# Patient Record
Sex: MALE | Race: WHITE | Employment: OTHER | URBAN - METROPOLITAN AREA
[De-identification: names, ages, dates, MRNs, and addresses within clinical notes are randomized per-mention and may not be internally consistent; named-entity substitution may affect disease eponyms.]

---

## 2017-03-28 ENCOUNTER — OFFICE VISIT (OUTPATIENT)
Dept: CARDIOLOGY CLINIC | Age: 77
End: 2017-03-28

## 2017-03-28 VITALS
WEIGHT: 173 LBS | SYSTOLIC BLOOD PRESSURE: 132 MMHG | DIASTOLIC BLOOD PRESSURE: 80 MMHG | HEIGHT: 70 IN | BODY MASS INDEX: 24.77 KG/M2 | HEART RATE: 84 BPM

## 2017-03-28 DIAGNOSIS — I49.3 FREQUENT PVCS: Primary | ICD-10-CM

## 2017-03-28 PROCEDURE — 1123F ACP DISCUSS/DSCN MKR DOCD: CPT | Performed by: INTERNAL MEDICINE

## 2017-03-28 PROCEDURE — G8427 DOCREV CUR MEDS BY ELIG CLIN: HCPCS | Performed by: INTERNAL MEDICINE

## 2017-03-28 PROCEDURE — G8484 FLU IMMUNIZE NO ADMIN: HCPCS | Performed by: INTERNAL MEDICINE

## 2017-03-28 PROCEDURE — G8420 CALC BMI NORM PARAMETERS: HCPCS | Performed by: INTERNAL MEDICINE

## 2017-03-28 PROCEDURE — 4040F PNEUMOC VAC/ADMIN/RCVD: CPT | Performed by: INTERNAL MEDICINE

## 2017-03-28 PROCEDURE — 1036F TOBACCO NON-USER: CPT | Performed by: INTERNAL MEDICINE

## 2017-03-28 PROCEDURE — 99213 OFFICE O/P EST LOW 20 MIN: CPT | Performed by: INTERNAL MEDICINE

## 2017-03-28 RX ORDER — DILTIAZEM HYDROCHLORIDE 120 MG/1
120 CAPSULE, COATED, EXTENDED RELEASE ORAL DAILY
Qty: 90 CAPSULE | Refills: 3 | Status: SHIPPED | OUTPATIENT
Start: 2017-03-28 | End: 2018-04-06 | Stop reason: SDUPTHER

## 2017-05-15 ENCOUNTER — HOSPITAL ENCOUNTER (OUTPATIENT)
Dept: LAB | Age: 77
Discharge: HOME | End: 2017-05-15
Attending: PSYCHIATRY & NEUROLOGY | Admitting: PSYCHIATRY & NEUROLOGY

## 2017-05-15 LAB
ALBUMIN SERPL-MCNC: 4.1 GM/DL (ref 3.4–5)
ALP BLD-CCNC: 33 IU/L (ref 40–129)
ALT SERPL-CCNC: 19 U/L (ref 10–40)
ANION GAP SERPL CALCULATED.3IONS-SCNC: 10 MMOL/L (ref 4–16)
AST SERPL-CCNC: 25 IU/L (ref 15–37)
BASOPHILS ABSOLUTE: 0 K/CU MM
BASOPHILS RELATIVE PERCENT: 0.3 % (ref 0–1)
BILIRUB SERPL-MCNC: 0.6 MG/DL (ref 0–1)
BUN BLDV-MCNC: 12 MG/DL (ref 6–23)
CALCIUM SERPL-MCNC: 9.5 MG/DL (ref 8.3–10.6)
CHLORIDE BLD-SCNC: 100 MMOL/L (ref 99–110)
CO2: 28 MMOL/L (ref 21–32)
CREAT SERPL-MCNC: 1 MG/DL (ref 0.9–1.3)
DIFFERENTIAL TYPE: ABNORMAL
EOSINOPHILS ABSOLUTE: 0.1 K/CU MM
EOSINOPHILS RELATIVE PERCENT: 1.6 % (ref 0–3)
FOLATE: 10.9 NG/ML (ref 3.1–17.5)
GFR AFRICAN AMERICAN: >60 ML/MIN/1.73M2
GFR NON-AFRICAN AMERICAN: >60 ML/MIN/1.73M2
GLUCOSE FASTING: 93 MG/DL (ref 70–99)
HCT VFR BLD CALC: 47.6 % (ref 42–52)
HEMOGLOBIN: 15.6 GM/DL (ref 13.5–18)
IMMATURE NEUTROPHIL %: 0.3 % (ref 0–0.43)
LYMPHOCYTES ABSOLUTE: 0.9 K/CU MM
LYMPHOCYTES RELATIVE PERCENT: 27.4 % (ref 24–44)
MCH RBC QN AUTO: 31 PG (ref 27–31)
MCHC RBC AUTO-ENTMCNC: 32.8 % (ref 32–36)
MCV RBC AUTO: 94.6 FL (ref 78–100)
MONOCYTES ABSOLUTE: 0.3 K/CU MM
MONOCYTES RELATIVE PERCENT: 10.2 % (ref 0–4)
NUCLEATED RBC %: 0 %
PDW BLD-RTO: 13.2 % (ref 11.7–14.9)
PLATELET # BLD: 190 K/CU MM (ref 140–440)
PMV BLD AUTO: 9.2 FL (ref 7.5–11.1)
POTASSIUM SERPL-SCNC: 4.4 MMOL/L (ref 3.5–5.1)
RBC # BLD: 5.03 M/CU MM (ref 4.6–6.2)
SEGMENTED NEUTROPHILS ABSOLUTE COUNT: 1.9 K/CU MM
SEGMENTED NEUTROPHILS RELATIVE PERCENT: 60.2 % (ref 36–66)
SODIUM BLD-SCNC: 138 MMOL/L (ref 135–145)
TOTAL IMMATURE NEUTOROPHIL: 0.01 K/CU MM
TOTAL NUCLEATED RBC: 0 K/CU MM
TOTAL PROTEIN: 6.2 GM/DL (ref 6.4–8.2)
VITAMIN B-12: 463.5 PG/ML (ref 211–911)
WBC # BLD: 3.1 K/CU MM (ref 4–10.5)

## 2017-05-17 LAB
ALBUMIN ELP: 3.3 GM/DL (ref 3.2–5.6)
ALPHA-1-GLOBULIN: 0.2 GM/DL (ref 0.1–0.4)
ALPHA-2-GLOBULIN: 0.7 GM/DL (ref 0.4–1.2)
BETA GLOBULIN: 1 GM/DL (ref 0.5–1.3)
GAMMA GLOBULIN: 1 GM/DL (ref 0.5–1.6)
TOTAL PROTEIN: 6.2 GM/DL (ref 6.4–8.2)

## 2017-07-09 RX ORDER — SODIUM CHLORIDE, SODIUM LACTATE, POTASSIUM CHLORIDE, CALCIUM CHLORIDE 600; 310; 30; 20 MG/100ML; MG/100ML; MG/100ML; MG/100ML
INJECTION, SOLUTION INTRAVENOUS CONTINUOUS
Status: CANCELLED | OUTPATIENT
Start: 2017-07-09

## 2017-07-10 ENCOUNTER — HOSPITAL ENCOUNTER (OUTPATIENT)
Dept: SURGERY | Age: 77
Discharge: HOME | End: 2017-07-10
Attending: SPECIALIST | Admitting: SPECIALIST

## 2017-07-10 VITALS
WEIGHT: 166 LBS | OXYGEN SATURATION: 93 % | SYSTOLIC BLOOD PRESSURE: 148 MMHG | RESPIRATION RATE: 16 BRPM | DIASTOLIC BLOOD PRESSURE: 79 MMHG | HEIGHT: 70 IN | TEMPERATURE: 97.6 F | BODY MASS INDEX: 23.77 KG/M2 | HEART RATE: 85 BPM

## 2017-07-10 ASSESSMENT — PAIN SCALES - GENERAL
PAINLEVEL_OUTOF10: 0
PAINLEVEL_OUTOF10: 0

## 2017-07-10 ASSESSMENT — PAIN - FUNCTIONAL ASSESSMENT: PAIN_FUNCTIONAL_ASSESSMENT: 0-10

## 2017-07-31 ENCOUNTER — TELEPHONE (OUTPATIENT)
Dept: CARDIOLOGY CLINIC | Age: 77
End: 2017-07-31

## 2017-07-31 DIAGNOSIS — R07.9 CHEST PAIN, UNSPECIFIED TYPE: ICD-10-CM

## 2017-07-31 DIAGNOSIS — R06.00 DYSPNEA, UNSPECIFIED TYPE: Primary | ICD-10-CM

## 2017-08-01 ENCOUNTER — HOSPITAL ENCOUNTER (OUTPATIENT)
Dept: MRI IMAGING | Age: 77
Discharge: HOME | End: 2017-08-01
Attending: PSYCHIATRY & NEUROLOGY | Admitting: PSYCHIATRY & NEUROLOGY

## 2017-08-01 DIAGNOSIS — M54.17 LUMBOSACRAL RADICULOPATHY: ICD-10-CM

## 2017-08-01 DIAGNOSIS — G62.9 ACQUIRED POLYNEUROPATHY: ICD-10-CM

## 2017-08-01 DIAGNOSIS — M54.17 RADICULOPATHY OF LUMBOSACRAL REGION: ICD-10-CM

## 2017-08-03 ENCOUNTER — PROCEDURE VISIT (OUTPATIENT)
Dept: CARDIOLOGY CLINIC | Age: 77
End: 2017-08-03

## 2017-08-03 DIAGNOSIS — R06.00 DYSPNEA, UNSPECIFIED TYPE: ICD-10-CM

## 2017-08-03 DIAGNOSIS — R07.9 CHEST PAIN, UNSPECIFIED TYPE: Primary | ICD-10-CM

## 2017-08-03 DIAGNOSIS — R07.9 CHEST PAIN, UNSPECIFIED TYPE: ICD-10-CM

## 2017-08-03 LAB
LV EF: 58 %
LV EF: 60 %
LVEF MODALITY: NORMAL
LVEF MODALITY: NORMAL

## 2017-08-03 PROCEDURE — 93015 CV STRESS TEST SUPVJ I&R: CPT | Performed by: INTERNAL MEDICINE

## 2017-08-03 PROCEDURE — 78452 HT MUSCLE IMAGE SPECT MULT: CPT | Performed by: INTERNAL MEDICINE

## 2017-08-03 PROCEDURE — A9500 TC99M SESTAMIBI: HCPCS | Performed by: INTERNAL MEDICINE

## 2017-08-03 PROCEDURE — 93306 TTE W/DOPPLER COMPLETE: CPT | Performed by: INTERNAL MEDICINE

## 2017-08-04 ENCOUNTER — TELEPHONE (OUTPATIENT)
Dept: CARDIOLOGY CLINIC | Age: 77
End: 2017-08-04

## 2017-08-30 PROBLEM — I65.21 STENOSIS OF RIGHT CAROTID ARTERY: Status: ACTIVE | Noted: 2017-08-30

## 2017-08-30 PROBLEM — R51.9 HEADACHE: Status: ACTIVE | Noted: 2017-08-30

## 2017-08-30 PROBLEM — G45.9 TIA (TRANSIENT ISCHEMIC ATTACK): Status: ACTIVE | Noted: 2017-08-30

## 2017-08-30 PROBLEM — R20.0 LEFT FACIAL NUMBNESS: Status: ACTIVE | Noted: 2017-08-30

## 2017-10-09 ENCOUNTER — HOSPITAL ENCOUNTER (OUTPATIENT)
Dept: MRI IMAGING | Age: 77
Discharge: HOME | End: 2017-10-09
Attending: PSYCHIATRY & NEUROLOGY | Admitting: PSYCHIATRY & NEUROLOGY

## 2017-10-09 DIAGNOSIS — C61 PROSTATE CA (HCC): ICD-10-CM

## 2017-10-09 DIAGNOSIS — R51.9 HEADACHE: ICD-10-CM

## 2017-11-27 ENCOUNTER — HOSPITAL ENCOUNTER (OUTPATIENT)
Dept: PULMONOLOGY | Age: 77
Discharge: HOME | End: 2017-11-27
Attending: FAMILY MEDICINE | Admitting: FAMILY MEDICINE

## 2018-04-06 ENCOUNTER — OFFICE VISIT (OUTPATIENT)
Dept: CARDIOLOGY CLINIC | Age: 78
End: 2018-04-06

## 2018-04-06 VITALS
SYSTOLIC BLOOD PRESSURE: 110 MMHG | BODY MASS INDEX: 24.88 KG/M2 | HEIGHT: 70 IN | WEIGHT: 173.8 LBS | DIASTOLIC BLOOD PRESSURE: 70 MMHG | HEART RATE: 64 BPM

## 2018-04-06 DIAGNOSIS — R07.9 CHEST PAIN, UNSPECIFIED TYPE: Primary | ICD-10-CM

## 2018-04-06 PROCEDURE — G8428 CUR MEDS NOT DOCUMENT: HCPCS | Performed by: INTERNAL MEDICINE

## 2018-04-06 PROCEDURE — 99213 OFFICE O/P EST LOW 20 MIN: CPT | Performed by: INTERNAL MEDICINE

## 2018-04-06 PROCEDURE — G8420 CALC BMI NORM PARAMETERS: HCPCS | Performed by: INTERNAL MEDICINE

## 2018-04-06 PROCEDURE — 4040F PNEUMOC VAC/ADMIN/RCVD: CPT | Performed by: INTERNAL MEDICINE

## 2018-04-06 PROCEDURE — 1123F ACP DISCUSS/DSCN MKR DOCD: CPT | Performed by: INTERNAL MEDICINE

## 2018-04-06 PROCEDURE — 1036F TOBACCO NON-USER: CPT | Performed by: INTERNAL MEDICINE

## 2018-04-06 PROCEDURE — G8598 ASA/ANTIPLAT THER USED: HCPCS | Performed by: INTERNAL MEDICINE

## 2018-04-06 RX ORDER — DILTIAZEM HYDROCHLORIDE 120 MG/1
120 CAPSULE, COATED, EXTENDED RELEASE ORAL DAILY
Qty: 90 CAPSULE | Refills: 3 | Status: SHIPPED | OUTPATIENT
Start: 2018-04-06 | End: 2019-04-02 | Stop reason: SDUPTHER

## 2018-04-10 ENCOUNTER — HOSPITAL ENCOUNTER (OUTPATIENT)
Dept: LAB | Age: 78
Discharge: HOME | End: 2018-04-10
Attending: PSYCHIATRY & NEUROLOGY | Admitting: PSYCHIATRY & NEUROLOGY

## 2018-04-10 LAB
BACTERIA: NEGATIVE /HPF
BILIRUBIN URINE: ABNORMAL MG/DL
BLOOD, URINE: NEGATIVE
CLARITY: CLEAR
COLOR: ABNORMAL
CREATININE URINE: 333.6 MG/DL (ref 39–259)
GLUCOSE, URINE: NEGATIVE MG/DL
HYALINE CASTS: 5 /LPF
ICTOTEST: NEGATIVE
KETONES, URINE: NEGATIVE MG/DL
LEUKOCYTE ESTERASE, URINE: NEGATIVE
MUCUS: ABNORMAL HPF
NITRITE URINE, QUANTITATIVE: NEGATIVE
PH, URINE: 5 (ref 5–8)
PROT/CREAT RATIO, UR: 0.1
PROTEIN UA: NEGATIVE MG/DL
RBC URINE: 2 /HPF (ref 0–3)
SPECIFIC GRAVITY UA: 1.03 (ref 1–1.03)
SQUAMOUS EPITHELIAL: <1 /HPF
TRANSITIONAL EPITHELIAL: <1 /HPF
TRICHOMONAS: ABNORMAL /HPF
URINE TOTAL PROTEIN: 31.7 MG/DL
UROBILINOGEN, URINE: 1 MG/DL (ref 0.2–1)
WBC UA: <1 /HPF (ref 0–2)

## 2018-07-01 PROBLEM — R07.9 CHEST PAIN: Status: ACTIVE | Noted: 2018-07-01

## 2018-07-01 PROBLEM — R07.9 EXERTIONAL CHEST PAIN: Status: ACTIVE | Noted: 2018-07-01

## 2018-08-03 ENCOUNTER — OFFICE VISIT (OUTPATIENT)
Dept: CARDIOLOGY CLINIC | Age: 78
End: 2018-08-03

## 2018-08-03 VITALS
WEIGHT: 168.6 LBS | DIASTOLIC BLOOD PRESSURE: 78 MMHG | BODY MASS INDEX: 24.14 KG/M2 | HEIGHT: 70 IN | SYSTOLIC BLOOD PRESSURE: 120 MMHG | HEART RATE: 72 BPM

## 2018-08-03 DIAGNOSIS — R07.2 PRECORDIAL PAIN: ICD-10-CM

## 2018-08-03 DIAGNOSIS — E78.2 MIXED HYPERLIPIDEMIA: Primary | ICD-10-CM

## 2018-08-03 PROCEDURE — G8420 CALC BMI NORM PARAMETERS: HCPCS | Performed by: INTERNAL MEDICINE

## 2018-08-03 PROCEDURE — G8427 DOCREV CUR MEDS BY ELIG CLIN: HCPCS | Performed by: INTERNAL MEDICINE

## 2018-08-03 PROCEDURE — G8598 ASA/ANTIPLAT THER USED: HCPCS | Performed by: INTERNAL MEDICINE

## 2018-08-03 PROCEDURE — 99214 OFFICE O/P EST MOD 30 MIN: CPT | Performed by: INTERNAL MEDICINE

## 2018-08-03 PROCEDURE — 1101F PT FALLS ASSESS-DOCD LE1/YR: CPT | Performed by: INTERNAL MEDICINE

## 2018-08-03 PROCEDURE — 1123F ACP DISCUSS/DSCN MKR DOCD: CPT | Performed by: INTERNAL MEDICINE

## 2018-08-03 PROCEDURE — 4040F PNEUMOC VAC/ADMIN/RCVD: CPT | Performed by: INTERNAL MEDICINE

## 2018-08-03 PROCEDURE — 1036F TOBACCO NON-USER: CPT | Performed by: INTERNAL MEDICINE

## 2018-08-03 NOTE — PROGRESS NOTES
Pt here in office & educated on [facility] for Dx: CAD, chest pain, scheduled for 8/6/2018 @ 1400, w/arrival @ 1200, @ [facility]; risks explained; & consents signed. No labs, cxr per dr Nikhil Olson. Instructions given to pt to:  NPO 8 hrs before procedure; Call hospital @ [phone] to pre-register; May take rest of morning meds. am of procedure; & pt voiced understanding. Copies of consent,  & info. sheet given to Geni.

## 2018-08-03 NOTE — PROGRESS NOTES
CARDIOLOGY NOTE      8/3/2018    RE: Michelle Cabrera  (1940)                               TO:  Dr. Heriberto Castro MD      Thank you for involving me in taking care of your  patient Michelle Cabrera, who is a  77y.o. year old      male with past medical  history of  hyperlipidimea,  afib , TIA, Car artery stenosis is  seen today Patient  during this  visit  getstremely tired while working outside gets hypotensive , still has pressure. Normal stress and echo 7/2/18    Vitals:    08/03/18 0920   BP: 120/78   Pulse: 72       Current Outpatient Prescriptions   Medication Sig Dispense Refill    diltiazem (CARDIZEM CD) 120 MG extended release capsule Take 1 capsule by mouth daily 90 capsule 3    acetaminophen (TYLENOL) 325 MG tablet Take 2 tablets by mouth every 4 hours as needed for Pain 120 tablet 3    clopidogrel (PLAVIX) 75 MG tablet Take 1 tablet by mouth daily 30 tablet 3    gabapentin (NEURONTIN) 300 MG capsule Take 300 mg by mouth 2 times daily. 0    vitamin B-12 (CYANOCOBALAMIN) 500 MCG tablet Take 500 mcg by mouth daily.  traMADol (ULTRAM) 50 MG tablet Take 50 mg by mouth every 6 hours as needed for Pain.  omeprazole (PRILOSEC) 20 MG capsule Take 20 mg by mouth daily. No current facility-administered medications for this visit. Allergies: Fenofibrate; Lipitor; and Pravachol [pravastatin sodium]  Past Medical History:   Diagnosis Date    Anesthesia complication     Patient reports in 1973 following a Back Surgery he was kept in the hospital due to high temperatures in the evenings. He stated they kept him for observation and doesn't remember receiving any treatment for the high temperatures. Patient reports the anesthesia name started with flur? ?.    CAD (coronary artery disease)     4-29-14 Patient unsure of reason for Diagnosis. Pt reports having a MVP but had clear Cardiac Cath. report.     Cancer [facility]) 6-2011    Prostate - Surgical Intervention    GERD (gastroesophageal reflux disease)     H/O 24 hour EKG monitoring 3/12  12/11    3/12/12  essentially a normal event monitor without significant arrhythmias noted    H/O cardiac catheterization 2/2/12 4/02 10/98    2/22/12  LM, LAD and RCA all without significant disease    H/O cardiac catheterization 2/2/12 4/02 10/98    Done R/T false pos on stress test.    H/O echocardiogram 12/10      3/14 EF55-60% normal LV function 12/10/10  complete 2 dim transthoracic echj. LVSF normal  EF >55%, transmitral spectral Doppler flow pattern is suggestive of  impaired LV relaxation    History of cardiovascular stress test 2/12 11/09 7/08 9/06 2/02 4/00 8/99 2/8/2012  evidence of mild ischemia  in the RCA. abnormal study , restingEF is 70%, global LVSF is  normal    History of Holter monitoring 9/17/14    14 day EVENT - no RVR    History of nuclear stress test 08/03/2017    cardiolite-normal,EF60%    Hx of chest x-ray 2/20/12    chest is considereed non acute. COPD    Hx of echocardiogram 08/03/2017    TR97-71%,VPVVJ 2 diastolic dysfunction    Hyperlipidemia     MVP (mitral valve prolapse)      Past Surgical History:   Procedure Laterality Date    BACK SURGERY  1973, 2003    CARDIAC CATHETERIZATION  2/2/12    EF 55% No sugnificant disease.     COLONOSCOPY  5/5/14    diverticulosis, 1 polyp, hemorrhoids    COLONOSCOPY  07/10/2017    single 5 mm polyp found in sigmoid colon, moderate diverticulosis found in sigmoid colon    PROSTATE SURGERY  6/2011    ROTATOR CUFF REPAIR      ROTATOR CUFF REPAIR Right      Family History   Problem Relation Age of Onset    Heart Disease Mother     Heart Disease Father     Heart Disease Brother      Social History   Substance Use Topics    Smoking status: Never Smoker    Smokeless tobacco: Never Used    Alcohol use Yes      Comment: Occassional          Review of systems:  HEENT: Neg  Card: CP  GI;Neg  : Neg  Neuro: Neg  Psych: Neg  Derm: Neg  MS; Neg  All:

## 2018-08-15 ENCOUNTER — OFFICE VISIT (OUTPATIENT)
Dept: CARDIOLOGY CLINIC | Age: 78
End: 2018-08-15

## 2018-08-15 ENCOUNTER — TELEPHONE (OUTPATIENT)
Dept: CARDIOLOGY CLINIC | Age: 78
End: 2018-08-15

## 2018-08-15 VITALS
HEIGHT: 70 IN | SYSTOLIC BLOOD PRESSURE: 126 MMHG | HEART RATE: 64 BPM | DIASTOLIC BLOOD PRESSURE: 78 MMHG | BODY MASS INDEX: 24.2 KG/M2 | WEIGHT: 169 LBS

## 2018-08-15 DIAGNOSIS — R07.2 PRECORDIAL PAIN: Primary | ICD-10-CM

## 2018-08-15 PROCEDURE — G8420 CALC BMI NORM PARAMETERS: HCPCS | Performed by: INTERNAL MEDICINE

## 2018-08-15 PROCEDURE — 1101F PT FALLS ASSESS-DOCD LE1/YR: CPT | Performed by: INTERNAL MEDICINE

## 2018-08-15 PROCEDURE — 1036F TOBACCO NON-USER: CPT | Performed by: INTERNAL MEDICINE

## 2018-08-15 PROCEDURE — 1123F ACP DISCUSS/DSCN MKR DOCD: CPT | Performed by: INTERNAL MEDICINE

## 2018-08-15 PROCEDURE — G8598 ASA/ANTIPLAT THER USED: HCPCS | Performed by: INTERNAL MEDICINE

## 2018-08-15 PROCEDURE — 99213 OFFICE O/P EST LOW 20 MIN: CPT | Performed by: INTERNAL MEDICINE

## 2018-08-15 PROCEDURE — 4040F PNEUMOC VAC/ADMIN/RCVD: CPT | Performed by: INTERNAL MEDICINE

## 2018-08-15 PROCEDURE — G8427 DOCREV CUR MEDS BY ELIG CLIN: HCPCS | Performed by: INTERNAL MEDICINE

## 2018-08-15 NOTE — PROGRESS NOTES
Cardiac Cath. report.  Cancer [facility]) 6-2011    Prostate - Surgical Intervention    GERD (gastroesophageal reflux disease)     H/O 24 hour EKG monitoring 3/12  12/11    3/12/12  essentially a normal event monitor without significant arrhythmias noted    H/O cardiac catheterization 2/2/12 4/02 10/98    2/22/12  LM, LAD and RCA all without significant disease    H/O cardiac catheterization 2/2/12 4/02 10/98    Done R/T false pos on stress test.    H/O cardiac catheterization 08/06/2018    normal study    H/O cardiovascular stress test 07/02/2018    normal study    H/O echocardiogram 12/10      3/14 EF55-60% normal LV function 12/10/10  complete 2 dim transthoracic echj. LVSF normal  EF >55%, transmitral spectral Doppler flow pattern is suggestive of  impaired LV relaxation    History of cardiovascular stress test 2/12 11/09 7/08 9/06 2/02 4/00 8/99 2/8/2012  evidence of mild ischemia  in the RCA. abnormal study , restingEF is 70%, global LVSF is  normal    History of Holter monitoring 9/17/14    14 day EVENT - no RVR    History of nuclear stress test 08/03/2017    cardiolite-normal,EF60%    Hx of chest x-ray 2/20/12    chest is considereed non acute. COPD    Hx of echocardiogram 08/03/2017    CH97-71%,FICFN 2 diastolic dysfunction    Hyperlipidemia     MVP (mitral valve prolapse)      Past Surgical History:   Procedure Laterality Date    BACK SURGERY  1973, 2003    CARDIAC CATHETERIZATION  2/2/12    EF 55% No sugnificant disease.     COLONOSCOPY  5/5/14    diverticulosis, 1 polyp, hemorrhoids    COLONOSCOPY  07/10/2017    single 5 mm polyp found in sigmoid colon, moderate diverticulosis found in sigmoid colon    PROSTATE SURGERY  6/2011    ROTATOR CUFF REPAIR      ROTATOR CUFF REPAIR Right      Family History   Problem Relation Age of Onset    Heart Disease Mother     Heart Disease Father     Heart Disease Brother      Social History   Substance Use Topics    Smoking status: Never Smoker  Smokeless tobacco: Never Used    Alcohol use Yes      Comment: Occassional          Review of systems:  HEENT: Neg  Card: CP  GI;Neg  : Neg  Neuro: Neg  Psych: Neg  Derm: Neg  MS; Neg  All: Documented  Constitutional: Neg    Objective:      Physical Exam:  /78   Pulse 64   Ht 5' 10\" (1.778 m)   Wt 169 lb (76.7 kg)   BMI 24.25 kg/m²   Wt Readings from Last 3 Encounters:   08/15/18 169 lb (76.7 kg)   08/08/18 168 lb (76.2 kg)   08/06/18 168 lb (76.2 kg)     Body mass index is 24.25 kg/m². GENERAL - Alert, oriented, pleasant, in no apparent distress. Head unremarkable  Eyes  Not injected conjunctiva  ENT  normal mucosa  Neck - Supple. No jugular venous distention noted. No carotid bruits. Cardiovascular  Normal S1 and S2 without obvious murmur or gallop. Extremities - No cyanosis, clubbing, or significant edema. Pulmonary  No respiratory distress. No wheezes or rales. Pulses: Bilateral radial and pedal pulses normal  Abdomen  no tenderness  Musculoskeletal  normal strength  Neurologic    There are  no gross focal neurologic abnormalities. Skin-  No rash  Affect; normal mood    DATA:  Lab Results   Component Value Date    CKTOTAL 197 08/08/2018    TROPONINI 0.010 03/28/2014     BNP:  No results found for: BNP  PT/INR:  No results found for: PTINR  No results found for: LABA1C  Lab Results   Component Value Date    CHOL 175 07/02/2018    TRIG 168 (H) 07/02/2018    HDL 33 (L) 07/02/2018    LDLDIRECT 128 (H) 07/02/2018     Lab Results   Component Value Date    ALT 16 08/08/2018    AST 21 08/08/2018     TSH:  No results found for: TSH          Assessment/ Plan:     Patient seen , interviewed and examined     PLAN:        - has normal LHC, has CP, CT chest    -  LIPID MANAGEMENT:  Available lipid  lab data reviewed  and patient was given dietary advice.     -   Changes  in medicines made: No     -  Test ordered today : No                      - arrythmia stable no a fib  CPM    - TIA

## 2018-08-16 ENCOUNTER — HOSPITAL ENCOUNTER (OUTPATIENT)
Dept: CT IMAGING | Age: 78
Discharge: HOME | End: 2018-08-16
Attending: INTERNAL MEDICINE | Admitting: INTERNAL MEDICINE

## 2018-08-16 DIAGNOSIS — R07.2 PRECORDIAL PAIN: ICD-10-CM

## 2018-08-17 ENCOUNTER — TELEPHONE (OUTPATIENT)
Dept: CARDIOLOGY CLINIC | Age: 78
End: 2018-08-17

## 2018-10-30 ENCOUNTER — HOSPITAL ENCOUNTER (OUTPATIENT)
Dept: CT IMAGING | Age: 78
Discharge: HOME | End: 2018-10-30
Payer: MEDICARE

## 2018-10-30 ENCOUNTER — HOSPITAL ENCOUNTER (OUTPATIENT)
Dept: NON INVASIVE DIAGNOSTICS | Age: 78
Discharge: HOME | End: 2018-10-30
Payer: MEDICARE

## 2018-10-30 DIAGNOSIS — R00.2 AWARENESS OF HEART BEAT: ICD-10-CM

## 2018-10-30 DIAGNOSIS — I95.1 AUTONOMIC ORTHOSTATIC HYPOTENSION: ICD-10-CM

## 2018-10-30 LAB
GFR AFRICAN AMERICAN: >60 ML/MIN/1.73M2
GFR NON-AFRICAN AMERICAN: >60 ML/MIN/1.73M2
POC CREATININE: 1 MG/DL (ref 0.9–1.3)

## 2018-10-30 PROCEDURE — 70498 CT ANGIOGRAPHY NECK: CPT

## 2018-10-30 PROCEDURE — 93225 XTRNL ECG REC<48 HRS REC: CPT

## 2018-10-30 PROCEDURE — 6360000004 HC RX CONTRAST MEDICATION

## 2018-10-30 PROCEDURE — 2580000003 HC RX 258

## 2018-10-30 PROCEDURE — 93226 XTRNL ECG REC<48 HR SCAN A/R: CPT

## 2018-10-30 RX ORDER — 0.9 % SODIUM CHLORIDE 0.9 %
10 VIAL (ML) INJECTION PRN
Status: DISCONTINUED | OUTPATIENT
Start: 2018-10-30 | End: 2018-10-31 | Stop reason: HOSPADM

## 2018-10-30 RX ADMIN — SODIUM CHLORIDE, PRESERVATIVE FREE 10 ML: 5 INJECTION INTRAVENOUS at 11:51

## 2018-10-30 RX ADMIN — IOPAMIDOL 80 ML: 755 INJECTION, SOLUTION INTRAVENOUS at 11:50

## 2018-11-07 ENCOUNTER — APPOINTMENT (OUTPATIENT)
Dept: CT IMAGING | Age: 78
End: 2018-11-07
Payer: MEDICARE

## 2018-11-07 ENCOUNTER — HOSPITAL ENCOUNTER (EMERGENCY)
Age: 78
Discharge: HOME | End: 2018-11-07
Attending: EMERGENCY MEDICINE
Payer: MEDICARE

## 2018-11-07 VITALS
RESPIRATION RATE: 16 BRPM | BODY MASS INDEX: 24.34 KG/M2 | OXYGEN SATURATION: 98 % | WEIGHT: 170 LBS | TEMPERATURE: 97.6 F | DIASTOLIC BLOOD PRESSURE: 39 MMHG | HEART RATE: 86 BPM | SYSTOLIC BLOOD PRESSURE: 100 MMHG | HEIGHT: 70 IN

## 2018-11-07 DIAGNOSIS — R10.84 GENERALIZED ABDOMINAL PAIN: Primary | ICD-10-CM

## 2018-11-07 DIAGNOSIS — R19.7 DIARRHEA, UNSPECIFIED TYPE: ICD-10-CM

## 2018-11-07 DIAGNOSIS — R11.2 NON-INTRACTABLE VOMITING WITH NAUSEA, UNSPECIFIED VOMITING TYPE: ICD-10-CM

## 2018-11-07 LAB
ALBUMIN SERPL-MCNC: 4.1 GM/DL (ref 3.4–5)
ALP BLD-CCNC: 31 IU/L (ref 40–128)
ALT SERPL-CCNC: 24 U/L (ref 10–40)
ANION GAP SERPL CALCULATED.3IONS-SCNC: 12 MMOL/L (ref 4–16)
AST SERPL-CCNC: 29 IU/L (ref 15–37)
BACTERIA: NEGATIVE /HPF
BASOPHILS ABSOLUTE: 0 K/CU MM
BASOPHILS RELATIVE PERCENT: 0.1 % (ref 0–1)
BILIRUB SERPL-MCNC: 0.7 MG/DL (ref 0–1)
BILIRUBIN URINE: ABNORMAL MG/DL
BLOOD, URINE: NEGATIVE
BUN BLDV-MCNC: 23 MG/DL (ref 6–23)
CALCIUM SERPL-MCNC: 9.3 MG/DL (ref 8.3–10.6)
CHLORIDE BLD-SCNC: 99 MMOL/L (ref 99–110)
CLARITY: CLEAR
CO2: 26 MMOL/L (ref 21–32)
COLOR: ABNORMAL
CREAT SERPL-MCNC: 1.2 MG/DL (ref 0.9–1.3)
DIFFERENTIAL TYPE: ABNORMAL
EOSINOPHILS ABSOLUTE: 0 K/CU MM
EOSINOPHILS RELATIVE PERCENT: 0.4 % (ref 0–3)
GFR AFRICAN AMERICAN: >60 ML/MIN/1.73M2
GFR NON-AFRICAN AMERICAN: 58 ML/MIN/1.73M2
GLUCOSE BLD-MCNC: 130 MG/DL (ref 70–99)
GLUCOSE, URINE: NEGATIVE MG/DL
HCT VFR BLD CALC: 49.8 % (ref 42–52)
HEMOGLOBIN: 16.2 GM/DL (ref 13.5–18)
ICTOTEST: POSITIVE
IMMATURE NEUTROPHIL %: 0.3 % (ref 0–0.43)
KETONES, URINE: NEGATIVE MG/DL
LACTATE: 1.5 MMOL/L (ref 0.4–2)
LEUKOCYTE ESTERASE, URINE: NEGATIVE
LIPASE: 22 IU/L (ref 13–60)
LYMPHOCYTES ABSOLUTE: 0.3 K/CU MM
LYMPHOCYTES RELATIVE PERCENT: 3.5 % (ref 24–44)
MCH RBC QN AUTO: 32.5 PG (ref 27–31)
MCHC RBC AUTO-ENTMCNC: 32.5 % (ref 32–36)
MCV RBC AUTO: 99.8 FL (ref 78–100)
MONOCYTES ABSOLUTE: 0.4 K/CU MM
MONOCYTES RELATIVE PERCENT: 5 % (ref 0–4)
MUCUS: ABNORMAL HPF
NITRITE URINE, QUANTITATIVE: NEGATIVE
PDW BLD-RTO: 12.5 % (ref 11.7–14.9)
PH, URINE: 5 (ref 5–8)
PLATELET # BLD: 236 K/CU MM (ref 140–440)
PMV BLD AUTO: 9.2 FL (ref 7.5–11.1)
POTASSIUM SERPL-SCNC: 4.5 MMOL/L (ref 3.5–5.1)
PROTEIN UA: 100 MG/DL
RBC # BLD: 4.99 M/CU MM (ref 4.6–6.2)
RBC URINE: 1 /HPF (ref 0–3)
SEGMENTED NEUTROPHILS ABSOLUTE COUNT: 6.7 K/CU MM
SEGMENTED NEUTROPHILS RELATIVE PERCENT: 90.7 % (ref 36–66)
SODIUM BLD-SCNC: 137 MMOL/L (ref 135–145)
SPECIFIC GRAVITY UA: 1.03 (ref 1–1.03)
SQUAMOUS EPITHELIAL: <1 /HPF
TOTAL IMMATURE NEUTOROPHIL: 0.02 K/CU MM
TOTAL PROTEIN: 6.8 GM/DL (ref 6.4–8.2)
TRICHOMONAS: ABNORMAL /HPF
UROBILINOGEN, URINE: 1 MG/DL (ref 0.2–1)
WBC # BLD: 7.4 K/CU MM (ref 4–10.5)
WBC UA: 1 /HPF (ref 0–2)

## 2018-11-07 PROCEDURE — 96374 THER/PROPH/DIAG INJ IV PUSH: CPT

## 2018-11-07 PROCEDURE — 93010 ELECTROCARDIOGRAM REPORT: CPT | Performed by: INTERNAL MEDICINE

## 2018-11-07 PROCEDURE — 83690 ASSAY OF LIPASE: CPT

## 2018-11-07 PROCEDURE — 99284 EMERGENCY DEPT VISIT MOD MDM: CPT

## 2018-11-07 PROCEDURE — 74177 CT ABD & PELVIS W/CONTRAST: CPT

## 2018-11-07 PROCEDURE — 83605 ASSAY OF LACTIC ACID: CPT

## 2018-11-07 PROCEDURE — 93005 ELECTROCARDIOGRAM TRACING: CPT | Performed by: EMERGENCY MEDICINE

## 2018-11-07 PROCEDURE — 96361 HYDRATE IV INFUSION ADD-ON: CPT

## 2018-11-07 PROCEDURE — 6360000004 HC RX CONTRAST MEDICATION: Performed by: EMERGENCY MEDICINE

## 2018-11-07 PROCEDURE — 2580000003 HC RX 258: Performed by: EMERGENCY MEDICINE

## 2018-11-07 PROCEDURE — 81001 URINALYSIS AUTO W/SCOPE: CPT

## 2018-11-07 PROCEDURE — 6360000002 HC RX W HCPCS: Performed by: EMERGENCY MEDICINE

## 2018-11-07 PROCEDURE — 85025 COMPLETE CBC W/AUTO DIFF WBC: CPT

## 2018-11-07 PROCEDURE — 80053 COMPREHEN METABOLIC PANEL: CPT

## 2018-11-07 RX ORDER — 0.9 % SODIUM CHLORIDE 0.9 %
10 VIAL (ML) INJECTION
Status: COMPLETED | OUTPATIENT
Start: 2018-11-07 | End: 2018-11-07

## 2018-11-07 RX ORDER — ONDANSETRON 2 MG/ML
4 INJECTION INTRAMUSCULAR; INTRAVENOUS
Status: COMPLETED | OUTPATIENT
Start: 2018-11-07 | End: 2018-11-07

## 2018-11-07 RX ORDER — 0.9 % SODIUM CHLORIDE 0.9 %
1000 INTRAVENOUS SOLUTION INTRAVENOUS ONCE
Status: COMPLETED | OUTPATIENT
Start: 2018-11-07 | End: 2018-11-07

## 2018-11-07 RX ADMIN — IOPAMIDOL 80 ML: 755 INJECTION, SOLUTION INTRAVENOUS at 08:32

## 2018-11-07 RX ADMIN — SODIUM CHLORIDE 1000 ML: 9 INJECTION, SOLUTION INTRAVENOUS at 07:18

## 2018-11-07 RX ADMIN — SODIUM CHLORIDE, PRESERVATIVE FREE 10 ML: 5 INJECTION INTRAVENOUS at 08:32

## 2018-11-07 RX ADMIN — ONDANSETRON HYDROCHLORIDE 4 MG: 2 INJECTION, SOLUTION INTRAMUSCULAR; INTRAVENOUS at 08:05

## 2018-11-07 ASSESSMENT — PAIN DESCRIPTION - DESCRIPTORS: DESCRIPTORS: SHARP

## 2018-11-07 ASSESSMENT — PAIN DESCRIPTION - ORIENTATION: ORIENTATION: LOWER

## 2018-11-07 ASSESSMENT — PAIN DESCRIPTION - PAIN TYPE: TYPE: ACUTE PAIN

## 2018-11-07 ASSESSMENT — PAIN SCALES - GENERAL: PAINLEVEL_OUTOF10: 8

## 2018-11-07 ASSESSMENT — PAIN DESCRIPTION - LOCATION: LOCATION: ABDOMEN

## 2018-11-07 NOTE — ED TRIAGE NOTES
Pt presents to ED for c.o generalized abd pain throughout the night. Reports nausea and emesis x1. Reports 3 episodes of diarrhea. Denies urinary complaints. Pt a&ox4 speaking clear complete sentences.

## 2018-11-08 LAB
EKG ATRIAL RATE: 86 BPM
EKG DIAGNOSIS: NORMAL
EKG P AXIS: 62 DEGREES
EKG P-R INTERVAL: 168 MS
EKG Q-T INTERVAL: 360 MS
EKG QRS DURATION: 84 MS
EKG QTC CALCULATION (BAZETT): 430 MS
EKG R AXIS: -19 DEGREES
EKG T AXIS: 62 DEGREES
EKG VENTRICULAR RATE: 86 BPM

## 2019-03-11 ENCOUNTER — HOSPITAL ENCOUNTER (EMERGENCY)
Age: 79
Discharge: HOME | End: 2019-03-11
Payer: MEDICARE

## 2019-03-11 ENCOUNTER — APPOINTMENT (OUTPATIENT)
Dept: GENERAL RADIOLOGY | Age: 79
End: 2019-03-11
Payer: MEDICARE

## 2019-03-11 VITALS
HEIGHT: 70 IN | HEART RATE: 71 BPM | WEIGHT: 175 LBS | OXYGEN SATURATION: 95 % | DIASTOLIC BLOOD PRESSURE: 83 MMHG | RESPIRATION RATE: 13 BRPM | SYSTOLIC BLOOD PRESSURE: 125 MMHG | BODY MASS INDEX: 25.05 KG/M2 | TEMPERATURE: 97.9 F

## 2019-03-11 DIAGNOSIS — M79.605 LEFT LEG PAIN: Primary | ICD-10-CM

## 2019-03-11 PROCEDURE — 73552 X-RAY EXAM OF FEMUR 2/>: CPT

## 2019-03-11 PROCEDURE — 99283 EMERGENCY DEPT VISIT LOW MDM: CPT

## 2019-03-11 PROCEDURE — 73501 X-RAY EXAM HIP UNI 1 VIEW: CPT

## 2019-03-11 PROCEDURE — 6370000000 HC RX 637 (ALT 250 FOR IP): Performed by: PHYSICIAN ASSISTANT

## 2019-03-11 RX ORDER — HYDROCODONE BITARTRATE AND ACETAMINOPHEN 5; 325 MG/1; MG/1
1 TABLET ORAL ONCE
Status: COMPLETED | OUTPATIENT
Start: 2019-03-11 | End: 2019-03-11

## 2019-03-11 RX ORDER — HYDROCODONE BITARTRATE AND ACETAMINOPHEN 5; 325 MG/1; MG/1
1 TABLET ORAL EVERY 6 HOURS PRN
Qty: 10 TABLET | Refills: 0 | Status: SHIPPED | OUTPATIENT
Start: 2019-03-11 | End: 2019-03-14

## 2019-03-11 RX ADMIN — HYDROCODONE BITARTRATE AND ACETAMINOPHEN 1 TABLET: 5; 325 TABLET ORAL at 16:01

## 2019-03-11 ASSESSMENT — PAIN DESCRIPTION - ORIENTATION: ORIENTATION: LEFT;POSTERIOR

## 2019-03-11 ASSESSMENT — PAIN SCALES - GENERAL
PAINLEVEL_OUTOF10: 10
PAINLEVEL_OUTOF10: 8

## 2019-03-11 ASSESSMENT — PAIN DESCRIPTION - LOCATION: LOCATION: LEG

## 2019-03-11 ASSESSMENT — PAIN DESCRIPTION - PAIN TYPE: TYPE: ACUTE PAIN

## 2019-04-02 RX ORDER — DILTIAZEM HYDROCHLORIDE 120 MG/1
120 CAPSULE, COATED, EXTENDED RELEASE ORAL DAILY
Qty: 30 CAPSULE | Refills: 5 | Status: SHIPPED | OUTPATIENT
Start: 2019-04-02 | End: 2019-09-24 | Stop reason: SDUPTHER

## 2019-04-18 LAB
T4 FREE: 1.3 NG/DL (ref 0.9–1.8)
TSH SERPL DL<=0.05 MIU/L-ACNC: 0.56 UIU/ML (ref 0.27–4.2)

## 2019-07-07 DIAGNOSIS — C43.30 MALIGNANT MELANOMA OF SKIN OF FACE (HCC): ICD-10-CM

## 2019-07-07 DIAGNOSIS — N28.1 PERIPELVIC (LYMPHATIC) CYST: ICD-10-CM

## 2019-07-07 DIAGNOSIS — N20.0 KIDNEY STONE: ICD-10-CM

## 2019-07-07 DIAGNOSIS — M62.89 MYOTONIA: ICD-10-CM

## 2019-07-07 DIAGNOSIS — N13.8 BPH WITH OBSTRUCTION/LOWER URINARY TRACT SYMPTOMS: ICD-10-CM

## 2019-07-07 DIAGNOSIS — E53.8 VITAMIN B12 DEFICIENCY: ICD-10-CM

## 2019-07-07 DIAGNOSIS — G60.9 IDIOPATHIC PERIPHERAL NEUROPATHY: ICD-10-CM

## 2019-07-07 DIAGNOSIS — N40.1 BPH WITH OBSTRUCTION/LOWER URINARY TRACT SYMPTOMS: ICD-10-CM

## 2019-07-07 DIAGNOSIS — M54.16 LUMBAR RADICULOPATHY: ICD-10-CM

## 2019-07-07 RX ORDER — MULTIVIT-MIN/IRON/FOLIC ACID/K 18-600-40
2 CAPSULE ORAL DAILY
COMMUNITY
End: 2019-07-22

## 2019-07-07 RX ORDER — VITAMIN B COMPLEX
2 CAPSULE ORAL DAILY
COMMUNITY
End: 2019-07-22

## 2019-07-07 RX ORDER — ALBUTEROL SULFATE 90 UG/1
2 AEROSOL, METERED RESPIRATORY (INHALATION) 4 TIMES DAILY PRN
COMMUNITY
End: 2019-07-22

## 2019-07-07 RX ORDER — PANTOPRAZOLE SODIUM 40 MG/1
40 TABLET, DELAYED RELEASE ORAL EVERY MORNING
COMMUNITY
End: 2019-11-08 | Stop reason: SDUPTHER

## 2019-07-07 RX ORDER — FLUOROURACIL 50 MG/G
CREAM TOPICAL
COMMUNITY
End: 2019-07-22

## 2019-07-07 RX ORDER — NITROGLYCERIN 0.3 MG/1
0.3 TABLET SUBLINGUAL EVERY 5 MIN PRN
COMMUNITY
End: 2021-11-18

## 2019-07-07 RX ORDER — LEVOTHYROXINE SODIUM 0.05 MG/1
50 TABLET ORAL EVERY MORNING
COMMUNITY
End: 2019-11-08 | Stop reason: SDUPTHER

## 2019-07-22 ENCOUNTER — OFFICE VISIT (OUTPATIENT)
Dept: FAMILY MEDICINE CLINIC | Age: 79
End: 2019-07-22
Payer: MEDICARE

## 2019-07-22 VITALS
HEART RATE: 76 BPM | BODY MASS INDEX: 23.56 KG/M2 | SYSTOLIC BLOOD PRESSURE: 110 MMHG | HEIGHT: 70 IN | WEIGHT: 164.6 LBS | DIASTOLIC BLOOD PRESSURE: 80 MMHG

## 2019-07-22 DIAGNOSIS — Z23 NEED FOR PROPHYLACTIC VACCINATION AND INOCULATION AGAINST VARICELLA: ICD-10-CM

## 2019-07-22 DIAGNOSIS — G62.9 PERIPHERAL POLYNEUROPATHY: ICD-10-CM

## 2019-07-22 DIAGNOSIS — G89.29 CHRONIC BILATERAL LOW BACK PAIN WITH BILATERAL SCIATICA: Primary | Chronic | ICD-10-CM

## 2019-07-22 DIAGNOSIS — M54.42 CHRONIC BILATERAL LOW BACK PAIN WITH BILATERAL SCIATICA: Primary | Chronic | ICD-10-CM

## 2019-07-22 DIAGNOSIS — M54.41 CHRONIC BILATERAL LOW BACK PAIN WITH BILATERAL SCIATICA: Primary | Chronic | ICD-10-CM

## 2019-07-22 PROBLEM — R07.2 PRECORDIAL PAIN: Status: RESOLVED | Noted: 2018-08-03 | Resolved: 2019-07-22

## 2019-07-22 PROBLEM — R51.9 HEADACHE: Chronic | Status: ACTIVE | Noted: 2017-08-30

## 2019-07-22 PROBLEM — K57.90 DIVERTICULOSIS: Chronic | Status: ACTIVE | Noted: 2019-07-22

## 2019-07-22 PROBLEM — Z85.46 H/O PROSTATE CANCER: Status: ACTIVE | Noted: 2019-07-22

## 2019-07-22 PROBLEM — Z86.19 HISTORY OF HELICOBACTER PYLORI INFECTION: Chronic | Status: ACTIVE | Noted: 2019-07-22

## 2019-07-22 PROBLEM — E03.9 ACQUIRED HYPOTHYROIDISM: Chronic | Status: ACTIVE | Noted: 2019-07-22

## 2019-07-22 PROBLEM — J44.9 COPD (CHRONIC OBSTRUCTIVE PULMONARY DISEASE) (HCC): Status: ACTIVE | Noted: 2019-07-22

## 2019-07-22 PROBLEM — Z85.46 H/O PROSTATE CANCER: Chronic | Status: ACTIVE | Noted: 2019-07-22

## 2019-07-22 PROBLEM — R07.9 CHEST PAIN: Status: RESOLVED | Noted: 2018-07-01 | Resolved: 2019-07-22

## 2019-07-22 PROBLEM — J44.9 COPD (CHRONIC OBSTRUCTIVE PULMONARY DISEASE) (HCC): Chronic | Status: ACTIVE | Noted: 2019-07-22

## 2019-07-22 PROBLEM — E03.9 ACQUIRED HYPOTHYROIDISM: Status: ACTIVE | Noted: 2019-07-22

## 2019-07-22 PROBLEM — R20.0 LEFT FACIAL NUMBNESS: Status: RESOLVED | Noted: 2017-08-30 | Resolved: 2019-07-22

## 2019-07-22 PROBLEM — K57.90 DIVERTICULOSIS: Status: ACTIVE | Noted: 2019-07-22

## 2019-07-22 PROBLEM — Z86.19 HISTORY OF HELICOBACTER PYLORI INFECTION: Status: ACTIVE | Noted: 2019-07-22

## 2019-07-22 PROCEDURE — 99213 OFFICE O/P EST LOW 20 MIN: CPT | Performed by: PHYSICIAN ASSISTANT

## 2019-07-22 RX ORDER — TRAMADOL HYDROCHLORIDE 50 MG/1
100 TABLET ORAL 2 TIMES DAILY PRN
Qty: 120 TABLET | Refills: 0 | Status: SHIPPED | OUTPATIENT
Start: 2019-07-22 | End: 2019-08-21

## 2019-07-22 RX ORDER — LEVOTHYROXINE SODIUM 0.05 MG/1
50 TABLET ORAL EVERY MORNING
Qty: 90 TABLET | Refills: 1 | Status: CANCELLED | OUTPATIENT
Start: 2019-07-22

## 2019-07-22 RX ORDER — GABAPENTIN 300 MG/1
300 CAPSULE ORAL 3 TIMES DAILY
Qty: 270 CAPSULE | Refills: 0 | Status: SHIPPED | OUTPATIENT
Start: 2019-07-22 | End: 2019-11-08 | Stop reason: SDUPTHER

## 2019-07-22 ASSESSMENT — PATIENT HEALTH QUESTIONNAIRE - PHQ9
1. LITTLE INTEREST OR PLEASURE IN DOING THINGS: 0
2. FEELING DOWN, DEPRESSED OR HOPELESS: 0
SUM OF ALL RESPONSES TO PHQ QUESTIONS 1-9: 0
SUM OF ALL RESPONSES TO PHQ QUESTIONS 1-9: 0
SUM OF ALL RESPONSES TO PHQ9 QUESTIONS 1 & 2: 0

## 2019-07-22 ASSESSMENT — ENCOUNTER SYMPTOMS: BACK PAIN: 1

## 2019-09-24 ENCOUNTER — OFFICE VISIT (OUTPATIENT)
Dept: CARDIOLOGY CLINIC | Age: 79
End: 2019-09-24
Payer: MEDICARE

## 2019-09-24 VITALS
HEIGHT: 70 IN | SYSTOLIC BLOOD PRESSURE: 122 MMHG | BODY MASS INDEX: 23.48 KG/M2 | DIASTOLIC BLOOD PRESSURE: 74 MMHG | WEIGHT: 164 LBS | HEART RATE: 66 BPM

## 2019-09-24 DIAGNOSIS — I10 ESSENTIAL HYPERTENSION: Primary | ICD-10-CM

## 2019-09-24 PROCEDURE — G8598 ASA/ANTIPLAT THER USED: HCPCS | Performed by: INTERNAL MEDICINE

## 2019-09-24 PROCEDURE — 99213 OFFICE O/P EST LOW 20 MIN: CPT | Performed by: INTERNAL MEDICINE

## 2019-09-24 PROCEDURE — 1036F TOBACCO NON-USER: CPT | Performed by: INTERNAL MEDICINE

## 2019-09-24 PROCEDURE — 4040F PNEUMOC VAC/ADMIN/RCVD: CPT | Performed by: INTERNAL MEDICINE

## 2019-09-24 PROCEDURE — G8420 CALC BMI NORM PARAMETERS: HCPCS | Performed by: INTERNAL MEDICINE

## 2019-09-24 PROCEDURE — 1123F ACP DISCUSS/DSCN MKR DOCD: CPT | Performed by: INTERNAL MEDICINE

## 2019-09-24 PROCEDURE — G8427 DOCREV CUR MEDS BY ELIG CLIN: HCPCS | Performed by: INTERNAL MEDICINE

## 2019-09-24 RX ORDER — CLOPIDOGREL BISULFATE 75 MG/1
75 TABLET ORAL DAILY
Qty: 90 TABLET | Refills: 3 | Status: SHIPPED | OUTPATIENT
Start: 2019-09-24 | End: 2019-11-22 | Stop reason: SDUPTHER

## 2019-09-24 RX ORDER — CLOPIDOGREL BISULFATE 75 MG/1
75 TABLET ORAL DAILY
Qty: 90 TABLET | Refills: 3 | Status: SHIPPED | OUTPATIENT
Start: 2019-09-24 | End: 2019-09-24 | Stop reason: SDUPTHER

## 2019-09-24 RX ORDER — DILTIAZEM HYDROCHLORIDE 120 MG/1
120 CAPSULE, COATED, EXTENDED RELEASE ORAL DAILY
Qty: 90 CAPSULE | Refills: 3 | Status: SHIPPED | OUTPATIENT
Start: 2019-09-24 | End: 2020-09-09

## 2019-09-24 NOTE — PROGRESS NOTES
(PROTONIX) 40 MG tablet Take 40 mg by mouth every morning      diltiazem (CARDIZEM CD) 120 MG extended release capsule Take 1 capsule by mouth daily 30 capsule 5    clopidogrel (PLAVIX) 75 MG tablet Take 1 tablet by mouth daily 30 tablet 3     No current facility-administered medications for this visit. Allergies: Fenofibrate; Lipitor; and Pravachol [pravastatin sodium]  Past Medical History:   Diagnosis Date    Anesthesia complication     Patient reports in 1973 following a Back Surgery he was kept in the hospital due to high temperatures in the evenings. He stated they kept him for observation and doesn't remember receiving any treatment for the high temperatures. Patient reports the anesthesia name started with flur? ?.    BPH with obstruction/lower urinary tract symptoms     CAD (coronary artery disease)     4-29-14 Patient unsure of reason for Diagnosis. Pt reports having a MVP but had clear Cardiac Cath. report.  Chest pain 7/1/2018    Chronic low back pain     GERD (gastroesophageal reflux disease)     H/O 24 hour EKG monitoring 3/12  12/11    3/12/12  essentially a normal event monitor without significant arrhythmias noted    H/O cardiac catheterization 2/2/12 4/02 10/98    2/22/12  LM, LAD and RCA all without significant disease    H/O cardiac catheterization 2/2/12 4/02 10/98    Done R/T false pos on stress test.    H/O cardiac catheterization 08/06/2018    normal study    H/O cardiovascular stress test 07/02/2018    normal study    H/O echocardiogram 12/10      3/14 EF55-60% normal LV function 12/10/10  complete 2 dim transthoracic echj. LVSF normal  EF >55%, transmitral spectral Doppler flow pattern is suggestive of  impaired LV relaxation    History of cardiovascular stress test 2/12 11/09 7/08 9/06 2/02 4/00 8/99 2/8/2012  evidence of mild ischemia  in the RCA.   abnormal study , restingEF is 70%, global LVSF is  normal    History of Holter monitoring 9/17/14    14 day EVENT -

## 2019-11-08 ENCOUNTER — OFFICE VISIT (OUTPATIENT)
Dept: FAMILY MEDICINE CLINIC | Age: 79
End: 2019-11-08
Payer: MEDICARE

## 2019-11-08 VITALS
HEIGHT: 71 IN | SYSTOLIC BLOOD PRESSURE: 120 MMHG | HEART RATE: 80 BPM | BODY MASS INDEX: 23.38 KG/M2 | DIASTOLIC BLOOD PRESSURE: 82 MMHG | WEIGHT: 167 LBS

## 2019-11-08 DIAGNOSIS — E78.2 MIXED HYPERLIPIDEMIA: Chronic | ICD-10-CM

## 2019-11-08 DIAGNOSIS — E53.8 VITAMIN B12 DEFICIENCY: Chronic | ICD-10-CM

## 2019-11-08 DIAGNOSIS — Z23 NEEDS FLU SHOT: ICD-10-CM

## 2019-11-08 DIAGNOSIS — K21.9 GASTROESOPHAGEAL REFLUX DISEASE WITHOUT ESOPHAGITIS: Chronic | ICD-10-CM

## 2019-11-08 DIAGNOSIS — Z91.81 AT HIGH RISK FOR FALLS: ICD-10-CM

## 2019-11-08 DIAGNOSIS — E03.9 ACQUIRED HYPOTHYROIDISM: Primary | Chronic | ICD-10-CM

## 2019-11-08 DIAGNOSIS — E03.9 ACQUIRED HYPOTHYROIDISM: Chronic | ICD-10-CM

## 2019-11-08 DIAGNOSIS — G62.9 PERIPHERAL POLYNEUROPATHY: ICD-10-CM

## 2019-11-08 DIAGNOSIS — M54.41 CHRONIC BILATERAL LOW BACK PAIN WITH BILATERAL SCIATICA: Chronic | ICD-10-CM

## 2019-11-08 DIAGNOSIS — G89.29 CHRONIC BILATERAL LOW BACK PAIN WITH BILATERAL SCIATICA: Chronic | ICD-10-CM

## 2019-11-08 DIAGNOSIS — M54.42 CHRONIC BILATERAL LOW BACK PAIN WITH BILATERAL SCIATICA: Chronic | ICD-10-CM

## 2019-11-08 LAB
A/G RATIO: 2.4 (ref 1.1–2.2)
ALBUMIN SERPL-MCNC: 4.7 G/DL (ref 3.4–5)
ALP BLD-CCNC: 40 U/L (ref 40–129)
ALT SERPL-CCNC: 17 U/L (ref 10–40)
ANION GAP SERPL CALCULATED.3IONS-SCNC: 13 MMOL/L (ref 3–16)
AST SERPL-CCNC: 26 U/L (ref 15–37)
BASOPHILS ABSOLUTE: 0 K/UL (ref 0–0.2)
BASOPHILS RELATIVE PERCENT: 0.5 %
BILIRUB SERPL-MCNC: 0.5 MG/DL (ref 0–1)
BUN BLDV-MCNC: 14 MG/DL (ref 7–20)
CALCIUM SERPL-MCNC: 9.6 MG/DL (ref 8.3–10.6)
CHLORIDE BLD-SCNC: 102 MMOL/L (ref 99–110)
CO2: 26 MMOL/L (ref 21–32)
CREAT SERPL-MCNC: 1.1 MG/DL (ref 0.8–1.3)
EOSINOPHILS ABSOLUTE: 0 K/UL (ref 0–0.6)
EOSINOPHILS RELATIVE PERCENT: 1.5 %
GFR AFRICAN AMERICAN: >60
GFR NON-AFRICAN AMERICAN: >60
GLOBULIN: 2 G/DL
GLUCOSE BLD-MCNC: 92 MG/DL (ref 70–99)
HCT VFR BLD CALC: 45.5 % (ref 40.5–52.5)
HEMOGLOBIN: 15.4 G/DL (ref 13.5–17.5)
LYMPHOCYTES ABSOLUTE: 1 K/UL (ref 1–5.1)
LYMPHOCYTES RELATIVE PERCENT: 33 %
MCH RBC QN AUTO: 32.4 PG (ref 26–34)
MCHC RBC AUTO-ENTMCNC: 33.9 G/DL (ref 31–36)
MCV RBC AUTO: 95.6 FL (ref 80–100)
MONOCYTES ABSOLUTE: 0.3 K/UL (ref 0–1.3)
MONOCYTES RELATIVE PERCENT: 10.2 %
NEUTROPHILS ABSOLUTE: 1.7 K/UL (ref 1.7–7.7)
NEUTROPHILS RELATIVE PERCENT: 54.8 %
PDW BLD-RTO: 13.5 % (ref 12.4–15.4)
PLATELET # BLD: 207 K/UL (ref 135–450)
PMV BLD AUTO: 7.7 FL (ref 5–10.5)
POTASSIUM SERPL-SCNC: 4.1 MMOL/L (ref 3.5–5.1)
RBC # BLD: 4.76 M/UL (ref 4.2–5.9)
SODIUM BLD-SCNC: 141 MMOL/L (ref 136–145)
TOTAL PROTEIN: 6.7 G/DL (ref 6.4–8.2)
TSH REFLEX FT4: 2.01 UIU/ML (ref 0.27–4.2)
VITAMIN B-12: 249 PG/ML (ref 211–911)
WBC # BLD: 3 K/UL (ref 4–11)

## 2019-11-08 PROCEDURE — G0008 ADMIN INFLUENZA VIRUS VAC: HCPCS | Performed by: FAMILY MEDICINE

## 2019-11-08 PROCEDURE — G8482 FLU IMMUNIZE ORDER/ADMIN: HCPCS | Performed by: FAMILY MEDICINE

## 2019-11-08 PROCEDURE — G8427 DOCREV CUR MEDS BY ELIG CLIN: HCPCS | Performed by: FAMILY MEDICINE

## 2019-11-08 PROCEDURE — 4040F PNEUMOC VAC/ADMIN/RCVD: CPT | Performed by: FAMILY MEDICINE

## 2019-11-08 PROCEDURE — 1123F ACP DISCUSS/DSCN MKR DOCD: CPT | Performed by: FAMILY MEDICINE

## 2019-11-08 PROCEDURE — G8598 ASA/ANTIPLAT THER USED: HCPCS | Performed by: FAMILY MEDICINE

## 2019-11-08 PROCEDURE — 90653 IIV ADJUVANT VACCINE IM: CPT | Performed by: FAMILY MEDICINE

## 2019-11-08 PROCEDURE — G8420 CALC BMI NORM PARAMETERS: HCPCS | Performed by: FAMILY MEDICINE

## 2019-11-08 PROCEDURE — 99214 OFFICE O/P EST MOD 30 MIN: CPT | Performed by: FAMILY MEDICINE

## 2019-11-08 PROCEDURE — 1036F TOBACCO NON-USER: CPT | Performed by: FAMILY MEDICINE

## 2019-11-08 RX ORDER — GABAPENTIN 300 MG/1
300 CAPSULE ORAL 3 TIMES DAILY
Qty: 270 CAPSULE | Refills: 1 | Status: SHIPPED | OUTPATIENT
Start: 2019-11-08 | End: 2020-01-10

## 2019-11-08 RX ORDER — TRAMADOL HYDROCHLORIDE 50 MG/1
100 TABLET ORAL 2 TIMES DAILY PRN
Qty: 360 TABLET | Refills: 0 | Status: SHIPPED | OUTPATIENT
Start: 2019-11-08 | End: 2020-05-08 | Stop reason: SDUPTHER

## 2019-11-08 RX ORDER — TRAMADOL HYDROCHLORIDE 50 MG/1
100 TABLET ORAL 2 TIMES DAILY PRN
COMMUNITY
End: 2019-11-08 | Stop reason: SDUPTHER

## 2019-11-08 RX ORDER — PANTOPRAZOLE SODIUM 40 MG/1
40 TABLET, DELAYED RELEASE ORAL EVERY MORNING
Qty: 90 TABLET | Refills: 1 | Status: SHIPPED | OUTPATIENT
Start: 2019-11-08 | End: 2019-11-22 | Stop reason: SDUPTHER

## 2019-11-08 RX ORDER — LEVOTHYROXINE SODIUM 0.05 MG/1
50 TABLET ORAL EVERY MORNING
Qty: 90 TABLET | Refills: 1 | Status: SHIPPED | OUTPATIENT
Start: 2019-11-08 | End: 2020-01-10

## 2019-11-09 ASSESSMENT — ENCOUNTER SYMPTOMS
SORE THROAT: 0
CONSTIPATION: 0
ABDOMINAL PAIN: 0
SHORTNESS OF BREATH: 0
RHINORRHEA: 0
SINUS PRESSURE: 0
COUGH: 0
DIARRHEA: 0
CHEST TIGHTNESS: 0

## 2019-11-21 DIAGNOSIS — K21.9 GASTROESOPHAGEAL REFLUX DISEASE WITHOUT ESOPHAGITIS: Chronic | ICD-10-CM

## 2019-11-22 ENCOUNTER — TELEPHONE (OUTPATIENT)
Dept: FAMILY MEDICINE CLINIC | Age: 79
End: 2019-11-22

## 2019-11-22 RX ORDER — PANTOPRAZOLE SODIUM 40 MG/1
40 TABLET, DELAYED RELEASE ORAL EVERY MORNING
Qty: 90 TABLET | Refills: 1 | Status: SHIPPED | OUTPATIENT
Start: 2019-11-22 | End: 2019-11-25

## 2019-11-22 RX ORDER — CLOPIDOGREL BISULFATE 75 MG/1
75 TABLET ORAL DAILY
Qty: 90 TABLET | Refills: 1 | Status: SHIPPED | OUTPATIENT
Start: 2019-11-22 | End: 2020-11-02

## 2019-11-25 RX ORDER — FAMOTIDINE 20 MG/1
20 TABLET, FILM COATED ORAL 2 TIMES DAILY
Qty: 180 TABLET | Refills: 1 | Status: SHIPPED | OUTPATIENT
Start: 2019-11-25 | End: 2019-12-16

## 2019-12-12 ENCOUNTER — TELEPHONE (OUTPATIENT)
Dept: FAMILY MEDICINE CLINIC | Age: 79
End: 2019-12-12

## 2019-12-16 ENCOUNTER — TELEPHONE (OUTPATIENT)
Dept: FAMILY MEDICINE CLINIC | Age: 79
End: 2019-12-16

## 2019-12-16 RX ORDER — PANTOPRAZOLE SODIUM 40 MG/1
40 TABLET, DELAYED RELEASE ORAL DAILY
Qty: 30 TABLET | Refills: 0 | Status: SHIPPED | OUTPATIENT
Start: 2019-12-16 | End: 2019-12-16 | Stop reason: SDUPTHER

## 2019-12-16 RX ORDER — PANTOPRAZOLE SODIUM 40 MG/1
40 TABLET, DELAYED RELEASE ORAL DAILY
Qty: 90 TABLET | Refills: 1 | Status: SHIPPED | OUTPATIENT
Start: 2019-12-16 | End: 2019-12-17 | Stop reason: SDUPTHER

## 2019-12-17 ENCOUNTER — TELEPHONE (OUTPATIENT)
Dept: FAMILY MEDICINE CLINIC | Age: 79
End: 2019-12-17

## 2019-12-17 RX ORDER — PANTOPRAZOLE SODIUM 40 MG/1
40 TABLET, DELAYED RELEASE ORAL DAILY
Qty: 30 TABLET | Refills: 0 | Status: SHIPPED | OUTPATIENT
Start: 2019-12-17 | End: 2020-05-08 | Stop reason: SDUPTHER

## 2020-01-09 ENCOUNTER — TELEPHONE (OUTPATIENT)
Dept: FAMILY MEDICINE CLINIC | Age: 80
End: 2020-01-09

## 2020-01-10 RX ORDER — GABAPENTIN 300 MG/1
CAPSULE ORAL
Qty: 270 CAPSULE | Refills: 1 | Status: SHIPPED | OUTPATIENT
Start: 2020-01-10 | End: 2020-11-09 | Stop reason: SDUPTHER

## 2020-01-10 RX ORDER — LEVOTHYROXINE SODIUM 0.05 MG/1
50 TABLET ORAL EVERY MORNING
Qty: 90 TABLET | Refills: 1 | Status: SHIPPED | OUTPATIENT
Start: 2020-01-10 | End: 2020-08-10 | Stop reason: SDUPTHER

## 2020-03-29 ENCOUNTER — HOSPITAL ENCOUNTER (EMERGENCY)
Age: 80
Discharge: HOME | End: 2020-03-30
Payer: MEDICARE

## 2020-03-29 PROCEDURE — 99284 EMERGENCY DEPT VISIT MOD MDM: CPT

## 2020-03-30 ENCOUNTER — APPOINTMENT (OUTPATIENT)
Dept: GENERAL RADIOLOGY | Age: 80
End: 2020-03-30
Payer: MEDICARE

## 2020-03-30 ENCOUNTER — APPOINTMENT (OUTPATIENT)
Dept: CT IMAGING | Age: 80
End: 2020-03-30
Payer: MEDICARE

## 2020-03-30 VITALS
WEIGHT: 170 LBS | HEART RATE: 79 BPM | TEMPERATURE: 99.2 F | RESPIRATION RATE: 18 BRPM | BODY MASS INDEX: 24.34 KG/M2 | OXYGEN SATURATION: 95 % | SYSTOLIC BLOOD PRESSURE: 125 MMHG | DIASTOLIC BLOOD PRESSURE: 83 MMHG | HEIGHT: 70 IN

## 2020-03-30 LAB
ALBUMIN SERPL-MCNC: 4 GM/DL (ref 3.4–5)
ALP BLD-CCNC: 40 IU/L (ref 40–128)
ALT SERPL-CCNC: 17 U/L (ref 10–40)
ANION GAP SERPL CALCULATED.3IONS-SCNC: 11 MMOL/L (ref 4–16)
AST SERPL-CCNC: 27 IU/L (ref 15–37)
BACTERIA: NEGATIVE /HPF
BASOPHILS ABSOLUTE: 0 K/CU MM
BASOPHILS RELATIVE PERCENT: 0.2 % (ref 0–1)
BILIRUB SERPL-MCNC: 0.3 MG/DL (ref 0–1)
BILIRUBIN URINE: NEGATIVE MG/DL
BLOOD, URINE: NEGATIVE
BUN BLDV-MCNC: 15 MG/DL (ref 6–23)
CALCIUM SERPL-MCNC: 9.8 MG/DL (ref 8.3–10.6)
CHLORIDE BLD-SCNC: 98 MMOL/L (ref 99–110)
CLARITY: CLEAR
CO2: 25 MMOL/L (ref 21–32)
COLOR: YELLOW
CREAT SERPL-MCNC: 1.2 MG/DL (ref 0.9–1.3)
DIFFERENTIAL TYPE: ABNORMAL
EOSINOPHILS ABSOLUTE: 0 K/CU MM
EOSINOPHILS RELATIVE PERCENT: 0.7 % (ref 0–3)
GFR AFRICAN AMERICAN: >60 ML/MIN/1.73M2
GFR NON-AFRICAN AMERICAN: 58 ML/MIN/1.73M2
GLUCOSE BLD-MCNC: 114 MG/DL (ref 70–99)
GLUCOSE, URINE: NEGATIVE MG/DL
HCT VFR BLD CALC: 45.7 % (ref 42–52)
HEMOGLOBIN: 15.1 GM/DL (ref 13.5–18)
IMMATURE NEUTROPHIL %: 0.2 % (ref 0–0.43)
KETONES, URINE: NEGATIVE MG/DL
LACTATE: 0.8 MMOL/L (ref 0.4–2)
LEUKOCYTE ESTERASE, URINE: NEGATIVE
LIPASE: 13 IU/L (ref 13–60)
LYMPHOCYTES ABSOLUTE: 0.8 K/CU MM
LYMPHOCYTES RELATIVE PERCENT: 18.2 % (ref 24–44)
MCH RBC QN AUTO: 31.9 PG (ref 27–31)
MCHC RBC AUTO-ENTMCNC: 33 % (ref 32–36)
MCV RBC AUTO: 96.6 FL (ref 78–100)
MONOCYTES ABSOLUTE: 0.5 K/CU MM
MONOCYTES RELATIVE PERCENT: 10.5 % (ref 0–4)
MUCUS: ABNORMAL HPF
NITRITE URINE, QUANTITATIVE: NEGATIVE
NUCLEATED RBC %: 0 %
PDW BLD-RTO: 12.7 % (ref 11.7–14.9)
PH, URINE: 6 (ref 5–8)
PLATELET # BLD: 196 K/CU MM (ref 140–440)
PMV BLD AUTO: 9.2 FL (ref 7.5–11.1)
POTASSIUM SERPL-SCNC: 4.7 MMOL/L (ref 3.5–5.1)
PROTEIN UA: NEGATIVE MG/DL
RBC # BLD: 4.73 M/CU MM (ref 4.6–6.2)
RBC URINE: ABNORMAL /HPF (ref 0–3)
SEGMENTED NEUTROPHILS ABSOLUTE COUNT: 3.2 K/CU MM
SEGMENTED NEUTROPHILS RELATIVE PERCENT: 70.2 % (ref 36–66)
SODIUM BLD-SCNC: 134 MMOL/L (ref 135–145)
SPECIFIC GRAVITY UA: 1.01 (ref 1–1.03)
SQUAMOUS EPITHELIAL: <1 /HPF
TOTAL IMMATURE NEUTOROPHIL: 0.01 K/CU MM
TOTAL NUCLEATED RBC: 0 K/CU MM
TOTAL PROTEIN: 6.6 GM/DL (ref 6.4–8.2)
TRICHOMONAS: ABNORMAL /HPF
TROPONIN T: <0.01 NG/ML
UROBILINOGEN, URINE: 2 MG/DL (ref 0.2–1)
WBC # BLD: 4.6 K/CU MM (ref 4–10.5)
WBC UA: <1 /HPF (ref 0–2)

## 2020-03-30 PROCEDURE — 80053 COMPREHEN METABOLIC PANEL: CPT

## 2020-03-30 PROCEDURE — 84484 ASSAY OF TROPONIN QUANT: CPT

## 2020-03-30 PROCEDURE — 71045 X-RAY EXAM CHEST 1 VIEW: CPT

## 2020-03-30 PROCEDURE — 81001 URINALYSIS AUTO W/SCOPE: CPT

## 2020-03-30 PROCEDURE — 83690 ASSAY OF LIPASE: CPT

## 2020-03-30 PROCEDURE — 70450 CT HEAD/BRAIN W/O DYE: CPT

## 2020-03-30 PROCEDURE — 83605 ASSAY OF LACTIC ACID: CPT

## 2020-03-30 PROCEDURE — 6360000002 HC RX W HCPCS: Performed by: PHYSICIAN ASSISTANT

## 2020-03-30 PROCEDURE — 93010 ELECTROCARDIOGRAM REPORT: CPT | Performed by: INTERNAL MEDICINE

## 2020-03-30 PROCEDURE — 85025 COMPLETE CBC W/AUTO DIFF WBC: CPT

## 2020-03-30 PROCEDURE — 2580000003 HC RX 258: Performed by: PHYSICIAN ASSISTANT

## 2020-03-30 PROCEDURE — 93005 ELECTROCARDIOGRAM TRACING: CPT | Performed by: PHYSICIAN ASSISTANT

## 2020-03-30 PROCEDURE — 96374 THER/PROPH/DIAG INJ IV PUSH: CPT

## 2020-03-30 PROCEDURE — 6370000000 HC RX 637 (ALT 250 FOR IP): Performed by: PHYSICIAN ASSISTANT

## 2020-03-30 RX ORDER — 0.9 % SODIUM CHLORIDE 0.9 %
1000 INTRAVENOUS SOLUTION INTRAVENOUS ONCE
Status: COMPLETED | OUTPATIENT
Start: 2020-03-30 | End: 2020-03-30

## 2020-03-30 RX ORDER — ACETAMINOPHEN 500 MG
1000 TABLET ORAL ONCE
Status: COMPLETED | OUTPATIENT
Start: 2020-03-30 | End: 2020-03-30

## 2020-03-30 RX ORDER — ONDANSETRON 2 MG/ML
4 INJECTION INTRAMUSCULAR; INTRAVENOUS ONCE
Status: COMPLETED | OUTPATIENT
Start: 2020-03-30 | End: 2020-03-30

## 2020-03-30 RX ADMIN — SODIUM CHLORIDE 1000 ML: 9 INJECTION, SOLUTION INTRAVENOUS at 01:38

## 2020-03-30 RX ADMIN — ONDANSETRON 4 MG: 2 INJECTION INTRAMUSCULAR; INTRAVENOUS at 01:38

## 2020-03-30 RX ADMIN — ACETAMINOPHEN 1000 MG: 500 TABLET, FILM COATED ORAL at 02:47

## 2020-03-30 ASSESSMENT — PAIN SCALES - GENERAL: PAINLEVEL_OUTOF10: 5

## 2020-03-30 NOTE — ED PROVIDER NOTES
5/5/14    diverticulosis, 1 polyp, hemorrhoids    COLONOSCOPY  07/10/2017    single 5 mm polyp found in sigmoid colon, moderate diverticulosis found in sigmoid colon    LUMBAR DISC SURGERY  11/2002    L4-5 hemilaminectomy and disk    LUMBAR DISCECTOMY  1973    PROSTATE SURGERY  09/26/2011    s/p robotic radical prostatectomy    ROTATOR CUFF REPAIR Right 10/2016    TIBIA FRACTURE SURGERY Right     childhood    URETEROLITHOTOMY  1992     Social History     Socioeconomic History    Marital status:       Spouse name: None    Number of children: None    Years of education: None    Highest education level: None   Occupational History    None   Social Needs    Financial resource strain: None    Food insecurity     Worry: None     Inability: None    Transportation needs     Medical: None     Non-medical: None   Tobacco Use    Smoking status: Never Smoker    Smokeless tobacco: Never Used   Substance and Sexual Activity    Alcohol use: Yes     Comment: Occassional    Drug use: No    Sexual activity: Not Currently     Partners: Female     Comment:    Lifestyle    Physical activity     Days per week: None     Minutes per session: None    Stress: None   Relationships    Social connections     Talks on phone: None     Gets together: None     Attends Sikh service: None     Active member of club or organization: None     Attends meetings of clubs or organizations: None     Relationship status: None    Intimate partner violence     Fear of current or ex partner: None     Emotionally abused: None     Physically abused: None     Forced sexual activity: None   Other Topics Concern    None   Social History Narrative    None       Medications/Allergies     Previous Medications    ASPIRIN 81 MG TABLET    Take 81 mg by mouth daily    CLOPIDOGREL (PLAVIX) 75 MG TABLET    Take 1 tablet by mouth daily    DILTIAZEM (CARDIZEM CD) 120 MG EXTENDED RELEASE CAPSULE    Take 1 capsule by mouth daily GABAPENTIN (NEURONTIN) 300 MG CAPSULE    TAKE 1 CAPSULE BY MOUTH 3  TIMES DAILY    LEVOTHYROXINE (SYNTHROID) 50 MCG TABLET    TAKE 1 TABLET BY MOUTH  EVERY MORNING    NITROGLYCERIN (NITROSTAT) 0.3 MG SL TABLET    Place 0.3 mg under the tongue every 5 minutes as needed for Chest pain up to max of 3 total doses. If no relief after 1 dose, call 911. PANTOPRAZOLE (PROTONIX) 40 MG TABLET    Take 1 tablet by mouth daily    TRAMADOL (ULTRAM) 50 MG TABLET    Take 2 tablets by mouth 2 times daily as needed for Pain for up to 90 days. Allergies   Allergen Reactions    Fenofibrate Other (See Comments)     Liq stools    Lipitor Other (See Comments)     Liq stools    Pravachol [Pravastatin Sodium] Other (See Comments)     Liq stools        Physical Exam       ED Triage Vitals   BP Temp Temp Source Pulse Resp SpO2 Height Weight   03/29/20 2346 03/29/20 2346 03/29/20 2346 03/29/20 2346 03/29/20 2346 03/29/20 2346 03/29/20 2339 03/29/20 2339   (!) 198/104 99.2 °F (37.3 °C) Oral 85 17 95 % 5' 10\" (1.778 m) 170 lb (77.1 kg)     GENERAL APPEARANCE:  Well-developed, well-nourished, no acute distress. HEAD:  NC/AT. EYES:  Sclera anicteric. ENT:  Ears, nose, mouth normal.     NECK:  Supple. CARDIO:  RRR. LUNGS:   CTAB. Respirations unlabored. ABDOMEN:  Soft, non-distended, non-tender. BS active. EXTREMITIES:  No acute deformities. SKIN:  Warm and dry. NEUROLOGICAL:  Alert and oriented. PSYCHIATRIC:  Normal mood.      Diagnostics     Labs:  Labs Reviewed   CBC WITH AUTO DIFFERENTIAL - Abnormal; Notable for the following components:       Result Value    MCH 31.9 (*)     Segs Relative 70.2 (*)     Lymphocytes % 18.2 (*)     Monocytes % 10.5 (*)     All other components within normal limits   COMPREHENSIVE METABOLIC PANEL W/ REFLEX TO MG FOR LOW K - Abnormal; Notable for the following components:    Sodium 134 (*)     Chloride 98 (*)     Glucose 114 (*)     GFR Non- 58 (*)     All other components exam:->nausea Reason for Exam: nausea Acuity: Acute Type of Exam: Initial FINDINGS: The cardiomediastinal silhouette is normal in size and contour. The lungs are clear. No pleural effusion or pneumothorax is present. No acute cardiopulmonary process     Procedures/EKG:   Please see Dr. Rito Desai note for interpretation. ED Course and MDM   -  Patient seen and evaluated in the emergency department. -  Triage and nursing notes reviewed and incorporated. -  Old chart records reviewed and incorporated. -  Supervising physician was Dr. Paty Pacheco. Patient was seen independently. -  Work-up included:  See above  -  ED medications:  NS, Zofran, Tylenol  -  Results discussed with patient. CT head without acute findings. CXR no acute findings. Labs unremarkable. Afebrile here in the ED. He feels better on re-examination. HA similar to his chronic migraines. He was advised on close FU with PCP or return here for new/worsening symptoms. He is agreeable with plan of care and disposition.  -  Disposition:  Home    I did don/doff appropriate PPE (including surgical face mask, protective eye ware/safety glasses, gloves, hair covering), as recommended by the health facility/national standard best practice, during my bedside interactions with the patient. Final Impression      1. Nonintractable headache, unspecified chronicity pattern, unspecified headache type    2.  Nausea            DISPOSITION        Maximino Key, [place], [place]  03/30/20 [id number]

## 2020-03-31 ENCOUNTER — CARE COORDINATION (OUTPATIENT)
Dept: CARE COORDINATION | Age: 80
End: 2020-03-31

## 2020-04-02 ENCOUNTER — CARE COORDINATION (OUTPATIENT)
Dept: CARE COORDINATION | Age: 80
End: 2020-04-02

## 2020-04-03 LAB
EKG ATRIAL RATE: 78 BPM
EKG DIAGNOSIS: NORMAL
EKG P AXIS: 54 DEGREES
EKG P-R INTERVAL: 192 MS
EKG Q-T INTERVAL: 360 MS
EKG QRS DURATION: 90 MS
EKG QTC CALCULATION (BAZETT): 410 MS
EKG R AXIS: 4 DEGREES
EKG T AXIS: 46 DEGREES
EKG VENTRICULAR RATE: 78 BPM

## 2020-04-06 ENCOUNTER — TELEPHONE (OUTPATIENT)
Dept: FAMILY MEDICINE CLINIC | Age: 80
End: 2020-04-06

## 2020-04-06 ENCOUNTER — APPOINTMENT (OUTPATIENT)
Dept: GENERAL RADIOLOGY | Age: 80
End: 2020-04-06
Payer: MEDICARE

## 2020-04-06 ENCOUNTER — HOSPITAL ENCOUNTER (EMERGENCY)
Age: 80
Discharge: HOME | End: 2020-04-06
Attending: EMERGENCY MEDICINE
Payer: MEDICARE

## 2020-04-06 ENCOUNTER — APPOINTMENT (OUTPATIENT)
Dept: CT IMAGING | Age: 80
End: 2020-04-06
Payer: MEDICARE

## 2020-04-06 ENCOUNTER — APPOINTMENT (OUTPATIENT)
Dept: MRI IMAGING | Age: 80
End: 2020-04-06
Payer: MEDICARE

## 2020-04-06 LAB
ANION GAP SERPL CALCULATED.3IONS-SCNC: 10 MMOL/L (ref 4–16)
APTT: 26.4 SECONDS (ref 25.1–37.1)
BASOPHILS ABSOLUTE: 0 K/CU MM
BASOPHILS RELATIVE PERCENT: 0.2 % (ref 0–1)
BUN BLDV-MCNC: 14 MG/DL (ref 6–23)
CALCIUM SERPL-MCNC: 9.5 MG/DL (ref 8.3–10.6)
CHLORIDE BLD-SCNC: 99 MMOL/L (ref 99–110)
CO2: 25 MMOL/L (ref 21–32)
CREAT SERPL-MCNC: 1 MG/DL (ref 0.9–1.3)
DIFFERENTIAL TYPE: ABNORMAL
EOSINOPHILS ABSOLUTE: 0 K/CU MM
EOSINOPHILS RELATIVE PERCENT: 0.4 % (ref 0–3)
GFR AFRICAN AMERICAN: >60 ML/MIN/1.73M2
GFR NON-AFRICAN AMERICAN: >60 ML/MIN/1.73M2
GLUCOSE BLD-MCNC: 101 MG/DL (ref 70–99)
HCT VFR BLD CALC: 49.7 % (ref 42–52)
HEMOGLOBIN: 16.2 GM/DL (ref 13.5–18)
IMMATURE NEUTROPHIL %: 0.2 % (ref 0–0.43)
INR BLD: 1.12 INDEX
LYMPHOCYTES ABSOLUTE: 1.1 K/CU MM
LYMPHOCYTES RELATIVE PERCENT: 24.9 % (ref 24–44)
MCH RBC QN AUTO: 31.7 PG (ref 27–31)
MCHC RBC AUTO-ENTMCNC: 32.6 % (ref 32–36)
MCV RBC AUTO: 97.3 FL (ref 78–100)
MONOCYTES ABSOLUTE: 0.3 K/CU MM
MONOCYTES RELATIVE PERCENT: 7.1 % (ref 0–4)
NUCLEATED RBC %: 0 %
PDW BLD-RTO: 12.5 % (ref 11.7–14.9)
PLATELET # BLD: 215 K/CU MM (ref 140–440)
PMV BLD AUTO: 9 FL (ref 7.5–11.1)
POTASSIUM SERPL-SCNC: 4.4 MMOL/L (ref 3.5–5.1)
PRO-BNP: 79.26 PG/ML
PROTHROMBIN TIME: 13.6 SECONDS (ref 11.7–14.5)
RBC # BLD: 5.11 M/CU MM (ref 4.6–6.2)
SEGMENTED NEUTROPHILS ABSOLUTE COUNT: 3 K/CU MM
SEGMENTED NEUTROPHILS RELATIVE PERCENT: 67.2 % (ref 36–66)
SODIUM BLD-SCNC: 134 MMOL/L (ref 135–145)
TOTAL IMMATURE NEUTOROPHIL: 0.01 K/CU MM
TOTAL NUCLEATED RBC: 0 K/CU MM
TROPONIN T: <0.01 NG/ML
WBC # BLD: 4.5 K/CU MM (ref 4–10.5)

## 2020-04-06 PROCEDURE — 70450 CT HEAD/BRAIN W/O DYE: CPT

## 2020-04-06 PROCEDURE — 99285 EMERGENCY DEPT VISIT HI MDM: CPT

## 2020-04-06 PROCEDURE — 71045 X-RAY EXAM CHEST 1 VIEW: CPT

## 2020-04-06 PROCEDURE — 96374 THER/PROPH/DIAG INJ IV PUSH: CPT

## 2020-04-06 PROCEDURE — 85730 THROMBOPLASTIN TIME PARTIAL: CPT

## 2020-04-06 PROCEDURE — 96375 TX/PRO/DX INJ NEW DRUG ADDON: CPT

## 2020-04-06 PROCEDURE — 70551 MRI BRAIN STEM W/O DYE: CPT

## 2020-04-06 PROCEDURE — 70496 CT ANGIOGRAPHY HEAD: CPT

## 2020-04-06 PROCEDURE — 84484 ASSAY OF TROPONIN QUANT: CPT

## 2020-04-06 PROCEDURE — 85610 PROTHROMBIN TIME: CPT

## 2020-04-06 PROCEDURE — 6360000002 HC RX W HCPCS: Performed by: EMERGENCY MEDICINE

## 2020-04-06 PROCEDURE — 85025 COMPLETE CBC W/AUTO DIFF WBC: CPT

## 2020-04-06 PROCEDURE — 83880 ASSAY OF NATRIURETIC PEPTIDE: CPT

## 2020-04-06 PROCEDURE — 93005 ELECTROCARDIOGRAM TRACING: CPT | Performed by: EMERGENCY MEDICINE

## 2020-04-06 PROCEDURE — 80048 BASIC METABOLIC PNL TOTAL CA: CPT

## 2020-04-06 PROCEDURE — 6360000004 HC RX CONTRAST MEDICATION: Performed by: EMERGENCY MEDICINE

## 2020-04-06 RX ORDER — PROCHLORPERAZINE EDISYLATE 5 MG/ML
10 INJECTION INTRAMUSCULAR; INTRAVENOUS ONCE
Status: COMPLETED | OUTPATIENT
Start: 2020-04-06 | End: 2020-04-06

## 2020-04-06 RX ORDER — DIPHENHYDRAMINE HYDROCHLORIDE 50 MG/ML
12.5 INJECTION INTRAMUSCULAR; INTRAVENOUS ONCE
Status: COMPLETED | OUTPATIENT
Start: 2020-04-06 | End: 2020-04-06

## 2020-04-06 RX ADMIN — IOPAMIDOL 80 ML: 755 INJECTION, SOLUTION INTRAVENOUS at 13:20

## 2020-04-06 RX ADMIN — DIPHENHYDRAMINE HYDROCHLORIDE 12.5 MG: 50 INJECTION, SOLUTION INTRAMUSCULAR; INTRAVENOUS at 14:12

## 2020-04-06 RX ADMIN — PROCHLORPERAZINE EDISYLATE 10 MG: 5 INJECTION INTRAMUSCULAR; INTRAVENOUS at 14:12

## 2020-04-06 ASSESSMENT — PAIN DESCRIPTION - ORIENTATION: ORIENTATION: LEFT

## 2020-04-06 ASSESSMENT — ENCOUNTER SYMPTOMS
BACK PAIN: 0
ABDOMINAL PAIN: 0
NAUSEA: 0
WHEEZING: 0
VOICE CHANGE: 0
SHORTNESS OF BREATH: 0
VOMITING: 0
EYE PAIN: 0
TROUBLE SWALLOWING: 0
STRIDOR: 0

## 2020-04-06 ASSESSMENT — PAIN DESCRIPTION - LOCATION: LOCATION: HEAD

## 2020-04-06 ASSESSMENT — PAIN DESCRIPTION - DESCRIPTORS: DESCRIPTORS: DULL;NUMBNESS

## 2020-04-06 ASSESSMENT — PAIN DESCRIPTION - PAIN TYPE: TYPE: ACUTE PAIN

## 2020-04-06 ASSESSMENT — PAIN DESCRIPTION - FREQUENCY: FREQUENCY: CONTINUOUS

## 2020-04-06 ASSESSMENT — PAIN SCALES - GENERAL
PAINLEVEL_OUTOF10: 4
PAINLEVEL_OUTOF10: 2

## 2020-04-06 NOTE — TELEPHONE ENCOUNTER
bp 04/06/20 10:30am 172/95 p 73. Headache. Denies chest pain, sob, nausea, numbness, tingling in limbs. Pt describes a \"numbness & tingling top of head\". 4/5/20 bp 195/113 p unknown 7:30pm    Reviewed med list pt taking all meds daily as directed.

## 2020-04-06 NOTE — TELEPHONE ENCOUNTER
Ask patient if they changed his blood pressure meds. Either way, have him call us in 2 days with a total of 4 blood pressures. 2 blood pressures from each day.

## 2020-04-06 NOTE — TELEPHONE ENCOUNTER
Call patient. Asked patient to do 2 things. Please increase his diltiazem from 120 to 240 mg daily. Therefore please use 2 of your 120s till they are gone. (Jessica call in diltiazem  mg 1 daily #30 refill 5) and asked patient to add lisinopril 5 mg 1 daily #30 refill 5. Please follow-up with us in 2 weeks with results of your blood pressure and pulses.

## 2020-04-06 NOTE — ED PROVIDER NOTES
[address] ENCOUNTER      Pt Name: Kassidy Carlisle  MRN: [id number]  [place] 1940  Date of evaluation: 4/6/2020  Provider: Tung Robert MD    [address] [place] [address]       Chief Complaint   Patient presents with    Numbness     to left side of face         HISTORY OF PRESENT ILLNESS   (Location/Symptom, Timing/Onset, Context/Setting, Quality, Duration, Modifying Factors, Severity)  Note limiting factors. Kassidy Carlisle is a 79 y.o. male who presents to the emergency department      18-year-old man history of migraines says that he has headaches most mornings. He said that he woke up this morning was having a headache as was normal for him. Says he is having headache which she says is on the left side of his head. He says that today however he had some associated numbness which was initially on the top of his head and migrated onto his left side of his face and now back to the top of his head. He denies any other focal weakness or numbness and denies any vision changes and no nausea or vomiting no photophobia no neck stiffness or pain no rashes and no fevers. He has attempted specific treatment nothing seems to make his symptoms better or worse. He describes his symptoms as moderate intensity gradual in onset and not severe. He says that aside from the numbness, this is similar to the migraines he has had in the past.    The history is provided by the patient. Nursing Notes were reviewed. REVIEW OF SYSTEMS    (2-9 systems for level 4, 10 or more for level 5)     Review of Systems   Constitutional: Negative for fatigue, fever and unexpected weight change. HENT: Negative for trouble swallowing and voice change. Eyes: Negative for pain and visual disturbance. Respiratory: Negative for shortness of breath, wheezing and stridor. Cardiovascular: Negative for chest pain, palpitations and leg swelling. Gastrointestinal: Negative for abdominal pain, nausea and vomiting. Endocrine: Negative for cold intolerance and heat intolerance. Genitourinary: Negative for decreased urine volume, dysuria and flank pain. Musculoskeletal: Negative for arthralgias, back pain, neck pain and neck stiffness. Skin: Negative for rash and wound. Allergic/Immunologic: Negative for immunocompromised state. Neurological: Positive for numbness and headaches. Negative for dizziness, tremors, seizures, syncope, facial asymmetry, speech difficulty, weakness and light-headedness. Hematological: Negative for adenopathy. Does not bruise/bleed easily. Psychiatric/Behavioral: Negative for self-injury and suicidal ideas. Except as noted above the remainder of the review of systems was reviewed and negative. PAST MEDICAL HISTORY     Past Medical History:   Diagnosis Date    Anesthesia complication     Patient reports in 1973 following a Back Surgery he was kept in the hospital due to high temperatures in the evenings. He stated they kept him for observation and doesn't remember receiving any treatment for the high temperatures. Patient reports the anesthesia name started with flur? ?.    BPH with obstruction/lower urinary tract symptoms     CAD (coronary artery disease)     4-29-14 Patient unsure of reason for Diagnosis. Pt reports having a MVP but had clear Cardiac Cath. report.     Chest pain 7/1/2018    Chronic low back pain     GERD (gastroesophageal reflux disease)     H/O 24 hour EKG monitoring 3/12  12/11    3/12/12  essentially a normal event monitor without significant arrhythmias noted    H/O cardiac catheterization 2/2/12 4/02 10/98    2/22/12  LM, LAD and RCA all without significant disease    H/O cardiac catheterization 2/2/12 4/02 10/98    Done R/T false pos on stress test.    H/O cardiac catheterization 08/06/2018    normal study    H/O cardiovascular stress test 07/02/2018    normal study    H/O echocardiogram 12/10      3/14 EF55-60% normal LV function 12/10/10  complete 2 dim transthoracic echj. LVSF normal  EF >55%, transmitral spectral Doppler flow pattern is suggestive of  impaired LV relaxation    History of cardiovascular stress test 2/12 11/09 7/08 9/06 2/02 4/00 8/99 2/8/2012  evidence of mild ischemia  in the RCA. abnormal study , restingEF is 70%, global LVSF is  normal    History of Holter monitoring 9/17/14    14 day EVENT - no RVR    History of nuclear stress test 08/03/2017    cardiolite-normal,EF60%    Hx of chest x-ray 2/20/12    chest is considereed non acute. COPD    Hx of echocardiogram 08/03/2017    II50-61%,YLJBD 2 diastolic dysfunction    Hyperlipidemia     Idiopathic peripheral neuropathy     Kidney stone     Left facial numbness 8/30/2017    Lumbar radiculopathy     Malignant melanoma of skin of face (HCC)     MVP (mitral valve prolapse)     Myotonia     chronic increased CPK's    Peripelvic (lymphatic) cyst     per CT    Precordial pain 8/3/2018    Prostate cancer ([address] 06/2011    Prostate - Surgical Intervention    Vitamin B12 deficiency          SURGICAL HISTORY       Past Surgical History:   Procedure Laterality Date    CARDIAC CATHETERIZATION  2/2/12    EF 55% No sugnificant disease.     COLONOSCOPY  5/5/14    diverticulosis, 1 polyp, hemorrhoids    COLONOSCOPY  07/10/2017    single 5 mm polyp found in sigmoid colon, moderate diverticulosis found in sigmoid colon    LUMBAR DISC SURGERY  11/2002    L4-5 hemilaminectomy and disk    LUMBAR DISCECTOMY  1973    PROSTATE SURGERY  09/26/2011    s/p robotic radical prostatectomy    ROTATOR CUFF REPAIR Right 10/2016    TIBIA FRACTURE SURGERY Right     childhood    URETEROLITHOTOMY  1992         CURRENT MEDICATIONS       Previous Medications    ASPIRIN 81 MG TABLET    Take 81 mg by mouth daily    CLOPIDOGREL (PLAVIX) 75 MG TABLET    Take 1 tablet by mouth daily    DILTIAZEM (CARDIZEM CD) 120 MG EXTENDED RELEASE CAPSULE    Take 1 capsule by mouth daily    GABAPENTIN (NEURONTIN) 300 MG CAPSULE    TAKE 1 CAPSULE BY MOUTH 3  TIMES DAILY    LEVOTHYROXINE (SYNTHROID) 50 MCG TABLET    TAKE 1 TABLET BY MOUTH  EVERY MORNING    NITROGLYCERIN (NITROSTAT) 0.3 MG SL TABLET    Place 0.3 mg under the tongue every 5 minutes as needed for Chest pain up to max of 3 total doses. If no relief after 1 dose, call 911. PANTOPRAZOLE (PROTONIX) 40 MG TABLET    Take 1 tablet by mouth daily    TRAMADOL (ULTRAM) 50 MG TABLET    Take 2 tablets by mouth 2 times daily as needed for Pain for up to 90 days. ALLERGIES     Fenofibrate; Lipitor; and Pravachol [pravastatin sodium]    FAMILY HISTORY       Family History   Problem Relation Age of Onset    Heart Disease Mother         ASCAD    Heart Disease Father     Heart Disease Brother     Diabetes Other     Cancer Other         unknown type    Prostate Cancer Other     Coronary Art Dis Other     Coronary Art Dis Other           SOCIAL HISTORY       Social History     Socioeconomic History    Marital status:       Spouse name: None    Number of children: None    Years of education: None    Highest education level: None   Occupational History    None   Social Needs    Financial resource strain: None    Food insecurity     Worry: None     Inability: None    Transportation needs     Medical: None     Non-medical: None   Tobacco Use    Smoking status: Never Smoker    Smokeless tobacco: Never Used   Substance and Sexual Activity    Alcohol use: Yes     Comment: Occassional    Drug use: No    Sexual activity: Not Currently     Partners: Female     Comment:    Lifestyle    Physical activity     Days per week: None     Minutes per session: None    Stress: None   Relationships    Social connections     Talks on phone: None     Gets together: None     Attends Druze service: None     Active member of club or organization: None     Attends meetings of clubs or Left eye: No discharge. Extraocular Movements: Extraocular movements intact. Conjunctiva/sclera: Conjunctivae normal.      Pupils: Pupils are equal, round, and reactive to light. Neck:      Musculoskeletal: Normal range of motion and neck supple. No neck rigidity or muscular tenderness. Trachea: No tracheal deviation. Cardiovascular:      Rate and Rhythm: Normal rate and regular rhythm. Pulses: Normal pulses. Heart sounds: Normal heart sounds. No murmur. No friction rub. No gallop. Pulmonary:      Effort: Pulmonary effort is normal. No respiratory distress. Breath sounds: Normal breath sounds. No stridor. No wheezing, rhonchi or rales. Comments: Completely clear and equal.  Speaking full sentences. Excellent aeration. No retractions. Chest:      Chest wall: No tenderness. Abdominal:      General: Bowel sounds are normal. There is no distension. Palpations: Abdomen is soft. There is no mass. Tenderness: There is no abdominal tenderness. There is no right CVA tenderness, left CVA tenderness, guarding or rebound. Hernia: No hernia is present. Comments: Soft nontender throughout. No rebound or guarding. No masses pulsatile or otherwise. Musculoskeletal: Normal range of motion. General: No swelling, tenderness, deformity or signs of injury. Right lower leg: No edema. Left lower leg: No edema. Skin:     General: Skin is warm. Capillary Refill: Capillary refill takes less than 2 seconds. Coloration: Skin is not jaundiced or pale. Findings: No rash. Neurological:      General: No focal deficit present. Mental Status: He is alert and oriented to person, place, and time. Mental status is at baseline. Cranial Nerves: No cranial nerve deficit. Sensory: No sensory deficit. Motor: No weakness or abnormal muscle tone.       Coordination: Coordination normal.      Gait: Gait normal.      Deep Tendon [place]  [facility] [address] [facility] [address]  [phone]    Schedule an appointment as soon as possible for a visit in 1 day        DISCHARGE MEDICATIONS:  New Prescriptions    No medications on file     Controlled Substances Monitoring:     RX Monitoring 11/8/2019   Periodic Controlled Substance Monitoring Obtaining appropriate analgesic effect of treatment.        (Please note that portions of this note were completed with a voice recognition program.  Efforts were made to edit the dictations but occasionally words are mis-transcribed.)    Bekah Rosales MD (electronically signed)  Attending Emergency Physician             Bekah Rosales MD  04/06/20 [id number]

## 2020-04-07 ENCOUNTER — CARE COORDINATION (OUTPATIENT)
Dept: CARE COORDINATION | Age: 80
End: 2020-04-07

## 2020-04-07 VITALS
HEIGHT: 70 IN | WEIGHT: 169 LBS | TEMPERATURE: 98.3 F | BODY MASS INDEX: 24.2 KG/M2 | RESPIRATION RATE: 14 BRPM | HEART RATE: 79 BPM | SYSTOLIC BLOOD PRESSURE: 143 MMHG | DIASTOLIC BLOOD PRESSURE: 83 MMHG | OXYGEN SATURATION: 97 %

## 2020-04-09 ENCOUNTER — OFFICE VISIT (OUTPATIENT)
Dept: FAMILY MEDICINE CLINIC | Age: 80
End: 2020-04-09
Payer: MEDICARE

## 2020-04-09 VITALS
HEART RATE: 64 BPM | SYSTOLIC BLOOD PRESSURE: 112 MMHG | WEIGHT: 163 LBS | TEMPERATURE: 97 F | DIASTOLIC BLOOD PRESSURE: 74 MMHG | BODY MASS INDEX: 23.39 KG/M2

## 2020-04-09 PROBLEM — G43.709 CHRONIC MIGRAINE WITHOUT AURA WITHOUT STATUS MIGRAINOSUS, NOT INTRACTABLE: Status: ACTIVE | Noted: 2020-04-09

## 2020-04-09 PROCEDURE — 99214 OFFICE O/P EST MOD 30 MIN: CPT | Performed by: FAMILY MEDICINE

## 2020-04-09 RX ORDER — SUMATRIPTAN 50 MG/1
50 TABLET, FILM COATED ORAL
Qty: 9 TABLET | Refills: 3 | Status: ON HOLD | OUTPATIENT
Start: 2020-04-09 | End: 2021-04-11 | Stop reason: HOSPADM

## 2020-04-09 ASSESSMENT — PATIENT HEALTH QUESTIONNAIRE - PHQ9
SUM OF ALL RESPONSES TO PHQ9 QUESTIONS 1 & 2: 0
1. LITTLE INTEREST OR PLEASURE IN DOING THINGS: 0
SUM OF ALL RESPONSES TO PHQ QUESTIONS 1-9: 0
2. FEELING DOWN, DEPRESSED OR HOPELESS: 0
SUM OF ALL RESPONSES TO PHQ QUESTIONS 1-9: 0

## 2020-04-09 NOTE — PROGRESS NOTES
weakness  Eyes:  no photophobia or discharge  ENT:  no sore throat or ear pain  Cardiovascular:  Denies chest pain, palpitations or swelling  Respiratory:  Denies cough or shortness of breath  GI:  no abdominal pain, nausea, vomiting, or diarrhea  Musculoskeletal:  no back pain  Skin:  No rashes  Neurologic:  no headache, focal weakness, or sensory changes  Endocrine:  no polyuria or polydipsia      PAST MEDICAL HISTORY  Past Medical History:   Diagnosis Date    Anesthesia complication     Patient reports in 1973 following a Back Surgery he was kept in the hospital due to high temperatures in the evenings. He stated they kept him for observation and doesn't remember receiving any treatment for the high temperatures. Patient reports the anesthesia name started with flur? ?.    BPH with obstruction/lower urinary tract symptoms     CAD (coronary artery disease)     4-29-14 Patient unsure of reason for Diagnosis. Pt reports having a MVP but had clear Cardiac Cath. report.  Chest pain 7/1/2018    Chronic low back pain     GERD (gastroesophageal reflux disease)     H/O 24 hour EKG monitoring 3/12  12/11    3/12/12  essentially a normal event monitor without significant arrhythmias noted    H/O cardiac catheterization 2/2/12 4/02 10/98    2/22/12  LM, LAD and RCA all without significant disease    H/O cardiac catheterization 2/2/12 4/02 10/98    Done R/T false pos on stress test.    H/O cardiac catheterization 08/06/2018    normal study    H/O cardiovascular stress test 07/02/2018    normal study    H/O echocardiogram 12/10      3/14 EF55-60% normal LV function 12/10/10  complete 2 dim transthoracic echj. LVSF normal  EF >55%, transmitral spectral Doppler flow pattern is suggestive of  impaired LV relaxation    History of cardiovascular stress test 2/12 11/09 7/08 9/06 2/02 4/00 8/99 2/8/2012  evidence of mild ischemia  in the RCA.   abnormal study , restingEF is 70%, global LVSF is  normal    History of Not on file     Attends Mosque service: Not on file     Active member of club or organization: Not on file     Attends meetings of clubs or organizations: Not on file     Relationship status: Not on file    Intimate partner violence     Fear of current or ex partner: Not on file     Emotionally abused: Not on file     Physically abused: Not on file     Forced sexual activity: Not on file   Other Topics Concern    Not on file   Social History Narrative    Not on file        SURGICAL HISTORY  Past Surgical History:   Procedure Laterality Date    CARDIAC CATHETERIZATION  2/2/12    EF 55% No sugnificant disease.  COLONOSCOPY  5/5/14    diverticulosis, 1 polyp, hemorrhoids    COLONOSCOPY  07/10/2017    single 5 mm polyp found in sigmoid colon, moderate diverticulosis found in sigmoid colon    LUMBAR DISC SURGERY  11/2002    L4-5 hemilaminectomy and disk    LUMBAR DISCECTOMY  1973    PROSTATE SURGERY  09/26/2011    s/p robotic radical prostatectomy    ROTATOR CUFF REPAIR Right 10/2016    TIBIA FRACTURE SURGERY Right     childhood    URETEROLITHOTOMY  1992       CURRENT MEDICATIONS  Current Outpatient Medications   Medication Sig Dispense Refill    SUMAtriptan (IMITREX) 50 MG tablet Take 1 tablet by mouth once as needed for Migraine 9 tablet 3    gabapentin (NEURONTIN) 300 MG capsule TAKE 1 CAPSULE BY MOUTH 3  TIMES DAILY 270 capsule 1    levothyroxine (SYNTHROID) 50 MCG tablet TAKE 1 TABLET BY MOUTH  EVERY MORNING 90 tablet 1    pantoprazole (PROTONIX) 40 MG tablet Take 1 tablet by mouth daily 30 tablet 0    clopidogrel (PLAVIX) 75 MG tablet Take 1 tablet by mouth daily 90 tablet 1    traMADol (ULTRAM) 50 MG tablet Take 2 tablets by mouth 2 times daily as needed for Pain for up to 90 days.  360 tablet 0    diltiazem (CARDIZEM CD) 120 MG extended release capsule Take 1 capsule by mouth daily 90 capsule 3    aspirin 81 MG tablet Take 81 mg by mouth daily      nitroGLYCERIN (NITROSTAT) 0.3 MG SL

## 2020-04-13 LAB
EKG ATRIAL RATE: 68 BPM
EKG DIAGNOSIS: NORMAL
EKG P AXIS: 55 DEGREES
EKG P-R INTERVAL: 186 MS
EKG Q-T INTERVAL: 404 MS
EKG QRS DURATION: 88 MS
EKG QTC CALCULATION (BAZETT): 429 MS
EKG R AXIS: 6 DEGREES
EKG T AXIS: 41 DEGREES
EKG VENTRICULAR RATE: 68 BPM

## 2020-04-21 ENCOUNTER — CARE COORDINATION (OUTPATIENT)
Dept: CARE COORDINATION | Age: 80
End: 2020-04-21

## 2020-05-08 ENCOUNTER — OFFICE VISIT (OUTPATIENT)
Dept: FAMILY MEDICINE CLINIC | Age: 80
End: 2020-05-08
Payer: MEDICARE

## 2020-05-08 VITALS
BODY MASS INDEX: 23.62 KG/M2 | WEIGHT: 164.6 LBS | HEART RATE: 64 BPM | DIASTOLIC BLOOD PRESSURE: 82 MMHG | SYSTOLIC BLOOD PRESSURE: 120 MMHG

## 2020-05-08 PROCEDURE — 99214 OFFICE O/P EST MOD 30 MIN: CPT | Performed by: FAMILY MEDICINE

## 2020-05-08 RX ORDER — TRAMADOL HYDROCHLORIDE 50 MG/1
100 TABLET ORAL 2 TIMES DAILY PRN
Qty: 360 TABLET | Refills: 0 | Status: SHIPPED | OUTPATIENT
Start: 2020-05-08 | End: 2020-11-09 | Stop reason: SDUPTHER

## 2020-05-08 RX ORDER — PANTOPRAZOLE SODIUM 40 MG/1
40 TABLET, DELAYED RELEASE ORAL DAILY
Qty: 90 TABLET | Refills: 1 | Status: SHIPPED | OUTPATIENT
Start: 2020-05-08 | End: 2020-11-09 | Stop reason: SDUPTHER

## 2020-05-08 NOTE — PROGRESS NOTES
5/8/2020    Branden Dias    Chief Complaint   Patient presents with    6 Month Follow-Up    Other     no c/o's       HPI    [facility] is a 79 y.o. male who presents today with follow-up. Patient complains of flatulence. He denies increased reflux. He denies eating cabbage and beans more. He denies significant abdominal pain or constipation. Patient notes his chronic low back pain is fairly steady. He uses the pain medicine now down to 2 or 3 pills/day. He is only using tramadol. He hopes to be using less as he like not to have to follow-up every 3 months. As light as tramadol is patient continues to wean himself down I would reward him with less often visits as that is the goal of the whole plan. Patient notes compliance with his thyroid medicine. He denies side effects of it. He is willing for thyroid lab rechecked. REVIEW OF SYSTEMS    Constitutional:  Denies fever, chills, weight loss or weakness  Eyes:  no photophobia or discharge  ENT:  no sore throat or ear pain  Cardiovascular:  Denies chest pain, palpitations or swelling  Respiratory:  Denies cough or shortness of breath  GI:  no abdominal pain, nausea, vomiting, or diarrhea  Musculoskeletal:  no back pain  Skin:  No rashes  Neurologic:  no headache, focal weakness, or sensory changes  Endocrine:  no polyuria or polydipsia      PAST MEDICAL HISTORY  Past Medical History:   Diagnosis Date    Anesthesia complication     Patient reports in 1973 following a Back Surgery he was kept in the hospital due to high temperatures in the evenings. He stated they kept him for observation and doesn't remember receiving any treatment for the high temperatures. Patient reports the anesthesia name started with flur? ?.    BPH with obstruction/lower urinary tract symptoms     CAD (coronary artery disease)     4-29-14 Patient unsure of reason for Diagnosis. Pt reports having a MVP but had clear Cardiac Cath. report.     Chest pain 7/1/2018    Chronic

## 2020-06-05 DIAGNOSIS — E53.8 VITAMIN B12 DEFICIENCY: Chronic | ICD-10-CM

## 2020-06-05 DIAGNOSIS — E03.9 ACQUIRED HYPOTHYROIDISM: Chronic | ICD-10-CM

## 2020-06-05 LAB
TSH REFLEX FT4: 2.03 UIU/ML (ref 0.27–4.2)
VITAMIN B-12: 579 PG/ML (ref 211–911)

## 2020-08-10 ENCOUNTER — OFFICE VISIT (OUTPATIENT)
Dept: FAMILY MEDICINE CLINIC | Age: 80
End: 2020-08-10
Payer: MEDICARE

## 2020-08-10 VITALS
HEIGHT: 70 IN | DIASTOLIC BLOOD PRESSURE: 66 MMHG | HEART RATE: 64 BPM | SYSTOLIC BLOOD PRESSURE: 102 MMHG | WEIGHT: 162.2 LBS | BODY MASS INDEX: 23.22 KG/M2 | TEMPERATURE: 97.6 F

## 2020-08-10 PROCEDURE — 99214 OFFICE O/P EST MOD 30 MIN: CPT | Performed by: FAMILY MEDICINE

## 2020-08-10 RX ORDER — LEVOTHYROXINE SODIUM 0.05 MG/1
50 TABLET ORAL EVERY MORNING
Qty: 90 TABLET | Refills: 1 | Status: SHIPPED | OUTPATIENT
Start: 2020-08-10 | End: 2021-01-26

## 2020-08-10 NOTE — PROGRESS NOTES
8/10/2020    Michelle Pathak    Chief Complaint   Patient presents with    3 Month Follow-Up    Other     no c/o       HPI    Jose Rojas is a 79 y.o. male who presents today with follow-up. Patient notes that he is trying to wean his tramadol so far unsuccessful. He is averaging about 4 tramadol a day due to his peripheral neuropathy. The neuropathy is about the same. He is currently not using Tylenol. Patient denies side effect of the tramadol. Patient notes since been over a decade since he is had melanoma on his face. Is not had recurrences elsewhere. He continues to get every 6 to 12 months checks from [facility] [address]. Patient notes compliance with his thyroid medicine. He denies side effects. We reviewed his last thyoid lab which is right on track. REVIEW OF SYSTEMS    Constitutional:  Denies fever, chills, weight loss or weakness  Eyes:  no photophobia or discharge  ENT:  no sore throat or ear pain  Cardiovascular:  Denies chest pain, palpitations or swelling  Respiratory:  Denies cough or shortness of breath  GI:  no abdominal pain, nausea, vomiting, or diarrhea  Musculoskeletal:  no back pain  Skin:  No rashes  Neurologic:  no headache, focal weakness, or sensory changes  Endocrine:  no polyuria or polydipsia      PAST MEDICAL HISTORY  Past Medical History:   Diagnosis Date    Anesthesia complication     Patient reports in 1973 following a Back Surgery he was kept in the hospital due to high temperatures in the evenings. He stated they kept him for observation and doesn't remember receiving any treatment for the high temperatures. Patient reports the anesthesia name started with flur? ?.    BPH with obstruction/lower urinary tract symptoms     Chest pain 7/1/2018    Chronic low back pain     GERD (gastroesophageal reflux disease)     H/O 24 hour EKG monitoring 3/12  12/11    3/12/12  essentially a normal event monitor without significant arrhythmias noted    H/O cardiac catheterization 2/2/12 4/02 10/98    2/22/12  LM, LAD and RCA all without significant disease    H/O cardiac catheterization 2/2/12 4/02 10/98    Done R/T false pos on stress test.    H/O cardiac catheterization 08/06/2018    normal study    H/O cardiovascular stress test 07/02/2018    normal study    H/O echocardiogram 12/10      3/14 EF55-60% normal LV function 12/10/10  complete 2 dim transthoracic echj. LVSF normal  EF >55%, transmitral spectral Doppler flow pattern is suggestive of  impaired LV relaxation    History of cardiovascular stress test 2/12 11/09 7/08 9/06 2/02 4/00 8/99 2/8/2012  evidence of mild ischemia  in the RCA. abnormal study , restingEF is 70%, global LVSF is  normal    History of Holter monitoring 9/17/14    14 day EVENT - no RVR    History of nuclear stress test 08/03/2017    cardiolite-normal,EF60%    Hx of chest x-ray 2/20/12    chest is considereed non acute. COPD    Hx of echocardiogram 08/03/2017    KX30-46%,TNZSH 2 diastolic dysfunction    Hyperlipidemia     Idiopathic peripheral neuropathy     Kidney stone     Left facial numbness 8/30/2017    Lumbar radiculopathy     Malignant melanoma of skin of face (HCC)     MVP (mitral valve prolapse)     Myotonia     chronic increased CPK's    Peripelvic (lymphatic) cyst     per CT    Precordial pain 8/3/2018    Prostate cancer ([facility] [address] 06/2011    Prostate - Surgical Intervention    Vitamin B12 deficiency        FAMILY HISTORY  Family History   Problem Relation Age of Onset    Heart Disease Mother         ASCAD    Heart Disease Father     Heart Disease Brother     Diabetes Other     Cancer Other         unknown type    Prostate Cancer Other     Coronary Art Dis Other     Coronary Art Dis Other        SOCIAL HISTORY  Social History     Socioeconomic History    Marital status:       Spouse name: Not on file    Number of children: Not on file    Years of education: Not on file    Highest education level: Not on file Occupational History    Not on file   Social Needs    Financial resource strain: Not on file    Food insecurity     Worry: Not on file     Inability: Not on file    Transportation needs     Medical: Not on file     Non-medical: Not on file   Tobacco Use    Smoking status: Never Smoker    Smokeless tobacco: Never Used   Substance and Sexual Activity    Alcohol use: Yes     Comment: Occassional    Drug use: No    Sexual activity: Not Currently     Partners: Female     Comment:    Lifestyle    Physical activity     Days per week: Not on file     Minutes per session: Not on file    Stress: Not on file   Relationships    Social connections     Talks on phone: Not on file     Gets together: Not on file     Attends [employer] service: Not on file     Active member of club or organization: Not on file     Attends meetings of clubs or organizations: Not on file     Relationship status: Not on file    Intimate partner violence     Fear of current or ex partner: Not on file     Emotionally abused: Not on file     Physically abused: Not on file     Forced sexual activity: Not on file   Other Topics Concern    Not on file   Social History Narrative    Not on file        SURGICAL HISTORY  Past Surgical History:   Procedure Laterality Date    CARDIAC CATHETERIZATION  2/2/12    EF 55% No sugnificant disease.     COLONOSCOPY  5/5/14    diverticulosis, 1 polyp, hemorrhoids    COLONOSCOPY  07/10/2017    single 5 mm polyp found in sigmoid colon, moderate diverticulosis found in sigmoid colon    LUMBAR DISC SURGERY  11/2002    L4-5 hemilaminectomy and disk    LUMBAR DISCECTOMY  1973    PROSTATE SURGERY  09/26/2011    s/p robotic radical prostatectomy    ROTATOR CUFF REPAIR Right 10/2016    TIBIA FRACTURE SURGERY Right     childhood    URETEROLITHOTOMY  1992       CURRENT MEDICATIONS  Current Outpatient Medications   Medication Sig Dispense Refill    levothyroxine (SYNTHROID) 50 MCG tablet Take 1 tablet by mouth every morning 90 tablet 1    pantoprazole (PROTONIX) 40 MG tablet Take 1 tablet by mouth daily 90 tablet 1    traMADol (ULTRAM) 50 MG tablet Take 2 tablets by mouth 2 times daily as needed for Pain for up to 90 days. 360 tablet 0    SUMAtriptan (IMITREX) 50 MG tablet Take 1 tablet by mouth once as needed for Migraine 9 tablet 3    gabapentin (NEURONTIN) 300 MG capsule TAKE 1 CAPSULE BY MOUTH 3  TIMES DAILY 270 capsule 1    clopidogrel (PLAVIX) 75 MG tablet Take 1 tablet by mouth daily 90 tablet 1    diltiazem (CARDIZEM CD) 120 MG extended release capsule Take 1 capsule by mouth daily 90 capsule 3    aspirin 81 MG tablet Take 81 mg by mouth daily      nitroGLYCERIN (NITROSTAT) 0.3 MG SL tablet Place 0.3 mg under the tongue every 5 minutes as needed for Chest pain up to max of 3 total doses. If no relief after 1 dose, call 911. No current facility-administered medications for this visit. ALLERGIES  Allergies   Allergen Reactions    Fenofibrate Other (See Comments)     Liq stools    Lipitor Other (See Comments)     Liq stools    Pravachol [Pravastatin Sodium] Other (See Comments)     Liq stools       PHYSICAL EXAM    /66   Pulse 64   Temp 97.6 °F (36.4 °C)   Ht 5' 10\" (1.778 m)   Wt 162 lb 3.2 oz (73.6 kg)   BMI 23.27 kg/m²     Constitutional:  Well developed, well nourished  HEENT:  Normocephalic, atraumatic, bilateral external ears normal, oropharynx moist, nose normal  Neck:  Normal range of motion, no tenderness, supple  Lymphatic:  No lymphadenopathy noted  Cardiovascular:  Normal heart rate, S1S2 nl  Thorax & Lungs:  Normal breath sounds, no respiratory distress, no wheezing  Skin:  Warm, dry, no erythema, no rash  Back:  straight  Extremities:  No edema, no tenderness, no cyanosis  Musculoskeletal:  Good range of motion   Neurologic:  Alert & oriented X 3      ASSESSMENT & PLAN    1. Acquired hypothyroidism  Reviewed labs.   Insert controlled  - levothyroxine (SYNTHROID) 50 MCG tablet; Take 1 tablet by mouth every morning  Dispense: 90 tablet; Refill: 1    2. Chronic obstructive pulmonary disease, unspecified COPD type ([facility] [address]  Patient doing so well is not even using albuterol. 3. Prostate CA ([facility] [address]  No sign of recurrence. 4. Malignant melanoma of skin of face (HCC)  Stable no sign of recurrence    6. 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  - VL DUP CAROTID BILATERAL;  Future    Follow-up 6 months       Electronically signed by Saleem Draper MD on 8/10/2020

## 2020-08-10 NOTE — LETTER
MEDICATION AGREEMENT     Abelinotara Swapnil Florez  [birth year]      For certain conditions, multiple classes of medications may be used to help better manage your symptoms, and to improve your ability to function at home, work and in social settings. However, these medications do have risks, which will be discussed with you, including addiction and dependency. The following prescribed medications need frequent monitoring and will require you to partner and assist in your healthcare. Medication  Dose, instructions and quantity as indicated on current prescription bottle Diagnosis/Reason(s) for Taking Category   Tramadol 50mg 2 pills bid prn lbp 4                           Benefits and goals of Controlled Substance Medications: There are two potential goals for your treatment: (1) decreased pain and suffering (2) improved daily life functions. There are many possible treatments for your chronic condition(s), and, in addition to controlled substance medications, we will try alternatives such as physical therapy, yoga, massage, home daily exercise, meditation, relaxation techniques, injections, chiropractic manipulations, surgery, cognitive therapy, hypnosis and many medications that are not habit-forming. Use of controlled substance medications may be helpful, but they are unlikely to resolve all of your symptoms or restore all function. Risks of Controlled Substance Medications:    Opioid pain medications: These medications can lead to problems such as addiction/dependence, sedation, lightheadedness/dizziness, memory issues, falls, constipation, nausea, or vomiting. They may also impair the ability to drive or operate machinery. Additionally, these medications may lower testosterone levels, leading to loss of bone strength, stamina and sex drive.   They may cause problems with breathing, sleep apnea and reduced coughing, which are especially dangerous for patients with lung disease. Overdose or dangerous interactions with alcohol and other medications may occur, leading to death. Hyperalgesia may develop, in which patients receiving opioids for the treatment of pain may actually become more sensitive to certain painful stimuli, and in some cases, experience pain from ordinarily non-painful stimuli. Women between the ages of 14-53 who could become pregnant should carefully weigh the risks and benefits of opioids with their physicians, as these medications increase the risk of pregnancy complications, including miscarriage,  delivery and stillbirth. It is also possible for babies to be born addicted to opioids. Opioid dependence withdrawal symptoms may include; feelings of uneasiness, increased pain, irritability, belly pain, diarrhea, sweats and goose-flesh. Benzodiazepines and non-benzodiazepine sleep medications: These medications can lead to problems such as addiction/dependence, sedation, fatigue, lightheadedness, dizziness, incoordination, falls, depression, hallucinations, and impaired judgment, memory and concentration. The ability to drive and operate machinery may also be affected. Abnormal sleep-related behaviors have been reported, including sleep walking, driving, making telephone calls, eating, or having sex while not fully awake. These medications can suppress breathing and worsen sleep apnea, particularly when combined with alcohol or other sedating medications, potentially leading to death. Dependence withdrawal symptoms may include tremors, anxiety, hallucinations and seizures. Stimulants:  Common adverse effects include addiction/dependence, increased blood pressure and heart rate, decreased appetite, nausea, involuntary weight loss, insomnia, irritability, and headaches.   These risks may increase when these medications are combined with other stimulants, such as caffeine pills or energy drinks, certain weight loss supplements and oral decongestants. Dependence withdrawal symptoms may include depressed mood, loss of interest, suicidal thoughts, anxiety, fatigue, appetite changes and agitation. Testosterone replacement therapy:  Potential side effects include increased risk of stroke and heart attack, blood clots, increased blood pressure, increased cholesterol, enlarged prostate, sleep apnea, irritability/aggression and other mood disorders, and decreased fertility. Other:     1. I understand that I have the following responsibilities:  · I will take medications at the dose and frequency prescribed. · I will not increase or change how I take my medications without the approval of the health care provider who signs this Medication Agreement. · I will arrange for refills at the prescribed interval ONLY during regular office hours. I will not ask for refills earlier than agreed, after-hours, on holidays or on weekends. · I will obtain all refills for these medications at  ·  ______[facility]______________________________  pharmacy (phone number  ·  ________________________), with full consent for my provider and pharmacist to exchange information in writing or verbally. · I will not request any pain medications or controlled substances from other providers and will inform this provider of all other medications I am taking. · I will inform my other health care providers that I am taking these medications and of the existence of this [address]. In the event of an emergency, I will provide the same information to the emergency department providers. · I will protect my prescriptions and medications. I understand that lost or misplaced prescriptions will not be replaced. · I will keep medications only for my own use and will not share them with others. I will keep all medications away from children.   · I agree to participate in any medical, psychological or psychiatric assessments recommended by my provider. · I will actively participate in any program designed to improve function, including social, physical, psychological and daily or work activities. 2. I will not use illegal or street drugs or another person's prescription. If I have an addiction problem with drugs or alcohol and my provider asks me to enter a program to address this issue, I agree to follow through. Such programs may include:  · 12-Step program and securing a sponsor  · Individual counseling   · Inpatient or outpatient treatment  · Other:_____________________________________________________________________________________________________________________________________________    If in treatment, I will request that a copy of the programs initial evaluation and treatment recommendations be sent to this provider and will not expect refills until that is received. I will also request written monthly updates be sent to this provider to verify my continuing treatment. 3. I will consent to drug screening upon my providers request to assure I am only taking the prescribed drugs, described in this MEDICATION AGREEMENT. I understand that a drug screen is a laboratory test in which a sample of my urine, blood or saliva is checked to see what drugs I have been taking. 4. I agree that I will treat the providers and staff at this office with respect at all times. I will keep all of my scheduled appointments, but if I need to cancel my appointment, I will do so a minimum of 24 hours before it is scheduled. 5. I understand that this provider may stop prescribing the medications listed if:  · I do not show any improvement in pain, or my activity has not improved. · I develop rapid tolerance or loss of improvement, as described in my treatment plan. · I develop significant side effects from the medication.   · My behavior is inconsistent with the responsibilities outlined above, which may also result in my being prevented from receiving further care from this office. · Other:____________________________________________________________________    AGREEMENT:    I have read the above and have had all of my questions answered. For chronic disease management, I know that my symptoms can be managed with many types of treatments. A chronic medication trial may be part of my treatment, but I must be an active participant in my care. Medication therapy is only one part of my symptom management plan. In some cases, there may be limited scientific evidence to support the chronic use of certain medications to improve symptoms and daily function. Furthermore, in certain circumstances, there may be scientific information that suggests that use of chronic controlled substances may actually worsen my symptoms and increase my risk of unintentional death directly related to this medication therapy. I know that if my provider feels my risk from controlled medications is greater than my benefit, I will have my controlled substance medication(s) compassionately lowered or removed altogether. I agree to a controlled substance medication trial.      I further agree to allow this office to contact my HIPAA contact on file if there are concerns about my safety and use of controlled medications. I have agreed to use the following medications above as instructed by my physician and as stated in this [address].      Patient Signature:  ______________________  Date:8/10/2020 or _____________    Provider Signature:______________________  Date:8/10/2020 or _____________

## 2020-08-18 ENCOUNTER — HOSPITAL ENCOUNTER (OUTPATIENT)
Dept: ULTRASOUND IMAGING | Age: 80
Discharge: HOME | End: 2020-08-18
Payer: MEDICARE

## 2020-08-18 PROCEDURE — 93880 EXTRACRANIAL BILAT STUDY: CPT

## 2020-09-09 RX ORDER — DILTIAZEM HYDROCHLORIDE 120 MG/1
120 CAPSULE, EXTENDED RELEASE ORAL DAILY
Qty: 90 CAPSULE | Refills: 1 | Status: SHIPPED | OUTPATIENT
Start: 2020-09-09 | End: 2021-03-03

## 2020-09-30 ENCOUNTER — OFFICE VISIT (OUTPATIENT)
Dept: CARDIOLOGY CLINIC | Age: 80
End: 2020-09-30
Payer: MEDICARE

## 2020-09-30 VITALS
DIASTOLIC BLOOD PRESSURE: 86 MMHG | WEIGHT: 164.2 LBS | BODY MASS INDEX: 23.51 KG/M2 | OXYGEN SATURATION: 96 % | SYSTOLIC BLOOD PRESSURE: 134 MMHG | TEMPERATURE: 96.9 F | HEART RATE: 72 BPM | HEIGHT: 70 IN

## 2020-09-30 PROCEDURE — 1036F TOBACCO NON-USER: CPT | Performed by: NURSE PRACTITIONER

## 2020-09-30 PROCEDURE — G8427 DOCREV CUR MEDS BY ELIG CLIN: HCPCS | Performed by: NURSE PRACTITIONER

## 2020-09-30 PROCEDURE — G8420 CALC BMI NORM PARAMETERS: HCPCS | Performed by: NURSE PRACTITIONER

## 2020-09-30 PROCEDURE — 4040F PNEUMOC VAC/ADMIN/RCVD: CPT | Performed by: NURSE PRACTITIONER

## 2020-09-30 PROCEDURE — 99212 OFFICE O/P EST SF 10 MIN: CPT | Performed by: NURSE PRACTITIONER

## 2020-09-30 PROCEDURE — 1123F ACP DISCUSS/DSCN MKR DOCD: CPT | Performed by: NURSE PRACTITIONER

## 2020-09-30 ASSESSMENT — ENCOUNTER SYMPTOMS
POOR WOUND HEALING: 0
EYE PAIN: 0
COLOR CHANGE: 0
SHORTNESS OF BREATH: 0
HEARTBURN: 1
ABDOMINAL PAIN: 0
HOARSE VOICE: 0

## 2020-09-30 NOTE — PROGRESS NOTES
[facility] PHYSICAL [facility]ABILITATION [facility]  Phone: [phone]    Fax [phone]                  Oziel Arvizu MD, Evangelina Rodriguez MD, [address] [facility], MD, MD Ngozi Nance, MD Niels Tellez, MD Cristal Aranda, APRN               Rubén Espinal, APRN    Khadar Case, APRN              Dakota Boswell, APRN            9/30/2020    RE: Hannah Maza  (1940)                               TO:  Dr. Etienne Feliciano MD  The primary cardiologist is Dr. Leena Person    CC:   1. Mixed hyperlipidemia         HPI:    Thank you for involving me in taking care of your patient Hannah Maza. Hannah Maza is a 79y.o. year old male with a history as listed above and is being seen in the office today. Hannah Maza reports feeling well. He denies any chest pain shortness breath or palpitations. He denies any edema. States that he is active and compliant with his medications. He does follow-up with his primary care physician on a regular basis. Current Outpatient Medications   Medication Sig Dispense Refill    dilTIAZem (DILT-XR) 120 MG extended release capsule Take 1 capsule by mouth daily 90 capsule 1    levothyroxine (SYNTHROID) 50 MCG tablet Take 1 tablet by mouth every morning 90 tablet 1    pantoprazole (PROTONIX) 40 MG tablet Take 1 tablet by mouth daily 90 tablet 1    traMADol (ULTRAM) 50 MG tablet Take 2 tablets by mouth 2 times daily as needed for Pain for up to 90 days.  360 tablet 0    SUMAtriptan (IMITREX) 50 MG tablet Take 1 tablet by mouth once as needed for Migraine 9 tablet 3    gabapentin (NEURONTIN) 300 MG capsule TAKE 1 CAPSULE BY MOUTH 3  TIMES DAILY 270 capsule 1    clopidogrel (PLAVIX) 75 MG tablet Take 1 tablet by mouth daily 90 tablet 1    aspirin 81 MG tablet Take 81 mg by mouth daily      nitroGLYCERIN (NITROSTAT) 0.3 MG SL tablet Place 0.3 mg under the tongue every 5 minutes as needed for Chest pain up to max of 3 total doses. If no relief after 1 dose, call 911. No current facility-administered medications for this visit. Allergies: Fenofibrate; Lipitor; and Pravachol [pravastatin sodium]  Past Medical History:   Diagnosis Date    Anesthesia complication     Patient reports in 1973 following a Back Surgery he was kept in the hospital due to high temperatures in the evenings. He stated they kept him for observation and doesn't remember receiving any treatment for the high temperatures. Patient reports the anesthesia name started with flur? ?.    BPH with obstruction/lower urinary tract symptoms     Chest pain 7/1/2018    Chronic low back pain     GERD (gastroesophageal reflux disease)     H/O 24 hour EKG monitoring 3/12  12/11    3/12/12  essentially a normal event monitor without significant arrhythmias noted    H/O cardiac catheterization 2/2/12 4/02 10/98    2/22/12  LM, LAD and RCA all without significant disease    H/O cardiac catheterization 2/2/12 4/02 10/98    Done R/T false pos on stress test.    H/O cardiac catheterization 08/06/2018    normal study    H/O cardiovascular stress test 07/02/2018    normal study    H/O echocardiogram 12/10      3/14 EF55-60% normal LV function 12/10/10  complete 2 dim transthoracic echj. LVSF normal  EF >55%, transmitral spectral Doppler flow pattern is suggestive of  impaired LV relaxation    History of cardiovascular stress test 2/12 11/09 7/08 9/06 2/02 4/00 8/99 2/8/2012  evidence of mild ischemia  in the RCA. abnormal study , restingEF is 70%, global LVSF is  normal    History of Holter monitoring 9/17/14    14 day EVENT - no RVR    History of nuclear stress test 08/03/2017    cardiolite-normal,EF60%    Hx of chest x-ray 2/20/12    chest is considereed non acute.   COPD    Hx of echocardiogram 08/03/2017    BV81-81%,XLZSH 2 diastolic dysfunction    Hyperlipidemia     Idiopathic peripheral neuropathy     Kidney stone     Left facial numbness 8/30/2017    Lumbar radiculopathy     Malignant melanoma of skin of face (HCC)     MVP (mitral valve prolapse)     Myotonia     chronic increased CPK's    Peripelvic (lymphatic) cyst     per CT    Precordial pain 8/3/2018    Prostate cancer ([facility] [address] 06/2011    Prostate - Surgical Intervention    Vitamin B12 deficiency      Past Surgical History:   Procedure Laterality Date    CARDIAC CATHETERIZATION  2/2/12    EF 55% No sugnificant disease.  COLONOSCOPY  5/5/14    diverticulosis, 1 polyp, hemorrhoids    COLONOSCOPY  07/10/2017    single 5 mm polyp found in sigmoid colon, moderate diverticulosis found in sigmoid colon   Rob 71 SURGERY  11/2002    L4-5 hemilaminectomy and disk    LUMBAR DISCECTOMY  1973    PROSTATE SURGERY  09/26/2011    s/p robotic radical prostatectomy    ROTATOR CUFF REPAIR Right 10/2016    TIBIA FRACTURE SURGERY Right     childhood    URETEROLITHOTOMY  1992     Family History   Problem Relation Age of Onset    Heart Disease Mother         ASCAD    Heart Disease Father     Heart Disease Brother     Diabetes Other     Cancer Other         unknown type    Prostate Cancer Other     Coronary Art Dis Other     Coronary Art Dis Other      Social History     Tobacco Use    Smoking status: Never Smoker    Smokeless tobacco: Never Used   Substance Use Topics    Alcohol use: Yes     Comment: Occassional        Review of Systems   Constitution: Negative for diaphoresis and malaise/fatigue. HENT: Negative for congestion and hoarse voice. Eyes: Negative for pain and visual disturbance. Cardiovascular: Negative for chest pain, dyspnea on exertion, irregular heartbeat, near-syncope, palpitations and paroxysmal nocturnal dyspnea. Respiratory: Negative for shortness of breath. Endocrine: Negative for cold intolerance and heat intolerance. Hematologic/Lymphatic: Negative for adenopathy and bleeding problem.    Skin: Negative for color change, poor wound healing and rash. Musculoskeletal: Positive for arthritis. Negative for muscle weakness. Gastrointestinal: Positive for heartburn. Negative for abdominal pain and melena. Genitourinary: Negative for flank pain and hematuria. Neurological: Negative for dizziness and light-headedness. Psychiatric/Behavioral: Negative for depression. The patient is not nervous/anxious. Objective:      Physical Exam:  /86   Pulse 72   Temp 96.9 °F (36.1 °C)   Ht 5' 10\" (1.778 m)   Wt 164 lb 3.2 oz (74.5 kg)   SpO2 96%   BMI 23.56 kg/m²   Wt Readings from Last 3 Encounters:   09/30/20 164 lb 3.2 oz (74.5 kg)   08/10/20 162 lb 3.2 oz (73.6 kg)   05/08/20 164 lb 9.6 oz (74.7 kg)     Body mass index is 23.56 kg/m². Physical Exam  Constitutional:       Appearance: Normal appearance. HENT:      Head: Normocephalic and atraumatic. Right Ear: External ear normal.      Left Ear: External ear normal.      Nose: Nose normal.      Mouth/Throat:      Mouth: Mucous membranes are moist.      Pharynx: Oropharynx is clear. Eyes:      Extraocular Movements: Extraocular movements intact. Pupils: Pupils are equal, round, and reactive to light. Neck:      Musculoskeletal: Normal range of motion and neck supple. Cardiovascular:      Rate and Rhythm: Normal rate and regular rhythm. Pulses: Normal pulses. Pulmonary:      Effort: Pulmonary effort is normal.      Breath sounds: Normal breath sounds. Abdominal:      General: There is no distension. Palpations: Abdomen is soft. Tenderness: There is no abdominal tenderness. Musculoskeletal: Normal range of motion. Right lower leg: No edema. Left lower leg: No edema. Skin:     General: Skin is warm. Capillary Refill: Capillary refill takes less than 2 seconds. Neurological:      General: No focal deficit present. Mental Status: He is alert and oriented to person, place, and time.    Psychiatric: Mood and Affect: Mood normal.         Behavior: Behavior normal.           DATA  BNP:   Lab Results   Component Value Date    PROBNP 79.26 04/06/2020     CBC:   Lab Results   Component Value Date    WBC 4.5 04/06/2020    RBC 5.11 04/06/2020    HGB 16.2 04/06/2020    HCT 49.7 04/06/2020     04/06/2020     CMP:    Lab Results   Component Value Date     04/06/2020    K 4.4 04/06/2020    CL 99 04/06/2020    CO2 25 04/06/2020    BUN 14 04/06/2020    CREATININE 1.0 04/06/2020    GFRAA >60 04/06/2020    AGRATIO 2.4 11/08/2019    LABGLOM >60 04/06/2020    GLUCOSE 101 04/06/2020    CALCIUM 9.5 04/06/2020     Hepatic Function Panel:    Lab Results   Component Value Date    ALKPHOS 40 03/30/2020    ALT 17 03/30/2020    AST 27 03/30/2020    PROT 6.6 03/30/2020    BILITOT 0.3 03/30/2020    BILIDIR 0.1 06/14/2011    IBILI 0.4 06/14/2011    LABALBU 4.0 03/30/2020     Magnesium:    Lab Results   Component Value Date    MG 1.8 07/01/2018     PT/INR:    Lab Results   Component Value Date    PROTIME 13.6 04/06/2020    PROTIME 12.4 02/20/2012    INR 1.12 04/06/2020     Lipids:    Lab Results   Component Value Date    TRIG 168 07/02/2018    HDL 33 07/02/2018    LDLDIRECT 128 07/02/2018     No results found for: LABA1C  TSH:    Lab Results   Component Value Date    TSH 0.56 04/18/2019         Assessment/ Plan:    Patient seen, interviewed and examined. Testing was reviewed. 1. Mixed hyperlipidemia  No recent lipid- will give lab slip to check  Encouraged to continue his walking program.   He has an intolerance to statins         Lifestyle and risk factor modificatons discussed. Various goals are discussed and questions answered. Continue current medications. Appropriate prescriptions are addressed. Questions answered and patient verbalizes understanding.    Office visit in 1 year

## 2020-10-06 ENCOUNTER — HOSPITAL ENCOUNTER (OUTPATIENT)
Age: 80
Discharge: HOME | End: 2020-10-06
Payer: MEDICARE

## 2020-10-06 LAB
CHOLESTEROL: 183 MG/DL
HDLC SERPL-MCNC: 37 MG/DL
LDL CHOLESTEROL DIRECT: 131 MG/DL
TRIGL SERPL-MCNC: 122 MG/DL

## 2020-10-06 PROCEDURE — 36415 COLL VENOUS BLD VENIPUNCTURE: CPT

## 2020-10-06 PROCEDURE — 80061 LIPID PANEL: CPT

## 2020-10-06 PROCEDURE — 83721 ASSAY OF BLOOD LIPOPROTEIN: CPT

## 2020-10-07 ENCOUNTER — TELEPHONE (OUTPATIENT)
Dept: CARDIOLOGY CLINIC | Age: 80
End: 2020-10-07

## 2020-10-07 NOTE — TELEPHONE ENCOUNTER
Please inform patient that his lipids are good- encourage exercise and low cholesterol diet    Per Ilia Peña, left message for patient.

## 2020-11-02 ENCOUNTER — TELEPHONE (OUTPATIENT)
Dept: INTERNAL MEDICINE CLINIC | Age: 80
End: 2020-11-02

## 2020-11-02 RX ORDER — CLOPIDOGREL BISULFATE 75 MG/1
75 TABLET ORAL DAILY
Qty: 90 TABLET | Refills: 3 | Status: SHIPPED | OUTPATIENT
Start: 2020-11-02 | End: 2021-04-21 | Stop reason: SDUPTHER

## 2020-11-02 NOTE — TELEPHONE ENCOUNTER
Pt returned phone call and stated he has enough medications to get him through until the 9 th. Thank you.

## 2020-11-09 ENCOUNTER — OFFICE VISIT (OUTPATIENT)
Dept: FAMILY MEDICINE CLINIC | Age: 80
End: 2020-11-09
Payer: MEDICARE

## 2020-11-09 VITALS
WEIGHT: 163.8 LBS | DIASTOLIC BLOOD PRESSURE: 68 MMHG | BODY MASS INDEX: 22.93 KG/M2 | TEMPERATURE: 98.1 F | SYSTOLIC BLOOD PRESSURE: 118 MMHG | HEART RATE: 68 BPM | HEIGHT: 71 IN

## 2020-11-09 PROCEDURE — 99214 OFFICE O/P EST MOD 30 MIN: CPT | Performed by: FAMILY MEDICINE

## 2020-11-09 RX ORDER — PANTOPRAZOLE SODIUM 40 MG/1
40 TABLET, DELAYED RELEASE ORAL DAILY
Qty: 90 TABLET | Refills: 1 | Status: SHIPPED | OUTPATIENT
Start: 2020-11-09 | End: 2021-04-12

## 2020-11-09 RX ORDER — GABAPENTIN 300 MG/1
CAPSULE ORAL
Qty: 270 CAPSULE | Refills: 1 | Status: SHIPPED | OUTPATIENT
Start: 2020-11-09 | End: 2021-04-12

## 2020-11-09 RX ORDER — TRAMADOL HYDROCHLORIDE 50 MG/1
100 TABLET ORAL 2 TIMES DAILY PRN
Qty: 360 TABLET | Refills: 0 | Status: SHIPPED | OUTPATIENT
Start: 2020-11-09 | End: 2021-08-12 | Stop reason: SDUPTHER

## 2020-11-09 NOTE — PROGRESS NOTES
11/9/2020    [facility]    Chief Complaint   Patient presents with    3 Month Follow-Up    Gas     Pt reports concern due to increase in flatus     Other     Pt requesting refill for plavix, system shows dr. Mateo Schultz filled prescription on 11/2. please advise       HPI    Phill Lesches is a 79 y.o. male who presents today with follow-up. Patient is pleased with how he is doing well overall. Patient does note chronic low back pain with bilateral sciatica. It is tolerable with his 4 tramadol a day plus Tylenol. Patient wishes remain on the same. No sign of abuse or diversion. Patient complains of increased gas. It did not change with probiotic. He is willing to increase the MiraLAX and try Beano. Patient denies significant reflux that could be causing that. Patient notes compliance with his close thyroid medication. He denies side effect of it. He is willing to get it checked on follow-up. We reviewed his thyroid from 5 months ago that was fine. Patient verifies a prior history of prostate cancer and melanoma. Both these diagnoses are annotated. Patient has chronic hereditary neuropathy. He also has run low on B12 before so we checked that keep following that level. It was fine 5 months ago      REVIEW OF SYSTEMS    Constitutional:  Denies fever, chills, weight loss or weakness  Eyes:  no photophobia or discharge  ENT:  no sore throat or ear pain  Cardiovascular:  Denies chest pain, palpitations or swelling  Respiratory:  Denies cough or shortness of breath  GI:  no abdominal pain, nausea, vomiting, or diarrhea  Musculoskeletal:  no back pain  Skin:  No rashes  Neurologic:  no headache, focal weakness, or sensory changes  Endocrine:  no polyuria or polydipsia      PAST MEDICAL HISTORY  Past Medical History:   Diagnosis Date    Anesthesia complication     Patient reports in 1973 following a Back Surgery he was kept in the hospital due to high temperatures in the evenings.  He stated they kept him for observation and doesn't remember receiving any treatment for the high temperatures. Patient reports the anesthesia name started with flur? ?.    BPH with obstruction/lower urinary tract symptoms     Chest pain 7/1/2018    Chronic low back pain     GERD (gastroesophageal reflux disease)     H/O 24 hour EKG monitoring 3/12  12/11    3/12/12  essentially a normal event monitor without significant arrhythmias noted    H/O cardiac catheterization 2/2/12 4/02 10/98    2/22/12  LM, LAD and RCA all without significant disease    H/O cardiac catheterization 2/2/12 4/02 10/98    Done R/T false pos on stress test.    H/O cardiac catheterization 08/06/2018    normal study    H/O cardiovascular stress test 07/02/2018    normal study    H/O echocardiogram 12/10      3/14 EF55-60% normal LV function 12/10/10  complete 2 dim transthoracic echj. LVSF normal  EF >55%, transmitral spectral Doppler flow pattern is suggestive of  impaired LV relaxation    History of cardiovascular stress test 2/12 11/09 7/08 9/06 2/02 4/00 8/99 2/8/2012  evidence of mild ischemia  in the RCA. abnormal study , restingEF is 70%, global LVSF is  normal    History of Holter monitoring 9/17/14    14 day EVENT - no RVR    History of nuclear stress test 08/03/2017    cardiolite-normal,EF60%    Hx of chest x-ray 2/20/12    chest is considereed non acute.   COPD    Hx of echocardiogram 08/03/2017    MS58-13%,ZMYKL 2 diastolic dysfunction    Hyperlipidemia     Idiopathic peripheral neuropathy     Kidney stone     Left facial numbness 8/30/2017    Lumbar radiculopathy     Malignant melanoma of skin of face (HCC)     MVP (mitral valve prolapse)     Myotonia     chronic increased CPK's    Peripelvic (lymphatic) cyst     per CT    Precordial pain 8/3/2018    Vitamin B12 deficiency        FAMILY HISTORY  Family History   Problem Relation Age of Onset    Heart Disease Mother         ASCAD    Heart Disease Father     Heart Disease Brother     Diabetes Other     Cancer Other         unknown type    Prostate Cancer Other     Coronary Art Dis Other     Coronary Art Dis Other        SOCIAL HISTORY  Social History     Socioeconomic History    Marital status:      Spouse name: None    Number of children: None    Years of education: None    Highest education level: None   Occupational History    None   Social Needs    Financial resource strain: None    Food insecurity     Worry: None     Inability: None    Transportation needs     Medical: None     Non-medical: None   Tobacco Use    Smoking status: Never Smoker    Smokeless tobacco: Never Used   Substance and Sexual Activity    Alcohol use: Yes     Comment: Occassional    Drug use: No    Sexual activity: Not Currently     Partners: Female     Comment:    Lifestyle    Physical activity     Days per week: None     Minutes per session: None    Stress: None   Relationships    Social connections     Talks on phone: None     Gets together: None     Attends Oriental orthodox service: None     Active member of club or organization: None     Attends meetings of clubs or organizations: None     Relationship status: None    Intimate partner violence     Fear of current or ex partner: None     Emotionally abused: None     Physically abused: None     Forced sexual activity: None   Other Topics Concern    None   Social History Narrative    None        SURGICAL HISTORY  Past Surgical History:   Procedure Laterality Date    CARDIAC CATHETERIZATION  2/2/12    EF 55% No sugnificant disease.     COLONOSCOPY  5/5/14    diverticulosis, 1 polyp, hemorrhoids    COLONOSCOPY  07/10/2017    single 5 mm polyp found in sigmoid colon, moderate diverticulosis found in sigmoid colon   Rob 71 SURGERY  11/2002    L4-5 hemilaminectomy and disk    LUMBAR DISCECTOMY  1973    PROSTATE SURGERY  09/26/2011    s/p robotic radical prostatectomy    ROTATOR CUFF REPAIR Right 10/2016    TIBIA FRACTURE SURGERY Right     childhood    URETEROLITHOTOMY  1992       CURRENT MEDICATIONS  Current Outpatient Medications   Medication Sig Dispense Refill    pantoprazole (PROTONIX) 40 MG tablet Take 1 tablet by mouth daily 90 tablet 1    gabapentin (NEURONTIN) 300 MG capsule TAKE 1 CAPSULE BY MOUTH 3  TIMES DAILY 270 capsule 1    traMADol (ULTRAM) 50 MG tablet Take 2 tablets by mouth 2 times daily as needed for Pain for up to 90 days. 360 tablet 0    clopidogrel (PLAVIX) 75 MG tablet Take 1 tablet by mouth daily 90 tablet 3    dilTIAZem (DILT-XR) 120 MG extended release capsule Take 1 capsule by mouth daily 90 capsule 1    levothyroxine (SYNTHROID) 50 MCG tablet Take 1 tablet by mouth every morning 90 tablet 1    SUMAtriptan (IMITREX) 50 MG tablet Take 1 tablet by mouth once as needed for Migraine 9 tablet 3    aspirin 81 MG tablet Take 81 mg by mouth daily      nitroGLYCERIN (NITROSTAT) 0.3 MG SL tablet Place 0.3 mg under the tongue every 5 minutes as needed for Chest pain up to max of 3 total doses. If no relief after 1 dose, call 911. No current facility-administered medications for this visit.         ALLERGIES  Allergies   Allergen Reactions    Fenofibrate Other (See Comments)     Liq stools    Lipitor Other (See Comments)     Liq stools    Pravachol [Pravastatin Sodium] Other (See Comments)     Liq stools       PHYSICAL EXAM    /68   Pulse 68   Temp 98.1 °F (36.7 °C)   Ht 5' 10.5\" (1.791 m)   Wt 163 lb 12.8 oz (74.3 kg)   BMI 23.17 kg/m²     Constitutional:  Well developed, well nourished  HEENT:  Normocephalic, atraumatic, bilateral external ears normal, oropharynx moist, nose normal  Neck:  Normal range of motion, no tenderness, supple  Lymphatic:  No lymphadenopathy noted  Cardiovascular:  Normal heart rate, S1S2 nl  Thorax & Lungs:  Normal breath sounds, no respiratory distress, no wheezing  Skin:  Warm, dry, no erythema, no rash  Back:  straight  Extremities:  No edema, no tenderness, no cyanosis  Musculoskeletal:  Good range of motion   Neurologic:  Alert & oriented X 3      ASSESSMENT & PLAN    1. Vitamin B12 deficiency  Issue controlled. Continue meds. Refilled meds. 2. Prostate CA ([address]  Issue controlled. Continue meds. Refilled meds. 3. Malignant melanoma of skin of face ([address]  No evidence of recurrence. 4. Acquired hypothyroidism  Currently controlled. Continue same dose. Recheck on follow-up. 5. Chronic bilateral low back pain with bilateral sciatica  No sign of abuse or diversion. Issue controlled. Continue meds. Refilled meds. - gabapentin (NEURONTIN) 300 MG capsule; TAKE 1 CAPSULE BY MOUTH 3  TIMES DAILY  Dispense: 270 capsule; Refill: 1  - traMADol (ULTRAM) 50 MG tablet; Take 2 tablets by mouth 2 times daily as needed for Pain for up to 90 days. Dispense: 360 tablet; Refill: 0    6. Peripheral polyneuropathy  Issue controlled. Continue meds. Refilled meds. - gabapentin (NEURONTIN) 300 MG capsule; TAKE 1 CAPSULE BY MOUTH 3  TIMES DAILY  Dispense: 270 capsule; Refill: 1      Periodic Controlled Substance Monitoring: Obtaining appropriate analgesic effect of treatment.  Saddie Apgar, MD)    Follow-up 3 months    Electronically signed by Saddie Apgar, MD on 2020

## 2020-11-11 ENCOUNTER — VIRTUAL VISIT (OUTPATIENT)
Dept: FAMILY MEDICINE CLINIC | Age: 80
End: 2020-11-11
Payer: MEDICARE

## 2020-11-11 VITALS — HEIGHT: 71 IN | WEIGHT: 163 LBS | BODY MASS INDEX: 22.82 KG/M2

## 2020-11-11 PROCEDURE — G0438 PPPS, INITIAL VISIT: HCPCS | Performed by: FAMILY MEDICINE

## 2020-11-11 ASSESSMENT — PATIENT HEALTH QUESTIONNAIRE - PHQ9
SUM OF ALL RESPONSES TO PHQ QUESTIONS 1-9: 0
2. FEELING DOWN, DEPRESSED OR HOPELESS: 0
1. LITTLE INTEREST OR PLEASURE IN DOING THINGS: 0
SUM OF ALL RESPONSES TO PHQ9 QUESTIONS 1 & 2: 0

## 2020-11-11 ASSESSMENT — LIFESTYLE VARIABLES
HOW OFTEN DURING THE LAST YEAR HAVE YOU HAD A FEELING OF GUILT OR REMORSE AFTER DRINKING: 0
HOW OFTEN DURING THE LAST YEAR HAVE YOU FAILED TO DO WHAT WAS NORMALLY EXPECTED FROM YOU BECAUSE OF DRINKING: 0
HOW OFTEN DO YOU HAVE A DRINK CONTAINING ALCOHOL: 1
HOW OFTEN DURING THE LAST YEAR HAVE YOU FOUND THAT YOU WERE NOT ABLE TO STOP DRINKING ONCE YOU HAD STARTED: 0
HOW OFTEN DO YOU HAVE SIX OR MORE DRINKS ON ONE OCCASION: 0
HOW OFTEN DURING THE LAST YEAR HAVE YOU NEEDED AN ALCOHOLIC DRINK FIRST THING IN THE MORNING TO GET YOURSELF GOING AFTER A NIGHT OF HEAVY DRINKING: 0
HOW OFTEN DURING THE LAST YEAR HAVE YOU BEEN UNABLE TO REMEMBER WHAT HAPPENED THE NIGHT BEFORE BECAUSE YOU HAD BEEN DRINKING: 0
AUDIT TOTAL SCORE: 1
HOW MANY STANDARD DRINKS CONTAINING ALCOHOL DO YOU HAVE ON A TYPICAL DAY: 0
HAS A RELATIVE, FRIEND, DOCTOR, OR ANOTHER HEALTH PROFESSIONAL EXPRESSED CONCERN ABOUT YOUR DRINKING OR SUGGESTED YOU CUT DOWN: 0
HAVE YOU OR SOMEONE ELSE BEEN INJURED AS A RESULT OF YOUR DRINKING: 0
AUDIT-C TOTAL SCORE: 1

## 2020-11-11 NOTE — PROGRESS NOTES
Medicare Annual Wellness Visit  Name: Sarmad Renteria Date: 2020   MRN: [id number] Sex: Male   Age: 79 y.o. Ethnicity: Non-/Non    : 1940 Race: Estela Newsome is here for Medicare AWV    Screenings for behavioral, psychosocial and functional/safety risks, and cognitive dysfunction are all negative except as indicated below. These results, as well as other patient data from the [address] form, are documented in Flowsheets linked to this Encounter. Allergies   Allergen Reactions    Fenofibrate Other (See Comments)     Liq stools    Lipitor Other (See Comments)     Liq stools    Pravachol [Pravastatin Sodium] Other (See Comments)     Liq stools       Prior to Visit Medications    Medication Sig Taking? Authorizing Provider   pantoprazole (PROTONIX) 40 MG tablet Take 1 tablet by mouth daily Yes Court Santos MD   gabapentin (NEURONTIN) 300 MG capsule TAKE 1 CAPSULE BY MOUTH 3  TIMES DAILY Yes Court Santos MD   traMADol (ULTRAM) 50 MG tablet Take 2 tablets by mouth 2 times daily as needed for Pain for up to 90 days. Yes Court Santos MD   clopidogrel (PLAVIX) 75 MG tablet Take 1 tablet by mouth daily Yes Doug Latham MD   dilTIAZem (DILT-XR) 120 MG extended release capsule Take 1 capsule by mouth daily Yes Doug Latham MD   aspirin 81 MG tablet Take 81 mg by mouth daily Yes Historical Provider, MD   nitroGLYCERIN (NITROSTAT) 0.3 MG SL tablet Place 0.3 mg under the tongue every 5 minutes as needed for Chest pain up to max of 3 total doses. If no relief after 1 dose, call 911.  Yes Historical Provider, MD   levothyroxine (SYNTHROID) 50 MCG tablet Take 1 tablet by mouth every morning  Court Santos MD   SUMAtriptan (IMITREX) 50 MG tablet Take 1 tablet by mouth once as needed for Migraine  Court Santos MD       Past Medical History:   Diagnosis Date    Anesthesia complication     Patient reports in  following Laterality Date    CARDIAC CATHETERIZATION  12    EF 55% No sugnificant disease.  COLONOSCOPY  14    diverticulosis, 1 polyp, hemorrhoids    COLONOSCOPY  07/10/2017    single 5 mm polyp found in sigmoid colon, moderate diverticulosis found in sigmoid colon    LUMBAR DISC SURGERY  2002    L4-5 hemilaminectomy and disk    LUMBAR DISCECTOMY  1973    PROSTATE SURGERY  2011    s/p robotic radical prostatectomy    ROTATOR CUFF REPAIR Right 10/2016    TIBIA FRACTURE SURGERY Right     childhood    URETEROLITHOTOMY         Family History   Problem Relation Age of Onset    Heart Disease Mother         ASCAD    Heart Disease Father     Heart Disease Brother     Diabetes Other     Cancer Other         unknown type    Prostate Cancer Other     Coronary Art Dis Other     Coronary Art Dis Other        CareTeam (Including outside providers/suppliers regularly involved in providing care):   Patient Care Team:  Saddie Apgar, MD as PCP - General  Saddie Apgar, MD as PCP - [facility] Empaneled Provider  Osvaldo Jurado MD as Consulting Physician (Cardiology)  Malachi Flanagan RN as Care Transitions Nurse    Wt Readings from Last 3 Encounters:   20 163 lb (73.9 kg)   20 163 lb 12.8 oz (74.3 kg)   20 164 lb 3.2 oz (74.5 kg)     Vitals:    20 1353   Weight: 163 lb (73.9 kg)   Height: 5' 10.5\" (1.791 m)     Body mass index is 23.06 kg/m². Based upon direct observation of the patient, evaluation of cognition reveals recent and remote memory intact. Patient's complete Health Risk Assessment and screening values have been reviewed and are found in Flowsheets. The following problems were reviewed today and where indicated follow up appointments were made and/or referrals ordered. Positive Risk Factor Screenings with Interventions:     Cognitive:   Words recalled: 2 Words Recalled  Total Score Interpretation: Positive Mini-Cog  Did the patient refuse to take the cognition test?: No  Cognitive Impairment Interventions:  · Patient declines any further evaluation/treatment for cognitive impairment   · Patient did not sound as if he was cognitively impaired    General Health and ACP:  General  In general, how would you say your health is?: Very Good  In the past 7 days, have you experienced any of the following?  New or Increased Pain, New or Increased Fatigue, Loneliness, Social Isolation, Stress or Anger?: None of These  Do you get the social and emotional support that you need?: Yes  Do you have a Living Will?: Yes  Advance Directives     Power of  Living Will ACP-Advance Directive ACP-Power of     Not on File Not on File [address] Interventions:  · No Living Will: ACP documents already completed- patient asked to provide copy to the office    Hearing/Vision:  No exam data present  Hearing/Vision  Do you or your family notice any trouble with your hearing?: (!) Yes  Do you have difficulty driving, watching TV, or doing any of your daily activities because of your eyesight?: No  Have you had an eye exam within the past year?: Yes  Hearing/Vision Interventions:  · Hearing concerns:  patient declines any further evaluation/treatment for hearing issues    Personalized Preventive Plan   Current Health Maintenance Status  Immunization History   Administered Date(s) Administered    Influenza Virus Vaccine 11/22/2018    Influenza, High Dose (Fluzone 65 yrs and older) 10/15/2015, 11/19/2018    Influenza, Quadv, IM, PF (6 mo and older Fluzone, Flulaval, Fluarix, and 3 yrs and older Afluria) 09/11/2020    Influenza, Quadv, adjuvanted, 65 yrs +, IM, PF (Fluad) 09/11/2020    Influenza, Triv, inactivated, subunit, adjuvanted, IM (Fluad 65 yrs and older) 11/08/2019    Pneumococcal Conjugate 13-valent ([id number]) 02/18/2016    Pneumococcal Conjugate 7-valent (Prevnar7) 09/12/2018    Pneumococcal Polysaccharide ([id number]) 09/27/2010    Tdap (Boostrix, Adacel) 08/28/2014        Health Maintenance   Topic Date Due    Shingles Vaccine (1 of 2) 02/02/1989    PSA counseling  02/02/1989    Annual Wellness Visit (AWV)  06/23/2019    TSH testing  06/05/2021    DTaP/Tdap/Td vaccine (2 - Td) 08/28/2024    Flu vaccine  Completed    Pneumococcal 65+ years Vaccine  Completed    Hepatitis A vaccine  Aged Out    Hepatitis B vaccine  Aged Out    Hib vaccine  Aged Out    Meningococcal (ACWY) vaccine  Aged Out     Recommendations for [employer] Due: see orders and patient instructions/AVS.    Unable to obtain 3 vital signs due to patient not having equipment to take temperature/BP. Recommended screening schedule for the next 5-10 years is provided to the patient in written form: see Patient Instructions/AVS.    Pola Bhardwaj is a 79 y.o. male being evaluated by a Virtual Visit (audio visit) encounter to address concerns as mentioned above. A caregiver was present when appropriate. Due to this being a TeleHealth encounter (During [place] [address] public health emergency), evaluation of the following organ systems was limited: Vitals/Constitutional/EENT/Resp/CV/GI//MS/Neuro/Skin/Heme-Lymph-Imm. Pursuant to the emergency declaration under the [address] [place] [address], [address] [place] [address] authority and the [employer] Act, this Virtual Visit was conducted with patient's (and/or legal guardian's) consent, to reduce the patient's risk of exposure to COVID-19 and provide necessary medical care. The patient (and/or legal guardian) has also been advised to contact this office for worsening conditions or problems, and seek emergency medical treatment and/or call 911 if deemed necessary.      Patient identification was verified at the start of the visit: Yes    Total time spent for this encounter: Not billed by time    Services were provided through a audio synchronous discussion virtually to substitute for in-person clinic visit. Patient and provider were located at their individual homes. --Claudette Cisse on 11/11/2020 at 2:03 PM    An electronic signature was used to authenticate this note. Nicholas Sheffield, 11/11/2020, performed the documented evaluation under the direct supervision of the attending physician. This encounter was performed under myYoni MDs, direct supervision, 11/11/2020.

## 2021-01-15 ENCOUNTER — TELEPHONE (OUTPATIENT)
Dept: FAMILY MEDICINE CLINIC | Age: 81
End: 2021-01-15

## 2021-01-15 NOTE — TELEPHONE ENCOUNTER
Called pt about covid vaccine, patient states he's already made an appointment for Tuesday to get the vaccine.

## 2021-01-26 DIAGNOSIS — E03.9 ACQUIRED HYPOTHYROIDISM: Chronic | ICD-10-CM

## 2021-01-26 RX ORDER — LEVOTHYROXINE SODIUM 0.05 MG/1
TABLET ORAL
Qty: 90 TABLET | Refills: 0 | Status: SHIPPED | OUTPATIENT
Start: 2021-01-26 | End: 2021-04-12

## 2021-01-26 NOTE — TELEPHONE ENCOUNTER
Requested Prescriptions     Signed Prescriptions Disp Refills    levothyroxine (SYNTHROID) 50 MCG tablet 90 tablet 0     Sig: TAKE 1 TABLET BY MOUTH IN  THE MORNING     Authorizing Provider: Ryan Neri     Ordering User: Danya García

## 2021-02-08 ENCOUNTER — OFFICE VISIT (OUTPATIENT)
Dept: FAMILY MEDICINE CLINIC | Age: 81
End: 2021-02-08
Payer: MEDICARE

## 2021-02-08 VITALS
TEMPERATURE: 96.6 F | SYSTOLIC BLOOD PRESSURE: 110 MMHG | HEART RATE: 68 BPM | WEIGHT: 167.2 LBS | HEIGHT: 70 IN | BODY MASS INDEX: 23.94 KG/M2 | DIASTOLIC BLOOD PRESSURE: 64 MMHG

## 2021-02-08 DIAGNOSIS — E03.9 ACQUIRED HYPOTHYROIDISM: Primary | Chronic | ICD-10-CM

## 2021-02-08 DIAGNOSIS — Z12.5 SCREENING FOR PROSTATE CANCER: ICD-10-CM

## 2021-02-08 DIAGNOSIS — E03.9 ACQUIRED HYPOTHYROIDISM: Chronic | ICD-10-CM

## 2021-02-08 DIAGNOSIS — M54.41 CHRONIC BILATERAL LOW BACK PAIN WITH BILATERAL SCIATICA: Chronic | ICD-10-CM

## 2021-02-08 DIAGNOSIS — M54.42 CHRONIC BILATERAL LOW BACK PAIN WITH BILATERAL SCIATICA: Chronic | ICD-10-CM

## 2021-02-08 DIAGNOSIS — C61 PROSTATE CA (HCC): ICD-10-CM

## 2021-02-08 DIAGNOSIS — J44.9 CHRONIC OBSTRUCTIVE PULMONARY DISEASE, UNSPECIFIED COPD TYPE (HCC): Chronic | ICD-10-CM

## 2021-02-08 DIAGNOSIS — G89.29 CHRONIC BILATERAL LOW BACK PAIN WITH BILATERAL SCIATICA: Chronic | ICD-10-CM

## 2021-02-08 LAB
ANION GAP SERPL CALCULATED.3IONS-SCNC: 7 MMOL/L (ref 3–16)
BUN BLDV-MCNC: 15 MG/DL (ref 7–20)
CALCIUM SERPL-MCNC: 9.2 MG/DL (ref 8.3–10.6)
CHLORIDE BLD-SCNC: 104 MMOL/L (ref 99–110)
CO2: 29 MMOL/L (ref 21–32)
CREAT SERPL-MCNC: 0.9 MG/DL (ref 0.8–1.3)
GFR AFRICAN AMERICAN: >60
GFR NON-AFRICAN AMERICAN: >60
GLUCOSE BLD-MCNC: 73 MG/DL (ref 70–99)
POTASSIUM SERPL-SCNC: 4 MMOL/L (ref 3.5–5.1)
PROSTATE SPECIFIC ANTIGEN: <0.01 NG/ML (ref 0–4)
SODIUM BLD-SCNC: 140 MMOL/L (ref 136–145)
TSH REFLEX FT4: 1.47 UIU/ML (ref 0.27–4.2)

## 2021-02-08 PROCEDURE — 1123F ACP DISCUSS/DSCN MKR DOCD: CPT | Performed by: FAMILY MEDICINE

## 2021-02-08 PROCEDURE — 99214 OFFICE O/P EST MOD 30 MIN: CPT | Performed by: FAMILY MEDICINE

## 2021-02-08 PROCEDURE — G8482 FLU IMMUNIZE ORDER/ADMIN: HCPCS | Performed by: FAMILY MEDICINE

## 2021-02-08 PROCEDURE — G8420 CALC BMI NORM PARAMETERS: HCPCS | Performed by: FAMILY MEDICINE

## 2021-02-08 PROCEDURE — 4040F PNEUMOC VAC/ADMIN/RCVD: CPT | Performed by: FAMILY MEDICINE

## 2021-02-08 PROCEDURE — G8427 DOCREV CUR MEDS BY ELIG CLIN: HCPCS | Performed by: FAMILY MEDICINE

## 2021-02-08 PROCEDURE — G8926 SPIRO NO PERF OR DOC: HCPCS | Performed by: FAMILY MEDICINE

## 2021-02-08 PROCEDURE — 3023F SPIROM DOC REV: CPT | Performed by: FAMILY MEDICINE

## 2021-02-08 PROCEDURE — 1036F TOBACCO NON-USER: CPT | Performed by: FAMILY MEDICINE

## 2021-02-08 ASSESSMENT — PATIENT HEALTH QUESTIONNAIRE - PHQ9
SUM OF ALL RESPONSES TO PHQ QUESTIONS 1-9: 0
SUM OF ALL RESPONSES TO PHQ9 QUESTIONS 1 & 2: 0

## 2021-03-03 RX ORDER — DILTIAZEM HYDROCHLORIDE 120 MG/1
CAPSULE, EXTENDED RELEASE ORAL
Qty: 90 CAPSULE | Refills: 1 | Status: SHIPPED | OUTPATIENT
Start: 2021-03-03 | End: 2021-09-07

## 2021-04-09 ENCOUNTER — HOSPITAL ENCOUNTER (INPATIENT)
Age: 81
LOS: 1 days | Discharge: HOME | DRG: 068 | End: 2021-04-12
Attending: EMERGENCY MEDICINE | Admitting: HOSPITALIST
Payer: MEDICARE

## 2021-04-09 ENCOUNTER — APPOINTMENT (OUTPATIENT)
Dept: GENERAL RADIOLOGY | Age: 81
DRG: 068 | End: 2021-04-09
Payer: MEDICARE

## 2021-04-09 ENCOUNTER — APPOINTMENT (OUTPATIENT)
Dept: CT IMAGING | Age: 81
DRG: 068 | End: 2021-04-09
Payer: MEDICARE

## 2021-04-09 DIAGNOSIS — G45.9 TIA (TRANSIENT ISCHEMIC ATTACK): Primary | ICD-10-CM

## 2021-04-09 LAB
ALBUMIN SERPL-MCNC: 3.7 GM/DL (ref 3.4–5)
ALP BLD-CCNC: 35 IU/L (ref 40–129)
ALT SERPL-CCNC: 22 U/L (ref 10–40)
ANION GAP SERPL CALCULATED.3IONS-SCNC: 7 MMOL/L (ref 4–16)
AST SERPL-CCNC: 37 IU/L (ref 15–37)
BACTERIA: NEGATIVE /HPF
BASOPHILS ABSOLUTE: 0 K/CU MM
BASOPHILS RELATIVE PERCENT: 0.2 % (ref 0–1)
BILIRUB SERPL-MCNC: 0.6 MG/DL (ref 0–1)
BILIRUBIN URINE: NEGATIVE MG/DL
BLOOD, URINE: NEGATIVE
BUN BLDV-MCNC: 21 MG/DL (ref 6–23)
CALCIUM SERPL-MCNC: 9.3 MG/DL (ref 8.3–10.6)
CHLORIDE BLD-SCNC: 103 MMOL/L (ref 99–110)
CLARITY: CLEAR
CO2: 28 MMOL/L (ref 21–32)
COLOR: YELLOW
CREAT SERPL-MCNC: 1 MG/DL (ref 0.9–1.3)
DIFFERENTIAL TYPE: ABNORMAL
EOSINOPHILS ABSOLUTE: 0.1 K/CU MM
EOSINOPHILS RELATIVE PERCENT: 1.2 % (ref 0–3)
GFR AFRICAN AMERICAN: >60 ML/MIN/1.73M2
GFR NON-AFRICAN AMERICAN: >60 ML/MIN/1.73M2
GLUCOSE BLD-MCNC: 145 MG/DL (ref 70–99)
GLUCOSE BLD-MCNC: 183 MG/DL
GLUCOSE BLD-MCNC: 183 MG/DL (ref 70–99)
GLUCOSE, URINE: NEGATIVE MG/DL
HCT VFR BLD CALC: 44.8 % (ref 42–52)
HEMOGLOBIN: 14.9 GM/DL (ref 13.5–18)
IMMATURE NEUTROPHIL %: 0.2 % (ref 0–0.43)
INR BLD: 1.17 INDEX
KETONES, URINE: NEGATIVE MG/DL
LEUKOCYTE ESTERASE, URINE: NEGATIVE
LYMPHOCYTES ABSOLUTE: 0.9 K/CU MM
LYMPHOCYTES RELATIVE PERCENT: 21.4 % (ref 24–44)
MCH RBC QN AUTO: 33.3 PG (ref 27–31)
MCHC RBC AUTO-ENTMCNC: 33.3 % (ref 32–36)
MCV RBC AUTO: 100 FL (ref 78–100)
MONOCYTES ABSOLUTE: 0.4 K/CU MM
MONOCYTES RELATIVE PERCENT: 8.9 % (ref 0–4)
MUCUS: ABNORMAL HPF
NITRITE URINE, QUANTITATIVE: NEGATIVE
NUCLEATED RBC %: 0 %
PDW BLD-RTO: 12.7 % (ref 11.7–14.9)
PH, URINE: 5 (ref 5–8)
PLATELET # BLD: 187 K/CU MM (ref 140–440)
PMV BLD AUTO: 9.4 FL (ref 7.5–11.1)
POTASSIUM SERPL-SCNC: 3.7 MMOL/L (ref 3.5–5.1)
PROTEIN UA: NEGATIVE MG/DL
PROTHROMBIN TIME: 14.2 SECONDS (ref 11.7–14.5)
RBC # BLD: 4.48 M/CU MM (ref 4.6–6.2)
RBC URINE: 1 /HPF (ref 0–3)
SEGMENTED NEUTROPHILS ABSOLUTE COUNT: 2.9 K/CU MM
SEGMENTED NEUTROPHILS RELATIVE PERCENT: 68.1 % (ref 36–66)
SODIUM BLD-SCNC: 138 MMOL/L (ref 135–145)
SPECIFIC GRAVITY UA: >1.06 (ref 1–1.03)
SQUAMOUS EPITHELIAL: <1 /HPF
TOTAL IMMATURE NEUTOROPHIL: 0.01 K/CU MM
TOTAL NUCLEATED RBC: 0 K/CU MM
TOTAL PROTEIN: 6.4 GM/DL (ref 6.4–8.2)
TRICHOMONAS: ABNORMAL /HPF
TROPONIN T: <0.01 NG/ML
TSH HIGH SENSITIVITY: 1.49 UIU/ML (ref 0.27–4.2)
UROBILINOGEN, URINE: 2 MG/DL (ref 0.2–1)
WBC # BLD: 4.3 K/CU MM (ref 4–10.5)
WBC UA: ABNORMAL /HPF (ref 0–2)

## 2021-04-09 PROCEDURE — 84484 ASSAY OF TROPONIN QUANT: CPT

## 2021-04-09 PROCEDURE — 85610 PROTHROMBIN TIME: CPT

## 2021-04-09 PROCEDURE — 6360000002 HC RX W HCPCS: Performed by: NURSE PRACTITIONER

## 2021-04-09 PROCEDURE — 6370000000 HC RX 637 (ALT 250 FOR IP): Performed by: PHYSICIAN ASSISTANT

## 2021-04-09 PROCEDURE — 85025 COMPLETE CBC W/AUTO DIFF WBC: CPT

## 2021-04-09 PROCEDURE — 6370000000 HC RX 637 (ALT 250 FOR IP): Performed by: NURSE PRACTITIONER

## 2021-04-09 PROCEDURE — 84443 ASSAY THYROID STIM HORMONE: CPT

## 2021-04-09 PROCEDURE — 70450 CT HEAD/BRAIN W/O DYE: CPT

## 2021-04-09 PROCEDURE — 82962 GLUCOSE BLOOD TEST: CPT

## 2021-04-09 PROCEDURE — 6360000004 HC RX CONTRAST MEDICATION: Performed by: PHYSICIAN ASSISTANT

## 2021-04-09 PROCEDURE — 36415 COLL VENOUS BLD VENIPUNCTURE: CPT

## 2021-04-09 PROCEDURE — 94761 N-INVAS EAR/PLS OXIMETRY MLT: CPT

## 2021-04-09 PROCEDURE — 71045 X-RAY EXAM CHEST 1 VIEW: CPT

## 2021-04-09 PROCEDURE — 81001 URINALYSIS AUTO W/SCOPE: CPT

## 2021-04-09 PROCEDURE — 70498 CT ANGIOGRAPHY NECK: CPT

## 2021-04-09 PROCEDURE — 2580000003 HC RX 258: Performed by: NURSE PRACTITIONER

## 2021-04-09 PROCEDURE — G0378 HOSPITAL OBSERVATION PER HR: HCPCS

## 2021-04-09 PROCEDURE — 99285 EMERGENCY DEPT VISIT HI MDM: CPT

## 2021-04-09 PROCEDURE — 96372 THER/PROPH/DIAG INJ SC/IM: CPT

## 2021-04-09 PROCEDURE — 93010 ELECTROCARDIOGRAM REPORT: CPT | Performed by: INTERNAL MEDICINE

## 2021-04-09 PROCEDURE — 80053 COMPREHEN METABOLIC PANEL: CPT

## 2021-04-09 PROCEDURE — 93005 ELECTROCARDIOGRAM TRACING: CPT | Performed by: EMERGENCY MEDICINE

## 2021-04-09 RX ORDER — GABAPENTIN 300 MG/1
300 CAPSULE ORAL 3 TIMES DAILY
Status: DISCONTINUED | OUTPATIENT
Start: 2021-04-09 | End: 2021-04-12 | Stop reason: HOSPADM

## 2021-04-09 RX ORDER — SODIUM CHLORIDE 0.9 % (FLUSH) 0.9 %
5-40 SYRINGE (ML) INJECTION EVERY 12 HOURS SCHEDULED
Status: DISCONTINUED | OUTPATIENT
Start: 2021-04-09 | End: 2021-04-12 | Stop reason: HOSPADM

## 2021-04-09 RX ORDER — SUMATRIPTAN 50 MG/1
50 TABLET, FILM COATED ORAL
Status: CANCELLED | OUTPATIENT
Start: 2021-04-09 | End: 2021-04-09

## 2021-04-09 RX ORDER — ONDANSETRON 2 MG/ML
4 INJECTION INTRAMUSCULAR; INTRAVENOUS EVERY 6 HOURS PRN
Status: DISCONTINUED | OUTPATIENT
Start: 2021-04-09 | End: 2021-04-12 | Stop reason: HOSPADM

## 2021-04-09 RX ORDER — ASPIRIN 325 MG
325 TABLET ORAL ONCE
Status: COMPLETED | OUTPATIENT
Start: 2021-04-09 | End: 2021-04-09

## 2021-04-09 RX ORDER — PANTOPRAZOLE SODIUM 40 MG/1
40 TABLET, DELAYED RELEASE ORAL DAILY
Status: DISCONTINUED | OUTPATIENT
Start: 2021-04-09 | End: 2021-04-12 | Stop reason: HOSPADM

## 2021-04-09 RX ORDER — LEVOTHYROXINE SODIUM 0.05 MG/1
50 TABLET ORAL DAILY
Status: DISCONTINUED | OUTPATIENT
Start: 2021-04-09 | End: 2021-04-12 | Stop reason: HOSPADM

## 2021-04-09 RX ORDER — PROMETHAZINE HYDROCHLORIDE 25 MG/1
12.5 TABLET ORAL EVERY 6 HOURS PRN
Status: DISCONTINUED | OUTPATIENT
Start: 2021-04-09 | End: 2021-04-12 | Stop reason: HOSPADM

## 2021-04-09 RX ORDER — LABETALOL HYDROCHLORIDE 5 MG/ML
10 INJECTION, SOLUTION INTRAVENOUS EVERY 10 MIN PRN
Status: DISCONTINUED | OUTPATIENT
Start: 2021-04-09 | End: 2021-04-12 | Stop reason: HOSPADM

## 2021-04-09 RX ORDER — ASPIRIN 81 MG/1
81 TABLET ORAL DAILY
Status: DISCONTINUED | OUTPATIENT
Start: 2021-04-10 | End: 2021-04-12 | Stop reason: HOSPADM

## 2021-04-09 RX ORDER — DILTIAZEM HYDROCHLORIDE 120 MG/1
120 CAPSULE, COATED, EXTENDED RELEASE ORAL DAILY
Status: DISCONTINUED | OUTPATIENT
Start: 2021-04-09 | End: 2021-04-12 | Stop reason: HOSPADM

## 2021-04-09 RX ORDER — POLYETHYLENE GLYCOL 3350 17 G/17G
17 POWDER, FOR SOLUTION ORAL DAILY PRN
Status: DISCONTINUED | OUTPATIENT
Start: 2021-04-09 | End: 2021-04-12 | Stop reason: HOSPADM

## 2021-04-09 RX ORDER — NITROGLYCERIN 0.4 MG/1
0.4 TABLET SUBLINGUAL EVERY 5 MIN PRN
Status: DISCONTINUED | OUTPATIENT
Start: 2021-04-09 | End: 2021-04-12 | Stop reason: HOSPADM

## 2021-04-09 RX ORDER — NITROGLYCERIN 0.3 MG/1
0.3 TABLET SUBLINGUAL EVERY 5 MIN PRN
Status: DISCONTINUED | OUTPATIENT
Start: 2021-04-09 | End: 2021-04-09 | Stop reason: CLARIF

## 2021-04-09 RX ORDER — TRAMADOL HYDROCHLORIDE 50 MG/1
100 TABLET ORAL 2 TIMES DAILY PRN
Status: DISCONTINUED | OUTPATIENT
Start: 2021-04-09 | End: 2021-04-12 | Stop reason: HOSPADM

## 2021-04-09 RX ORDER — CLOPIDOGREL BISULFATE 75 MG/1
75 TABLET ORAL DAILY
Status: DISCONTINUED | OUTPATIENT
Start: 2021-04-09 | End: 2021-04-12 | Stop reason: HOSPADM

## 2021-04-09 RX ORDER — SODIUM CHLORIDE 0.9 % (FLUSH) 0.9 %
5-40 SYRINGE (ML) INJECTION PRN
Status: DISCONTINUED | OUTPATIENT
Start: 2021-04-09 | End: 2021-04-12 | Stop reason: HOSPADM

## 2021-04-09 RX ORDER — SODIUM CHLORIDE 9 MG/ML
25 INJECTION, SOLUTION INTRAVENOUS PRN
Status: DISCONTINUED | OUTPATIENT
Start: 2021-04-09 | End: 2021-04-12 | Stop reason: HOSPADM

## 2021-04-09 RX ADMIN — ASPIRIN 325 MG ORAL TABLET 325 MG: 325 PILL ORAL at 13:15

## 2021-04-09 RX ADMIN — IOPAMIDOL 80 ML: 755 INJECTION, SOLUTION INTRAVENOUS at 12:02

## 2021-04-09 RX ADMIN — CLOPIDOGREL BISULFATE 75 MG: 75 TABLET ORAL at 17:03

## 2021-04-09 RX ADMIN — DILTIAZEM HYDROCHLORIDE 120 MG: 120 CAPSULE, COATED, EXTENDED RELEASE ORAL at 17:03

## 2021-04-09 RX ADMIN — GABAPENTIN 300 MG: 300 CAPSULE ORAL at 19:44

## 2021-04-09 RX ADMIN — SODIUM CHLORIDE, PRESERVATIVE FREE 10 ML: 5 INJECTION INTRAVENOUS at 19:44

## 2021-04-09 RX ADMIN — TRAMADOL HYDROCHLORIDE 100 MG: 50 TABLET, FILM COATED ORAL at 19:44

## 2021-04-09 RX ADMIN — ENOXAPARIN SODIUM 40 MG: 40 INJECTION SUBCUTANEOUS at 17:03

## 2021-04-09 ASSESSMENT — PAIN DESCRIPTION - ORIENTATION: ORIENTATION: LEFT;RIGHT

## 2021-04-09 ASSESSMENT — PAIN DESCRIPTION - RADICULAR PAIN: RADICULAR_PAIN: ABSENT

## 2021-04-09 ASSESSMENT — PAIN DESCRIPTION - LOCATION: LOCATION: FOOT

## 2021-04-09 ASSESSMENT — PAIN SCALES - GENERAL
PAINLEVEL_OUTOF10: 0
PAINLEVEL_OUTOF10: 8

## 2021-04-09 ASSESSMENT — PAIN DESCRIPTION - PAIN TYPE: TYPE: NEUROPATHIC PAIN

## 2021-04-09 NOTE — ED NOTES
[address] down to ED to transport patient to room. Handoff NIHSS competed, total remains 0.       Ghada Morrow RN  04/09/21 9309

## 2021-04-09 NOTE — H&P
History and Physical  NELLIE Mcleod-BC   Internal Medicine Hospitalist      Name:  Lanell Alpers /Age/Sex: 1940  (79 y.o. male)   MRN & CSN:  [id number] & [id number] Admission Date/Time: 2021 11:16 AM   Location:  ED20/ED-20 PCP: Yoni Delvalle MD       Hospital Day: 1      Supervising Physician: Dr. Denae Conn      Chief Complaint: Fatigue (all over weakness, 1 hour PTA )     Assessment and Plan:   Lanell Alpers is a 79 y.o. male who presents with TIA (transient ischemic attack)     TIA, r/o CVA - symptoms appears resolved in ED       -known h/o TIA (2017?) and right internal carotid stenosis        -CT head/CXR results no acute abnormalities       -CTA head/Neck shows shows 50% stenosis of the Lt vertebral, 70% stenosis at the Rt vertebral       -NIH assessment- negative, initial trop negative, denied chest pain       -admit for observation, telemetry monitoring       -consult Neurology for further evaluation and treatment       -NIHSS/Neuro, NV checks       -on Plavix, allergic to Lipitor/statin, pt stated \"no cholesterol meds please\"       -PT/OT eval and treat       -swallow eval       -pending Echo, MRI Brain w/o contrast      COPD - not in exacerbation, breathing stable on room air        -continue breathing treatment if needed       -pulse ox spot check     Chronic Illnesses:        -MVP        -BPH/Prostate CA       -chronic low back pain -on Ultram, gabapentin       -GERD -on PPI       -hyperlipidemia        -hypertension       -thyroid disease -on levothyroxine        -migraine -on prn Imitrex      Current diagnosis and plan of management discussed with the patient at the time of admission in lay language who agree to the above plan and disposition of admission for further care. All concerns and questions addressed.       Patient assessment and plan in conjunction with supervising physician - Dr. Wiseman Covert NPO Effective Now, advance diet as tolerated hematuria, and frequency or urgency. Full CODE STATUS confirmed by patient. Upon interview, the patient provided the history as above. ED Course: Discussed case with ED physician prior to admission. ROS: Ten point ROS reviewed and negative, unless as noted above per HPI. Objective:   No intake or output data in the 24 hours ending 04/09/21 1432     Vitals:   Vitals:    04/09/21 1107 04/09/21 1127 04/09/21 1150   BP: (!) 153/70  119/62   Pulse: 69 69 70   Resp: 16  16   Temp: 98 °F (36.7 °C)     TempSrc: Oral     SpO2: 96% 98% 95%   Weight: 170 lb (77.1 kg)     Height: 5' 10\" (1.778 m)       Physical Exam: 04/09/21    GEN  -Awake, alert, appearing elderly male, sitting upright in bed in no apparent distress, cooperative, able to give adequate history. Appears given age. EYES -Pupils are equally round. No vision changes. No scleral erythema, discharge, or conjunctivitis. HENT -MM are moist. Oral pharynx without exudates, no evidence of thrush. NECK -Supple, no apparent thyromegaly or masses. RESP -LS CTA equal bilat, no wheezes, rales or rhonchi. Symmetric chest movement. No respiratory distress noted. C/V  -S1/S2 auscultated. RRR without appreciable M/R/G. No JVD or carotid bruits. Peripheral pulses equal bilaterally and palpable. Peripheral pulses equal bilaterally and palpable. No peripheral edema. GI  -Abdomen is soft, round, non-distended, no significant tenderness. No masses or guarding. + BS in all quadrants. Rectal exam deferred.   -Siegel catheter is not present. LYMPH  -No palpable cervical lymphadenopathy and no hepatosplenomegaly. No petechiae or ecchymoses. MS  -B/L extremities strong muscles strength. Full movements. No gross joint deformities. No swelling, intact sensation symmetrical.   SKIN  -Normal coloration, warm, dry. No open wounds or ulcers. NEURO  -CN 2-12 appear grossly intact, normal speech, no lateralizing weakness.      1a  Level of consciousness: 0=alert; keenly responsive   1b. LOC questions:  0=Performs both tasks correctly   1c. LOC commands: 0=Performs both tasks correctly   2. Best Gaze: 0=normal   3. Visual: 0=No visual loss   4. Facial Palsy: 0=Normal symmetric movement   5a. Motor left arm: 0=No drift, limb holds 90 (or 45) degrees for full 10 seconds   5b. Motor right arm: 0=No drift, limb holds 90 (or 45) degrees for full 10 seconds   6a. motor left le=No drift, limb holds 90 (or 45) degrees for full 10 seconds   6b  Motor right le=No drift, limb holds 90 (or 45) degrees for full 10 seconds   7. Limb Ataxia: 0=Absent   8. Sensory: 0=Normal; no sensory loss   9. Best Language:  0=No aphasia, normal   10. Dysarthria: 0=Normal   11. Extinction and Inattention: 0=No abnormality   12. Distal motor function: 0=Normal     Total:  0     PSYC  -Awake, alert, oriented x 4. Appropriate affect. .     Past Medical History:      Past Medical History:   Diagnosis Date    Anesthesia complication     Patient reports in  following a Back Surgery he was kept in the hospital due to high temperatures in the evenings. He stated they kept him for observation and doesn't remember receiving any treatment for the high temperatures. Patient reports the anesthesia name started with flur? ?.    BPH with obstruction/lower urinary tract symptoms     Chest pain 2018    Chronic low back pain     GERD (gastroesophageal reflux disease)     H/O 24 hour EKG monitoring 3/12  12/11    3/12/12  essentially a normal event monitor without significant arrhythmias noted    H/O cardiac catheterization 2/2/12 4/02 10/98    2/22/12  LM, LAD and RCA all without significant disease    H/O cardiac catheterization 2/2/12 4/02 10/98    Done R/T false pos on stress test.    H/O cardiac catheterization 2018    normal study    H/O cardiovascular stress test 2018    normal study    H/O echocardiogram 12/10      3/14 EF55-60% normal LV function 12/10/10  complete 2 dim None     Non-medical: None   Tobacco Use    Smoking status: Never Smoker    Smokeless tobacco: Never Used   Substance and Sexual Activity    Alcohol use: Yes     Comment: Occassional    Drug use: No    Sexual activity: Not Currently     Partners: Female     Comment:    Lifestyle    Physical activity     Days per week: None     Minutes per session: None    Stress: None   Relationships    Social connections     Talks on phone: None     Gets together: None     Attends [employer] service: None     Active member of club or organization: None     Attends meetings of clubs or organizations: None     Relationship status: None    Intimate partner violence     Fear of current or ex partner: None     Emotionally abused: None     Physically abused: None     Forced sexual activity: None   Other Topics Concern    None   Social History Narrative    None     TOBACCO:   reports that he has never smoked. He has never used smokeless tobacco.  ETOH:   reports current alcohol use. Drugs:  reports no history of drug use. Allergies: Allergies   Allergen Reactions    Fenofibrate Other (See Comments)     Liq stools    Lipitor Other (See Comments)     Liq stools    Pravachol [Pravastatin Sodium] Other (See Comments)     Liq stools     Medications:   Medications:    Infusions:   PRN Meds:   Prior to Admission Meds:  Prior to Admission medications    Medication Sig Start Date End Date Taking?  Authorizing Provider   DILT- MG extended release capsule Take 1 capsule by mouth daily 3/3/21   NELLIE Maher CNP   levothyroxine (SYNTHROID) 50 MCG tablet TAKE 1 TABLET BY MOUTH IN  THE MORNING 1/26/21 4/26/21  Cameron Alfaro MD   pantoprazole (PROTONIX) 40 MG tablet Take 1 tablet by mouth daily 11/9/20 2/8/21  Cameron Alfaro MD   gabapentin (NEURONTIN) 300 MG capsule TAKE 1 CAPSULE BY MOUTH 3  TIMES DAILY 11/9/20 2/8/21  Cameron Alfaro MD   traMADol (ULTRAM) 50 MG tablet Take 2 tablets by mouth 2 times daily as needed for Pain for up to 90 days. 11/9/20 2/8/21  Jacqueline Cadena MD   clopidogrel (PLAVIX) 75 MG tablet Take 1 tablet by mouth daily 11/2/20   Debora Han MD   SUMAtriptan (IMITREX) 50 MG tablet Take 1 tablet by mouth once as needed for Migraine 4/9/20 2/8/21  Jacqueline Cadena MD   aspirin 81 MG tablet Take 81 mg by mouth daily    Historical Provider, MD   nitroGLYCERIN (NITROSTAT) 0.3 MG SL tablet Place 0.3 mg under the tongue every 5 minutes as needed for Chest pain up to max of 3 total doses. If no relief after 1 dose, call 911. Historical Provider, MD     Data:     Laboratory this visit:  Reviewed  Recent Labs     04/09/21  1114   WBC 4.3   HGB 14.9   HCT 44.8         Recent Labs     04/09/21  1114      K 3.7      CO2 28   BUN 21   CREATININE 1.0     Recent Labs     04/09/21  1114   AST 37   ALT 22   BILITOT 0.6   ALKPHOS 35*     Recent Labs     04/09/21  1114   INR 1.17     No results for input(s): CKTOTAL, CKMB, CKMBINDEX in the last 72 hours. Invalid input(s): Radha Wheatley input(s): PRO-BNP    Radiology this visit:  Reviewed. Ct Head Wo Contrast    Result Date: 4/9/2021  EXAMINATION: CT OF THE HEAD WITHOUT CONTRAST  4/9/2021 11:39 am TECHNIQUE: CT of the head was performed without the administration of intravenous contrast. Dose modulation, iterative reconstruction, and/or weight based adjustment of the mA/kV was utilized to reduce the radiation dose to as low as reasonably achievable. COMPARISON: CT head April 6, 2020, MRI brain April 6, 2020 HISTORY: ORDERING SYSTEM PROVIDED HISTORY: stroke TECHNOLOGIST PROVIDED HISTORY: Has a \"code stroke\" or \"stroke alert\" been called? ->Yes Reason for exam:->stroke Decision Support Exception->Emergency Medical Condition (MA) Reason for Exam: stroke Acuity: Acute Type of Exam: Initial FINDINGS: BRAIN/VENTRICLES: There is minimal parenchymal volume loss.   There is periventricular white matter low HISTORY: stroke TECHNOLOGIST PROVIDED HISTORY: Reason for exam:->stroke Decision Support Exception->Emergency Medical Condition (MA) Reason for Exam: stroke Acuity: Acute Type of Exam: Initial Relevant Medical/Surgical History: 79 ML ISOVUE 55 FINDINGS: CTA NECK: AORTIC ARCH/ARCH VESSELS: No dissection or arterial injury. No significant stenosis of the brachiocephalic or subclavian arteries. CAROTID ARTERIES: No dissection, arterial injury, or hemodynamically significant stenosis by NASCET criteria. VERTEBRAL ARTERIES: There is left dominance of the vertebral arteries. There is 50% stenosis at the origin of the left vertebral artery. There is 70% stenosis at the origin of the right vertebral artery. SOFT TISSUES: There is mild septal thickening at the lung apices, may be related to minimal pulmonary vascular congestion. No cervical or superior mediastinal lymphadenopathy. The larynx and pharynx are unremarkable. No acute abnormality of the salivary and thyroid glands. BONES: No acute osseous abnormality. CTA HEAD: ANTERIOR CIRCULATION: No significant stenosis of the intracranial internal carotid, anterior cerebral, or middle cerebral arteries. No aneurysm. POSTERIOR CIRCULATION: There is left dominance of the vertebral arteries with hypoplastic intracranial right vertebral artery. No significant stenosis of the vertebral, basilar, or posterior cerebral arteries. No aneurysm. OTHER: No dural venous sinus thrombosis on this non-dedicated study. BRAIN: No mass effect or midline shift. No extra-axial fluid collection. The gray-white differentiation is maintained. There is moderate mucosal thickening in the left maxillary sinus. 1.  50% stenosis at the origin of the left vertebral artery. 2.  70% stenosis at the origin of the right vertebral artery. 3.  No acute abnormality or flow-limiting stenosis in the remainder of the major arteries of the head and neck.      EKG this visit:   EKG: Normal sinus rhythm,

## 2021-04-09 NOTE — ED PROVIDER NOTES
Patient Identification  Mandie Brady is a 79 y.o. male    Chief Complaint  Fatigue (all over weakness, 1 hour PTA )      HPI  (History provided by patient)  This is a 79 y.o. male who was brought in by self for chief complaint of weakness, headache, unsteady on his feet. Patient states that symptoms began at 9:20 AM while washing dishes. He states he began to feel generally weak, developed sudden headache, and reports feeling unsteady on his feet. Headache is generalized, mild, constant since onset. He denies history of similar symptoms. No numbness or weakness. No dizziness. Unable to describe the unsteadiness any further. Denies falls or head injuries. No vomiting. No changes in vision. No difficulty speaking or swallowing. REVIEW OF SYSTEMS    Constitutional:  Denies fever, chills  HENT:  Denies sore throat or ear pain   Eyes: Denies vision changes, eye pain  Cardiovascular:  Denies chest pain, syncope  Respiratory:  Denies shortness of breath, cough   GI:  Denies abdominal pain, nausea, vomiting  :  Denies dysuria, discharge  Musculoskeletal:  Denies back pain, joint pain  Skin:  Denies rash, pruritis  Neurologic:  Denies focal weakness, or sensory changes. + BUTLER     See HPI and nursing notes for additional information     I have reviewed the following nursing documentation:  Allergies:    Allergies   Allergen Reactions    Fenofibrate Other (See Comments)     Liq stools    Lipitor Other (See Comments)     Liq stools    Pravachol [Pravastatin Sodium] Other (See Comments)     Liq stools       Past medical history:  has a past medical history of Anesthesia complication, BPH with obstruction/lower urinary tract symptoms, Chest pain (7/1/2018), Chronic low back pain, GERD (gastroesophageal reflux disease), H/O 24 hour EKG monitoring (3/12  12/11), H/O cardiac catheterization (2/2/12 4/02 10/98), H/O cardiac catheterization (2/2/12 4/02 10/98), H/O cardiac catheterization (08/06/2018), H/O cardiovascular stress test (07/02/2018), H/O echocardiogram (12/10  ), History of cardiovascular stress test (2/12 11/09 7/08 9/06 2/02 4/00 8/99), History of Holter monitoring (9/17/14), History of nuclear stress test (08/03/2017), chest x-ray (2/20/12), echocardiogram (08/03/2017), Hyperlipidemia, Idiopathic peripheral neuropathy, Kidney stone, Left facial numbness (8/30/2017), Lumbar radiculopathy, Malignant melanoma of skin of face ([facility] [address] MVP (mitral valve prolapse), Myotonia, Peripelvic (lymphatic) cyst, Precordial pain (8/3/2018), and Vitamin B12 deficiency. Past surgical history:  has a past surgical history that includes Cardiac catheterization (2/2/12); Prostate surgery (09/26/2011); Rotator cuff repair (Right, 10/2016); Colonoscopy (5/5/14); Colonoscopy (07/10/2017); lumbar discectomy (5198); ureterolithotomy (1992); Tibia fracture surgery (Right); and Lumbar disc surgery (11/2002). Home medications:   Prior to Admission medications    Medication Sig Start Date End Date Taking? Authorizing Provider   DILT- MG extended release capsule Take 1 capsule by mouth daily 3/3/21   NELLIE Sanchez - CNP   levothyroxine (SYNTHROID) 50 MCG tablet TAKE 1 TABLET BY MOUTH IN  THE MORNING 1/26/21 4/26/21  Noah Garcia MD   pantoprazole (PROTONIX) 40 MG tablet Take 1 tablet by mouth daily 11/9/20 2/8/21  Noah Garcia MD   gabapentin (NEURONTIN) 300 MG capsule TAKE 1 CAPSULE BY MOUTH 3  TIMES DAILY 11/9/20 2/8/21  Noah Garcia MD   traMADol (ULTRAM) 50 MG tablet Take 2 tablets by mouth 2 times daily as needed for Pain for up to 90 days.  11/9/20 2/8/21  Noah Garcia MD   clopidogrel (PLAVIX) 75 MG tablet Take 1 tablet by mouth daily 11/2/20   Susi Coon MD   SUMAtriptan (IMITREX) 50 MG tablet Take 1 tablet by mouth once as needed for Migraine 4/9/20 2/8/21  Noah Garcia MD   aspirin 81 MG tablet Take 81 mg by mouth daily    Historical Provider, MD nitroGLYCERIN (NITROSTAT) 0.3 MG SL tablet Place 0.3 mg under the tongue every 5 minutes as needed for Chest pain up to max of 3 total doses. If no relief after 1 dose, call 911. Historical Provider, MD       Social history:  reports that he has never smoked. He has never used smokeless tobacco. He reports current alcohol use. He reports that he does not use drugs. Family history:    Family History   Problem Relation Age of Onset    Heart Disease Mother         ASCAD    Heart Disease Father     Heart Disease Brother     Diabetes Other     Cancer Other         unknown type    Prostate Cancer Other     Coronary Art Dis Other     Coronary Art Dis Other          Exam  /62   Pulse 70   Temp 98 °F (36.7 °C) (Oral)   Resp 16   Ht 5' 10\" (1.778 m)   Wt 170 lb (77.1 kg)   SpO2 95%   BMI 24.39 kg/m²   Nursing note and vitals reviewed. Constitutional: Well developed, well nourished. No acute distress. HENT:      Head: Normocephalic and atraumatic. Ears: External ears normal.      Nose: Nose normal.     Mouth: Membrane mucosa moist and pink. No posterior oropharynx erythema or tonsillar edema  Eyes: Anicteric sclera. No discharge, PERRL  Neck: Supple. Trachea midline. Cardiovascular: RRR, no murmurs, rubs, or gallops, radial pulses 2+ bilaterally. Pulmonary/Chest: Effort normal. No respiratory distress. CTAB. No stridor. No wheezes. No rales. Abdominal: Soft. Nontender to palpation. No distension. No guarding, rebound tenderness, or evidence of ascites. : No CVA tenderness. Musculoskeletal: Moves all extremities. No gross deformity. NEUROLOGICAL: Awake and alert. GCS 15. Cranial nerves 2-12 grossly intact with the exception of inability to perform lateral eye gaze in either eye, unclear chronicity. Strength 5/5 throughout. Light touch sensation intact throughout but diminished in bilateral legs below the knees which patient reports is chronic secondary to neuropathy.  Finger to nose WNL. Heel shin intact bilaterally. Gait normal.  Skin: Warm and dry. No rash. Psychiatric: Normal mood and affect. Behavior is normal.      EKG   Please see Dr. Geo Alvarez note for EKG read. Radiographs (if obtained):  [] The following radiograph was interpreted by myself in the absence of a radiologist:   [x] Radiologist's Report Reviewed:  XR CHEST PORTABLE   Final Result   No evidence of acute process. CTA HEAD NECK W CONTRAST   Preliminary Result   1.  50% stenosis at the origin of the left vertebral artery. 2.  70% stenosis at the origin of the right vertebral artery. 3.  No acute abnormality or flow-limiting stenosis in the remainder of the   major arteries of the head and neck. CT HEAD WO CONTRAST   Final Result   No acute intracranial abnormality. 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 Minimal parenchymal volume loss. Minimal chronic microvascular disease. Results were reported to [address] [place] [address] Dr at 11:46 a.m. on April 9, 2021.                 Labs  Results for orders placed or performed during the hospital encounter of 04/09/21   CBC Auto Differential   Result Value Ref Range    WBC 4.3 4.0 - 10.5 K/CU MM    RBC 4.48 (L) 4.6 - 6.2 M/CU MM    Hemoglobin 14.9 13.5 - 18.0 GM/DL    Hematocrit 44.8 42 - 52 %    .0 78 - 100 FL    MCH 33.3 (H) 27 - 31 PG    MCHC 33.3 32.0 - 36.0 %    RDW 12.7 11.7 - 14.9 %    Platelets 849 583 - 746 K/CU MM    MPV 9.4 7.5 - 11.1 FL    Differential Type AUTOMATED DIFFERENTIAL     Segs Relative 68.1 (H) 36 - 66 %    Lymphocytes % 21.4 (L) 24 - 44 %    Monocytes % 8.9 (H) 0 - 4 %    Eosinophils % 1.2 0 - 3 %    Basophils % 0.2 0 - 1 %    Segs Absolute 2.9 K/CU MM    Lymphocytes Absolute 0.9 K/CU MM    Monocytes Absolute 0.4 K/CU MM    Eosinophils Absolute 0.1 K/CU MM    Basophils Absolute 0.0 K/CU MM    Nucleated RBC % 0.0 %    Total Nucleated RBC 0.0 K/CU MM ms    Q-T Interval 396 ms    QTc Calculation (Bazett) 430 ms    P Axis 70 degrees    R Axis 23 degrees    T Axis 66 degrees    Diagnosis       Normal sinus rhythm  Possible Left atrial enlargement  Borderline ECG  When compared with ECG of 06-APR-2020 13:22,  No significant change was found           MDM  Patient presents for unsteadiness, sudden onset headache, feeling unwell. Neuro exam here shows difficulty with lateral eye movements, numbness in the bilateral lower extremities. Unclear chronicity of eye movement, reports numbness in the lower extremities chronic. His symptoms resolved in ED. He was seen by stroke neurology who thought he might have had a TIA, not a TPA candidate, admit for MRI. Work-up in ED shows CTA head and neck with stenoses of the bilateral vertebral arteries without occlusion. Consult placed to Hannah OL who agreed to admit. This patient was also seen and evaluated by Dr. Mya Shane.      Final Impression  1. TIA (transient ischemic attack)        Blood pressure 119/62, pulse 70, temperature 98 °F (36.7 °C), temperature source Oral, resp. rate 16, height 5' 10\" (1.778 m), weight 170 lb (77.1 kg), SpO2 95 %. Disposition:  Admit to telemetry in stable condition. Patient was given scripts for the following medications. I counseled patient how to take these medications. New Prescriptions    No medications on file       This chart was generated using the Dat[employer] dictation system. I created this record but it may contain dictation errors given the limitations of this technology.        Ashley Salazar PA-C  04/09/21 142

## 2021-04-09 NOTE — ED PROVIDER NOTES
I independently examined and evaluated Damien Rhodes. In brief their history revealed weakness, headache, unsteady on his feet that started around 920 this morning while he was awake and washing dishes. States he began to feel generally weak after this. Did not feel specific weakness on one side of his body. No confusion. No difficulty with his speech. Has not passed out. No chest pain. No shortness of breath. No fevers or chills. .  Stroke alert was called on patient as his symptoms were within 24 hours. Patient is awake, alert, oriented x4. Speaks in full sentences. Clear lungs. Soft abdomen. Normal coordination. Strength is equal throughout. Sensation intact. All diagnostic, treatment, and disposition decisions were made by myself in conjunction with the mid-level provider. Before disposition, questions were sought and answered with the patient. They have voiced understanding and agree with the plan: Patient's vital signs are stable. Accu-Chek is 189. CBC shows a normal white count of 4.3. Hemoglobin of 14.9.. Electrolytes are normal.  Troponin normal.  TSH normal.  Coags are normal.  CT head shows no acute findings. CTA head shows 50% stenosis of the left vertebral, 70% stenosis at the right vertebral.  Patient did consult with  of [facility] neurology who states not a TPA candidate. Did recommend patient is admitted here for MRI as he is back to his baseline and believes this was likely a TIA. Patient and family do agree to plan. For all further details of the patient's emergency department visit, please see the mid-level practitioner's documentation. The Ekg interpreted by me shows  normal sinus rhythm with a rate of 71  Axis is   Normal  QTc is  normal  Intervals and Durations are unremarkable.       ST Segments: no acute change  No significant change from prior EKG dated 4-6-2020           Shilpi Aguilar MD  04/09/21 [id number]

## 2021-04-09 NOTE — ED NOTES
1341 paged hospitalist     Court Harada  04/09/21 1342  1352 Hannah mid level with [facility] returned call      Court Harada  04/09/21 [id number]

## 2021-04-09 NOTE — ED PROVIDER NOTES
As physician-in-triage, I performed a medical screening history and physical exam on this patient. I performed a medical screening examination and evaluated this patient briefly kari tele-health platform with the purpose of initiating their ED workup in an expeditious manner. Please see notes from other ED providers regarding comprehensive evaluation including full history, physical exam, interpretation of results, and medical decision making/disposition. CHIEF COMPLAINT  No chief complaint on file. HISTORY OF PRESENT ILLNESS  Saturnino Copeland is a 79 y.o. male that presents to the emergency department for evaluation of weakness. Patient notes that 1 hr PTA, was standing, washing dishes and felt weakness. Patient characterizes global weakness. No focal or lateralizing deficits. No facial droop, slurred speech, aphasia, dysphagia. No double vision, blurred vision, change in vision. No flashes or floaters. No tinnitus, buzzing, ringing in the ears. No syncope, dizziness, lightheadedness, numbness, tingling, paresthesias. Denies chest pain or pleuritic pain. Denies abdominal pain, nausea, vomiting. Denies fevers, chills, diaphoresis, night sweats. Denies additional precipitating, modifying, alleviating features. PHYSICAL EXAM  BP (!) 153/70   Pulse 69   Temp 98 °F (36.7 °C) (Oral)   Resp 16   Ht 5' 10\" (1.778 m)   Wt 170 lb (77.1 kg)   SpO2 (!) 69%   BMI 24.39 kg/m²     On exam, the patient appears in no acute distress. Speech is clear. Breathing is unlabored. Moves all extremities    Comment: Please note this report has been produced using speech recognition software and may contain errors related to that system including errors in grammar, punctuation, and spelling, as well as words and phrases that may be inappropriate. If there are any questions or concerns please feel free to contact the dictating provider for clarification.        Sal Cunningham DO  04/09/21 1111

## 2021-04-10 ENCOUNTER — APPOINTMENT (OUTPATIENT)
Dept: MRI IMAGING | Age: 81
DRG: 068 | End: 2021-04-10
Payer: MEDICARE

## 2021-04-10 LAB
CHOLESTEROL: 162 MG/DL
ERYTHROCYTE SEDIMENTATION RATE: 8 MM/HR (ref 0–20)
ESTIMATED AVERAGE GLUCOSE: 111 MG/DL
GLUCOSE BLD-MCNC: 94 MG/DL (ref 70–99)
HBA1C MFR BLD: 5.5 % (ref 4.2–6.3)
HCT VFR BLD CALC: 42.7 % (ref 42–52)
HDLC SERPL-MCNC: 34 MG/DL
HEMOGLOBIN: 13.8 GM/DL (ref 13.5–18)
LDL CHOLESTEROL DIRECT: 120 MG/DL
MCH RBC QN AUTO: 32.2 PG (ref 27–31)
MCHC RBC AUTO-ENTMCNC: 32.3 % (ref 32–36)
MCV RBC AUTO: 99.8 FL (ref 78–100)
PDW BLD-RTO: 12.4 % (ref 11.7–14.9)
PLATELET # BLD: 181 K/CU MM (ref 140–440)
PMV BLD AUTO: 9.4 FL (ref 7.5–11.1)
RBC # BLD: 4.28 M/CU MM (ref 4.6–6.2)
TRIGL SERPL-MCNC: 74 MG/DL
WBC # BLD: 3.4 K/CU MM (ref 4–10.5)

## 2021-04-10 PROCEDURE — 6360000002 HC RX W HCPCS: Performed by: NURSE PRACTITIONER

## 2021-04-10 PROCEDURE — G0378 HOSPITAL OBSERVATION PER HR: HCPCS

## 2021-04-10 PROCEDURE — 97165 OT EVAL LOW COMPLEX 30 MIN: CPT

## 2021-04-10 PROCEDURE — 96374 THER/PROPH/DIAG INJ IV PUSH: CPT

## 2021-04-10 PROCEDURE — 96375 TX/PRO/DX INJ NEW DRUG ADDON: CPT

## 2021-04-10 PROCEDURE — 82962 GLUCOSE BLOOD TEST: CPT

## 2021-04-10 PROCEDURE — 36415 COLL VENOUS BLD VENIPUNCTURE: CPT

## 2021-04-10 PROCEDURE — 83036 HEMOGLOBIN GLYCOSYLATED A1C: CPT

## 2021-04-10 PROCEDURE — 85652 RBC SED RATE AUTOMATED: CPT

## 2021-04-10 PROCEDURE — 6360000002 HC RX W HCPCS: Performed by: HOSPITALIST

## 2021-04-10 PROCEDURE — 80061 LIPID PANEL: CPT

## 2021-04-10 PROCEDURE — 6370000000 HC RX 637 (ALT 250 FOR IP): Performed by: NURSE PRACTITIONER

## 2021-04-10 PROCEDURE — 6370000000 HC RX 637 (ALT 250 FOR IP): Performed by: HOSPITALIST

## 2021-04-10 PROCEDURE — 97530 THERAPEUTIC ACTIVITIES: CPT

## 2021-04-10 PROCEDURE — 96376 TX/PRO/DX INJ SAME DRUG ADON: CPT

## 2021-04-10 PROCEDURE — 85027 COMPLETE CBC AUTOMATED: CPT

## 2021-04-10 PROCEDURE — 70551 MRI BRAIN STEM W/O DYE: CPT

## 2021-04-10 PROCEDURE — 96372 THER/PROPH/DIAG INJ SC/IM: CPT

## 2021-04-10 PROCEDURE — 99223 1ST HOSP IP/OBS HIGH 75: CPT | Performed by: NURSE PRACTITIONER

## 2021-04-10 PROCEDURE — 2580000003 HC RX 258: Performed by: NURSE PRACTITIONER

## 2021-04-10 PROCEDURE — 83721 ASSAY OF BLOOD LIPOPROTEIN: CPT

## 2021-04-10 PROCEDURE — 2580000003 HC RX 258: Performed by: HOSPITALIST

## 2021-04-10 RX ORDER — KETOROLAC TROMETHAMINE 30 MG/ML
15 INJECTION, SOLUTION INTRAMUSCULAR; INTRAVENOUS ONCE
Status: COMPLETED | OUTPATIENT
Start: 2021-04-10 | End: 2021-04-10

## 2021-04-10 RX ORDER — SODIUM CHLORIDE, SODIUM LACTATE, POTASSIUM CHLORIDE, CALCIUM CHLORIDE 600; 310; 30; 20 MG/100ML; MG/100ML; MG/100ML; MG/100ML
INJECTION, SOLUTION INTRAVENOUS CONTINUOUS
Status: DISCONTINUED | OUTPATIENT
Start: 2021-04-10 | End: 2021-04-12 | Stop reason: HOSPADM

## 2021-04-10 RX ORDER — LANOLIN ALCOHOL/MO/W.PET/CERES
3 CREAM (GRAM) TOPICAL ONCE
Status: COMPLETED | OUTPATIENT
Start: 2021-04-10 | End: 2021-04-10

## 2021-04-10 RX ADMIN — ASPIRIN 81 MG: 81 TABLET, COATED ORAL at 09:16

## 2021-04-10 RX ADMIN — TRAMADOL HYDROCHLORIDE 100 MG: 50 TABLET, FILM COATED ORAL at 01:41

## 2021-04-10 RX ADMIN — CLOPIDOGREL BISULFATE 75 MG: 75 TABLET ORAL at 09:16

## 2021-04-10 RX ADMIN — DILTIAZEM HYDROCHLORIDE 120 MG: 120 CAPSULE, COATED, EXTENDED RELEASE ORAL at 22:08

## 2021-04-10 RX ADMIN — GABAPENTIN 300 MG: 300 CAPSULE ORAL at 14:44

## 2021-04-10 RX ADMIN — GABAPENTIN 300 MG: 300 CAPSULE ORAL at 22:07

## 2021-04-10 RX ADMIN — ENOXAPARIN SODIUM 40 MG: 40 INJECTION SUBCUTANEOUS at 17:12

## 2021-04-10 RX ADMIN — ONDANSETRON 4 MG: 2 INJECTION INTRAMUSCULAR; INTRAVENOUS at 09:16

## 2021-04-10 RX ADMIN — PROMETHAZINE HYDROCHLORIDE 12.5 MG: 25 TABLET ORAL at 22:07

## 2021-04-10 RX ADMIN — SODIUM CHLORIDE, POTASSIUM CHLORIDE, SODIUM LACTATE AND CALCIUM CHLORIDE: 600; 310; 30; 20 INJECTION, SOLUTION INTRAVENOUS at 17:13

## 2021-04-10 RX ADMIN — GABAPENTIN 300 MG: 300 CAPSULE ORAL at 09:16

## 2021-04-10 RX ADMIN — LEVOTHYROXINE SODIUM 50 MCG: 0.05 TABLET ORAL at 09:21

## 2021-04-10 RX ADMIN — Medication 3 MG: at 00:57

## 2021-04-10 RX ADMIN — KETOROLAC TROMETHAMINE 15 MG: 30 INJECTION, SOLUTION INTRAMUSCULAR; INTRAVENOUS at 14:44

## 2021-04-10 RX ADMIN — SODIUM CHLORIDE, PRESERVATIVE FREE 10 ML: 5 INJECTION INTRAVENOUS at 09:16

## 2021-04-10 RX ADMIN — ONDANSETRON 4 MG: 2 INJECTION INTRAMUSCULAR; INTRAVENOUS at 14:44

## 2021-04-10 RX ADMIN — PANTOPRAZOLE SODIUM 40 MG: 40 TABLET, DELAYED RELEASE ORAL at 09:16

## 2021-04-10 RX ADMIN — TRAMADOL HYDROCHLORIDE 100 MG: 50 TABLET, FILM COATED ORAL at 12:07

## 2021-04-10 ASSESSMENT — PAIN DESCRIPTION - PAIN TYPE: TYPE: ACUTE PAIN

## 2021-04-10 NOTE — PROGRESS NOTES
Contrast    Result Date: 4/9/2021  EXAMINATION: CT OF THE HEAD WITHOUT CONTRAST  4/9/2021 11:39 am TECHNIQUE: CT of the head was performed without the administration of intravenous contrast. Dose modulation, iterative reconstruction, and/or weight based adjustment of the mA/kV was utilized to reduce the radiation dose to as low as reasonably achievable. COMPARISON: CT head April 6, 2020, MRI brain April 6, 2020 HISTORY: ORDERING SYSTEM PROVIDED HISTORY: stroke TECHNOLOGIST PROVIDED HISTORY: Has a \"code stroke\" or \"stroke alert\" been called? ->Yes Reason for exam:->stroke Decision Support Exception->Emergency Medical Condition (MA) Reason for Exam: stroke Acuity: Acute Type of Exam: Initial FINDINGS: BRAIN/VENTRICLES: There is minimal parenchymal volume loss. There is periventricular white matter low attenuation, likely related to minimal chronic microvascular disease. There is no acute intracranial hemorrhage, mass effect or midline shift. No abnormal extra-axial fluid collection. There is old lacunar infarct in the inferior left frontal periventricular white matter, stable. No evidence of an acute infarct. There is no evidence of hydrocephalus. ORBITS: The visualized portion of the orbits demonstrate no acute abnormality. SINUSES: The visualized paranasal sinuses and mastoid air cells demonstrate no acute abnormality. SOFT TISSUES/SKULL:  No acute abnormality of the visualized skull or soft tissues. No acute intracranial abnormality. Old lacunar infarct in the inferior left frontal periventricular white matter, stable. Minimal parenchymal volume loss. Minimal chronic microvascular disease. Results were reported to [address] [place] [address] Dr at 11:46 a.m. on April 9, 2021.      Xr Chest Portable    Result Date: 4/9/2021  EXAMINATION: ONE XRAY VIEW OF THE CHEST 4/9/2021 11:22 am COMPARISON: 04/06/2020 HISTORY: ORDERING SYSTEM PROVIDED HISTORY: weakness TECHNOLOGIST PROVIDED HISTORY: Reason for exam:->weakness Reason for Exam: weakness FINDINGS: Normal heart size and pulmonary vasculature. No focal consolidations, pleural effusions, or pneumothorax. No evidence of acute process. Cta Head Neck W Contrast    Result Date: 4/9/2021  EXAMINATION: CTA OF THE HEAD AND NECK WITH CONTRAST 4/9/2021 12:02 pm: TECHNIQUE: CTA of the head and neck was performed with the administration of intravenous contrast. Multiplanar reformatted images are provided for review. MIP images are provided for review. Stenosis of the internal carotid arteries measured using NASCET criteria. Dose modulation, iterative reconstruction, and/or weight based adjustment of the mA/kV was utilized to reduce the radiation dose to as low as reasonably achievable. COMPARISON: Noncontrast CT head from earlier today. HISTORY: ORDERING SYSTEM PROVIDED HISTORY: stroke TECHNOLOGIST PROVIDED HISTORY: Reason for exam:->stroke Decision Support Exception->Emergency Medical Condition (MA) Reason for Exam: stroke Acuity: Acute Type of Exam: Initial Relevant Medical/Surgical History: 79 ML ISOVUE Teofilo FINDINGS: CTA NECK: AORTIC ARCH/ARCH VESSELS: No dissection or arterial injury. No significant stenosis of the brachiocephalic or subclavian arteries. CAROTID ARTERIES: No dissection, arterial injury, or hemodynamically significant stenosis by NASCET criteria. VERTEBRAL ARTERIES: There is left dominance of the vertebral arteries. There is 50% stenosis at the origin of the left vertebral artery. There is 70% stenosis at the origin of the right vertebral artery. SOFT TISSUES: There is mild septal thickening at the lung apices, may be related to minimal pulmonary vascular congestion. No cervical or superior mediastinal lymphadenopathy. The larynx and pharynx are unremarkable. No acute abnormality of the salivary and thyroid glands. BONES: No acute osseous abnormality.  CTA HEAD: ANTERIOR CIRCULATION: No significant stenosis of the intracranial internal carotid, anterior

## 2021-04-10 NOTE — PROGRESS NOTES
Occupational Therapy  [facility] OCCUPATIONAL THERAPY EVALUATION    History  [place]:  The encounter diagnosis was TIA (transient ischemic attack). Patient  has a past medical history of Anesthesia complication, BPH with obstruction/lower urinary tract symptoms, Chest pain, Chronic low back pain, GERD (gastroesophageal reflux disease), H/O 24 hour EKG monitoring, H/O cardiac catheterization, H/O cardiac catheterization, H/O cardiac catheterization, H/O cardiovascular stress test, H/O echocardiogram, History of cardiovascular stress test, History of Holter monitoring, History of nuclear stress test, Hx of chest x-ray, Hx of echocardiogram, Hyperlipidemia, Idiopathic peripheral neuropathy, Kidney stone, Left facial numbness, Lumbar radiculopathy, Malignant melanoma of skin of face ([facility] [address] MVP (mitral valve prolapse), Myotonia, Peripelvic (lymphatic) cyst, Precordial pain, and Vitamin B12 deficiency. Patient  has a past surgical history that includes Cardiac catheterization (2/2/12); Prostate surgery (09/26/2011); Rotator cuff repair (Right, 10/2016); Colonoscopy (5/5/14); Colonoscopy (07/10/2017); lumbar discectomy (3172); ureterolithotomy (1992); Tibia fracture surgery (Right); and Lumbar disc surgery (11/2002).       Restrictions:  Restrictions/Precautions  Restrictions/Precautions: Fall Risk, General Precautions,  ,    Communication with other providers:  Cleared with nsg    Subjective:  Patient states:  I got sick this morning  Occupational profile (relevant social history and personal factors):    Social/Functional History  Lives With: Alone  Type of Home: House  Home Layout: One level, Laundry in basement  Home Access: Stairs to enter without rails  Entrance Stairs - Number of Steps: 2  Bathroom Shower/Tub: Tub/Shower unit  Bathroom Toilet: Standard  Bathroom Equipment: Grab bars in shower  Bathroom Accessibility: Walker accessible  Home Equipment: Standard walker  Receives Help From: Friend(s)  ADL Assistance: Independent  Homemaking Assistance: Independent  Homemaking Responsibilities: Yes  Ambulation Assistance: Independent  Transfer Assistance: Independent  Active : Yes  Occupation: Retired    Pain:  6/10- Headache/Migraine  Patient goal:  Want my headache to go away. Examination of body systems (includes body structures/functions, activity/participation limitations):  · Orientation: WFL  · Cognition:  WFL  · Observation:  Seated in chair on arrival  · Vision:  Wears glasses at all times  · Hearing:  WFL  · ROM: WFL  · Strength: WFL  · Sensation: [facility] 6 click short form for inpatient daily activity:   How much help from another person does the patient currently need. .. Unable  Dep A Lot  Max A A Lot   Mod A A Little  Min A A Little   CGA  Sup None   Mod I  Indep   1. Putting on and taking off regular lower body clothing? [] 1    [] 2   [] 2   [] 3   [] 3   [x] 4      2. Bathing (including washing, rinsing, drying)? [] 1   [] 2   [] 2 [] 3 [] 3 [x] 4   3. Toileting, which includes using toilet, bedpan, or urinal? [] 1    [] 2   [] 2   [] 3   [] 3   [x] 4     4. Putting on and taking off regular upper body clothing? [] 1   [] 2   [] 2   [] 3   [] 3    [x] 4      5. Taking care of personal grooming such as brushing teeth? [] 1   [] 2    [] 2 [] 3    [] 3   [x] 4      6. Eating meals? [] 1   [] 2   [] 2   [] 3   [] 3   [x] 4      Raw Score:  24/24 24/24 = unimpaired  23/24 = 1-20% impaired   20/24-22/24 = 21-40% impaired  15/24-19/24 = 41-59% impaired   10/24-14/24 = 60%-79% impaired  7/24-9/24 = 80%-99% impaired  6/24 = 100% impaired    ADLs  Feeding: Idependent  Grooming: Independent    Dressing: UB Independent LB Independent  Bathing: UB Independent LB Independent  Toileting: Independent    Functional Mobility  Bed mobility: Supine to sit:  Independent  Scooting: Independent  Transfers: Sit to stand: Independent  Stand pivot: Independent  Ambulation:  SBA  Activity tolerance:  Good, Pt got sick during evaluation. Assessment:  Assessment  Assessment: Pt is 80 yo male brought in by self for chief complaint of weakness, headache, unsteady on his feet. At this time all symptoms have subsided but headache. Pt demonstrated the ability to complete LB dresssing and grooming activities with distant supervision. Pt was able to ambulate with nsg, with SBA. Pt is performing ADL tasks at baseline and therapy is not warranted. Prognosis: Good  Decision Making: Medium Complexity  REQUIRES OT FOLLOW UP: No  No Skilled OT: At baseline function  Discharge Recommendations: Home with assist PRN    Plan:   Pt functioning at baseline. No therapy needs at this time. Treatment today:    Therapeutic Activity Training:   Therapeutic activity training was instructed today. Cues were given for safety, sequence, UE/LE placement, awareness, and balance. Activities performed today included bed mobility training, sup-sit, sit-stand, SPT.         Time in:  1200  Time out:  1230  Timed treatment minutes:  15  Total treatment time:  30    Electronically signed by:  Ivana Ho OT  4/10/2021, 12:53 PM

## 2021-04-10 NOTE — PROGRESS NOTES
Physical Therapy  Triage hold, patient ind-SBA with OT, no acute therapy needs, will d/c if imaging is negative.

## 2021-04-10 NOTE — CONSULTS
catheterization 2/2/12 4/02 10/98    2/22/12  LM, LAD and RCA all without significant disease    H/O cardiac catheterization 2/2/12 4/02 10/98    Done R/T false pos on stress test.    H/O cardiac catheterization 08/06/2018    normal study    H/O cardiovascular stress test 07/02/2018    normal study    H/O echocardiogram 12/10      3/14 EF55-60% normal LV function 12/10/10  complete 2 dim transthoracic echj. LVSF normal  EF >55%, transmitral spectral Doppler flow pattern is suggestive of  impaired LV relaxation    History of cardiovascular stress test 2/12 11/09 7/08 9/06 2/02 4/00 8/99 2/8/2012  evidence of mild ischemia  in the RCA. abnormal study , restingEF is 70%, global LVSF is  normal    History of Holter monitoring 9/17/14    14 day EVENT - no RVR    History of nuclear stress test 08/03/2017    cardiolite-normal,EF60%    Hx of chest x-ray 2/20/12    chest is considereed non acute. COPD    Hx of echocardiogram 08/03/2017    XH78-12%,ISUKI 2 diastolic dysfunction    Hyperlipidemia     Idiopathic peripheral neuropathy     Kidney stone     Left facial numbness 8/30/2017    Lumbar radiculopathy     Malignant melanoma of skin of face (HCC)     MVP (mitral valve prolapse)     Myotonia     chronic increased CPK's    Peripelvic (lymphatic) cyst     per CT    Precordial pain 8/3/2018    Vitamin B12 deficiency     :   Past Surgical History:   Procedure Laterality Date    CARDIAC CATHETERIZATION  2/2/12    EF 55% No sugnificant disease.     COLONOSCOPY  5/5/14    diverticulosis, 1 polyp, hemorrhoids    COLONOSCOPY  07/10/2017    single 5 mm polyp found in sigmoid colon, moderate diverticulosis found in sigmoid colon   Rob 71 SURGERY  11/2002    L4-5 hemilaminectomy and disk    LUMBAR DISCECTOMY  1973    PROSTATE SURGERY  09/26/2011    s/p robotic radical prostatectomy    ROTATOR CUFF REPAIR Right 10/2016    TIBIA FRACTURE SURGERY Right     childhood    URETEROLITHOTOMY  1992 04/09/21  1114 04/09/21  1146 04/10/21  0412   WBC 4.3  --  3.4*     --   --    K 3.7  --   --      --   --    CO2 28  --   --    BUN 21  --   --    CREATININE 1.0  --   --    GLUCOSE 145* 183  --    INR 1.17  --   --        Results for Elyse Noel (MRN [id number]) as of 4/11/2021 12:14   Ref. Range 4/10/2021 04:12   Cholesterol Latest Ref Range: <200 MG/   HDL Cholesterol Latest Ref Range: >40 MG/DL 34 (L)   LDL Direct Latest Ref Range: <100 MG/ (H)   Triglycerides Latest Ref Range: <150 MG/DL 74   Hemoglobin A1C Latest Ref Range: 4.2 - 6.3 % 5.5   eAG (mg/dL) Latest Units: mg/dL 111       IMAGING:      MRI Brain pending    CTA:  1.  50% stenosis at the origin of the left vertebral artery.       2.  70% stenosis at the origin of the right vertebral artery.       3.  No acute abnormality or flow-limiting stenosis in the remainder of the   major arteries of the head and neck. CT Head:  No acute intracranial abnormality.       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       Minimal parenchymal volume loss.       Minimal chronic microvascular disease. ASSESSMENT/PLAN:     3 79year old male with unsteady gait and headache with generalized weakness, will rule out PCA infarction v. Reactivation of previous infarctions v. TIA v. vestibular migraine. Plan of care as follows:  1. Neuro Exam:   1. Non focal   2. Neurodiagnostics:  1. MRI Brain pending   2. CTA head and neck as above  3. Medications:  1. DAPT  2. Statin   4. PT/OT/ST:  1. Per their recommendations  5. Follow up:  1. Pending MRI         Thank you for allowing us to participate in the care of your patient. If there are any questions regarding evaluation please feel free to contact us.      NELLIE Cook - CNP, 4/10/2021

## 2021-04-11 PROCEDURE — 99233 SBSQ HOSP IP/OBS HIGH 50: CPT | Performed by: NURSE PRACTITIONER

## 2021-04-11 PROCEDURE — 2580000003 HC RX 258: Performed by: NURSE PRACTITIONER

## 2021-04-11 PROCEDURE — 6360000002 HC RX W HCPCS: Performed by: HOSPITALIST

## 2021-04-11 PROCEDURE — 94761 N-INVAS EAR/PLS OXIMETRY MLT: CPT

## 2021-04-11 PROCEDURE — 6360000002 HC RX W HCPCS: Performed by: NURSE PRACTITIONER

## 2021-04-11 PROCEDURE — 1200000000 HC SEMI PRIVATE

## 2021-04-11 PROCEDURE — 96376 TX/PRO/DX INJ SAME DRUG ADON: CPT

## 2021-04-11 PROCEDURE — 6370000000 HC RX 637 (ALT 250 FOR IP): Performed by: NURSE PRACTITIONER

## 2021-04-11 PROCEDURE — G0378 HOSPITAL OBSERVATION PER HR: HCPCS

## 2021-04-11 PROCEDURE — 2580000003 HC RX 258: Performed by: HOSPITALIST

## 2021-04-11 PROCEDURE — 96375 TX/PRO/DX INJ NEW DRUG ADDON: CPT

## 2021-04-11 PROCEDURE — 6370000000 HC RX 637 (ALT 250 FOR IP): Performed by: HOSPITALIST

## 2021-04-11 PROCEDURE — 2500000003 HC RX 250 WO HCPCS: Performed by: NURSE PRACTITIONER

## 2021-04-11 RX ORDER — EZETIMIBE 10 MG/1
10 TABLET ORAL NIGHTLY
Qty: 30 TABLET | Refills: 0 | Status: SHIPPED | OUTPATIENT
Start: 2021-04-11 | End: 2021-08-12

## 2021-04-11 RX ORDER — KETOROLAC TROMETHAMINE 30 MG/ML
15 INJECTION, SOLUTION INTRAMUSCULAR; INTRAVENOUS ONCE
Status: COMPLETED | OUTPATIENT
Start: 2021-04-11 | End: 2021-04-11

## 2021-04-11 RX ORDER — EZETIMIBE 10 MG/1
10 TABLET ORAL NIGHTLY
Status: DISCONTINUED | OUTPATIENT
Start: 2021-04-11 | End: 2021-04-12 | Stop reason: HOSPADM

## 2021-04-11 RX ORDER — DEXAMETHASONE SODIUM PHOSPHATE 4 MG/ML
10 INJECTION, SOLUTION INTRA-ARTICULAR; INTRALESIONAL; INTRAMUSCULAR; INTRAVENOUS; SOFT TISSUE ONCE
Status: COMPLETED | OUTPATIENT
Start: 2021-04-11 | End: 2021-04-11

## 2021-04-11 RX ADMIN — KETOROLAC TROMETHAMINE 15 MG: 30 INJECTION, SOLUTION INTRAMUSCULAR; INTRAVENOUS at 11:59

## 2021-04-11 RX ADMIN — DEXAMETHASONE SODIUM PHOSPHATE 10 MG: 4 INJECTION, SOLUTION INTRA-ARTICULAR; INTRALESIONAL; INTRAMUSCULAR; INTRAVENOUS; SOFT TISSUE at 11:59

## 2021-04-11 RX ADMIN — GABAPENTIN 300 MG: 300 CAPSULE ORAL at 11:08

## 2021-04-11 RX ADMIN — EZETIMIBE 10 MG: 10 TABLET ORAL at 22:02

## 2021-04-11 RX ADMIN — SODIUM CHLORIDE, POTASSIUM CHLORIDE, SODIUM LACTATE AND CALCIUM CHLORIDE: 600; 310; 30; 20 INJECTION, SOLUTION INTRAVENOUS at 14:49

## 2021-04-11 RX ADMIN — LEVOTHYROXINE SODIUM 50 MCG: 0.05 TABLET ORAL at 11:08

## 2021-04-11 RX ADMIN — GABAPENTIN 300 MG: 300 CAPSULE ORAL at 14:19

## 2021-04-11 RX ADMIN — PANTOPRAZOLE SODIUM 40 MG: 40 TABLET, DELAYED RELEASE ORAL at 11:08

## 2021-04-11 RX ADMIN — DEXTROSE MONOHYDRATE 500 MG: 50 INJECTION, SOLUTION INTRAVENOUS at 22:05

## 2021-04-11 RX ADMIN — ENOXAPARIN SODIUM 40 MG: 40 INJECTION SUBCUTANEOUS at 17:38

## 2021-04-11 RX ADMIN — TRAMADOL HYDROCHLORIDE 100 MG: 50 TABLET, FILM COATED ORAL at 22:04

## 2021-04-11 RX ADMIN — PROMETHAZINE HYDROCHLORIDE 12.5 MG: 25 TABLET ORAL at 22:03

## 2021-04-11 RX ADMIN — DILTIAZEM HYDROCHLORIDE 120 MG: 120 CAPSULE, COATED, EXTENDED RELEASE ORAL at 22:02

## 2021-04-11 RX ADMIN — ASPIRIN 81 MG: 81 TABLET, COATED ORAL at 11:08

## 2021-04-11 RX ADMIN — GABAPENTIN 300 MG: 300 CAPSULE ORAL at 22:02

## 2021-04-11 RX ADMIN — SODIUM CHLORIDE, PRESERVATIVE FREE 10 ML: 5 INJECTION INTRAVENOUS at 22:02

## 2021-04-11 RX ADMIN — CLOPIDOGREL BISULFATE 75 MG: 75 TABLET ORAL at 11:08

## 2021-04-11 RX ADMIN — DEXTROSE MONOHYDRATE 500 MG: 50 INJECTION, SOLUTION INTRAVENOUS at 12:25

## 2021-04-11 ASSESSMENT — PAIN SCALES - GENERAL
PAINLEVEL_OUTOF10: 5
PAINLEVEL_OUTOF10: 4

## 2021-04-11 ASSESSMENT — PAIN DESCRIPTION - ORIENTATION: ORIENTATION: RIGHT;LEFT

## 2021-04-11 ASSESSMENT — PAIN DESCRIPTION - LOCATION: LOCATION: LEG

## 2021-04-11 NOTE — DISCHARGE SUMMARY
Physician Discharge Summary     Patient ID:  La Russo  [id number]  62 y.o.  1940    Admit date: 4/9/2021    Discharge date and time: 4/12/21     Admitting Physician: Kevin Sharpe MD     Discharge Physician: Radha Hickey    Admission Diagnoses: TIA (transient ischemic attack) [G45.9]    Discharge Diagnoses: TIA with bilateral vertebral stenosis                                          HTN                                          HPL                                          Hypothyroid                                          headache    Admission Condition: good    Discharged Condition: good    Indication for Admission: TIA    Hospital Course:    79 y.o. male with past medical history of TIA, MVP, GERD, hyperlipidemia, hypertension, thyroid disease, and right internal carotid stenosis presented to the ED with complaints of generalized weakness, unsteady on his feet, headache, and dizziness. Patient stated that he lives alone at home. Reports symptoms started around 9:20 AM after he was done washing dishes. Stated that after washing dishes he felt very weak all over his body followed by headache, dizziness, and became unsteady on his feet. Reports that he has history of migraine and he is taking Imitrex only as needed but the dizziness is new. He called his daughter who brought him to the ED. Patient's symptoms appear resolved in ED prior to this assessment/examnation, and no confusion, no difficulty with his speech noted. Patient now denies headache, dizziness, lightheadedness, fall, and loss of consciousness. He denies chest pain, palpitation, fever, chills or diaphoresis, cough, and SOB or difficulty breathing. Denies paresthesias, abdominal pain, nausea,  constipation, changes in bowels, dysuria, hematuria, and frequency or urgency. Full CODE STATUS confirmed by patient. He was admitted and for TIA and CTA head and neck with bilateral vertebral artery stenosis. MRI brain neg for acute stroke.  Neurology consulted. He then started to have headache upon standing up with vomiting. Orthostatic neg and Echo with normal EF and bubble study neg. He was treated for migraine. He was continued on dual antiplt and placed on zetia as had reactions to statins. MRV was also taken and unremarkable. Consults: neurology        Outstanding Order Results     No orders found from 3/11/2021 to 4/10/2021. Discharge Exam:  HEENT: Normal HEENT exam.    Heart: Normal rate. Abdomen: Abdomen is soft. Bowel sounds are normal.     Extremities: Normal range of motion. Neurological: Patient is alert. Pupils:  Pupils are equal, round, and reactive to light. Skin:  Warm. Disposition: home    Patient Instructions:      Medication List      START taking these medications    ezetimibe 10 MG tablet  Commonly known as: ZETIA  Take 1 tablet by mouth nightly        CONTINUE taking these medications    aspirin 81 MG tablet     clopidogrel 75 MG tablet  Commonly known as: PLAVIX  Take 1 tablet by mouth daily     Dilt- MG extended release capsule  Generic drug: dilTIAZem  Take 1 capsule by mouth daily     gabapentin 300 MG capsule  Commonly known as: NEURONTIN  TAKE 1 CAPSULE BY MOUTH 3  TIMES DAILY     levothyroxine 50 MCG tablet  Commonly known as: SYNTHROID  TAKE 1 TABLET BY MOUTH IN  THE MORNING     nitroGLYCERIN 0.3 MG SL tablet  Commonly known as: NITROSTAT     pantoprazole 40 MG tablet  Commonly known as: PROTONIX  Take 1 tablet by mouth daily     traMADol 50 MG tablet  Commonly known as: ULTRAM  Take 2 tablets by mouth 2 times daily as needed for Pain for up to 90 days.         STOP taking these medications    SUMAtriptan 50 MG tablet  Commonly known as: Imitrex           Where to Get Your Medications      You can get these medications from any pharmacy    Bring a paper prescription for each of these medications  · ezetimibe 10 MG tablet         Activity: activity as tolerated  Diet: cardiac diet  Wound Care: none needed  Discharge time 30min  Follow-up with PCP}.     SignedLeoncio Covington  4/11/2021  12:11 PM

## 2021-04-11 NOTE — PROGRESS NOTES
1114   AST 37   ALT 22   BILITOT 0.6   ALKPHOS 35*       Imaging Last 24 Hours:  Ct Head Wo Contrast    Result Date: 4/9/2021  EXAMINATION: CT OF THE HEAD WITHOUT CONTRAST  4/9/2021 11:39 am TECHNIQUE: CT of the head was performed without the administration of intravenous contrast. Dose modulation, iterative reconstruction, and/or weight based adjustment of the mA/kV was utilized to reduce the radiation dose to as low as reasonably achievable. COMPARISON: CT head April 6, 2020, MRI brain April 6, 2020 HISTORY: ORDERING SYSTEM PROVIDED HISTORY: stroke TECHNOLOGIST PROVIDED HISTORY: Has a \"code stroke\" or \"stroke alert\" been called? ->Yes Reason for exam:->stroke Decision Support Exception->Emergency Medical Condition ([place]) Reason for Exam: stroke Acuity: Acute Type of Exam: Initial FINDINGS: BRAIN/VENTRICLES: There is minimal parenchymal volume loss. There is periventricular white matter low attenuation, likely related to minimal chronic microvascular disease. There is no acute intracranial hemorrhage, mass effect or midline shift. No abnormal extra-axial fluid collection. There is old lacunar infarct in the inferior left frontal periventricular white matter, stable. No evidence of an acute infarct. There is no evidence of hydrocephalus. ORBITS: The visualized portion of the orbits demonstrate no acute abnormality. SINUSES: The visualized paranasal sinuses and mastoid air cells demonstrate no acute abnormality. SOFT TISSUES/SKULL:  No acute abnormality of the visualized skull or soft tissues. No acute intracranial abnormality. Old lacunar infarct in the inferior left frontal periventricular white matter, stable. Minimal parenchymal volume loss. Minimal chronic microvascular disease. Results were reported to [address] [place] [address] Dr at 11:46 a.m. on April 9, 2021.      Xr Chest Portable    Result Date: 4/9/2021  EXAMINATION: ONE XRAY VIEW OF THE CHEST 4/9/2021 11:22 am COMPARISON: 04/06/2020 HISTORY: ORDERING SYSTEM PROVIDED HISTORY: weakness TECHNOLOGIST PROVIDED HISTORY: Reason for exam:->weakness Reason for Exam: weakness FINDINGS: Normal heart size and pulmonary vasculature. No focal consolidations, pleural effusions, or pneumothorax. No evidence of acute process. Cta Head Neck W Contrast    Result Date: 4/9/2021  EXAMINATION: CTA OF THE HEAD AND NECK WITH CONTRAST 4/9/2021 12:02 pm: TECHNIQUE: CTA of the head and neck was performed with the administration of intravenous contrast. Multiplanar reformatted images are provided for review. MIP images are provided for review. Stenosis of the internal carotid arteries measured using NASCET criteria. Dose modulation, iterative reconstruction, and/or weight based adjustment of the mA/kV was utilized to reduce the radiation dose to as low as reasonably achievable. COMPARISON: Noncontrast CT head from earlier today. HISTORY: ORDERING SYSTEM PROVIDED HISTORY: stroke TECHNOLOGIST PROVIDED HISTORY: Reason for exam:->stroke Decision Support Exception->Emergency Medical Condition ([place]) Reason for Exam: stroke Acuity: Acute Type of Exam: Initial Relevant Medical/Surgical History: 79 ML ISOVUE 56 FINDINGS: CTA NECK: AORTIC ARCH/ARCH VESSELS: No dissection or arterial injury. No significant stenosis of the brachiocephalic or subclavian arteries. CAROTID ARTERIES: No dissection, arterial injury, or hemodynamically significant stenosis by NASCET criteria. VERTEBRAL ARTERIES: There is left dominance of the vertebral arteries. There is 50% stenosis at the origin of the left vertebral artery. There is 70% stenosis at the origin of the right vertebral artery. SOFT TISSUES: There is mild septal thickening at the lung apices, may be related to minimal pulmonary vascular congestion. No cervical or superior mediastinal lymphadenopathy. The larynx and pharynx are unremarkable. No acute abnormality of the salivary and thyroid glands. BONES: No acute osseous abnormality.  CTA HEAD: ANTERIOR CIRCULATION: No significant stenosis of the intracranial internal carotid, anterior cerebral, or middle cerebral arteries. No aneurysm. POSTERIOR CIRCULATION: There is left dominance of the vertebral arteries with hypoplastic intracranial right vertebral artery. No significant stenosis of the vertebral, basilar, or posterior cerebral arteries. No aneurysm. OTHER: No dural venous sinus thrombosis on this non-dedicated study. BRAIN: No mass effect or midline shift. No extra-axial fluid collection. The gray-white differentiation is maintained. There is moderate mucosal thickening in the left maxillary sinus. 1.  50% stenosis at the origin of the left vertebral artery. 2.  70% stenosis at the origin of the right vertebral artery. 3.  No acute abnormality or flow-limiting stenosis in the remainder of the major arteries of the head and neck. Assessment//Plan           Hospital Problems           Last Modified POA    * (Principal) TIA (transient ischemic attack) 4/9/2021 Yes        Assessment & Plan  TIA  -ASa.  Plavix, not on statin due side effects  -try zetia for HPL  -MRI brain neg   -echo outpt as weekend  -neuro eval  HTN-[facility]  Hypothyroid  -synthorid  Headache   -resolved after IV hydration  DVT prophyalxis  -lovenox        Electronically signed by Coty Jones MD on 4/10/21 at 7:26 AM EDT

## 2021-04-11 NOTE — PROGRESS NOTES
Neurology Service progress note  [facility]  Patient Name: Victor M Echavarria  : 1940        Subjective:   Patient seen and examined today. He states that he is experiencing a pressure in the bilateral region of his head and he starting to feel lightheaded with the numbness again in the bilateral lower extremities. States he is not experiencing any visual field changes. His blood pressure is within normal limits at the bedside. We are going to attempt to give him a headache medication and see if this resolves his symptoms. Past Medical History:   Diagnosis Date    Anesthesia complication     Patient reports in  following a Back Surgery he was kept in the hospital due to high temperatures in the evenings. He stated they kept him for observation and doesn't remember receiving any treatment for the high temperatures. Patient reports the anesthesia name started with flur? ?.    BPH with obstruction/lower urinary tract symptoms     Chest pain 2018    Chronic low back pain     GERD (gastroesophageal reflux disease)     H/O 24 hour EKG monitoring 3/12  12/11    3/12/12  essentially a normal event monitor without significant arrhythmias noted    H/O cardiac catheterization 2/2/12 4/02 10/98    2/22/12  LM, LAD and RCA all without significant disease    H/O cardiac catheterization 2/2/12 4/02 10/98    Done R/T false pos on stress test.    H/O cardiac catheterization 2018    normal study    H/O cardiovascular stress test 2018    normal study    H/O echocardiogram 12/10      3/14 EF55-60% normal LV function 12/10/10  complete 2 dim transthoracic echj. LVSF normal  EF >55%, transmitral spectral Doppler flow pattern is suggestive of  impaired LV relaxation    History of cardiovascular stress test 2012  evidence of mild ischemia  in the RCA.   abnormal study , restingEF is 70%, global LVSF is  normal    History of Holter monitoring 14    14 day EVENT - no RVR    History of nuclear stress test 08/03/2017    cardiolite-normal,EF60%    Hx of chest x-ray 2/20/12    chest is considereed non acute. COPD    Hx of echocardiogram 08/03/2017    YZ48-46%,QEBJU 2 diastolic dysfunction    Hyperlipidemia     Idiopathic peripheral neuropathy     Kidney stone     Left facial numbness 8/30/2017    Lumbar radiculopathy     Malignant melanoma of skin of face (HCC)     MVP (mitral valve prolapse)     Myotonia     chronic increased CPK's    Peripelvic (lymphatic) cyst     per CT    Precordial pain 8/3/2018    Vitamin B12 deficiency     :   Past Surgical History:   Procedure Laterality Date    CARDIAC CATHETERIZATION  2/2/12    EF 55% No sugnificant disease.     COLONOSCOPY  5/5/14    diverticulosis, 1 polyp, hemorrhoids    COLONOSCOPY  07/10/2017    single 5 mm polyp found in sigmoid colon, moderate diverticulosis found in sigmoid colon    LUMBAR DISC SURGERY  11/2002    L4-5 hemilaminectomy and disk    LUMBAR DISCECTOMY  1973    PROSTATE SURGERY  09/26/2011    s/p robotic radical prostatectomy    ROTATOR CUFF REPAIR Right 10/2016    TIBIA FRACTURE SURGERY Right     childhood    URETEROLITHOTOMY  1992     Medications:  Scheduled Meds:   ezetimibe  10 mg Oral Nightly    valproate sodium (DEPACON) IVPB  500 mg Intravenous Q8H    sodium chloride flush  5-40 mL Intravenous 2 times per day    enoxaparin  40 mg Subcutaneous QPM    clopidogrel  75 mg Oral Daily    gabapentin  300 mg Oral TID    dilTIAZem  120 mg Oral Daily    levothyroxine  50 mcg Oral Daily    pantoprazole  40 mg Oral Daily    aspirin  81 mg Oral Daily     Continuous Infusions:   lactated ringers 100 mL/hr at 04/10/21 1713    sodium chloride       PRN Meds:.sodium chloride flush, sodium chloride, promethazine **OR** ondansetron, polyethylene glycol, labetalol, traMADol, nitroGLYCERIN    Allergies   Allergen Reactions    Fenofibrate Other (See Comments)     Liq stools    Lipitor Other (See Comments)     Liq stools    Pravachol [Pravastatin Sodium] Other (See Comments)     Liq stools     Social History     Socioeconomic History    Marital status:      Spouse name: Not on file    Number of children: Not on file    Years of education: Not on file    Highest education level: Not on file   Occupational History    Not on file   Social Needs    Financial resource strain: Not on file    Food insecurity     Worry: Not on file     Inability: Not on file    Transportation needs     Medical: Not on file     Non-medical: Not on file   Tobacco Use    Smoking status: Never Smoker    Smokeless tobacco: Never Used   Substance and Sexual Activity    Alcohol use: Yes     Comment: Occassional    Drug use: No    Sexual activity: Not Currently     Partners: Female     Comment:    Lifestyle    Physical activity     Days per week: Not on file     Minutes per session: Not on file    Stress: Not on file   Relationships    Social connections     Talks on phone: Not on file     Gets together: Not on file     Attends Yazidi service: Not on file     Active member of club or organization: Not on file     Attends meetings of clubs or organizations: Not on file     Relationship status: Not on file    Intimate partner violence     Fear of current or ex partner: Not on file     Emotionally abused: Not on file     Physically abused: Not on file     Forced sexual activity: Not on file   Other Topics Concern    Not on file   Social History Narrative    Not on file      Family History   Problem Relation Age of Onset    Heart Disease Mother         ASCAD    Heart Disease Father     Heart Disease Brother     Diabetes Other     Cancer Other         unknown type    Prostate Cancer Other     Coronary Art Dis Other     Coronary Art Dis Other        Review of Symptoms:    14-point system review completed. All of which are negative except as mentioned above.     Physical Exam:       Gen: A&O x 4, NAD, cooperative  HEENT: NC/AT, EOMI, chronic right eye changes with no movement , mmm, neck supple, no meningeal signs; Heart: regular   Lungs: no distress  Ext: no edema, no calf tenderness b/l  Psych: normal mood and affect  Skin: no rashes or lesions    NEUROLOGIC EXAM:    Mental Status: A&O to self, location, month and year, NAD, speech clear, language fluent, repetition and naming intact, follows commands appropriately    Cranial Nerve Exam:   CN II-XII: , PERRL, VFF, no nystagmus, chronic changes in the right eye , sensation V1-V3 intact b/l, muscles of facial expression symmetric; hearing intact to conversational tone, palate elevates symmetrically, shoulder elevation symmetric and tongue protrudes midline with movement side to side. Motor Exam:       Strength 5/5 UE's/LE's b/l  Tone and bulk normal   No pronator drift    Deep Tendon Reflexes: 2/4 biceps, triceps, brachioradialis, patellar, and achilles b/l; flexor plantar responses b/l    Sensation: Intact light touch/pinprick/vibration UE's/LE's b/l--->all decreased in stocking distribution     Coordination/Cerebellum:       Tremors--none      Rapidly alternating movements: no dysdiadochokinesia b/l                Finger-to-Nose: no dysmetria b/l    Gait and stance:      Gait: moderate romberg       LABS:     Recent Labs     04/09/21  1114 04/09/21  1146 04/10/21  0412   WBC 4.3  --  3.4*     --   --    K 3.7  --   --      --   --    CO2 28  --   --    BUN 21  --   --    CREATININE 1.0  --   --    GLUCOSE 145* 183  --    INR 1.17  --   --    ESR  --   --  8       Results for Karan Kline (MRN [id number]) as of 4/11/2021 12:14   Ref.  Range 4/10/2021 04:12   Cholesterol Latest Ref Range: <200 MG/   HDL Cholesterol Latest Ref Range: >40 MG/DL 34 (L)   LDL Direct Latest Ref Range: <100 MG/ (H)   Triglycerides Latest Ref Range: <150 MG/DL 74   Hemoglobin A1C Latest Ref Range: 4.2 - 6.3 % 5.5   eAG (mg/dL) Latest Units: mg/dL 111       IMAGING:      MRI Brain:  No acute stroke, intracranial hemorrhage or mass effect.       Chronic small vessel ischemic disease.               CTA:  1.  50% stenosis at the origin of the left vertebral artery.       2.  70% stenosis at the origin of the right vertebral artery.       3.  No acute abnormality or flow-limiting stenosis in the remainder of the   major arteries of the head and neck. CT Head:  No acute intracranial abnormality.       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       Minimal parenchymal volume loss.       Minimal chronic microvascular disease. ASSESSMENT/PLAN:     3 79year old male with unsteady gait and headache with generalized weakness, will rule out PCA infarction v. Reactivation of previous infarctions v. TIA v. vestibular migraine. Plan of care as follows:  1. Neuro Exam:   1. Non focal   2. Neurodiagnostics:  1. MRI Brain as above   2. CTA head and neck as above  3. Medications:  1. DAPT  2. Statin   3. Depacon, Toradol and Decadron for headache   4. PT/OT/ST:  1. Per their recommendations  5. Follow up:  1. Follow up in our office, if head pressure resolves hes welcome to be discharged and been seen in 4-6 weeks. Thank you for allowing us to participate in the care of your patient. If there are any questions regarding evaluation please feel free to contact us.      NELLIE Murphy - TERESITA, 4/11/2021

## 2021-04-12 ENCOUNTER — APPOINTMENT (OUTPATIENT)
Dept: MRI IMAGING | Age: 81
DRG: 068 | End: 2021-04-12
Payer: MEDICARE

## 2021-04-12 VITALS
DIASTOLIC BLOOD PRESSURE: 74 MMHG | HEIGHT: 70 IN | BODY MASS INDEX: 23.19 KG/M2 | WEIGHT: 162 LBS | TEMPERATURE: 98.2 F | RESPIRATION RATE: 15 BRPM | HEART RATE: 76 BPM | SYSTOLIC BLOOD PRESSURE: 139 MMHG | OXYGEN SATURATION: 96 %

## 2021-04-12 LAB
LV EF: 53 %
LVEF MODALITY: NORMAL

## 2021-04-12 PROCEDURE — 2580000003 HC RX 258: Performed by: NURSE PRACTITIONER

## 2021-04-12 PROCEDURE — 93306 TTE W/DOPPLER COMPLETE: CPT

## 2021-04-12 PROCEDURE — 6370000000 HC RX 637 (ALT 250 FOR IP): Performed by: HOSPITALIST

## 2021-04-12 PROCEDURE — A9579 GAD-BASE MR CONTRAST NOS,1ML: HCPCS | Performed by: HOSPITALIST

## 2021-04-12 PROCEDURE — 94761 N-INVAS EAR/PLS OXIMETRY MLT: CPT

## 2021-04-12 PROCEDURE — 2500000003 HC RX 250 WO HCPCS: Performed by: NURSE PRACTITIONER

## 2021-04-12 PROCEDURE — 70545 MR ANGIOGRAPHY HEAD W/DYE: CPT

## 2021-04-12 PROCEDURE — 6370000000 HC RX 637 (ALT 250 FOR IP): Performed by: NURSE PRACTITIONER

## 2021-04-12 PROCEDURE — 6360000004 HC RX CONTRAST MEDICATION: Performed by: HOSPITALIST

## 2021-04-12 PROCEDURE — 99233 SBSQ HOSP IP/OBS HIGH 50: CPT | Performed by: NURSE PRACTITIONER

## 2021-04-12 RX ADMIN — GADOTERIDOL 20 ML: 279.3 INJECTION, SOLUTION INTRAVENOUS at 08:44

## 2021-04-12 RX ADMIN — GABAPENTIN 300 MG: 300 CAPSULE ORAL at 10:50

## 2021-04-12 RX ADMIN — ASPIRIN 81 MG: 81 TABLET, COATED ORAL at 10:50

## 2021-04-12 RX ADMIN — LEVOTHYROXINE SODIUM 50 MCG: 0.05 TABLET ORAL at 06:18

## 2021-04-12 RX ADMIN — DEXTROSE MONOHYDRATE 500 MG: 50 INJECTION, SOLUTION INTRAVENOUS at 06:18

## 2021-04-12 RX ADMIN — PANTOPRAZOLE SODIUM 40 MG: 40 TABLET, DELAYED RELEASE ORAL at 10:50

## 2021-04-12 RX ADMIN — CLOPIDOGREL BISULFATE 75 MG: 75 TABLET ORAL at 10:50

## 2021-04-12 ASSESSMENT — PAIN SCALES - GENERAL: PAINLEVEL_OUTOF10: 0

## 2021-04-12 NOTE — PROGRESS NOTES
Progress Note  Date:2021       Room:[address] [place] [address]  Patient Name:Glynn Florez     YOB: 1940     Age:81 y.o. No complaints this morning    Subjective    Subjective:  Symptoms:  Stable. Diet:  Adequate intake. Pain:  He reports no pain. Review of Systems  Objective         Vitals Last 24 Hours:  TEMPERATURE:  Temp  Av.5 °F (36.9 °C)  Min: 98.2 °F (36.8 °C)  Max: 98.8 °F (37.1 °C)  RESPIRATIONS RANGE: Resp  Av.8  Min: 11  Max: 16  PULSE OXIMETRY RANGE: SpO2  Av.5 %  Min: 95 %  Max: 96 %  PULSE RANGE: Pulse  Av.7  Min: 55  Max: 78  BLOOD PRESSURE RANGE: Systolic (22YLO), YLZ:846 , Min:123 , GANESH:371   ; Diastolic (45XUQ), JMT:44, Min:63, Max:90    I/O (24Hr): No intake or output data in the 24 hours ending 21 0721  Objective:  General Appearance:  Comfortable. Vital signs: (most recent): Blood pressure 130/65, pulse 62, temperature 98.2 °F (36.8 °C), temperature source Oral, resp. rate 13, height 5' 10\" (1.778 m), weight 162 lb (73.5 kg), SpO2 96 %. Vital signs are normal.    HEENT: Normal HEENT exam.    Heart: Normal rate. Abdomen: Abdomen is soft. Bowel sounds are normal.     Extremities: Normal range of motion. Neurological: Patient is alert. Pupils:  Pupils are equal, round, and reactive to light. Skin:  Warm. Labs/Imaging/Diagnostics    Labs:  CBC:  Recent Labs     21  1114 04/10/21  0412   WBC 4.3 3.4*   RBC 4.48* 4.28*   HGB 14.9 13.8   HCT 44.8 42.7   .0 99.8   RDW 12.7 12.4    181     CHEMISTRIES:  Recent Labs     21  1114 21  1146     --    K 3.7  --      --    CO2 28  --    BUN 21  --    CREATININE 1.0  --    GLUCOSE 145* 183     PT/INR:  Recent Labs     21  1114   PROTIME 14.2   INR 1.17     APTT:No results for input(s): APTT in the last 72 hours.   LIVER PROFILE:  Recent Labs     21  1114   AST 37   ALT 22   BILITOT 0.6   ALKPHOS 35*       Imaging Last 24 Hours:  Ct Head Wo Contrast    Result Date: 4/9/2021  EXAMINATION: CT OF THE HEAD WITHOUT CONTRAST  4/9/2021 11:39 am TECHNIQUE: CT of the head was performed without the administration of intravenous contrast. Dose modulation, iterative reconstruction, and/or weight based adjustment of the mA/kV was utilized to reduce the radiation dose to as low as reasonably achievable. COMPARISON: CT head April 6, 2020, MRI brain April 6, 2020 HISTORY: ORDERING SYSTEM PROVIDED HISTORY: stroke TECHNOLOGIST PROVIDED HISTORY: Has a \"code stroke\" or \"stroke alert\" been called? ->Yes Reason for exam:->stroke Decision Support Exception->Emergency Medical Condition (MA) Reason for Exam: stroke Acuity: Acute Type of Exam: Initial FINDINGS: BRAIN/VENTRICLES: There is minimal parenchymal volume loss. There is periventricular white matter low attenuation, likely related to minimal chronic microvascular disease. There is no acute intracranial hemorrhage, mass effect or midline shift. No abnormal extra-axial fluid collection. There is old lacunar infarct in the inferior left frontal periventricular white matter, stable. No evidence of an acute infarct. There is no evidence of hydrocephalus. ORBITS: The visualized portion of the orbits demonstrate no acute abnormality. SINUSES: The visualized paranasal sinuses and mastoid air cells demonstrate no acute abnormality. SOFT TISSUES/SKULL:  No acute abnormality of the visualized skull or soft tissues. No acute intracranial abnormality. Old lacunar infarct in the inferior left frontal periventricular white matter, stable. Minimal parenchymal volume loss. Minimal chronic microvascular disease. Results were reported to [address] [place] [address] Dr at 11:46 a.m. on April 9, 2021.      Xr Chest Portable    Result Date: 4/9/2021  EXAMINATION: ONE XRAY VIEW OF THE CHEST 4/9/2021 11:22 am COMPARISON: 04/06/2020 HISTORY: ORDERING SYSTEM PROVIDED HISTORY: weakness TECHNOLOGIST PROVIDED HISTORY: Reason for exam:->weakness Reason for Exam: weakness FINDINGS: Normal heart size and pulmonary vasculature. No focal consolidations, pleural effusions, or pneumothorax. No evidence of acute process. Cta Head Neck W Contrast    Result Date: 4/9/2021  EXAMINATION: CTA OF THE HEAD AND NECK WITH CONTRAST 4/9/2021 12:02 pm: TECHNIQUE: CTA of the head and neck was performed with the administration of intravenous contrast. Multiplanar reformatted images are provided for review. MIP images are provided for review. Stenosis of the internal carotid arteries measured using NASCET criteria. Dose modulation, iterative reconstruction, and/or weight based adjustment of the mA/kV was utilized to reduce the radiation dose to as low as reasonably achievable. COMPARISON: Noncontrast CT head from earlier today. HISTORY: ORDERING SYSTEM PROVIDED HISTORY: stroke TECHNOLOGIST PROVIDED HISTORY: Reason for exam:->stroke Decision Support Exception->Emergency Medical Condition (MA) Reason for Exam: stroke Acuity: Acute Type of Exam: Initial Relevant Medical/Surgical History: 79 ML ISOVUE 56 FINDINGS: CTA NECK: AORTIC ARCH/ARCH VESSELS: No dissection or arterial injury. No significant stenosis of the brachiocephalic or subclavian arteries. CAROTID ARTERIES: No dissection, arterial injury, or hemodynamically significant stenosis by NASCET criteria. VERTEBRAL ARTERIES: There is left dominance of the vertebral arteries. There is 50% stenosis at the origin of the left vertebral artery. There is 70% stenosis at the origin of the right vertebral artery. SOFT TISSUES: There is mild septal thickening at the lung apices, may be related to minimal pulmonary vascular congestion. No cervical or superior mediastinal lymphadenopathy. The larynx and pharynx are unremarkable. No acute abnormality of the salivary and thyroid glands. BONES: No acute osseous abnormality.  CTA HEAD: ANTERIOR CIRCULATION: No significant stenosis of the intracranial internal carotid, anterior cerebral, or middle cerebral arteries. No aneurysm. POSTERIOR CIRCULATION: There is left dominance of the vertebral arteries with hypoplastic intracranial right vertebral artery. No significant stenosis of the vertebral, basilar, or posterior cerebral arteries. No aneurysm. OTHER: No dural venous sinus thrombosis on this non-dedicated study. BRAIN: No mass effect or midline shift. No extra-axial fluid collection. The gray-white differentiation is maintained. There is moderate mucosal thickening in the left maxillary sinus. 1.  50% stenosis at the origin of the left vertebral artery. 2.  70% stenosis at the origin of the right vertebral artery. 3.  No acute abnormality or flow-limiting stenosis in the remainder of the major arteries of the head and neck. Assessment//Plan           Hospital Problems           Last Modified POA    * (Principal) TIA (transient ischemic attack) 4/9/2021 Yes        Assessment & Plan  TIA  -ASa. Plavix, not on statin due side effects  -try zetia for HPL  -MRI brain neg   -not orthostatic  -echo outpt as weekend  -neuro eval  HTN-[facility]  Hypothyroid  -synthorid  Headache   -resolved after IV hydration  DVT prophyalxis  -lovenox      MRV is neg. Received cocktail for migraine yesterday. If echo is stable then anticipiate discharge today.  Updated nursing  Electronically signed by Adolfo Claros MD on 4/10/21 at 7:26 AM EDT

## 2021-04-12 NOTE — CARE COORDINATION
Chart reviewed, in to see pt for dc planning. Introduced self and role explained. Pt was admitted for weakness, headache, dizziness. States he lives at home alone and is independent with all needs without DME though has a walker/cane if needed. Explained he follows with PCP, has insurance with affordable RX co-pays and reliable transportation per himself or his family. Discussed HHC and pt declined [address] need nor any other discharge needs. CM remains available if needs arise.

## 2021-04-13 LAB
EKG ATRIAL RATE: 71 BPM
EKG DIAGNOSIS: NORMAL
EKG P AXIS: 70 DEGREES
EKG P-R INTERVAL: 196 MS
EKG Q-T INTERVAL: 396 MS
EKG QRS DURATION: 94 MS
EKG QTC CALCULATION (BAZETT): 430 MS
EKG R AXIS: 23 DEGREES
EKG T AXIS: 66 DEGREES
EKG VENTRICULAR RATE: 71 BPM

## 2021-04-13 NOTE — PROGRESS NOTES
Neurology Service progress note  [facility]  Patient Name: Tootie Baker  : 1940        Subjective:   Patient seen and examined today. No head pressure today  Numbness in legs resolved  No lightheadedness today  Patient has appt in our office on   No CP or SOB    Past Medical History:   Diagnosis Date    Anesthesia complication     Patient reports in  following a Back Surgery he was kept in the hospital due to high temperatures in the evenings. He stated they kept him for observation and doesn't remember receiving any treatment for the high temperatures. Patient reports the anesthesia name started with flur? ?.    BPH with obstruction/lower urinary tract symptoms     Chest pain 2018    Chronic low back pain     GERD (gastroesophageal reflux disease)     H/O 24 hour EKG monitoring 3/12  12/11    3/12/12  essentially a normal event monitor without significant arrhythmias noted    H/O cardiac catheterization 2/2/12 4/02 10/98    2/22/12  LM, LAD and RCA all without significant disease    H/O cardiac catheterization 2/2/12 4/02 10/98    Done R/T false pos on stress test.    H/O cardiac catheterization 2018    normal study    H/O cardiovascular stress test 2018    normal study    H/O echocardiogram 12/10      3/14 EF55-60% normal LV function 12/10/10  complete 2 dim transthoracic echj. LVSF normal  EF >55%, transmitral spectral Doppler flow pattern is suggestive of  impaired LV relaxation    History of cardiovascular stress test 2012  evidence of mild ischemia  in the RCA. abnormal study , restingEF is 70%, global LVSF is  normal    History of Holter monitoring 14    14 day EVENT - no RVR    History of nuclear stress test 2017    cardiolite-normal,EF60%    Hx of chest x-ray 12    chest is considereed non acute.   COPD    Hx of echocardiogram 2017    SA86-89%,GYSAS 2 diastolic dysfunction    Hyperlipidemia     Days per week: Not on file     Minutes per session: Not on file    Stress: Not on file   Relationships    Social connections     Talks on phone: Not on file     Gets together: Not on file     Attends Baptism service: Not on file     Active member of club or organization: Not on file     Attends meetings of clubs or organizations: Not on file     Relationship status: Not on file    Intimate partner violence     Fear of current or ex partner: Not on file     Emotionally abused: Not on file     Physically abused: Not on file     Forced sexual activity: Not on file   Other Topics Concern    Not on file   Social History Narrative    Not on file      Family History   Problem Relation Age of Onset    Heart Disease Mother         ASCAD    Heart Disease Father     Heart Disease Brother     Diabetes Other     Cancer Other         unknown type    Prostate Cancer Other     Coronary Art Dis Other     Coronary Art Dis Other        Review of Symptoms:    14-point system review completed. All of which are negative except as mentioned above. Physical Exam:       Gen: A&O x 4, NAD, cooperative  HEENT: NC/AT, EOMI, chronic right eye changes with no movement , mmm, neck supple, no meningeal signs; Heart: regular   Lungs: no distress  Ext: no edema, no calf tenderness b/l  Psych: normal mood and affect  Skin: no rashes or lesions    NEUROLOGIC EXAM:    Mental Status: A&O to self, location, month and year, NAD, speech clear, language fluent, repetition and naming intact, follows commands appropriately    Cranial Nerve Exam:   CN II-XII: , PERRL, VFF, no nystagmus, chronic changes in the right eye , sensation V1-V3 intact b/l, muscles of facial expression symmetric; hearing intact to conversational tone, palate elevates symmetrically, shoulder elevation symmetric and tongue protrudes midline with movement side to side.     Motor Exam:       Strength 5/5 UE's/LE's b/l  Tone and bulk normal   No pronator drift    Deep Tendon Reflexes: 2/4 biceps, triceps, brachioradialis, patellar, and achilles b/l; flexor plantar responses b/l    Sensation: Intact light touch/pinprick/vibration UE's/LE's b/l--->all decreased in stocking distribution     Coordination/Cerebellum:       Tremors--none      Rapidly alternating movements: no dysdiadochokinesia b/l                Finger-to-Nose: no dysmetria b/l    Gait and stance:      Gait: moderate romberg       LABS:     Recent Labs     04/10/21  0412   WBC 3.4*   ESR 8       Results for Maricel Roman (MRN [id number]) as of 4/11/2021 12:14   Ref. Range 4/10/2021 04:12   Cholesterol Latest Ref Range: <200 MG/   HDL Cholesterol Latest Ref Range: >40 MG/DL 34 (L)   LDL Direct Latest Ref Range: <100 MG/ (H)   Triglycerides Latest Ref Range: <150 MG/DL 74   Hemoglobin A1C Latest Ref Range: 4.2 - 6.3 % 5.5   eAG (mg/dL) Latest Units: mg/dL 111       IMAGING:      MRI Brain:  No acute stroke, intracranial hemorrhage or mass effect.       Chronic small vessel ischemic disease.               CTA:  1.  50% stenosis at the origin of the left vertebral artery.       2.  70% stenosis at the origin of the right vertebral artery.       3.  No acute abnormality or flow-limiting stenosis in the remainder of the   major arteries of the head and neck. CT Head:  No acute intracranial abnormality.       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       Minimal parenchymal volume loss.       Minimal chronic microvascular disease. ASSESSMENT/PLAN:     3 79year old male with unsteady gait and headache with generalized weakness, will rule out PCA infarction v. Reactivation of previous infarctions v. TIA v. vestibular migraine. Plan of care as follows:  1. Neuro Exam:   1. Non focal   2. Neurodiagnostics:  1. MRI Brain as above   2. CTA head and neck as above  3. Medications:  1. DAPT  2. Statin   3. Depacon, Toradol and Decadron for headache   4.

## 2021-04-15 ENCOUNTER — HOSPITAL ENCOUNTER (EMERGENCY)
Age: 81
Discharge: HOME | End: 2021-04-15
Attending: EMERGENCY MEDICINE
Payer: MEDICARE

## 2021-04-15 ENCOUNTER — APPOINTMENT (OUTPATIENT)
Dept: CT IMAGING | Age: 81
End: 2021-04-15
Payer: MEDICARE

## 2021-04-15 VITALS
TEMPERATURE: 97.8 F | DIASTOLIC BLOOD PRESSURE: 102 MMHG | HEART RATE: 93 BPM | SYSTOLIC BLOOD PRESSURE: 148 MMHG | RESPIRATION RATE: 18 BRPM | OXYGEN SATURATION: 98 %

## 2021-04-15 DIAGNOSIS — R20.2 NUMBNESS AND TINGLING OF BOTH LEGS BELOW KNEES: ICD-10-CM

## 2021-04-15 DIAGNOSIS — R51.9 PRESSURE IN HEAD: Primary | ICD-10-CM

## 2021-04-15 DIAGNOSIS — R20.0 NUMBNESS AND TINGLING OF BOTH LEGS BELOW KNEES: ICD-10-CM

## 2021-04-15 LAB
ALBUMIN SERPL-MCNC: 3.4 GM/DL (ref 3.4–5)
ALP BLD-CCNC: 37 IU/L (ref 40–129)
ALT SERPL-CCNC: 36 U/L (ref 10–40)
ANION GAP SERPL CALCULATED.3IONS-SCNC: 6 MMOL/L (ref 4–16)
AST SERPL-CCNC: 35 IU/L (ref 15–37)
BACTERIA: NEGATIVE /HPF
BASOPHILS ABSOLUTE: 0 K/CU MM
BASOPHILS RELATIVE PERCENT: 0.2 % (ref 0–1)
BILIRUB SERPL-MCNC: 0.3 MG/DL (ref 0–1)
BILIRUBIN URINE: NEGATIVE MG/DL
BLOOD, URINE: NEGATIVE
BUN BLDV-MCNC: 19 MG/DL (ref 6–23)
CALCIUM SERPL-MCNC: 9.1 MG/DL (ref 8.3–10.6)
CHLORIDE BLD-SCNC: 102 MMOL/L (ref 99–110)
CLARITY: CLEAR
CO2: 29 MMOL/L (ref 21–32)
COLOR: YELLOW
CREAT SERPL-MCNC: 1 MG/DL (ref 0.9–1.3)
DIFFERENTIAL TYPE: ABNORMAL
EOSINOPHILS ABSOLUTE: 0.1 K/CU MM
EOSINOPHILS RELATIVE PERCENT: 1.4 % (ref 0–3)
GFR AFRICAN AMERICAN: >60 ML/MIN/1.73M2
GFR NON-AFRICAN AMERICAN: >60 ML/MIN/1.73M2
GLUCOSE BLD-MCNC: 120 MG/DL (ref 70–99)
GLUCOSE, URINE: NEGATIVE MG/DL
HCT VFR BLD CALC: 43.1 % (ref 42–52)
HEMOGLOBIN: 14.5 GM/DL (ref 13.5–18)
IMMATURE NEUTROPHIL %: 0.5 % (ref 0–0.43)
KETONES, URINE: NEGATIVE MG/DL
LEUKOCYTE ESTERASE, URINE: NEGATIVE
LYMPHOCYTES ABSOLUTE: 0.8 K/CU MM
LYMPHOCYTES RELATIVE PERCENT: 18.1 % (ref 24–44)
MCH RBC QN AUTO: 33 PG (ref 27–31)
MCHC RBC AUTO-ENTMCNC: 33.6 % (ref 32–36)
MCV RBC AUTO: 98 FL (ref 78–100)
MONOCYTES ABSOLUTE: 0.3 K/CU MM
MONOCYTES RELATIVE PERCENT: 7.3 % (ref 0–4)
NITRITE URINE, QUANTITATIVE: NEGATIVE
NUCLEATED RBC %: 0 %
PDW BLD-RTO: 12.3 % (ref 11.7–14.9)
PH, URINE: 7 (ref 5–8)
PLATELET # BLD: 201 K/CU MM (ref 140–440)
PMV BLD AUTO: 9.5 FL (ref 7.5–11.1)
POTASSIUM SERPL-SCNC: 4.6 MMOL/L (ref 3.5–5.1)
PROTEIN UA: NEGATIVE MG/DL
RBC # BLD: 4.4 M/CU MM (ref 4.6–6.2)
RBC URINE: <1 /HPF (ref 0–3)
SEGMENTED NEUTROPHILS ABSOLUTE COUNT: 3.2 K/CU MM
SEGMENTED NEUTROPHILS RELATIVE PERCENT: 72.5 % (ref 36–66)
SODIUM BLD-SCNC: 137 MMOL/L (ref 135–145)
SPECIFIC GRAVITY UA: 1.01 (ref 1–1.03)
TOTAL CK: 332 IU/L (ref 38–174)
TOTAL IMMATURE NEUTOROPHIL: 0.02 K/CU MM
TOTAL NUCLEATED RBC: 0 K/CU MM
TOTAL PROTEIN: 5.6 GM/DL (ref 6.4–8.2)
TRICHOMONAS: ABNORMAL /HPF
TROPONIN T: <0.01 NG/ML
UROBILINOGEN, URINE: 2 MG/DL (ref 0.2–1)
WBC # BLD: 4.4 K/CU MM (ref 4–10.5)
WBC UA: <1 /HPF (ref 0–2)

## 2021-04-15 PROCEDURE — 36415 COLL VENOUS BLD VENIPUNCTURE: CPT

## 2021-04-15 PROCEDURE — 82550 ASSAY OF CK (CPK): CPT

## 2021-04-15 PROCEDURE — 99285 EMERGENCY DEPT VISIT HI MDM: CPT

## 2021-04-15 PROCEDURE — 84484 ASSAY OF TROPONIN QUANT: CPT

## 2021-04-15 PROCEDURE — 70450 CT HEAD/BRAIN W/O DYE: CPT

## 2021-04-15 PROCEDURE — 85025 COMPLETE CBC W/AUTO DIFF WBC: CPT

## 2021-04-15 PROCEDURE — 80053 COMPREHEN METABOLIC PANEL: CPT

## 2021-04-15 PROCEDURE — 81001 URINALYSIS AUTO W/SCOPE: CPT

## 2021-04-15 ASSESSMENT — ENCOUNTER SYMPTOMS
EYE REDNESS: 0
RHINORRHEA: 0
VOMITING: 0
SORE THROAT: 0
COUGH: 0
BACK PAIN: 0
ABDOMINAL PAIN: 0
SHORTNESS OF BREATH: 0
NAUSEA: 0

## 2021-04-15 ASSESSMENT — PAIN DESCRIPTION - PAIN TYPE: TYPE: ACUTE PAIN

## 2021-04-15 ASSESSMENT — PAIN DESCRIPTION - FREQUENCY: FREQUENCY: CONTINUOUS

## 2021-04-15 ASSESSMENT — PAIN SCALES - GENERAL: PAINLEVEL_OUTOF10: 5

## 2021-04-15 ASSESSMENT — PAIN DESCRIPTION - LOCATION: LOCATION: HEAD

## 2021-04-15 NOTE — ED TRIAGE NOTES
Patient arrives ambulating with EMS for complaints of head pressure that started at 1200 today. Patient reports when the pain initiated he also began experiencing numbness in bilateral legs and feet.

## 2021-04-15 NOTE — ED NOTES
Layin/70 (89) 71HR 99% RA    Sittin/78 (96) 73HR 98% RA    Standin/90 (104) 93HR 98% RA    Informed Dr. Julia Gilmore.       Soniya Borden RN  04/15/21 [id number]

## 2021-04-15 NOTE — CARE COORDINATION
Patient identified as potential readmission. Last admission 4/9-4/12 for TIA. Patient here today for headache. No acute findings requiring admission identified. Readmission was avoided and patient was able to be discharged home.

## 2021-04-15 NOTE — ED PROVIDER NOTES
Triage Chief Complaint:   Headache (reports extreme head pressure and experiencing numbness to bilateral feet and legs)    Tribe:  Lanell Alpers is a 79 y.o. male that presents with pressure and numbness in his bilateral legs similar to what occurred last week. He states the symptoms started while he was riding his mower. He has a pressure in his head. He describes it as a bandlike pattern around his head and up from there. He states the head pressure is increased if he sits up but slightly improved if he lays down. He states he has neuropathy in his ankles and feet and normally has numbness and they are but now has a numbness feeling in between his ankles and knees bilaterally. He can still feel in that area but it does not feel normal.  He denies any weakness. He states he also had a numb feeling over his bilateral face and upper chest when this initially started but it has since resolved. He denies any vision changes. No chest pain or shortness of breath. No fevers. No trauma to his head. No nausea or vomiting today. He denies any exacerbating or alleviating factors associated with the symptoms. The high pressure does not come on any specific time in the day. He states he had multiple episodes in the hospital and no cause was noted. No recent dehydration. He does not take NSAIDs daily. He states he drinks about 3 cups of coffee a morning but this is not changed recently. He was started on Zetia in the hospital but no new medications before the symptoms started. He states when he checked his blood pressure after symptoms started it was over 200 but it was only about 150 for EMS. He is on Plavix. ROS:   Review of Systems   Constitutional: Negative for chills and fever. HENT: Negative for congestion, rhinorrhea and sore throat. Eyes: Negative for redness and visual disturbance. Respiratory: Negative for cough and shortness of breath.     Cardiovascular: Negative for chest pain and leg cardiolite-normal,EF60%    Hx of chest x-ray 2/20/12    chest is considereed non acute. COPD    Hx of echocardiogram 08/03/2017    HY26-80%,UKBDH 2 diastolic dysfunction    Hyperlipidemia     Idiopathic peripheral neuropathy     Kidney stone     Left facial numbness 8/30/2017    Lumbar radiculopathy     Malignant melanoma of skin of face (HCC)     MVP (mitral valve prolapse)     Myotonia     chronic increased CPK's    Peripelvic (lymphatic) cyst     per CT    Precordial pain 8/3/2018    Vitamin B12 deficiency      Past Surgical History:   Procedure Laterality Date    CARDIAC CATHETERIZATION  2/2/12    EF 55% No sugnificant disease.  COLONOSCOPY  5/5/14    diverticulosis, 1 polyp, hemorrhoids    COLONOSCOPY  07/10/2017    single 5 mm polyp found in sigmoid colon, moderate diverticulosis found in sigmoid colon    LUMBAR DISC SURGERY  11/2002    L4-5 hemilaminectomy and disk    LUMBAR DISCECTOMY  1973    PROSTATE SURGERY  09/26/2011    s/p robotic radical prostatectomy    ROTATOR CUFF REPAIR Right 10/2016    TIBIA FRACTURE SURGERY Right     childhood    URETEROLITHOTOMY  1992     Family History   Problem Relation Age of Onset    Heart Disease Mother         ASCAD    Heart Disease Father     Heart Disease Brother     Diabetes Other     Cancer Other         unknown type    Prostate Cancer Other     Coronary Art Dis Other     Coronary Art Dis Other      Social History     Socioeconomic History    Marital status:       Spouse name: Not on file    Number of children: Not on file    Years of education: Not on file    Highest education level: Not on file   Occupational History    Not on file   Social Needs    Financial resource strain: Not on file    Food insecurity     Worry: Not on file     Inability: Not on file    Transportation needs     Medical: Not on file     Non-medical: Not on file   Tobacco Use    Smoking status: Never Smoker    Smokeless tobacco: Never Used Substance and Sexual Activity    Alcohol use: Yes     Comment: Occassional    Drug use: No    Sexual activity: Not Currently     Partners: Female     Comment:    Lifestyle    Physical activity     Days per week: Not on file     Minutes per session: Not on file    Stress: Not on file   Relationships    Social connections     Talks on phone: Not on file     Gets together: Not on file     Attends Synagogue service: Not on file     Active member of club or organization: Not on file     Attends meetings of clubs or organizations: Not on file     Relationship status: Not on file    Intimate partner violence     Fear of current or ex partner: Not on file     Emotionally abused: Not on file     Physically abused: Not on file     Forced sexual activity: Not on file   Other Topics Concern    Not on file   Social History Narrative    Not on file     No current facility-administered medications for this encounter. Current Outpatient Medications   Medication Sig Dispense Refill    gabapentin (NEURONTIN) 300 MG capsule TAKE 1 CAPSULE BY MOUTH 3  TIMES DAILY 90 capsule 0    pantoprazole (PROTONIX) 40 MG tablet TAKE 1 TABLET BY MOUTH  DAILY 90 tablet 0    levothyroxine (SYNTHROID) 50 MCG tablet TAKE 1 TABLET BY MOUTH IN  THE MORNING 90 tablet 0    ezetimibe (ZETIA) 10 MG tablet Take 1 tablet by mouth nightly 30 tablet 0    DILT- MG extended release capsule Take 1 capsule by mouth daily 90 capsule 1    traMADol (ULTRAM) 50 MG tablet Take 2 tablets by mouth 2 times daily as needed for Pain for up to 90 days. 360 tablet 0    clopidogrel (PLAVIX) 75 MG tablet Take 1 tablet by mouth daily 90 tablet 3    aspirin 81 MG tablet Take 81 mg by mouth daily      nitroGLYCERIN (NITROSTAT) 0.3 MG SL tablet Place 0.3 mg under the tongue every 5 minutes as needed for Chest pain up to max of 3 total doses. If no relief after 1 dose, call 911.        Allergies   Allergen Reactions    Fenofibrate Other (See feel light touch in his bilateral lower legs below his knees. Able to tell me that my hands are cold bilaterally. He does state that sensation does not feel normal though.    Psychiatric:         Mood and Affect: Mood normal.         Behavior: Behavior normal.         I have reviewed and interpreted all of the currently available lab results from this visit (if applicable):  Results for orders placed or performed during the hospital encounter of 04/15/21   CBC Auto Differential   Result Value Ref Range    WBC 4.4 4.0 - 10.5 K/CU MM    RBC 4.40 (L) 4.6 - 6.2 M/CU MM    Hemoglobin 14.5 13.5 - 18.0 GM/DL    Hematocrit 43.1 42 - 52 %    MCV 98.0 78 - 100 FL    MCH 33.0 (H) 27 - 31 PG    MCHC 33.6 32.0 - 36.0 %    RDW 12.3 11.7 - 14.9 %    Platelets 136 798 - 491 K/CU MM    MPV 9.5 7.5 - 11.1 FL    Differential Type AUTOMATED DIFFERENTIAL     Segs Relative 72.5 (H) 36 - 66 %    Lymphocytes % 18.1 (L) 24 - 44 %    Monocytes % 7.3 (H) 0 - 4 %    Eosinophils % 1.4 0 - 3 %    Basophils % 0.2 0 - 1 %    Segs Absolute 3.2 K/CU MM    Lymphocytes Absolute 0.8 K/CU MM    Monocytes Absolute 0.3 K/CU MM    Eosinophils Absolute 0.1 K/CU MM    Basophils Absolute 0.0 K/CU MM    Nucleated RBC % 0.0 %    Total Nucleated RBC 0.0 K/CU MM    Total Immature Neutrophil 0.02 K/CU MM    Immature Neutrophil % 0.5 (H) 0 - 0.43 %   Comprehensive Metabolic Panel w/ Reflex to MG   Result Value Ref Range    Sodium 137 135 - 145 MMOL/L    Potassium 4.6 3.5 - 5.1 MMOL/L    Chloride 102 99 - 110 mMol/L    CO2 29 21 - 32 MMOL/L    BUN 19 6 - 23 MG/DL    CREATININE 1.0 0.9 - 1.3 MG/DL    Glucose 120 (H) 70 - 99 MG/DL    Calcium 9.1 8.3 - 10.6 MG/DL    Albumin 3.4 3.4 - 5.0 GM/DL    Total Protein 5.6 (L) 6.4 - 8.2 GM/DL    Total Bilirubin 0.3 0.0 - 1.0 MG/DL    ALT 36 10 - 40 U/L    AST 35 15 - 37 IU/L    Alkaline Phosphatase 37 (L) 40 - 129 IU/L    GFR Non-African American >60 >60 mL/min/1.73m2    GFR African American >60 >60 mL/min/1.73m2    Anion Gap 6 4 - 16   Troponin   Result Value Ref Range    Troponin T <0.010 <0.01 NG/ML   CK   Result Value Ref Range    Total  (H) 38 - 174 IU/L   Urinalysis with microscopic   Result Value Ref Range    Color, UA YELLOW YELLOW    Clarity, UA CLEAR CLEAR    Glucose, Urine NEGATIVE NEGATIVE MG/DL    Bilirubin Urine NEGATIVE NEGATIVE MG/DL    Ketones, Urine NEGATIVE NEGATIVE MG/DL    Specific Gravity, UA 1.011 1.001 - 1.035    Blood, Urine NEGATIVE NEGATIVE    pH, Urine 7.0 5.0 - 8.0    Protein, UA NEGATIVE NEGATIVE MG/DL    Urobilinogen, Urine 2.0 (H) 0.2 - 1.0 MG/DL    Nitrite Urine, Quantitative NEGATIVE NEGATIVE    Leukocyte Esterase, Urine NEGATIVE NEGATIVE    RBC, UA <1 0 - 3 /HPF    WBC, UA <1 0 - 2 /HPF    Bacteria, UA NEGATIVE NEGATIVE /HPF    Trichomonas, UA NONE SEEN NONE SEEN /HPF      Radiographs (if obtained):  [] The following radiograph was interpreted by myself in the absence of a radiologist:  [x]Radiologist's Report Reviewed:  CT HEAD WO CONTRAST   Final Result   Chronic findings in the brain without acute CT abnormality identified. EKG (if obtained): (All EKG's are interpreted by myself in the absence of a cardiologist)    MDM:  Differential diagnoses considered include but are not limited to peripheral neuropathy, episode of hypotension, acute intracranial hemorrhage, I do not suspect acute stroke is the deficits are not lateralizing. He does not have any actual deficits on exam except that it feels slightly different. Differential also includes claudication. Deficits as noted above which are very minimal if at all. Nonlateralizing I do not suspect acute stroke. Basic labs were obtained are unremarkable. Urine is not concerning for an infection. CT scan of the head shows no acute intracranial abnormalities. His CK level is minimally elevated but he is nontender on exam.  Not suspect rhabdomyolysis. Normal pulses in his feet.   His symptoms started when he is standing or

## 2021-04-19 ENCOUNTER — OFFICE VISIT (OUTPATIENT)
Dept: FAMILY MEDICINE CLINIC | Age: 81
End: 2021-04-19
Payer: MEDICARE

## 2021-04-19 VITALS
OXYGEN SATURATION: 93 % | TEMPERATURE: 97 F | RESPIRATION RATE: 16 BRPM | DIASTOLIC BLOOD PRESSURE: 65 MMHG | BODY MASS INDEX: 23.19 KG/M2 | HEART RATE: 75 BPM | HEIGHT: 70 IN | WEIGHT: 162 LBS | SYSTOLIC BLOOD PRESSURE: 120 MMHG

## 2021-04-19 DIAGNOSIS — K21.9 GASTROESOPHAGEAL REFLUX DISEASE WITHOUT ESOPHAGITIS: ICD-10-CM

## 2021-04-19 DIAGNOSIS — M54.42 CHRONIC BILATERAL LOW BACK PAIN WITH BILATERAL SCIATICA: Chronic | ICD-10-CM

## 2021-04-19 DIAGNOSIS — E03.9 ACQUIRED HYPOTHYROIDISM: Chronic | ICD-10-CM

## 2021-04-19 DIAGNOSIS — G45.9 TIA (TRANSIENT ISCHEMIC ATTACK): Primary | ICD-10-CM

## 2021-04-19 DIAGNOSIS — I65.21 STENOSIS OF RIGHT CAROTID ARTERY: ICD-10-CM

## 2021-04-19 DIAGNOSIS — G62.9 PERIPHERAL POLYNEUROPATHY: ICD-10-CM

## 2021-04-19 DIAGNOSIS — G89.29 CHRONIC BILATERAL LOW BACK PAIN WITH BILATERAL SCIATICA: Chronic | ICD-10-CM

## 2021-04-19 DIAGNOSIS — M54.41 CHRONIC BILATERAL LOW BACK PAIN WITH BILATERAL SCIATICA: Chronic | ICD-10-CM

## 2021-04-19 PROCEDURE — 99214 OFFICE O/P EST MOD 30 MIN: CPT | Performed by: STUDENT IN AN ORGANIZED HEALTH CARE EDUCATION/TRAINING PROGRAM

## 2021-04-19 PROCEDURE — 1111F DSCHRG MED/CURRENT MED MERGE: CPT | Performed by: STUDENT IN AN ORGANIZED HEALTH CARE EDUCATION/TRAINING PROGRAM

## 2021-04-19 NOTE — PROGRESS NOTES
Post-Discharge Transitional Care Management Services or Hospital Follow Up      Tootie Baker   YOB: 1940    Date of Office Visit:  4/19/2021  Date of Hospital Admission: 4/15/21  Date of Hospital Discharge: 4/15/21  Readmission Risk Score(high >=14%.  Medium >=10%):Readmission Risk Score: 12      Care management risk score Rising risk (score 2-5) and Complex Care (Scores >=6): 1     Non face to face  following discharge, date last encounter closed (first attempt may have been earlier): *No documented post hospital discharge outreach found in the last 14 days *No documented post hospital discharge outreach found in the last 14 days    Call initiated 2 business days of discharge: *No response recorded in the last 14 days     Patient Active Problem List   Diagnosis    Hyperlipidemia    MVP (mitral valve prolapse)    Frequent PVCs    Headache    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% 8/29/17     Kidney stone    Myotonia    Vitamin B12 deficiency    Peripelvic (lymphatic) cyst    BPH with obstruction/lower urinary tract symptoms    Chronic low back pain    Idiopathic peripheral neuropathy    GERD (gastroesophageal reflux disease)    Lumbar radiculopathy    Malignant melanoma of skin of face ([address]    Peripheral polyneuropathy    H/O prostate cancer    COPD (chronic obstructive pulmonary disease) ([address]    Acquired hypothyroidism    History of Helicobacter pylori infection    Diverticulosis    Chronic migraine without aura without status migrainosus, not intractable    Prostate CA ([address]    TIA (transient ischemic attack)       Allergies   Allergen Reactions    Fenofibrate Other (See Comments)     Liq stools    Lipitor Other (See Comments)     Liq stools    Pravachol [Pravastatin Sodium] Other (See Comments)     Liq stools       Medications listed as ordered at the time of discharge from hospital   Natalee HDEZ   Home Medication Instructions FRAN:    Printed on:04/25/21  Medication Information                      aspirin 81 MG tablet  Take 81 mg by mouth daily             clopidogrel (PLAVIX) 75 MG tablet  Take 1 tablet by mouth daily             DILT- MG extended release capsule  Take 1 capsule by mouth daily             ezetimibe (ZETIA) 10 MG tablet  Take 1 tablet by mouth nightly             gabapentin (NEURONTIN) 300 MG capsule  Take 1 capsule by mouth nightly for 180 days. levothyroxine (SYNTHROID) 50 MCG tablet  TAKE 1 TABLET BY MOUTH IN  THE MORNING             nitroGLYCERIN (NITROSTAT) 0.3 MG SL tablet  Place 0.3 mg under the tongue every 5 minutes as needed for Chest pain up to max of 3 total doses. If no relief after 1 dose, call 911. pantoprazole (PROTONIX) 40 MG tablet  Take 1 tablet by mouth daily             traMADol (ULTRAM) 50 MG tablet  Take 2 tablets by mouth 2 times daily as needed for Pain for up to 90 days. Medications marked \"taking\" at this time  Outpatient Medications Marked as Taking for the 4/19/21 encounter (Office Visit) with Ana Luisa Philippe, DO   Medication Sig Dispense Refill    clopidogrel (PLAVIX) 75 MG tablet Take 1 tablet by mouth daily 90 tablet 3    gabapentin (NEURONTIN) 300 MG capsule Take 1 capsule by mouth nightly for 180 days. 180 capsule 0    levothyroxine (SYNTHROID) 50 MCG tablet TAKE 1 TABLET BY MOUTH IN  THE MORNING 90 tablet 1    pantoprazole (PROTONIX) 40 MG tablet Take 1 tablet by mouth daily 90 tablet 0    ezetimibe (ZETIA) 10 MG tablet Take 1 tablet by mouth nightly 30 tablet 0    DILT- MG extended release capsule Take 1 capsule by mouth daily 90 capsule 1    aspirin 81 MG tablet Take 81 mg by mouth daily      nitroGLYCERIN (NITROSTAT) 0.3 MG SL tablet Place 0.3 mg under the tongue every 5 minutes as needed for Chest pain up to max of 3 total doses. If no relief after 1 dose, call 911.           Medications patient taking as of now reconciled against medications ordered at time of hospital discharge: Yes    Chief Complaint   Patient presents with    Follow-Up from Hospital     TC Visit, TIA. D/C date: 04/11/21. Feeling good since last er visit. HPI  Patient presents for follow up s/p recent hospital admission. Diagnosed with TIA imaging including CT head, MRV brain nl. Echo LVEF nl no wall abnormalitiies. Per Patient has an appointment with Neurology. Per chart review unable to locate this referral.   Patient states he had a similar episode of dizziness while working in the yard post discharge. Went to emergency room CT negative and discahrged the same day    Two Episodes lasting around 5 minutes. Denies LOC. No tongue biting, incontinence. On disharge patient Rx. Zetia, tolerating. O/w compliant with medications. Declines statins as has given diarrhea in the past.      CTA head and neck with bilateral vertebral artery stenosis. MRI brain neg for acute stroke    Echo with normal EF and bubble study neg    On discharge Stopped imitrex as no migraine    Hx. Of back surgery neruopathy b/l legs from back     Some elevation in CK with prior ED visit, patient states he was likely dehydrated at the time    Interval history/Current status: good condition    Review of Systems   Constitutional: Negative for chills and fatigue. HENT: Negative for congestion and sore throat. Respiratory: Negative for shortness of breath and wheezing. Cardiovascular: Negative for chest pain and palpitations. Gastrointestinal: Negative for abdominal pain and nausea. Genitourinary: Negative for frequency and urgency. Neurological: Negative for dizziness, light-headedness and headaches. Vitals:    04/19/21 1115   BP: 120/65   Pulse: 75   Resp: 16   Temp: 97 °F (36.1 °C)   SpO2: 93%   Weight: 162 lb (73.5 kg)   Height: 5' 10\" (1.778 m)     Body mass index is 23.24 kg/m².    Wt Readings from Last 3 Encounters:   04/19/21 162 lb (73.5 kg)   04/11/21 162 lb (73.5 kg)   02/08/21 167 tablet; TAKE 1 TABLET BY MOUTH IN  THE MORNING  Dispense: 90 tablet; Refill: 1    5. 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 Gastroesophageal reflux disease without esophagitis  -stable  - pantoprazole (PROTONIX) 40 MG tablet; Take 1 tablet by mouth daily  Dispense: 90 tablet;  Refill: 0        Medical Decision Making: moderate complexity

## 2021-04-21 RX ORDER — GABAPENTIN 300 MG/1
300 CAPSULE ORAL NIGHTLY
Qty: 180 CAPSULE | Refills: 0 | Status: SHIPPED | OUTPATIENT
Start: 2021-04-21 | End: 2021-06-28 | Stop reason: SDUPTHER

## 2021-04-21 RX ORDER — CLOPIDOGREL BISULFATE 75 MG/1
75 TABLET ORAL DAILY
Qty: 90 TABLET | Refills: 3 | Status: SHIPPED | OUTPATIENT
Start: 2021-04-21 | End: 2022-02-14 | Stop reason: SDUPTHER

## 2021-04-21 RX ORDER — LEVOTHYROXINE SODIUM 0.05 MG/1
TABLET ORAL
Qty: 90 TABLET | Refills: 1 | Status: SHIPPED | OUTPATIENT
Start: 2021-04-21 | End: 2021-10-18

## 2021-04-21 RX ORDER — PANTOPRAZOLE SODIUM 40 MG/1
40 TABLET, DELAYED RELEASE ORAL DAILY
Qty: 90 TABLET | Refills: 0 | Status: SHIPPED | OUTPATIENT
Start: 2021-04-21 | End: 2021-09-08 | Stop reason: SDUPTHER

## 2021-04-25 ASSESSMENT — ENCOUNTER SYMPTOMS
WHEEZING: 0
SHORTNESS OF BREATH: 0
NAUSEA: 0
ABDOMINAL PAIN: 0
SORE THROAT: 0

## 2021-06-28 ENCOUNTER — TELEPHONE (OUTPATIENT)
Dept: FAMILY MEDICINE CLINIC | Age: 81
End: 2021-06-28

## 2021-06-28 DIAGNOSIS — G89.29 CHRONIC BILATERAL LOW BACK PAIN WITH BILATERAL SCIATICA: Chronic | ICD-10-CM

## 2021-06-28 DIAGNOSIS — M54.41 CHRONIC BILATERAL LOW BACK PAIN WITH BILATERAL SCIATICA: Chronic | ICD-10-CM

## 2021-06-28 DIAGNOSIS — G62.9 PERIPHERAL POLYNEUROPATHY: Primary | ICD-10-CM

## 2021-06-28 DIAGNOSIS — M54.42 CHRONIC BILATERAL LOW BACK PAIN WITH BILATERAL SCIATICA: Chronic | ICD-10-CM

## 2021-06-28 RX ORDER — GABAPENTIN 300 MG/1
300 CAPSULE ORAL 3 TIMES DAILY
Qty: 90 CAPSULE | Refills: 2 | Status: SHIPPED | OUTPATIENT
Start: 2021-06-28 | End: 2021-09-08 | Stop reason: SDUPTHER

## 2021-06-28 NOTE — TELEPHONE ENCOUNTER
Patient called and stated that  on the gabapentin 300 mg he was taking 3 a day and the script that was sent in 4-31-2l reflects one time daily. That was prior to his hospital admission.  Took one in the Am one at noon and one in the pm

## 2021-08-12 ENCOUNTER — OFFICE VISIT (OUTPATIENT)
Dept: FAMILY MEDICINE CLINIC | Age: 81
End: 2021-08-12
Payer: MEDICARE

## 2021-08-12 VITALS
BODY MASS INDEX: 22.92 KG/M2 | WEIGHT: 160.1 LBS | HEIGHT: 70 IN | SYSTOLIC BLOOD PRESSURE: 104 MMHG | OXYGEN SATURATION: 96 % | HEART RATE: 75 BPM | DIASTOLIC BLOOD PRESSURE: 62 MMHG

## 2021-08-12 DIAGNOSIS — J44.9 CHRONIC OBSTRUCTIVE PULMONARY DISEASE, UNSPECIFIED COPD TYPE (HCC): Chronic | ICD-10-CM

## 2021-08-12 DIAGNOSIS — M54.42 CHRONIC BILATERAL LOW BACK PAIN WITH BILATERAL SCIATICA: Primary | Chronic | ICD-10-CM

## 2021-08-12 DIAGNOSIS — G89.29 CHRONIC BILATERAL LOW BACK PAIN WITH BILATERAL SCIATICA: Primary | Chronic | ICD-10-CM

## 2021-08-12 DIAGNOSIS — C61 PROSTATE CA (HCC): ICD-10-CM

## 2021-08-12 DIAGNOSIS — E03.9 ACQUIRED HYPOTHYROIDISM: Chronic | ICD-10-CM

## 2021-08-12 DIAGNOSIS — M54.41 CHRONIC BILATERAL LOW BACK PAIN WITH BILATERAL SCIATICA: Primary | Chronic | ICD-10-CM

## 2021-08-12 PROCEDURE — 4040F PNEUMOC VAC/ADMIN/RCVD: CPT | Performed by: FAMILY MEDICINE

## 2021-08-12 PROCEDURE — G8420 CALC BMI NORM PARAMETERS: HCPCS | Performed by: FAMILY MEDICINE

## 2021-08-12 PROCEDURE — G8427 DOCREV CUR MEDS BY ELIG CLIN: HCPCS | Performed by: FAMILY MEDICINE

## 2021-08-12 PROCEDURE — 1123F ACP DISCUSS/DSCN MKR DOCD: CPT | Performed by: FAMILY MEDICINE

## 2021-08-12 PROCEDURE — 1036F TOBACCO NON-USER: CPT | Performed by: FAMILY MEDICINE

## 2021-08-12 PROCEDURE — 99213 OFFICE O/P EST LOW 20 MIN: CPT | Performed by: FAMILY MEDICINE

## 2021-08-12 PROCEDURE — 3023F SPIROM DOC REV: CPT | Performed by: FAMILY MEDICINE

## 2021-08-12 PROCEDURE — G8926 SPIRO NO PERF OR DOC: HCPCS | Performed by: FAMILY MEDICINE

## 2021-08-12 RX ORDER — TRAMADOL HYDROCHLORIDE 50 MG/1
100 TABLET ORAL 2 TIMES DAILY PRN
Qty: 360 TABLET | Refills: 0 | Status: SHIPPED | OUTPATIENT
Start: 2021-08-12 | End: 2021-11-18 | Stop reason: SDUPTHER

## 2021-08-12 SDOH — ECONOMIC STABILITY: FOOD INSECURITY: WITHIN THE PAST 12 MONTHS, THE FOOD YOU BOUGHT JUST DIDN'T LAST AND YOU DIDN'T HAVE MONEY TO GET MORE.: NEVER TRUE

## 2021-08-12 SDOH — ECONOMIC STABILITY: FOOD INSECURITY: WITHIN THE PAST 12 MONTHS, YOU WORRIED THAT YOUR FOOD WOULD RUN OUT BEFORE YOU GOT MONEY TO BUY MORE.: NEVER TRUE

## 2021-08-12 ASSESSMENT — SOCIAL DETERMINANTS OF HEALTH (SDOH): HOW HARD IS IT FOR YOU TO PAY FOR THE VERY BASICS LIKE FOOD, HOUSING, MEDICAL CARE, AND HEATING?: NOT HARD AT ALL

## 2021-08-12 NOTE — PROGRESS NOTES
on stress test.    H/O cardiac catheterization 08/06/2018    normal study    H/O cardiovascular stress test 07/02/2018    normal study    H/O echocardiogram 12/10      3/14 EF55-60% normal LV function 12/10/10  complete 2 dim transthoracic echj. LVSF normal  EF >55%, transmitral spectral Doppler flow pattern is suggestive of  impaired LV relaxation    History of cardiovascular stress test 2/12 11/09 7/08 9/06 2/02 4/00 8/99 2/8/2012  evidence of mild ischemia  in the RCA. abnormal study , restingEF is 70%, global LVSF is  normal    History of Holter monitoring 9/17/14    14 day EVENT - no RVR    History of nuclear stress test 08/03/2017    cardiolite-normal,EF60%    Hx of chest x-ray 2/20/12    chest is considereed non acute. COPD    Hx of echocardiogram 08/03/2017    YG25-64%,TWBWV 2 diastolic dysfunction    Hyperlipidemia     Idiopathic peripheral neuropathy     Kidney stone     Left facial numbness 8/30/2017    Lumbar radiculopathy     Malignant melanoma of skin of face (HCC)     MVP (mitral valve prolapse)     Myotonia     chronic increased CPK's    Peripelvic (lymphatic) cyst     per CT    Precordial pain 8/3/2018    Vitamin B12 deficiency        FAMILY HISTORY  Family History   Problem Relation Age of Onset    Heart Disease Mother         ASCAD    Heart Disease Father     Heart Disease Brother     Diabetes Other     Cancer Other         unknown type    Prostate Cancer Other     Coronary Art Dis Other     Coronary Art Dis Other        SOCIAL HISTORY  Social History     Socioeconomic History    Marital status:       Spouse name: Not on file    Number of children: Not on file    Years of education: Not on file    Highest education level: Not on file   Occupational History    Not on file   Tobacco Use    Smoking status: Never Smoker    Smokeless tobacco: Never Used   Vaping Use    Vaping Use: Never used   Substance and Sexual Activity    Alcohol use: Yes     Comment: Occassional    Drug use: No    Sexual activity: Not Currently     Partners: Female     Comment:    Other Topics Concern    Not on file   Social History Narrative    Not on file     Social Determinants of Health     Financial Resource Strain: Low Risk     Difficulty of Paying Living Expenses: Not hard at all   Food Insecurity: No Food Insecurity    Worried About Running Out of Food in the Last Year: Never true    [address] in the Last Year: Never true   Transportation Needs:     Lack of Transportation (Medical):  Lack of Transportation (Non-Medical):    Physical Activity:     Days of Exercise per Week:     Minutes of Exercise per Session:    Stress:     Feeling of Stress :    Social Connections:     Frequency of Communication with Friends and Family:     Frequency of Social Gatherings with Friends and Family:     Attends [employer] Services:     Active Member of Clubs or Organizations:     Attends Club or Organization Meetings:     Marital Status:    Intimate Partner Violence:     Fear of Current or Ex-Partner:     Emotionally Abused:     Physically Abused:     Sexually Abused:         SURGICAL HISTORY  Past Surgical History:   Procedure Laterality Date    CARDIAC CATHETERIZATION  2/2/12    EF 55% No sugnificant disease.  COLONOSCOPY  5/5/14    diverticulosis, 1 polyp, hemorrhoids    COLONOSCOPY  07/10/2017    single 5 mm polyp found in sigmoid colon, moderate diverticulosis found in sigmoid colon    LUMBAR DISC SURGERY  11/2002    L4-5 hemilaminectomy and disk    LUMBAR DISCECTOMY  1973    PROSTATE SURGERY  09/26/2011    s/p robotic radical prostatectomy    ROTATOR CUFF REPAIR Right 10/2016    TIBIA FRACTURE SURGERY Right     childhood    URETEROLITHOTOMY  1992       CURRENT MEDICATIONS  Current Outpatient Medications   Medication Sig Dispense Refill    traMADol (ULTRAM) 50 MG tablet Take 2 tablets by mouth 2 times daily as needed for Pain for up to 90 days.  Tonya 229 tablet 0    gabapentin (NEURONTIN) 300 MG capsule Take 1 capsule by mouth 3 times daily for 90 days. 90 capsule 2    clopidogrel (PLAVIX) 75 MG tablet Take 1 tablet by mouth daily 90 tablet 3    levothyroxine (SYNTHROID) 50 MCG tablet TAKE 1 TABLET BY MOUTH IN  THE MORNING 90 tablet 1    pantoprazole (PROTONIX) 40 MG tablet Take 1 tablet by mouth daily 90 tablet 0    DILT- MG extended release capsule Take 1 capsule by mouth daily 90 capsule 1    aspirin 81 MG tablet Take 81 mg by mouth daily      nitroGLYCERIN (NITROSTAT) 0.3 MG SL tablet Place 0.3 mg under the tongue every 5 minutes as needed for Chest pain up to max of 3 total doses. If no relief after 1 dose, call 911. No current facility-administered medications for this visit. ALLERGIES  Allergies   Allergen Reactions    Fenofibrate Other (See Comments)     Liq stools    Lipitor Other (See Comments)     Liq stools    Pravachol [Pravastatin Sodium] Other (See Comments)     Liq stools       PHYSICAL EXAM  /62   Pulse 75   Ht 5' 10\" (1.778 m)   Wt 160 lb 1.6 oz (72.6 kg)   SpO2 96%   BMI 22.97 kg/m²     ASSESSMENT & PLAN    1. Chronic bilateral low back pain with bilateral sciatica  Reviewed OARRS. Patient finds medication beneficial.  Patient has been able to wean medicine further. No signs of abuse or diversion. Refill    - traMADol (ULTRAM) 50 MG tablet; Take 2 tablets by mouth 2 times daily as needed for Pain for up to 90 days. Dispense: 360 tablet; Refill: 0    2. Acquired hypothyroidism  Reviewed labs. At goal.  Remain on same    3. Prostate CA [facility])  Reviewed labs. PSA less than 0.01, 6 months ago. Recheck on follow-up    4. Chronic obstructive pulmonary disease, unspecified COPD type ([facility]  Issue controlled. Continue meds. Refilled meds. Follow-up 6 months    Periodic Controlled Substance Monitoring: Obtaining appropriate analgesic effect of treatment.  Arabella Molina MD)    Electronically signed by Babar Fan MD on 8/12/2021

## 2021-09-07 RX ORDER — DILTIAZEM HYDROCHLORIDE 120 MG/1
CAPSULE, EXTENDED RELEASE ORAL
Qty: 90 CAPSULE | Refills: 1 | Status: SHIPPED | OUTPATIENT
Start: 2021-09-07 | End: 2022-02-23

## 2021-09-08 DIAGNOSIS — M54.41 CHRONIC BILATERAL LOW BACK PAIN WITH BILATERAL SCIATICA: Chronic | ICD-10-CM

## 2021-09-08 DIAGNOSIS — G89.29 CHRONIC BILATERAL LOW BACK PAIN WITH BILATERAL SCIATICA: Chronic | ICD-10-CM

## 2021-09-08 DIAGNOSIS — K21.9 GASTROESOPHAGEAL REFLUX DISEASE WITHOUT ESOPHAGITIS: ICD-10-CM

## 2021-09-08 DIAGNOSIS — M54.42 CHRONIC BILATERAL LOW BACK PAIN WITH BILATERAL SCIATICA: Chronic | ICD-10-CM

## 2021-09-08 DIAGNOSIS — G62.9 PERIPHERAL POLYNEUROPATHY: ICD-10-CM

## 2021-09-08 RX ORDER — GABAPENTIN 300 MG/1
300 CAPSULE ORAL 3 TIMES DAILY
Qty: 90 CAPSULE | Refills: 2 | Status: SHIPPED | OUTPATIENT
Start: 2021-09-08 | End: 2021-12-03 | Stop reason: SDUPTHER

## 2021-09-08 RX ORDER — PANTOPRAZOLE SODIUM 40 MG/1
40 TABLET, DELAYED RELEASE ORAL DAILY
Qty: 90 TABLET | Refills: 1 | Status: SHIPPED | OUTPATIENT
Start: 2021-09-08 | End: 2022-01-26

## 2021-09-08 NOTE — TELEPHONE ENCOUNTER
LV-08/12  NV-02/14    [facility]    PLEASE SEND BOTH MEDICATIONS THROUGH [facility]     PT WOULD LIKE ALL MEDICATIONS TO GO THROUGH [facility]. PT IS ALMOST OUT OF MEDICATION. PT THOUGHT AFTER HIS LAST APPT THAT THEY WOULD AUTOMATICALLY BE SENT TO THE PHARM. SO HE IS LATE FILLING.

## 2021-09-14 ENCOUNTER — OFFICE VISIT (OUTPATIENT)
Dept: CARDIOLOGY CLINIC | Age: 81
End: 2021-09-14
Payer: MEDICARE

## 2021-09-14 VITALS
HEART RATE: 66 BPM | BODY MASS INDEX: 23.11 KG/M2 | DIASTOLIC BLOOD PRESSURE: 70 MMHG | SYSTOLIC BLOOD PRESSURE: 110 MMHG | WEIGHT: 161.4 LBS | HEIGHT: 70 IN

## 2021-09-14 DIAGNOSIS — E78.2 MIXED HYPERLIPIDEMIA: Primary | Chronic | ICD-10-CM

## 2021-09-14 PROCEDURE — G8427 DOCREV CUR MEDS BY ELIG CLIN: HCPCS | Performed by: NURSE PRACTITIONER

## 2021-09-14 PROCEDURE — 99212 OFFICE O/P EST SF 10 MIN: CPT | Performed by: NURSE PRACTITIONER

## 2021-09-14 PROCEDURE — 1036F TOBACCO NON-USER: CPT | Performed by: NURSE PRACTITIONER

## 2021-09-14 PROCEDURE — 1123F ACP DISCUSS/DSCN MKR DOCD: CPT | Performed by: NURSE PRACTITIONER

## 2021-09-14 PROCEDURE — G8420 CALC BMI NORM PARAMETERS: HCPCS | Performed by: NURSE PRACTITIONER

## 2021-09-14 PROCEDURE — 4040F PNEUMOC VAC/ADMIN/RCVD: CPT | Performed by: NURSE PRACTITIONER

## 2021-09-14 ASSESSMENT — ENCOUNTER SYMPTOMS
SHORTNESS OF BREATH: 0
ORTHOPNEA: 0
BACK PAIN: 1

## 2021-09-14 NOTE — PATIENT INSTRUCTIONS
**It is YOUR responsibilty to bring medication bottles and/or updated medication list to [address] [place] [address]. This will allow us to better serve you and all your healthcare needs**    Please be informed that if you contact our office outside of normal business hours the physician on call cannot help with any scheduling or rescheduling issues, procedure instruction questions or any type of medication issue. We advise you for any urgent/emergency that you go to the nearest emergency room!     PLEASE CALL OUR OFFICE DURING NORMAL BUSINESS HOURS    Monday - Friday   8 am to 5 pm    [place]: [address]: [phone]    [place]:  [phone]

## 2021-09-14 NOTE — PROGRESS NOTES
Normal range of motion and neck supple. Right lower leg: No edema. Left lower leg: No edema. Skin:     General: Skin is warm. Capillary Refill: Capillary refill takes less than 2 seconds. Neurological:      General: No focal deficit present. Mental Status: He is alert and oriented to person, place, and time. Psychiatric:         Mood and Affect: Mood normal.         Behavior: Behavior normal.                Current Outpatient Medications   Medication Sig Dispense Refill    pantoprazole (PROTONIX) 40 MG tablet Take 1 tablet by mouth daily 90 tablet 1    gabapentin (NEURONTIN) 300 MG capsule Take 1 capsule by mouth 3 times daily for 90 days. 90 capsule 2    DILT- MG extended release capsule Take 1 capsule by mouth daily 90 capsule 1    traMADol (ULTRAM) 50 MG tablet Take 2 tablets by mouth 2 times daily as needed for Pain for up to 90 days. 360 tablet 0    clopidogrel (PLAVIX) 75 MG tablet Take 1 tablet by mouth daily 90 tablet 3    levothyroxine (SYNTHROID) 50 MCG tablet TAKE 1 TABLET BY MOUTH IN  THE MORNING 90 tablet 1    aspirin 81 MG tablet Take 81 mg by mouth daily      nitroGLYCERIN (NITROSTAT) 0.3 MG SL tablet Place 0.3 mg under the tongue every 5 minutes as needed for Chest pain up to max of 3 total doses. If no relief after 1 dose, call 911. No current facility-administered medications for this visit. All pertinent data reviewed and discussed with patient       ASSESSMENT/PLAN:    Hyperlipidemia  Results for Mike Loyola \"BILL\" (MRN [id number])    Ref.  Range 4/10/2021 04:12   Cholesterol Latest Ref Range: <200 MG/   HDL Cholesterol Latest Ref Range: >40 MG/DL 34 (L)   LDL Direct Latest Ref Range: <100 MG/ (H)   Triglycerides Latest Ref Range: <150 MG/DL 74     Diet and exercise recommended-  Hold on statin as side effects out weight benefit   Add healthy nuts such as walnuts to assist with HDL     Medications reviewed and confirmed with patient     Tests ordered:  none      Follow-up  1 year      Signed:  NELLIE Rea CNP, 9/14/2021, 10:16 AM    An electronic signature was used to authenticate this note.

## 2021-10-27 ENCOUNTER — OFFICE VISIT (OUTPATIENT)
Dept: NEUROLOGY | Age: 81
End: 2021-10-27
Payer: MEDICARE

## 2021-10-27 VITALS
SYSTOLIC BLOOD PRESSURE: 132 MMHG | HEIGHT: 70 IN | OXYGEN SATURATION: 97 % | BODY MASS INDEX: 23.62 KG/M2 | WEIGHT: 165 LBS | HEART RATE: 67 BPM | DIASTOLIC BLOOD PRESSURE: 82 MMHG

## 2021-10-27 DIAGNOSIS — G43.009 MIGRAINE WITHOUT AURA AND WITHOUT STATUS MIGRAINOSUS, NOT INTRACTABLE: ICD-10-CM

## 2021-10-27 DIAGNOSIS — Z86.73 HISTORY OF LACUNAR CEREBROVASCULAR ACCIDENT: Primary | ICD-10-CM

## 2021-10-27 DIAGNOSIS — G45.9 TIA (TRANSIENT ISCHEMIC ATTACK): ICD-10-CM

## 2021-10-27 PROCEDURE — 99213 OFFICE O/P EST LOW 20 MIN: CPT | Performed by: PSYCHIATRY & NEUROLOGY

## 2021-10-27 NOTE — PROGRESS NOTES
.  10/27/21    Glynn Florez  1940    Chief Complaint   Patient presents with    Follow-up     balance, short term memory declining       History of Present Illness  Reino Landau is a 79 y.o. male presenting today for follow-up of: Corrinne Echevaria is seen in follow-up today for history of migraine and possible TIA. He had last seen her patches back in May 2021, our nurse practitioner Louise Carrera. During his hospitalization he presented with headache and generalized weakness in the differential diagnosis is a complicated migraine versus TIA. He was discharged on aspirin and Plavix. His LDLCholesterol is elevated at 120. He was recommended Zetia due to intolerance to statins in the past.  His MRI of the brain did reveal a remote small lacunar infarct in the left caudate head but was otherwise unremarkable. Verify now studies on aspirin Plavix showed responsiveness to both. He has not had any changes in his health since his last visit. He tells me that he is still taking both aspirin and Plavix. He states that his family doctor discontinued Zetia. He does not have any significant headaches. He states he maybe has a headache every other month or less than that. He states he will wake up with a headache and it is usually after a night that he sleeps too long. It would typically last a couple hours and is mild in severity. Current Outpatient Medications   Medication Sig Dispense Refill    levothyroxine (SYNTHROID) 50 MCG tablet TAKE 1 TABLET BY MOUTH IN  THE MORNING 90 tablet 1    pantoprazole (PROTONIX) 40 MG tablet Take 1 tablet by mouth daily 90 tablet 1    gabapentin (NEURONTIN) 300 MG capsule Take 1 capsule by mouth 3 times daily for 90 days. 90 capsule 2    DILT- MG extended release capsule Take 1 capsule by mouth daily 90 capsule 1    traMADol (ULTRAM) 50 MG tablet Take 2 tablets by mouth 2 times daily as needed for Pain for up to 90 days.  360 tablet 0    clopidogrel (PLAVIX) 75 MG tablet Take 1 tablet by mouth daily 90 tablet 3    aspirin 81 MG tablet Take 81 mg by mouth daily      nitroGLYCERIN (NITROSTAT) 0.3 MG SL tablet Place 0.3 mg under the tongue every 5 minutes as needed for Chest pain up to max of 3 total doses. If no relief after 1 dose, call 911. No current facility-administered medications for this visit. Physical Exam:    Mental Status   Orientation: oriented to person, oriented to place, oriented to problem and oriented to time    Mood/affectappropriate mood and appropriate affect   Memory/Other: recent memory intact, remote memory intact, fund of knowledge intact, attention span normal and concentration normal  Language  Language: (normal) language, no dysarthria, (normal) articulation and no dysphasia/aphasia  Cranial Nerves   Eyes: pupils normal size and reactive to light and visual fields appear full   CN III, IV, VI : no nystagmus, no ptosis and CN VI abnormal right   Facial Motor: normal facial motor  Motor/Coordination Exam   Power: motor strength appears intact throughout, no arm drift and normal tone  Gait and Stance   Gait/Posture: station normal, casual gait normal and ambulates independently       /82 (Site: Left Upper Arm, Position: Sitting, Cuff Size: Medium Adult)   Pulse 67   Ht 5' 10\" (1.778 m)   Wt 165 lb (74.8 kg)   SpO2 97%   BMI 23.68 kg/m²     Assessment and Plan     Diagnosis Orders   1. History of lacunar cerebrovascular accident     2. TIA (transient ischemic attack)     3. Migraine without aura and without status migrainosus, not intractable         [facility] is seen in follow-up today for his history of possible TIA and migraine. He has rare mild headaches. He is no longer taking triptan's as they are contraindicated given his age and in his health history. We can always consider a CGRP inhibitor such as [place] and [place] in the future if headaches become more severe or more frequent. In regards to his history of possible TIA and lacunar stroke.   His MRI of the brain in April did demonstrate a small chronic lacunar infarct in the left caudate head. The verify now studies on both his aspirin and Plavix were therapeutic. He was previously taking Plavix prior to the event in April. I will continue him on both aspirin and Plavix at this time. His LDL cholesterol is 120. In the setting of a prior lacunar infarct it would be advised that he be on a lipid-lowering agent. He has had an intolerance to statins in the past.  He was previously on Zetia but this was stopped by his primary care physician. He is going to rediscuss initiating Zetia with his PCP. This would be advised from my standpoint. We discussed the importance of modification of stroke risk factors. We discussed the importance of the adherence to secondary stroke preventative medications. We discussed the importance of alerting EMS and presenting to the hospital at the onset of any stroke-like symptoms. Return in about 1 year (around 10/27/2022) for SVETA or me.     Cristina Martinez,

## 2021-11-04 ENCOUNTER — TELEPHONE (OUTPATIENT)
Dept: FAMILY MEDICINE CLINIC | Age: 81
End: 2021-11-04

## 2021-11-04 NOTE — TELEPHONE ENCOUNTER
----- Message from Kulwinder Gastelum sent at 11/4/2021  9:55 AM EDT -----  Subject: Refill Request    QUESTIONS  Name of Medication? gabapentin (NEURONTIN) 300 MG capsule  Patient-reported dosage and instructions? 300 mg  How many days do you have left? 0  Preferred Pharmacy? [address] phone number (if available)? [phone]  ---------------------------------------------------------------------------  --------------,  Name of Medication? levothyroxine (SYNTHROID) 50 MCG tablet  Patient-reported dosage and instructions? 50 mg  How many days do you have left? 0  Preferred Pharmacy? [address] phone number (if available)? [phone]  ---------------------------------------------------------------------------  --------------  CALL BACK INFO  What is the best way for the office to contact you? OK to leave message on   voicemail  Preferred Call Back Phone Number?  [phone]

## 2021-11-04 NOTE — TELEPHONE ENCOUNTER
----- Message from Mariaa Hughes sent at 11/4/2021  9:55 AM EDT -----  Subject: Refill Request    QUESTIONS  Name of Medication? gabapentin (NEURONTIN) 300 MG capsule  Patient-reported dosage and instructions? 300 mg  How many days do you have left? 0  Preferred Pharmacy? [address] phone number (if available)? [phone]  ---------------------------------------------------------------------------  --------------,  Name of Medication? levothyroxine (SYNTHROID) 50 MCG tablet  Patient-reported dosage and instructions? 50 mg  How many days do you have left? 0  Preferred Pharmacy? [address] phone number (if available)? [phone]  ---------------------------------------------------------------------------  --------------  CALL BACK INFO  What is the best way for the office to contact you? OK to leave message on   voicemail  Preferred Call Back Phone Number?  [phone]

## 2021-11-18 ENCOUNTER — OFFICE VISIT (OUTPATIENT)
Dept: FAMILY MEDICINE CLINIC | Age: 81
End: 2021-11-18
Payer: MEDICARE

## 2021-11-18 VITALS
SYSTOLIC BLOOD PRESSURE: 126 MMHG | HEART RATE: 72 BPM | DIASTOLIC BLOOD PRESSURE: 82 MMHG | WEIGHT: 165.1 LBS | HEIGHT: 70 IN | BODY MASS INDEX: 23.64 KG/M2

## 2021-11-18 DIAGNOSIS — Z00.00 ROUTINE GENERAL MEDICAL EXAMINATION AT A HEALTH CARE FACILITY: Primary | ICD-10-CM

## 2021-11-18 DIAGNOSIS — M54.41 CHRONIC BILATERAL LOW BACK PAIN WITH BILATERAL SCIATICA: Chronic | ICD-10-CM

## 2021-11-18 DIAGNOSIS — M54.42 CHRONIC BILATERAL LOW BACK PAIN WITH BILATERAL SCIATICA: Chronic | ICD-10-CM

## 2021-11-18 DIAGNOSIS — G89.29 CHRONIC BILATERAL LOW BACK PAIN WITH BILATERAL SCIATICA: Chronic | ICD-10-CM

## 2021-11-18 PROCEDURE — 4040F PNEUMOC VAC/ADMIN/RCVD: CPT | Performed by: FAMILY MEDICINE

## 2021-11-18 PROCEDURE — G0439 PPPS, SUBSEQ VISIT: HCPCS | Performed by: FAMILY MEDICINE

## 2021-11-18 PROCEDURE — 1123F ACP DISCUSS/DSCN MKR DOCD: CPT | Performed by: FAMILY MEDICINE

## 2021-11-18 PROCEDURE — G8484 FLU IMMUNIZE NO ADMIN: HCPCS | Performed by: FAMILY MEDICINE

## 2021-11-18 RX ORDER — TRAMADOL HYDROCHLORIDE 50 MG/1
100 TABLET ORAL 2 TIMES DAILY PRN
Qty: 360 TABLET | Refills: 0 | Status: SHIPPED | OUTPATIENT
Start: 2021-11-18 | End: 2022-08-22

## 2021-11-18 RX ORDER — TRAMADOL HYDROCHLORIDE 50 MG/1
100 TABLET ORAL 2 TIMES DAILY PRN
Qty: 360 TABLET | Refills: 0 | Status: SHIPPED | OUTPATIENT
Start: 2021-11-18 | End: 2021-11-18 | Stop reason: SDUPTHER

## 2021-11-18 ASSESSMENT — LIFESTYLE VARIABLES
HAVE YOU OR SOMEONE ELSE BEEN INJURED AS A RESULT OF YOUR DRINKING: 0
HOW MANY STANDARD DRINKS CONTAINING ALCOHOL DO YOU HAVE ON A TYPICAL DAY: 0
HOW OFTEN DO YOU HAVE A DRINK CONTAINING ALCOHOL: 1
HOW OFTEN DURING THE LAST YEAR HAVE YOU FOUND THAT YOU WERE NOT ABLE TO STOP DRINKING ONCE YOU HAD STARTED: 0
AUDIT-C TOTAL SCORE: 1
HOW OFTEN DURING THE LAST YEAR HAVE YOU NEEDED AN ALCOHOLIC DRINK FIRST THING IN THE MORNING TO GET YOURSELF GOING AFTER A NIGHT OF HEAVY DRINKING: 0
HOW OFTEN DURING THE LAST YEAR HAVE YOU FAILED TO DO WHAT WAS NORMALLY EXPECTED FROM YOU BECAUSE OF DRINKING: 0
HOW OFTEN DO YOU HAVE SIX OR MORE DRINKS ON ONE OCCASION: 0
AUDIT TOTAL SCORE: 1
HAS A RELATIVE, FRIEND, DOCTOR, OR ANOTHER HEALTH PROFESSIONAL EXPRESSED CONCERN ABOUT YOUR DRINKING OR SUGGESTED YOU CUT DOWN: 0
HOW OFTEN DURING THE LAST YEAR HAVE YOU HAD A FEELING OF GUILT OR REMORSE AFTER DRINKING: 0
HOW OFTEN DURING THE LAST YEAR HAVE YOU BEEN UNABLE TO REMEMBER WHAT HAPPENED THE NIGHT BEFORE BECAUSE YOU HAD BEEN DRINKING: 0

## 2021-11-18 ASSESSMENT — PATIENT HEALTH QUESTIONNAIRE - PHQ9
SUM OF ALL RESPONSES TO PHQ QUESTIONS 1-9: 0
SUM OF ALL RESPONSES TO PHQ9 QUESTIONS 1 & 2: 0
SUM OF ALL RESPONSES TO PHQ QUESTIONS 1-9: 0
SUM OF ALL RESPONSES TO PHQ QUESTIONS 1-9: 0
2. FEELING DOWN, DEPRESSED OR HOPELESS: 0
1. LITTLE INTEREST OR PLEASURE IN DOING THINGS: 0

## 2021-11-18 NOTE — PROGRESS NOTES
Medicare Annual Wellness Visit  Name: Kathia Bertrand Date: 2021   MRN: [id number] Sex: Male   Age: 79 y.o. Ethnicity: Non- / Non    : 1940 Race: White (non-)      Yvette Ornelas is here for Other (no c/o's)    Screenings for behavioral, psychosocial and functional/safety risks, and cognitive dysfunction are all negative except as indicated below. These results, as well as other patient data from the [address] form, are documented in Flowsheets linked to this Encounter. No Known Allergies    Prior to Visit Medications    Medication Sig Taking? Authorizing Provider   traMADol (ULTRAM) 50 MG tablet Take 2 tablets by mouth 2 times daily as needed for Pain for up to 90 days. Yes Jorgito Hansen MD   levothyroxine (SYNTHROID) 50 MCG tablet TAKE 1 TABLET BY MOUTH IN  THE MORNING Yes Jorgito Hansen MD   pantoprazole (PROTONIX) 40 MG tablet Take 1 tablet by mouth daily Yes Jorgito Hansen MD   gabapentin (NEURONTIN) 300 MG capsule Take 1 capsule by mouth 3 times daily for 90 days. Yes Jorgito Hansen MD   DILT- MG extended release capsule Take 1 capsule by mouth daily Yes NELLIE Damon CNP   clopidogrel (PLAVIX) 75 MG tablet Take 1 tablet by mouth daily Yes Blake Segovia DO   aspirin 81 MG tablet Take 81 mg by mouth daily Yes Historical Provider, MD       Past Medical History:   Diagnosis Date    Anesthesia complication     Patient reports in  following a Back Surgery he was kept in the hospital due to high temperatures in the evenings. He stated they kept him for observation and doesn't remember receiving any treatment for the high temperatures. Patient reports the anesthesia name started with flur? ?.    BPH with obstruction/lower urinary tract symptoms     Chest pain 2018    Chronic low back pain     GERD (gastroesophageal reflux disease)     H/O 24 hour EKG monitoring 3/12  12/11    3/12/12  essentially a normal event monitor without significant arrhythmias noted    H/O cardiac catheterization 2/2/12 4/02 10/98    2/22/12  LM, LAD and RCA all without significant disease    H/O cardiac catheterization 2/2/12 4/02 10/98    Done R/T false pos on stress test.    H/O cardiac catheterization 08/06/2018    normal study    H/O cardiovascular stress test 07/02/2018    normal study    H/O echocardiogram 12/10      3/14 EF55-60% normal LV function 12/10/10  complete 2 dim transthoracic echj. LVSF normal  EF >55%, transmitral spectral Doppler flow pattern is suggestive of  impaired LV relaxation    History of cardiovascular stress test 2/12 11/09 7/08 9/06 2/02 4/00 8/99 2/8/2012  evidence of mild ischemia  in the RCA. abnormal study , restingEF is 70%, global LVSF is  normal    History of Holter monitoring 9/17/14    14 day EVENT - no RVR    History of nuclear stress test 08/03/2017    cardiolite-normal,EF60%    Hx of chest x-ray 2/20/12    chest is considereed non acute. COPD    Hx of echocardiogram 08/03/2017    LV54-33%,SCLXM 2 diastolic dysfunction    Hyperlipidemia     Idiopathic peripheral neuropathy     Kidney stone     Left facial numbness 8/30/2017    Lumbar radiculopathy     Malignant melanoma of skin of face (HCC)     MVP (mitral valve prolapse)     Myotonia     chronic increased CPK's    Peripelvic (lymphatic) cyst     per CT    Precordial pain 8/3/2018    Vitamin B12 deficiency        Past Surgical History:   Procedure Laterality Date    CARDIAC CATHETERIZATION  2/2/12    EF 55% No sugnificant disease.     COLONOSCOPY  5/5/14    diverticulosis, 1 polyp, hemorrhoids    COLONOSCOPY  07/10/2017    single 5 mm polyp found in sigmoid colon, moderate diverticulosis found in sigmoid colon   Rob 71 SURGERY  11/2002    L4-5 hemilaminectomy and disk    LUMBAR DISCECTOMY  1973    PROSTATE SURGERY  09/26/2011    s/p robotic radical prostatectomy    ROTATOR CUFF REPAIR Right 10/2016    TIBIA FRACTURE SURGERY Right     childhood    URETEROLITHOTOMY  1992       Family History   Problem Relation Age of Onset    Heart Disease Mother         ASCAD    Heart Disease Father     Heart Disease Brother     Diabetes Other     Cancer Other         unknown type    Prostate Cancer Other     Coronary Art Dis Other     Coronary Art Dis Other        CareTeam (Including outside providers/suppliers regularly involved in providing care):   Patient Care Team:  Den Lee MD as PCP - General  Den Lee MD as PCP - [facility] Empaneled Provider  Swapna Smallwood MD as Consulting Physician (Cardiology)  Kamla Robin RN as Care Transitions Nurse    Wt Readings from Last 3 Encounters:   11/18/21 165 lb 1.6 oz (74.9 kg)   10/27/21 165 lb (74.8 kg)   09/14/21 161 lb 6.4 oz (73.2 kg)     Vitals:    11/18/21 0940   BP: 126/82   Pulse: 72   Weight: 165 lb 1.6 oz (74.9 kg)   Height: 5' 10\" (1.778 m)     Body mass index is 23.69 kg/m². Based upon direct observation of the patient, evaluation of cognition reveals recent and remote memory intact. Patient's complete Health Risk Assessment and screening values have been reviewed and are found in Flowsheets. The following problems were reviewed today and where indicated follow up appointments were made and/or referrals ordered. Positive Risk Factor Screenings with Interventions:          General Health and ACP:  General  In general, how would you say your health is?: Excellent  In the past 7 days, have you experienced any of the following?  New or Increased Pain, New or Increased Fatigue, Loneliness, Social Isolation, Stress or Anger?: None of These  Do you get the social and emotional support that you need?: Yes  Do you have a Living Will?: Yes  Advance Directives     Power of  Living Will ACP-Advance Directive ACP-Power of     Not on File Not on File Not on File Not on File      General Health Risk Interventions:  · No interventions needed    Health Habits/Nutrition:  Health Habits/Nutrition  Do you exercise for at least 20 minutes 2-3 times per week?: Yes  Have you lost any weight without trying in the past 3 months?: No  Do you eat only one meal per day?: No  Have you seen the dentist within the past year?: (!) No  Body mass index: 23.69  Health Habits/Nutrition Interventions:  · Dental exam overdue:  patient encouraged to make appointment with his/her dentist    Hearing/Vision:  No exam data present  Hearing/Vision  Do you or your family notice any trouble with your hearing that hasn't been managed with hearing aids?: (!) Yes  Do you have difficulty driving, watching TV, or doing any of your daily activities because of your eyesight?: (!) Yes  Have you had an eye exam within the past year?: (!) No  Hearing/Vision Interventions:  · Hearing concerns:  patient declines any further evaluation/treatment for hearing issues  · Vision concerns:  patient encouraged to make appointment with his/her eye specialist      Personalized Preventive Plan   Current Health Maintenance Status  Immunization History   Administered Date(s) Administered    Influenza Virus Vaccine 11/22/2018    Influenza, High Dose (Fluzone 65 yrs and older) 10/15/2015, 11/19/2018    Influenza, Quadv, IM, PF (6 mo and older Fluzone, Flulaval, Fluarix, and 3 yrs and older Afluria) 09/11/2020    Influenza, Quadv, adjuvanted, 65 yrs +, IM, PF (Fluad) 09/11/2020    Influenza, Triv, inactivated, subunit, adjuvanted, IM (Fluad 65 yrs and older) 11/08/2019    Pneumococcal Conjugate 13-valent (Julijqt14) 02/18/2016    Pneumococcal Conjugate 7-valent (Prevnar7) 09/12/2018    Pneumococcal Polysaccharide ([id number]) 09/27/2010    Tdap (Boostrix, Adacel) 08/28/2014        Health Maintenance   Topic Date Due    Shingles Vaccine (1 of 2) Never done    COVID-19 Vaccine (2 - Moderna 3-dose booster series) 03/29/2021    Flu vaccine (1) 09/01/2021    Annual Wellness Visit (AWV)  11/12/2021    PSA counseling  02/08/2022    TSH testing  04/09/2022    DTaP/Tdap/Td vaccine (2 - Td or Tdap) 08/28/2024    Pneumococcal 65+ years Vaccine  Completed    Hepatitis A vaccine  Aged Out    Hepatitis B vaccine  Aged Out    Hib vaccine  Aged Out    Meningococcal (ACWY) vaccine  Aged Out     Recommendations for [employer] [place] Due: see orders and patient instructions/AVS.  . Recommended screening schedule for the next 5-10 years is provided to the patient in written form: see Patient Riccardo Turner was seen today for other. Diagnoses and all orders for this visit:    Chronic bilateral low back pain with bilateral sciatica  -     traMADol (ULTRAM) 50 MG tablet; Take 2 tablets by mouth 2 times daily as needed for Pain for up to 90 days.

## 2021-11-18 NOTE — PATIENT INSTRUCTIONS
Personalized Preventive Plan for Koko Melton - 11/18/2021  Medicare offers a range of preventive health benefits. Some of the tests and screenings are paid in full while other may be subject to a deductible, co-insurance, and/or copay. Some of these benefits include a comprehensive review of your medical history including lifestyle, illnesses that may run in your family, and various assessments and screenings as appropriate. After reviewing your medical record and screening and assessments performed today your provider may have ordered immunizations, labs, imaging, and/or referrals for you. A list of these orders (if applicable) as well as your Preventive Care list are included within your After Visit Summary for your review. Other Preventive Recommendations:    · A preventive eye exam performed by an eye specialist is recommended every 1-2 years to screen for glaucoma; cataracts, macular degeneration, and other eye disorders. · A preventive dental visit is recommended every 6 months. · Try to get at least 150 minutes of exercise per week or 10,000 steps per day on a pedometer . · Order or download the FREE \"Exercise & Physical Activity: Your Everyday Guide\" from [employer]. Call 3-397.963.7109 or search The [employer] online. · You need 8198-7708 mg of calcium and 4872-8030 IU of vitamin D per day. It is possible to meet your calcium requirement with diet alone, but a vitamin D supplement is usually necessary to meet this goal.  · When exposed to the sun, use a sunscreen that protects against both UVA and UVB radiation with an SPF of 30 or greater. Reapply every 2 to 3 hours or after sweating, drying off with a towel, or swimming. · Always wear a seat belt when traveling in a car. Always wear a helmet when riding a bicycle or motorcycle. Personalized Preventive Plan for Koko Melton - 11/18/2021  Medicare offers a range of preventive health benefits. Some of the tests and screenings are paid in full while other may be subject to a deductible, co-insurance, and/or copay. Some of these benefits include a comprehensive review of your medical history including lifestyle, illnesses that may run in your family, and various assessments and screenings as appropriate. After reviewing your medical record and screening and assessments performed today your provider may have ordered immunizations, labs, imaging, and/or referrals for you. A list of these orders (if applicable) as well as your Preventive Care list are included within your After Visit Summary for your review. Other Preventive Recommendations:    A preventive eye exam performed by an eye specialist is recommended every 1-2 years to screen for glaucoma; cataracts, macular degeneration, and other eye disorders. A preventive dental visit is recommended every 6 months. Try to get at least 150 minutes of exercise per week or 10,000 steps per day on a pedometer . Order or download the FREE \"Exercise & Physical Activity: Your Everyday Guide\" from The [employer]. Call 6-755.731.7305 or search The [employer] online. You need 9425-6698 mg of calcium and 2838-1083 IU of vitamin D per day. It is possible to meet your calcium requirement with diet alone, but a vitamin D supplement is usually necessary to meet this goal.  When exposed to the sun, use a sunscreen that protects against both UVA and UVB radiation with an SPF of 30 or greater. Reapply every 2 to 3 hours or after sweating, drying off with a towel, or swimming. Always wear a seat belt when traveling in a car. Always wear a helmet when riding a bicycle or motorcycle.

## 2021-12-03 DIAGNOSIS — G62.9 PERIPHERAL POLYNEUROPATHY: ICD-10-CM

## 2021-12-03 DIAGNOSIS — M54.42 CHRONIC BILATERAL LOW BACK PAIN WITH BILATERAL SCIATICA: Chronic | ICD-10-CM

## 2021-12-03 DIAGNOSIS — M54.41 CHRONIC BILATERAL LOW BACK PAIN WITH BILATERAL SCIATICA: Chronic | ICD-10-CM

## 2021-12-03 DIAGNOSIS — G89.29 CHRONIC BILATERAL LOW BACK PAIN WITH BILATERAL SCIATICA: Chronic | ICD-10-CM

## 2021-12-03 RX ORDER — GABAPENTIN 300 MG/1
300 CAPSULE ORAL 3 TIMES DAILY
Qty: 270 CAPSULE | Refills: 0 | Status: SHIPPED | OUTPATIENT
Start: 2021-12-03 | End: 2022-02-14 | Stop reason: SDUPTHER

## 2022-01-22 ENCOUNTER — HOSPITAL ENCOUNTER (EMERGENCY)
Age: 82
Discharge: HOME | End: 2022-01-22
Attending: EMERGENCY MEDICINE
Payer: MEDICARE

## 2022-01-22 ENCOUNTER — APPOINTMENT (OUTPATIENT)
Dept: GENERAL RADIOLOGY | Age: 82
End: 2022-01-22
Payer: MEDICARE

## 2022-01-22 ENCOUNTER — APPOINTMENT (OUTPATIENT)
Dept: CT IMAGING | Age: 82
End: 2022-01-22
Payer: MEDICARE

## 2022-01-22 VITALS
OXYGEN SATURATION: 97 % | DIASTOLIC BLOOD PRESSURE: 98 MMHG | HEART RATE: 76 BPM | HEIGHT: 70 IN | BODY MASS INDEX: 23.62 KG/M2 | SYSTOLIC BLOOD PRESSURE: 161 MMHG | TEMPERATURE: 98.3 F | RESPIRATION RATE: 9 BRPM | WEIGHT: 165 LBS

## 2022-01-22 DIAGNOSIS — R20.0 NUMBNESS: Primary | ICD-10-CM

## 2022-01-22 DIAGNOSIS — R53.1 GENERALIZED WEAKNESS: ICD-10-CM

## 2022-01-22 LAB
ALBUMIN SERPL-MCNC: 4.2 GM/DL (ref 3.4–5)
ALP BLD-CCNC: 35 IU/L (ref 40–128)
ALT SERPL-CCNC: 14 U/L (ref 10–40)
ANION GAP SERPL CALCULATED.3IONS-SCNC: 8 MMOL/L (ref 4–16)
AST SERPL-CCNC: 21 IU/L (ref 15–37)
BACTERIA: NEGATIVE /HPF
BASOPHILS ABSOLUTE: 0 K/CU MM
BASOPHILS RELATIVE PERCENT: 0 % (ref 0–1)
BILIRUB SERPL-MCNC: 0.6 MG/DL (ref 0–1)
BILIRUBIN URINE: NEGATIVE MG/DL
BLOOD, URINE: ABNORMAL
BUN BLDV-MCNC: 11 MG/DL (ref 6–23)
CALCIUM SERPL-MCNC: 9.4 MG/DL (ref 8.3–10.6)
CHLORIDE BLD-SCNC: 99 MMOL/L (ref 99–110)
CLARITY: CLEAR
CO2: 29 MMOL/L (ref 21–32)
COLOR: ABNORMAL
CREAT SERPL-MCNC: 0.8 MG/DL (ref 0.9–1.3)
DIFFERENTIAL TYPE: ABNORMAL
EOSINOPHILS ABSOLUTE: 0 K/CU MM
EOSINOPHILS RELATIVE PERCENT: 1.2 % (ref 0–3)
GFR AFRICAN AMERICAN: >60 ML/MIN/1.73M2
GFR NON-AFRICAN AMERICAN: >60 ML/MIN/1.73M2
GLUCOSE BLD-MCNC: 99 MG/DL (ref 70–99)
GLUCOSE, URINE: NEGATIVE MG/DL
HCT VFR BLD CALC: 45 % (ref 42–52)
HEMOGLOBIN: 14.7 GM/DL (ref 13.5–18)
IMMATURE NEUTROPHIL %: 0.3 % (ref 0–0.43)
KETONES, URINE: NEGATIVE MG/DL
LEUKOCYTE ESTERASE, URINE: NEGATIVE
LYMPHOCYTES ABSOLUTE: 0.6 K/CU MM
LYMPHOCYTES RELATIVE PERCENT: 17.5 % (ref 24–44)
MCH RBC QN AUTO: 31.6 PG (ref 27–31)
MCHC RBC AUTO-ENTMCNC: 32.7 % (ref 32–36)
MCV RBC AUTO: 96.8 FL (ref 78–100)
MONOCYTES ABSOLUTE: 0.3 K/CU MM
MONOCYTES RELATIVE PERCENT: 7.3 % (ref 0–4)
MUCUS: ABNORMAL HPF
NITRITE URINE, QUANTITATIVE: NEGATIVE
NUCLEATED RBC %: 0 %
PDW BLD-RTO: 12.4 % (ref 11.7–14.9)
PH, URINE: 7 (ref 5–8)
PLATELET # BLD: 169 K/CU MM (ref 140–440)
PMV BLD AUTO: 9.3 FL (ref 7.5–11.1)
POTASSIUM SERPL-SCNC: 3.9 MMOL/L (ref 3.5–5.1)
PRO-BNP: 75.28 PG/ML
PROTEIN UA: NEGATIVE MG/DL
RBC # BLD: 4.65 M/CU MM (ref 4.6–6.2)
RBC URINE: 1 /HPF (ref 0–3)
SEGMENTED NEUTROPHILS ABSOLUTE COUNT: 2.5 K/CU MM
SEGMENTED NEUTROPHILS RELATIVE PERCENT: 73.7 % (ref 36–66)
SODIUM BLD-SCNC: 136 MMOL/L (ref 135–145)
SPECIFIC GRAVITY UA: 1.01 (ref 1–1.03)
SQUAMOUS EPITHELIAL: <1 /HPF
TOTAL IMMATURE NEUTOROPHIL: 0.01 K/CU MM
TOTAL NUCLEATED RBC: 0 K/CU MM
TOTAL PROTEIN: 7 GM/DL (ref 6.4–8.2)
TRICHOMONAS: ABNORMAL /HPF
TROPONIN T: <0.01 NG/ML
TSH HIGH SENSITIVITY: 2.09 UIU/ML (ref 0.27–4.2)
UROBILINOGEN, URINE: NEGATIVE MG/DL (ref 0.2–1)
WBC # BLD: 3.4 K/CU MM (ref 4–10.5)
WBC UA: <1 /HPF (ref 0–2)

## 2022-01-22 PROCEDURE — 83880 ASSAY OF NATRIURETIC PEPTIDE: CPT

## 2022-01-22 PROCEDURE — 93005 ELECTROCARDIOGRAM TRACING: CPT | Performed by: EMERGENCY MEDICINE

## 2022-01-22 PROCEDURE — 70450 CT HEAD/BRAIN W/O DYE: CPT

## 2022-01-22 PROCEDURE — 99285 EMERGENCY DEPT VISIT HI MDM: CPT

## 2022-01-22 PROCEDURE — 85025 COMPLETE CBC W/AUTO DIFF WBC: CPT

## 2022-01-22 PROCEDURE — 80053 COMPREHEN METABOLIC PANEL: CPT

## 2022-01-22 PROCEDURE — 71045 X-RAY EXAM CHEST 1 VIEW: CPT

## 2022-01-22 PROCEDURE — 84443 ASSAY THYROID STIM HORMONE: CPT

## 2022-01-22 PROCEDURE — 81001 URINALYSIS AUTO W/SCOPE: CPT

## 2022-01-22 PROCEDURE — 84484 ASSAY OF TROPONIN QUANT: CPT

## 2022-01-22 ASSESSMENT — ENCOUNTER SYMPTOMS
NAUSEA: 0
EYE REDNESS: 0
VOMITING: 0
SORE THROAT: 1
ABDOMINAL PAIN: 0
RHINORRHEA: 0
SHORTNESS OF BREATH: 0
BACK PAIN: 0
COUGH: 0

## 2022-01-22 NOTE — ED PROVIDER NOTES
Triage Chief Complaint:   Other (\"numbness all over\")    [place]:  Janene King is a 79 y.o. male that presents with complaint of numbness all over. He states this started at about 7 AM this morning. He was feeling fine when he initially woke up and then symptoms started about 7 AM.  He also initially felt generally weak. He states when the symptoms started he took his blood pressure and it was in the 200s over 190s. He then walked around his house and recheck his blood pressure and its starting to get better though still high for him. He states his blood pressure normally has a systolic in the 729J or 156A. He states the generalized weakness has resolved. He does have a mild headache. He has difficulty describing the generalized numbness but denies any pins-and-needles and feels like it is also improving though not resolved like the weakness. He denies any lightheadedness or dizziness. He denies any chest pain, shortness of breath or fevers. He did not take any of his morning medicines. He denies any changes in his vision. He has had a cold for about the last week and has had a little bit of a sore throat and congestion and while his symptoms are improving from the cold he still has some mild symptoms. He was tested for COVID on Wednesday and rapid test was negative at that time. A send out test was also sent on Wednesday which returned as negative. Patient did receive the COVID-vaccine and has been boosted. ROS:   Review of Systems   Constitutional: Negative for chills and fever. HENT: Positive for congestion (mild) and sore throat (mild). Negative for rhinorrhea. Eyes: Negative for redness and visual disturbance. Respiratory: Negative for cough and shortness of breath. Cardiovascular: Negative for chest pain and leg swelling. Gastrointestinal: Negative for abdominal pain, nausea and vomiting. Genitourinary: Negative for dysuria and frequency.    Musculoskeletal: Negative for arthralgias and back pain. Skin: Negative for rash and wound. Neurological: Positive for weakness (generalized, resolved), numbness (generalized) and headaches (mild). Negative for dizziness and syncope. Psychiatric/Behavioral: Negative for hallucinations and suicidal ideas. Past Medical History:   Diagnosis Date    Anesthesia complication     Patient reports in 1973 following a Back Surgery he was kept in the hospital due to high temperatures in the evenings. He stated they kept him for observation and doesn't remember receiving any treatment for the high temperatures. Patient reports the anesthesia name started with flur? ?.    BPH with obstruction/lower urinary tract symptoms     Chest pain 7/1/2018    Chronic low back pain     GERD (gastroesophageal reflux disease)     H/O 24 hour EKG monitoring 3/12  12/11    3/12/12  essentially a normal event monitor without significant arrhythmias noted    H/O cardiac catheterization 2/2/12 4/02 10/98    2/22/12  LM, LAD and RCA all without significant disease    H/O cardiac catheterization 2/2/12 4/02 10/98    Done R/T false pos on stress test.    H/O cardiac catheterization 08/06/2018    normal study    H/O cardiovascular stress test 07/02/2018    normal study    H/O echocardiogram 12/10      3/14 EF55-60% normal LV function 12/10/10  complete 2 dim transthoracic echj. LVSF normal  EF >55%, transmitral spectral Doppler flow pattern is suggestive of  impaired LV relaxation    History of cardiovascular stress test 2/12 11/09 7/08 9/06 2/02 4/00 8/99 2/8/2012  evidence of mild ischemia  in the RCA. abnormal study , restingEF is 70%, global LVSF is  normal    History of Holter monitoring 9/17/14    14 day EVENT - no RVR    History of nuclear stress test 08/03/2017    cardiolite-normal,EF60%    Hx of chest x-ray 2/20/12    chest is considereed non acute.   COPD    Hx of echocardiogram 08/03/2017    JC30-21%,DGHED 2 diastolic dysfunction    Hyperlipidemia     Idiopathic peripheral neuropathy     Kidney stone     Left facial numbness 8/30/2017    Lumbar radiculopathy     Malignant melanoma of skin of face (HCC)     MVP (mitral valve prolapse)     Myotonia     chronic increased CPK's    Peripelvic (lymphatic) cyst     per CT    Precordial pain 8/3/2018    Vitamin B12 deficiency      Past Surgical History:   Procedure Laterality Date    CARDIAC CATHETERIZATION  2/2/12    EF 55% No sugnificant disease.  COLONOSCOPY  5/5/14    diverticulosis, 1 polyp, hemorrhoids    COLONOSCOPY  07/10/2017    single 5 mm polyp found in sigmoid colon, moderate diverticulosis found in sigmoid colon    LUMBAR DISC SURGERY  11/2002    L4-5 hemilaminectomy and disk    LUMBAR DISCECTOMY  1973    PROSTATE SURGERY  09/26/2011    s/p robotic radical prostatectomy    ROTATOR CUFF REPAIR Right 10/2016    TIBIA FRACTURE SURGERY Right     childhood    URETEROLITHOTOMY  1992     Family History   Problem Relation Age of Onset    Heart Disease Mother         ASCAD    Heart Disease Father     Heart Disease Brother     Diabetes Other     Cancer Other         unknown type    Prostate Cancer Other     Coronary Art Dis Other     Coronary Art Dis Other      Social History     Socioeconomic History    Marital status:       Spouse name: Not on file    Number of children: Not on file    Years of education: Not on file    Highest education level: Not on file   Occupational History    Not on file   Tobacco Use    Smoking status: Never Smoker    Smokeless tobacco: Never Used   Vaping Use    Vaping Use: Never used   Substance and Sexual Activity    Alcohol use: Yes     Comment: Occassional    Drug use: No    Sexual activity: Not Currently     Partners: Female     Comment:    Other Topics Concern    Not on file   Social History Narrative    Not on file     Social Determinants of Health     Financial Resource Strain: Low Risk     Difficulty of Paying Living Expenses: Not hard at all   Food Insecurity: No Food Insecurity    Worried About Running Out of Food in the Last Year: Never true    Ran Out of Food in the Last Year: Never true   Transportation Needs:     Lack of Transportation (Medical): Not on file    Lack of Transportation (Non-Medical): Not on file   Physical Activity:     Days of Exercise per Week: Not on file    Minutes of Exercise per Session: Not on file   Stress:     Feeling of Stress : Not on file   Social Connections:     Frequency of Communication with Friends and Family: Not on file    Frequency of Social Gatherings with Friends and Family: Not on file    Attends Denominational Services: Not on file    Active Member of [address] [place] [address] [employer] or Organizations: Not on file    Attends Club or Organization Meetings: Not on file    Marital Status: Not on file   Intimate Partner Violence:     Fear of Current or Ex-Partner: Not on file    Emotionally Abused: Not on file    Physically Abused: Not on file    Sexually Abused: Not on file   Housing Stability:     Unable to Pay for Housing in the Last Year: Not on file    Number of [place] in the Last Year: Not on file    Unstable Housing in the Last Year: Not on file     No current facility-administered medications for this encounter. Current Outpatient Medications   Medication Sig Dispense Refill    benzonatate (TESSALON) 200 MG capsule Take 1 capsule by mouth 3 times daily as needed for Cough 30 capsule 0    gabapentin (NEURONTIN) 300 MG capsule Take 1 capsule by mouth 3 times daily for 90 days. 270 capsule 0    traMADol (ULTRAM) 50 MG tablet Take 2 tablets by mouth 2 times daily as needed for Pain for up to 90 days.  360 tablet 0    levothyroxine (SYNTHROID) 50 MCG tablet TAKE 1 TABLET BY MOUTH IN  THE MORNING 90 tablet 1    pantoprazole (PROTONIX) 40 MG tablet Take 1 tablet by mouth daily 90 tablet 1    DILT- MG extended release capsule Take 1 capsule by mouth daily 90 capsule 1    clopidogrel (PLAVIX) 75 MG tablet Take 1 tablet by mouth daily 90 tablet 3    aspirin 81 MG tablet Take 81 mg by mouth daily       No Known Allergies    Nursing Notes Reviewed     Physical Exam:   ED Triage Vitals   Enc Vitals Group      BP       Pulse       Resp       Temp       Temp src       SpO2       Weight       Height       Head Circumference       Peak Flow       Pain Score       Pain Loc       Pain Edu? Excl. in [address]? BP (!) 161/98   Pulse 76   Temp 98.3 °F (36.8 °C) (Oral)   Resp 9   Ht 5' 10\" (1.778 m)   Wt 165 lb (74.8 kg)   SpO2 97%   BMI 23.68 kg/m²   My pulse ox interpretation is - normal  Physical Exam  Constitutional:       General: He is not in acute distress. Appearance: Normal appearance. He is not diaphoretic. HENT:      Head: Normocephalic and atraumatic. Eyes:      General:         Right eye: No discharge. Left eye: No discharge. Conjunctiva/sclera: Conjunctivae normal.      Pupils: Pupils are equal, round, and reactive to light. Comments: Disconjugate gaze at baseline. Cardiovascular:      Rate and Rhythm: Normal rate and regular rhythm. Pulses: Normal pulses. Radial pulses are 2+ on the right side and 2+ on the left side. Pulmonary:      Effort: Pulmonary effort is normal. No respiratory distress. Breath sounds: Normal breath sounds. No wheezing or rales. Abdominal:      General: There is no distension. Tenderness: There is no abdominal tenderness. There is no guarding or rebound. Musculoskeletal:         General: No swelling or tenderness. Normal range of motion. Skin:     General: Skin is warm and dry. Neurological:      General: No focal deficit present. Mental Status: He is alert. Cranial Nerves: No cranial nerve deficit. Sensory: No sensory deficit ( Normal sensation to light touch throughout though patient feels like sensation is slightly abnormal still. ).       Motor: No weakness (5/5 strength in all extremities).    Psychiatric:         Mood and Affect: Mood normal.         Behavior: Behavior normal.         I have reviewed and interpreted all of the currently available lab results from this visit (if applicable):  Results for orders placed or performed during the hospital encounter of 01/22/22   CBC Auto Differential   Result Value Ref Range    WBC 3.4 (L) 4.0 - 10.5 K/CU MM    RBC 4.65 4.6 - 6.2 M/CU MM    Hemoglobin 14.7 13.5 - 18.0 GM/DL    Hematocrit 45.0 42 - 52 %    MCV 96.8 78 - 100 FL    MCH 31.6 (H) 27 - 31 PG    MCHC 32.7 32.0 - 36.0 %    RDW 12.4 11.7 - 14.9 %    Platelets 417 819 - 814 K/CU MM    MPV 9.3 7.5 - 11.1 FL    Differential Type AUTOMATED DIFFERENTIAL     Segs Relative 73.7 (H) 36 - 66 %    Lymphocytes % 17.5 (L) 24 - 44 %    Monocytes % 7.3 (H) 0 - 4 %    Eosinophils % 1.2 0 - 3 %    Basophils % 0.0 0 - 1 %    Segs Absolute 2.5 K/CU MM    Lymphocytes Absolute 0.6 K/CU MM    Monocytes Absolute 0.3 K/CU MM    Eosinophils Absolute 0.0 K/CU MM    Basophils Absolute 0.0 K/CU MM    Nucleated RBC % 0.0 %    Total Nucleated RBC 0.0 K/CU MM    Total Immature Neutrophil 0.01 K/CU MM    Immature Neutrophil % 0.3 0 - 0.43 %   Comprehensive Metabolic Panel w/ Reflex to MG   Result Value Ref Range    Sodium 136 135 - 145 MMOL/L    Potassium 3.9 3.5 - 5.1 MMOL/L    Chloride 99 99 - 110 mMol/L    CO2 29 21 - 32 MMOL/L    BUN 11 6 - 23 MG/DL    CREATININE 0.8 (L) 0.9 - 1.3 MG/DL    Glucose 99 70 - 99 MG/DL    Calcium 9.4 8.3 - 10.6 MG/DL    Albumin 4.2 3.4 - 5.0 GM/DL    Total Protein 7.0 6.4 - 8.2 GM/DL    Total Bilirubin 0.6 0.0 - 1.0 MG/DL    ALT 14 10 - 40 U/L    AST 21 15 - 37 IU/L    Alkaline Phosphatase 35 (L) 40 - 128 IU/L    GFR Non-African American >60 >60 mL/min/1.73m2    GFR African American >60 >60 mL/min/1.73m2    Anion Gap 8 4 - 16   Troponin   Result Value Ref Range    Troponin T <0.010 <0.01 NG/ML   Brain Natriuretic Peptide   Result Value Ref Range    Pro-BNP 75.28 <300 PG/ML   Urinalysis, reflex to microscopic   Result Value Ref Range    Color, UA STRAW (A) YELLOW    Clarity, UA CLEAR CLEAR    Glucose, Urine NEGATIVE NEGATIVE MG/DL    Bilirubin Urine NEGATIVE NEGATIVE MG/DL    Ketones, Urine NEGATIVE NEGATIVE MG/DL    Specific Gravity, UA 1.006 1.001 - 1.035    Blood, Urine SMALL (A) NEGATIVE    pH, Urine 7.0 5.0 - 8.0    Protein, UA NEGATIVE NEGATIVE MG/DL    Urobilinogen, Urine NEGATIVE 0.2 - 1.0 MG/DL    Nitrite Urine, Quantitative NEGATIVE NEGATIVE    Leukocyte Esterase, Urine NEGATIVE NEGATIVE    RBC, UA 1 0 - 3 /HPF    WBC, UA <1 0 - 2 /HPF    Bacteria, UA NEGATIVE NEGATIVE /HPF    Squam Epithel, UA <1 /HPF    Mucus, UA RARE (A) NEGATIVE HPF    Trichomonas, UA NONE SEEN NONE SEEN /HPF   TSH without Reflex   Result Value Ref Range    TSH, High Sensitivity 2.090 0.270 - 4.20 uIu/ml   EKG 12 Lead   Result Value Ref Range    Ventricular Rate 71 BPM    Atrial Rate 71 BPM    P-R Interval 200 ms    QRS Duration 96 ms    Q-T Interval 388 ms    QTc Calculation (Bazett) 421 ms    P Axis 65 degrees    R Axis 5 degrees    T Axis 58 degrees    Diagnosis       Normal sinus rhythm  Normal ECG  When compared with ECG of 09-APR-2021 11:22,  No significant change was found        Radiographs (if obtained):  [] The following radiograph was interpreted by myself in the absence of a radiologist:  [x]Radiologist's Report Reviewed:  XR CHEST PORTABLE   Final Result   No acute findings. CT HEAD WO CONTRAST   Final Result   No acute intracranial abnormality. EKG (if obtained): (All EKG's are interpreted by myself in the absence of a cardiologist)  Normal sinus rhythm with a rate of 71. AL interval 200, QRS 96, QTc 421. No ST elevations or depressions. Normal T waves. Impression: Normal EKG. When compared to previous EKG from 4/9/2021, there are no significant changes.     MDM:  Differential diagnoses considered include but are not limited to hypoglycemia, electrolyte abnormality, cardiac arrhythmia, hypoglycemia, hypertensive urgency vs emergency, inaccurate home blood pressure cuff, acute intracranial hemorrhage, hyperthyroidism, panic attack. Patient present with complaint of numbness all over. He is having difficulty describing the feeling but denies pins-and-needles. The symptoms are improving and are only moderate to almost mild since arriving in the emergency room. Patient's blood pressure is elevated but much improved from the initial readings he obtained at home. EKG was obtained which is not concerning for acute ischemia or arrhythmia. Basic labs were obtained and show minimally decreased white blood cell count at 3.4 but otherwise unremarkable. Normal electrolytes. Normal glucose. I do not suspect hypoglycemia. Troponin is normal, I do not suspect acute coronary syndrome. No focal deficits. I do not suspect an ischemic stroke. CT scan of his head was obtained and is unremarkable. I do not suspect intracranial hemorrhage. Urine is not concerning for an infection. Thyroid studies are within normal limits. I do not suspect hyperthyroidism. Since arriving in the emergency room patient symptoms are resolved and he feels much better. He no longer has any abnormal sensation and no longer feels weak. Patient was ambulated in the emergency department without difficulty. I do not suspect an emergent cause of his symptoms though I did recommend close follow-up with his primary care physician or return to the emergency department for additional evaluation if the symptoms return. We will discharge patient home in stable condition. Plan of care explained to patient. Concerning signs and symptoms warranting a return visit to the Emergency Department were explained in detail. All questions and concerns were addressed to the patient's satisfaction. Patient understood and agreed with plan.     I did don appropriate PPE (including face mask, protective eye ware/safety glasses and gloves), as recommended by the health facility/national standard best practice, during my bedside interactions with the patient. The likelihood of other entities in the differential is insufficient to justify any further testing for them. This was explained to the patient. The patient was advised that persistent or worsening symptoms would requirefurther evaluation. Clinical Impression:  1. Numbness    2. Generalized weakness          Giovanna Ellis MD       Please note that portions of this note may have been complete with a voice recognition program.  Effortswere made to edit the dictations, but occasional words are mis-transcribed.           Giovanna Ellis MD  01/25/22 1071

## 2022-01-22 NOTE — ED NOTES
Bed: ED-20  Expected date:   Expected time:   Means of arrival:   Comments:  EMS      Oral Tovar RN  01/22/22 5300

## 2022-01-22 NOTE — ED TRIAGE NOTES
The patient presents to the emergency department alert and oriented with a complaint of \"numbness all over\" that started this morning at 7 am. The patient denies any pain, blurred vision, weakness, headache, dizziness, shortness of breath, nausea or slurred speech. The patient placed on the monitor with vital signs taken. Assessment as follows; Skin is pink, warm and dry. The patient has equal , no arm drift and facial droop or slurred speech.

## 2022-01-24 ENCOUNTER — HOSPITAL ENCOUNTER (OUTPATIENT)
Age: 82
Setting detail: SPECIMEN
Discharge: HOME | End: 2022-01-24
Payer: MEDICARE

## 2022-01-24 ENCOUNTER — OFFICE VISIT (OUTPATIENT)
Dept: FAMILY MEDICINE CLINIC | Age: 82
End: 2022-01-24
Payer: MEDICARE

## 2022-01-24 DIAGNOSIS — R05.9 COUGH: Primary | ICD-10-CM

## 2022-01-24 PROCEDURE — U0003 INFECTIOUS AGENT DETECTION BY NUCLEIC ACID (DNA OR RNA); SEVERE ACUTE RESPIRATORY SYNDROME CORONAVIRUS 2 (SARS-COV-2) (CORONAVIRUS DISEASE [COVID-19]), AMPLIFIED PROBE TECHNIQUE, MAKING USE OF HIGH THROUGHPUT TECHNOLOGIES AS DESCRIBED BY CMS-2020-01-R: HCPCS

## 2022-01-24 PROCEDURE — 99213 OFFICE O/P EST LOW 20 MIN: CPT | Performed by: NURSE PRACTITIONER

## 2022-01-24 PROCEDURE — G8484 FLU IMMUNIZE NO ADMIN: HCPCS | Performed by: NURSE PRACTITIONER

## 2022-01-24 PROCEDURE — 1123F ACP DISCUSS/DSCN MKR DOCD: CPT | Performed by: NURSE PRACTITIONER

## 2022-01-24 PROCEDURE — G8427 DOCREV CUR MEDS BY ELIG CLIN: HCPCS | Performed by: NURSE PRACTITIONER

## 2022-01-24 PROCEDURE — G8420 CALC BMI NORM PARAMETERS: HCPCS | Performed by: NURSE PRACTITIONER

## 2022-01-24 PROCEDURE — 1036F TOBACCO NON-USER: CPT | Performed by: NURSE PRACTITIONER

## 2022-01-24 PROCEDURE — 4040F PNEUMOC VAC/ADMIN/RCVD: CPT | Performed by: NURSE PRACTITIONER

## 2022-01-24 PROCEDURE — U0005 INFEC AGEN DETEC AMPLI PROBE: HCPCS

## 2022-01-24 RX ORDER — BENZONATATE 200 MG/1
200 CAPSULE ORAL 3 TIMES DAILY PRN
Qty: 30 CAPSULE | Refills: 0 | Status: SHIPPED | OUTPATIENT
Start: 2022-01-24 | End: 2022-01-31

## 2022-01-24 NOTE — PROGRESS NOTES
1/24/2022    HPI:  Chief complaint and history of present illness as per medical assistant/nurse documented today in the Flu/COVID-19 clinic. Arnoldo Loyola is a 59-year-old male, current patient of Dr. Gera Chacko who presents to the right clinic this afternoon for complaints of a mild congested cough, slight pharyngitis, postnasal drip and cough productive of intermittent slight yellow mucus over the last 5 to 6 days. Denies any fevers or chills but has had some mild myalgias diffusely. Has been taking over-the-counter cough syrup with no improvement and he stopped using this after his blood pressure significantly elevated over the weekend causing him to have to go to the emergency room. States he was tested approximately 1 week ago and this was negative. MEDICATIONS:  Prior to Visit Medications    Medication Sig Taking? Authorizing Provider   benzonatate (TESSALON) 200 MG capsule Take 1 capsule by mouth 3 times daily as needed for Cough Yes Rosezella Fossa, APRN - CNP   gabapentin (NEURONTIN) 300 MG capsule Take 1 capsule by mouth 3 times daily for 90 days. Taj Stone MD   traMADol (ULTRAM) 50 MG tablet Take 2 tablets by mouth 2 times daily as needed for Pain for up to 90 days. Taj Stone MD   levothyroxine (SYNTHROID) 50 MCG tablet TAKE 1 TABLET BY MOUTH IN  THE MORNING  Taj Stone MD   pantoprazole (PROTONIX) 40 MG tablet Take 1 tablet by mouth daily  Taj Stone MD   DILT- MG extended release capsule Take 1 capsule by mouth daily  Georgina Gates, APRN - CNP   clopidogrel (PLAVIX) 75 MG tablet Take 1 tablet by mouth daily  Blake Segovia DO   aspirin 81 MG tablet Take 81 mg by mouth daily  Historical Provider, MD       No Known Allergies,   Past Medical History:   Diagnosis Date    Anesthesia complication     Patient reports in 1973 following a Back Surgery he was kept in the hospital due to high temperatures in the evenings.  He stated they kept him for observation and doesn't remember receiving any treatment for the high temperatures. Patient reports the anesthesia name started with flur? ?.    BPH with obstruction/lower urinary tract symptoms     Chest pain 7/1/2018    Chronic low back pain     GERD (gastroesophageal reflux disease)     H/O 24 hour EKG monitoring 3/12  12/11    3/12/12  essentially a normal event monitor without significant arrhythmias noted    H/O cardiac catheterization 2/2/12 4/02 10/98    2/22/12  LM, LAD and RCA all without significant disease    H/O cardiac catheterization 2/2/12 4/02 10/98    Done R/T false pos on stress test.    H/O cardiac catheterization 08/06/2018    normal study    H/O cardiovascular stress test 07/02/2018    normal study    H/O echocardiogram 12/10      3/14 EF55-60% normal LV function 12/10/10  complete 2 dim transthoracic echj. LVSF normal  EF >55%, transmitral spectral Doppler flow pattern is suggestive of  impaired LV relaxation    History of cardiovascular stress test 2/12 11/09 7/08 9/06 2/02 4/00 8/99 2/8/2012  evidence of mild ischemia  in the RCA. abnormal study , restingEF is 70%, global LVSF is  normal    History of Holter monitoring 9/17/14    14 day EVENT - no RVR    History of nuclear stress test 08/03/2017    cardiolite-normal,EF60%    Hx of chest x-ray 2/20/12    chest is considereed non acute. COPD    Hx of echocardiogram 08/03/2017    OS10-18%,JSXQP 2 diastolic dysfunction    Hyperlipidemia     Idiopathic peripheral neuropathy     Kidney stone     Left facial numbness 8/30/2017    Lumbar radiculopathy     Malignant melanoma of skin of face (HCC)     MVP (mitral valve prolapse)     Myotonia     chronic increased CPK's    Peripelvic (lymphatic) cyst     per CT    Precordial pain 8/3/2018    Vitamin B12 deficiency    ,   Past Surgical History:   Procedure Laterality Date    CARDIAC CATHETERIZATION  2/2/12    EF 55% No sugnificant disease.     COLONOSCOPY  5/5/14 diverticulosis, 1 polyp, hemorrhoids    COLONOSCOPY  07/10/2017    single 5 mm polyp found in sigmoid colon, moderate diverticulosis found in sigmoid colon    LUMBAR DISC SURGERY  11/2002    L4-5 hemilaminectomy and disk    LUMBAR DISCECTOMY  1973    PROSTATE SURGERY  09/26/2011    s/p robotic radical prostatectomy    ROTATOR CUFF REPAIR Right 10/2016    TIBIA FRACTURE SURGERY Right     childhood    URETEROLITHOTOMY  1992   ,   Social History     Tobacco Use    Smoking status: Never Smoker    Smokeless tobacco: Never Used   Vaping Use    Vaping Use: Never used   Substance Use Topics    Alcohol use: Yes     Comment: Occassional    Drug use: No       PHYSICAL EXAM:  Physical Exam    ASSESSMENT/PLAN:  1. Cough-suspect likely viral etiology. Continue Tylenol or ibuprofen as needed for headaches fevers or chills. We will also provide as needed Tessalon as below for cough. CDC guidelines for quarantine until testing results discussed. Increase fluids rest and call with any further questions or complications. - Covid-19 Ambulatory  - benzonatate (TESSALON) 200 MG capsule; Take 1 capsule by mouth 3 times daily as needed for Cough  Dispense: 30 capsule; Refill: 0      FOLLOW-UP:  Return if symptoms worsen or fail to improve.     In addition to other information, the printed after visit summary provided to the patient includes:  [x] COVID-19 Self care instructions  [x] COVID-19 General patient information

## 2022-01-24 NOTE — PATIENT INSTRUCTIONS
Your COVID 19 test can take 1-5 days for the results to come back. We ask that you make a Mychart page and view your test results this way. You will need to Self quarantine until you know your results. If positive, please work on contact tracing. Increase fluids and rest  Saline nasal spray as needed for nasal congestion  Warm salt gargles as needed for throat discomfort  Monitor temperature twice a day  Tylenol as needed for fevers and/or discomfort. Big deep breaths periodically throughout the day  Regular Mucinex over the counter as needed for chest congestion  If symptoms worsen -Go to the ER. Follow up with your primary care provider      To Whom it May Concern:    Victor M Echavarria was tested for COVID-19 1/24/2022. He/she must stay home until test results are back. If test is positive, isolate for a total of 5 days, starting from day 1 of symptom onset. After 5 days, if fever-free for 24 hours and there has been a gradual improvement in symptoms, may come out of isolation, but must consistently wear a mask when around other people for 5 additional days. (5 days isolation, 5 days mask-wearing). We do not recommend retesting as patients may continue to test positive for months even though no longer contagious. It is suggested you call [address] [facility] or [address] with any questions regarding isolation timeframe/return to work/school details.

## 2022-01-24 NOTE — PROGRESS NOTES
1/24/22  Glynn Florez  1940    FLU/COVID-19 CLINIC EVALUATION    HPI SYMPTOMS:    Employer:  Retired    [] Fevers  [x] Chills  [x] Cough  [] Coughing up blood  [x] Chest Congestion  [x] Nasal Congestion  [] Feeling short of breath  [] Sometimes  [] Frequently  [] All the time  [] Chest pain  [x] Headaches  [x]Tolerable  [] Severe  [x] Sore throat  [] Muscle aches  [] Nausea  [] Vomiting  []Unable to keep fluids down  [] Diarrhea  []Severe    [] OTHER SYMPTOMS:      Symptom Duration:   [] 1  [] 2   [] 3   [] 4    [] 5   [] 6   [x] 7   [] 8   [] 9   [] 10   [] 11   [] 12   [] 13   [] 14   [] Longer than 14 days    Symptom course:   [] Worsening     [x] Stable     [] Improving    RISK FACTORS:    [] Pregnant or possibly pregnant  [x] Age over 61  [] Diabetes  [] Heart disease  [] Asthma  [] COPD/Other chronic lung diseases  [] Active Cancer  [] On Chemotherapy  [] Taking oral steroids  [] History Lymphoma/Leukemia  [] Close contact with a lab confirmed COVID-19 patient within 14 days of symptom onset  [] History of travel from affected geographical areas within 14 days of symptom onset       VITALS:  There were no vitals filed for this visit. TESTS:    POCT FLU:  [] Positive     []Negative    ASSESSMENT:    [] Flu  [] Possible COVID-19  [] Strep    PLAN:    [] Discharge home with written instructions for:  [] Flu management  [] Possible COVID-19 infection with self-quarantine and management of symptoms  [] Follow-up with primary care physician or emergency department if worsens  [] Evaluation per physician/NP/PA in clinic  [] Sent to ER       An  electronic signature was used to authenticate this note.      --Brock Landeros MA on 1/24/2022 at 1:18 PM

## 2022-01-25 DIAGNOSIS — K21.9 GASTROESOPHAGEAL REFLUX DISEASE WITHOUT ESOPHAGITIS: ICD-10-CM

## 2022-01-25 LAB
EKG ATRIAL RATE: 71 BPM
EKG DIAGNOSIS: NORMAL
EKG P AXIS: 65 DEGREES
EKG P-R INTERVAL: 200 MS
EKG Q-T INTERVAL: 388 MS
EKG QRS DURATION: 96 MS
EKG QTC CALCULATION (BAZETT): 421 MS
EKG R AXIS: 5 DEGREES
EKG T AXIS: 58 DEGREES
EKG VENTRICULAR RATE: 71 BPM
SARS-COV-2: NOT DETECTED
SOURCE: NORMAL

## 2022-01-25 PROCEDURE — 93010 ELECTROCARDIOGRAM REPORT: CPT | Performed by: INTERNAL MEDICINE

## 2022-01-26 RX ORDER — PANTOPRAZOLE SODIUM 40 MG/1
40 TABLET, DELAYED RELEASE ORAL DAILY
Qty: 90 TABLET | Refills: 0 | Status: SHIPPED | OUTPATIENT
Start: 2022-01-26 | End: 2022-02-14 | Stop reason: SDUPTHER

## 2022-02-07 ENCOUNTER — HOSPITAL ENCOUNTER (EMERGENCY)
Age: 82
Discharge: HOME | End: 2022-02-08
Attending: EMERGENCY MEDICINE
Payer: MEDICARE

## 2022-02-07 VITALS
BODY MASS INDEX: 23.62 KG/M2 | HEIGHT: 70 IN | TEMPERATURE: 97.8 F | OXYGEN SATURATION: 98 % | WEIGHT: 165 LBS | SYSTOLIC BLOOD PRESSURE: 188 MMHG | RESPIRATION RATE: 16 BRPM | DIASTOLIC BLOOD PRESSURE: 99 MMHG | HEART RATE: 80 BPM

## 2022-02-07 DIAGNOSIS — I10 HYPERTENSION, UNSPECIFIED TYPE: Primary | ICD-10-CM

## 2022-02-07 PROCEDURE — 99284 EMERGENCY DEPT VISIT MOD MDM: CPT

## 2022-02-07 ASSESSMENT — PAIN SCALES - GENERAL: PAINLEVEL_OUTOF10: 8

## 2022-02-07 ASSESSMENT — PAIN DESCRIPTION - PAIN TYPE: TYPE: ACUTE PAIN

## 2022-02-07 ASSESSMENT — PAIN DESCRIPTION - LOCATION: LOCATION: HEAD

## 2022-02-08 ENCOUNTER — OFFICE VISIT (OUTPATIENT)
Dept: CARDIOLOGY CLINIC | Age: 82
End: 2022-02-08
Payer: MEDICARE

## 2022-02-08 VITALS
SYSTOLIC BLOOD PRESSURE: 150 MMHG | HEIGHT: 70 IN | HEART RATE: 76 BPM | BODY MASS INDEX: 22.9 KG/M2 | DIASTOLIC BLOOD PRESSURE: 80 MMHG | WEIGHT: 160 LBS

## 2022-02-08 DIAGNOSIS — I10 PRIMARY HYPERTENSION: Primary | ICD-10-CM

## 2022-02-08 LAB
ALBUMIN SERPL-MCNC: 4.3 GM/DL (ref 3.4–5)
ALP BLD-CCNC: 52 IU/L (ref 40–128)
ALT SERPL-CCNC: 15 U/L (ref 10–40)
ANION GAP SERPL CALCULATED.3IONS-SCNC: 6 MMOL/L (ref 4–16)
AST SERPL-CCNC: 22 IU/L (ref 15–37)
BASOPHILS ABSOLUTE: 0 K/CU MM
BASOPHILS RELATIVE PERCENT: 0.2 % (ref 0–1)
BILIRUB SERPL-MCNC: 0.4 MG/DL (ref 0–1)
BUN BLDV-MCNC: 17 MG/DL (ref 6–23)
CALCIUM SERPL-MCNC: 9.7 MG/DL (ref 8.3–10.6)
CHLORIDE BLD-SCNC: 101 MMOL/L (ref 99–110)
CO2: 28 MMOL/L (ref 21–32)
CREAT SERPL-MCNC: 1 MG/DL (ref 0.9–1.3)
DIFFERENTIAL TYPE: ABNORMAL
EOSINOPHILS ABSOLUTE: 0.1 K/CU MM
EOSINOPHILS RELATIVE PERCENT: 1.7 % (ref 0–3)
GFR AFRICAN AMERICAN: >60 ML/MIN/1.73M2
GFR NON-AFRICAN AMERICAN: >60 ML/MIN/1.73M2
GLUCOSE BLD-MCNC: 101 MG/DL (ref 70–99)
HCT VFR BLD CALC: 47.1 % (ref 42–52)
HEMOGLOBIN: 15.7 GM/DL (ref 13.5–18)
IMMATURE NEUTROPHIL %: 0.2 % (ref 0–0.43)
LYMPHOCYTES ABSOLUTE: 1.3 K/CU MM
LYMPHOCYTES RELATIVE PERCENT: 29.5 % (ref 24–44)
MCH RBC QN AUTO: 31.8 PG (ref 27–31)
MCHC RBC AUTO-ENTMCNC: 33.3 % (ref 32–36)
MCV RBC AUTO: 95.5 FL (ref 78–100)
MONOCYTES ABSOLUTE: 0.5 K/CU MM
MONOCYTES RELATIVE PERCENT: 11.6 % (ref 0–4)
NUCLEATED RBC %: 0 %
PDW BLD-RTO: 12.7 % (ref 11.7–14.9)
PLATELET # BLD: 223 K/CU MM (ref 140–440)
PMV BLD AUTO: 9.2 FL (ref 7.5–11.1)
POTASSIUM SERPL-SCNC: 4.5 MMOL/L (ref 3.5–5.1)
RBC # BLD: 4.93 M/CU MM (ref 4.6–6.2)
SEGMENTED NEUTROPHILS ABSOLUTE COUNT: 2.4 K/CU MM
SEGMENTED NEUTROPHILS RELATIVE PERCENT: 56.8 % (ref 36–66)
SODIUM BLD-SCNC: 135 MMOL/L (ref 135–145)
TOTAL IMMATURE NEUTOROPHIL: 0.01 K/CU MM
TOTAL NUCLEATED RBC: 0 K/CU MM
TOTAL PROTEIN: 6.8 GM/DL (ref 6.4–8.2)
TROPONIN T: <0.01 NG/ML
WBC # BLD: 4.2 K/CU MM (ref 4–10.5)

## 2022-02-08 PROCEDURE — G8420 CALC BMI NORM PARAMETERS: HCPCS | Performed by: NURSE PRACTITIONER

## 2022-02-08 PROCEDURE — G8484 FLU IMMUNIZE NO ADMIN: HCPCS | Performed by: NURSE PRACTITIONER

## 2022-02-08 PROCEDURE — 1123F ACP DISCUSS/DSCN MKR DOCD: CPT | Performed by: NURSE PRACTITIONER

## 2022-02-08 PROCEDURE — 1036F TOBACCO NON-USER: CPT | Performed by: NURSE PRACTITIONER

## 2022-02-08 PROCEDURE — 4040F PNEUMOC VAC/ADMIN/RCVD: CPT | Performed by: NURSE PRACTITIONER

## 2022-02-08 PROCEDURE — 99214 OFFICE O/P EST MOD 30 MIN: CPT | Performed by: NURSE PRACTITIONER

## 2022-02-08 PROCEDURE — 84484 ASSAY OF TROPONIN QUANT: CPT

## 2022-02-08 PROCEDURE — 85025 COMPLETE CBC W/AUTO DIFF WBC: CPT

## 2022-02-08 PROCEDURE — 93005 ELECTROCARDIOGRAM TRACING: CPT | Performed by: EMERGENCY MEDICINE

## 2022-02-08 PROCEDURE — G8427 DOCREV CUR MEDS BY ELIG CLIN: HCPCS | Performed by: NURSE PRACTITIONER

## 2022-02-08 PROCEDURE — 80053 COMPREHEN METABOLIC PANEL: CPT

## 2022-02-08 RX ORDER — HYDROCHLOROTHIAZIDE 25 MG/1
25 TABLET ORAL EVERY MORNING
Qty: 30 TABLET | Refills: 3 | Status: SHIPPED | OUTPATIENT
Start: 2022-02-08 | End: 2022-02-14 | Stop reason: DRUGHIGH

## 2022-02-08 ASSESSMENT — ENCOUNTER SYMPTOMS
ABDOMINAL PAIN: 0
SORE THROAT: 0
SHORTNESS OF BREATH: 0
SHORTNESS OF BREATH: 0
BACK PAIN: 0
VOMITING: 0
ORTHOPNEA: 0
EYE DISCHARGE: 0
NAUSEA: 0
COUGH: 0
EYE PAIN: 0

## 2022-02-08 NOTE — LETTER
Lidia Lemon Arn  1940  [id number]    Have you had any Chest Pain that is not new? - No    Have you had any Shortness of Breath - No    Have you had any dizziness - No    Have you had any palpitations that are not new? - No    Do you have any edema - No      When did you have your last labs drawn 2/8/22    Do you have a surgery or procedure scheduled in the near future - No     Pt went to the ER last night stated his bp was high 188/99.

## 2022-02-08 NOTE — PROGRESS NOTES
2/8/2022  Primary cardiologist: Dr. Keanu Tovra  is an established 79 y.o.  male here for follow-up on emergency department visit for hypertension      SUBJECTIVE/OBJECTIVE:    HPI :   Padmini Hess reports he was seen in the emergency department yesterday for hypertension. Noted blood pressure found to be 188/99. Days blood pressure is much improved. Notes at home blood pressure was 140/76 this morning. He denies headaches, lightheadedness or dizziness. Review of Systems   Constitutional: Negative for diaphoresis and malaise/fatigue. Cardiovascular: Negative for chest pain, claudication, dyspnea on exertion, irregular heartbeat, leg swelling, near-syncope, orthopnea, palpitations and paroxysmal nocturnal dyspnea. Respiratory: Negative for shortness of breath. Neurological: Negative for dizziness and light-headedness. Vitals:    02/08/22 1329   BP: (!) 150/80   Site: Left Upper Arm   Position: Sitting   Cuff Size: Medium Adult   Pulse: 76   Weight: 160 lb (72.6 kg)   Height: 5' 10\" (1.778 m)     No flowsheet data found. Wt Readings from Last 3 Encounters:   02/08/22 160 lb (72.6 kg)   02/07/22 165 lb (74.8 kg)   01/22/22 165 lb (74.8 kg)     Body mass index is 22.96 kg/m². Physical Exam  Neck:      Vascular: No carotid bruit. Cardiovascular:      Rate and Rhythm: Normal rate and regular rhythm. Pulses: Normal pulses. Heart sounds: Normal heart sounds. Pulmonary:      Effort: Pulmonary effort is normal.      Breath sounds: Normal breath sounds. Musculoskeletal:      Right lower leg: No edema. Left lower leg: No edema. Skin:     General: Skin is warm and dry. Capillary Refill: Capillary refill takes less than 2 seconds. Neurological:      Mental Status: He is alert.                 Current Outpatient Medications   Medication Sig Dispense Refill    hydroCHLOROthiazide (HYDRODIURIL) 25 MG tablet Take 1 tablet by mouth every morning 30 tablet 3    pantoprazole (PROTONIX) 40 MG tablet TAKE 1 TABLET BY MOUTH  DAILY 90 tablet 0    gabapentin (NEURONTIN) 300 MG capsule Take 1 capsule by mouth 3 times daily for 90 days. 270 capsule 0    traMADol (ULTRAM) 50 MG tablet Take 2 tablets by mouth 2 times daily as needed for Pain for up to 90 days. 360 tablet 0    levothyroxine (SYNTHROID) 50 MCG tablet TAKE 1 TABLET BY MOUTH IN  THE MORNING 90 tablet 1    DILT- MG extended release capsule Take 1 capsule by mouth daily 90 capsule 1    clopidogrel (PLAVIX) 75 MG tablet Take 1 tablet by mouth daily 90 tablet 3    aspirin 81 MG tablet Take 81 mg by mouth daily       No current facility-administered medications for this visit. All pertinent data reviewed and discussed with patient       ASSESSMENT/PLAN:    Hypertension   Has noted to have high blood pressure at home. Was seen in the ED yesterday with elevated blood pressure as well. When comparing his blood pressure in the office with his machine in our manual cuff ,there is a significant difference. advised to get new cuff   Will start hydrochlorothiazide 25 mg once daily. Check BMP in 1 week. Return to office in 2 weeks for BP check        Medications reviewed and confirmed with patient     Tests ordered:  BMP      Follow-up  2 weeks for BP check      Signed:  NELLIE Johansen CNP, 2/8/2022, 2:02 PM    An electronic signature was used to authenticate this note. Please note this report has been partially produced using speech recognition software and may contain errors related to that system including errors in grammar, punctuation, and spelling, as well as words and phrases that may be inappropriate. If there are any questions or concerns please feel free to contact the dictating provider for clarification.

## 2022-02-08 NOTE — ED PROVIDER NOTES
[address] ENCOUNTER      Pt Name: Laura Rousseau  MRN: [id number]  [place] 1940  Date of evaluation: 2/7/2022  Provider: Mavis Toledo MD    CHIEF COMPLAINT       Chief Complaint   Patient presents with    Hypertension         HISTORY OF PRESENT ILLNESS      Laura Rousseau is a 79 y.o. male who presents to the emergency department  for   Chief Complaint   Patient presents with    Hypertension       44-year-old male presents with current concern for elevated blood pressure. He has history of high blood pressure is managed by his cardiologist.  He states that today he got blood pressure numbers at home as high as systolic in the 181M. Denies any remarkable symptoms. Is not having any chest pain or shortness of breath. No focal neurological deficits. No change in speech, hearing or vision. No trouble swallowing. No focal weakness or paresthesias. No headaches. Presents the emergency department for evaluation. States he is taking his blood pressure medicines as directed. GCS of 15 in the emergency department. He is amatory without difficulty moving all extremities spontaneously. Nursing Notes, Triage Notes & Vital Signs were reviewed. REVIEW OF SYSTEMS    (2-9 systems for level 4, 10 or more for level 5)     Review of Systems   Constitutional: Negative for chills and fever. HENT: Negative for congestion and sore throat. Eyes: Negative for pain and discharge. Respiratory: Negative for cough and shortness of breath. Cardiovascular: Negative for chest pain and palpitations. Gastrointestinal: Negative for abdominal pain, nausea and vomiting. Endocrine: Negative for polydipsia and polyuria. Genitourinary: Negative for dysuria and flank pain. Musculoskeletal: Negative for back pain and neck pain. Skin: Negative for pallor and wound.    Neurological: Negative for dizziness, facial asymmetry, light-headedness, numbness and headaches. Psychiatric/Behavioral: Negative for confusion. Except as noted above the remainder of the review of systems was reviewed and negative. PAST MEDICAL HISTORY     Past Medical History:   Diagnosis Date    Anesthesia complication     Patient reports in 1973 following a Back Surgery he was kept in the hospital due to high temperatures in the evenings. He stated they kept him for observation and doesn't remember receiving any treatment for the high temperatures. Patient reports the anesthesia name started with flur? ?.    BPH with obstruction/lower urinary tract symptoms     Chest pain 7/1/2018    Chronic low back pain     GERD (gastroesophageal reflux disease)     H/O 24 hour EKG monitoring 3/12  12/11    3/12/12  essentially a normal event monitor without significant arrhythmias noted    H/O cardiac catheterization 2/2/12 4/02 10/98    2/22/12  LM, LAD and RCA all without significant disease    H/O cardiac catheterization 2/2/12 4/02 10/98    Done R/T false pos on stress test.    H/O cardiac catheterization 08/06/2018    normal study    H/O cardiovascular stress test 07/02/2018    normal study    H/O echocardiogram 12/10      3/14 EF55-60% normal LV function 12/10/10  complete 2 dim transthoracic echj. LVSF normal  EF >55%, transmitral spectral Doppler flow pattern is suggestive of  impaired LV relaxation    History of cardiovascular stress test 2/12 11/09 7/08 9/06 2/02 4/00 8/99 2/8/2012  evidence of mild ischemia  in the RCA. abnormal study , restingEF is 70%, global LVSF is  normal    History of Holter monitoring 9/17/14    14 day EVENT - no RVR    History of nuclear stress test 08/03/2017    cardiolite-normal,EF60%    Hx of chest x-ray 2/20/12    chest is considereed non acute.   COPD    Hx of echocardiogram 08/03/2017    CV65-23%,DXLZP 2 diastolic dysfunction    Hyperlipidemia     Idiopathic peripheral neuropathy     Kidney stone     Left facial numbness 8/30/2017    Lumbar radiculopathy     Malignant melanoma of skin of face (HCC)     MVP (mitral valve prolapse)     Myotonia     chronic increased CPK's    Peripelvic (lymphatic) cyst     per CT    Precordial pain 8/3/2018    Vitamin B12 deficiency        Prior to Admission medications    Medication Sig Start Date End Date Taking? Authorizing Provider   pantoprazole (PROTONIX) 40 MG tablet TAKE 1 TABLET BY MOUTH  DAILY 1/26/22 4/26/22  Braxton Meraz MD   gabapentin (NEURONTIN) 300 MG capsule Take 1 capsule by mouth 3 times daily for 90 days. 12/3/21 3/3/22  Braxton Meraz MD   traMADol (ULTRAM) 50 MG tablet Take 2 tablets by mouth 2 times daily as needed for Pain for up to 90 days.  11/18/21 2/16/22  Braxton Meraz MD   levothyroxine (SYNTHROID) 50 MCG tablet TAKE 1 TABLET BY MOUTH IN  THE MORNING 10/18/21   Braxton Meraz MD   DILT- MG extended release capsule Take 1 capsule by mouth daily 9/7/21   Maurice Mitchell APRN - CNP   clopidogrel (PLAVIX) 75 MG tablet Take 1 tablet by mouth daily 4/21/21   Blake Segovia DO   aspirin 81 MG tablet Take 81 mg by mouth daily    Historical Provider, MD        Patient Active Problem List   Diagnosis    Hyperlipidemia    MVP (mitral valve prolapse)    Frequent PVCs    Headache    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% 8/29/17     Kidney stone    Myotonia    Vitamin B12 deficiency    Peripelvic (lymphatic) cyst    BPH with obstruction/lower urinary tract symptoms    Chronic low back pain    Idiopathic peripheral neuropathy    GERD (gastroesophageal reflux disease)    Lumbar radiculopathy    Malignant melanoma of skin of face ([address]    Peripheral polyneuropathy    H/O prostate cancer    COPD (chronic obstructive pulmonary disease) ([address]    Acquired hypothyroidism    History of Helicobacter pylori infection    Diverticulosis    Chronic migraine without aura without status migrainosus, not intractable    Prostate CA [facility]    TIA (transient ischemic attack)         SURGICAL HISTORY       Past Surgical History:   Procedure Laterality Date    CARDIAC CATHETERIZATION  2/2/12    EF 55% No sugnificant disease.  COLONOSCOPY  5/5/14    diverticulosis, 1 polyp, hemorrhoids    COLONOSCOPY  07/10/2017    single 5 mm polyp found in sigmoid colon, moderate diverticulosis found in sigmoid colon    LUMBAR DISC SURGERY  11/2002    L4-5 hemilaminectomy and disk    LUMBAR DISCECTOMY  1973    PROSTATE SURGERY  09/26/2011    s/p robotic radical prostatectomy    ROTATOR CUFF REPAIR Right 10/2016    TIBIA FRACTURE SURGERY Right     childhood    URETEROLITHOTOMY  1992         CURRENT MEDICATIONS       Previous Medications    ASPIRIN 81 MG TABLET    Take 81 mg by mouth daily    CLOPIDOGREL (PLAVIX) 75 MG TABLET    Take 1 tablet by mouth daily    DILT- MG EXTENDED RELEASE CAPSULE    Take 1 capsule by mouth daily    GABAPENTIN (NEURONTIN) 300 MG CAPSULE    Take 1 capsule by mouth 3 times daily for 90 days. LEVOTHYROXINE (SYNTHROID) 50 MCG TABLET    TAKE 1 TABLET BY MOUTH IN  THE MORNING    PANTOPRAZOLE (PROTONIX) 40 MG TABLET    TAKE 1 TABLET BY MOUTH  DAILY    TRAMADOL (ULTRAM) 50 MG TABLET    Take 2 tablets by mouth 2 times daily as needed for Pain for up to 90 days. ALLERGIES     Patient has no known allergies. FAMILY HISTORY       Family History   Problem Relation Age of Onset    Heart Disease Mother         ASCAD    Heart Disease Father     Heart Disease Brother     Diabetes Other     Cancer Other         unknown type    Prostate Cancer Other     Coronary Art Dis Other     Coronary Art Dis Other           SOCIAL HISTORY       Social History     Socioeconomic History    Marital status:       Spouse name: None    Number of children: None    Years of education: None    Highest education level: None   Occupational History    None   Tobacco Use    Smoking status: Never Smoker    Smokeless tobacco: Never Used   Vaping Use    Vaping Use: Never used   Substance and Sexual Activity    Alcohol use: Yes     Comment: Occassional    Drug use: No    Sexual activity: Not Currently     Partners: Female     Comment:    Other Topics Concern    None   Social History Narrative    None     Social Determinants of Health     Financial Resource Strain: Low Risk     Difficulty of Paying Living Expenses: Not hard at all   Food Insecurity: No Food Insecurity    Worried About Running Out of Food in the Last Year: Never true    [address] in the Last Year: Never true   Transportation Needs:     Lack of Transportation (Medical): Not on file    Lack of Transportation (Non-Medical):  Not on file   Physical Activity:     Days of Exercise per Week: Not on file    Minutes of Exercise per Session: Not on file   Stress:     Feeling of Stress : Not on file   Social Connections:     Frequency of Communication with Friends and Family: Not on file    Frequency of Social Gatherings with Friends and Family: Not on file    Attends Christianity Services: Not on file    Active Member of [address] [place] [address] or Organizations: Not on file    Attends Club or Organization Meetings: Not on file    Marital Status: Not on file   Intimate Partner Violence:     Fear of Current or Ex-Partner: Not on file    Emotionally Abused: Not on file    Physically Abused: Not on file    Sexually Abused: Not on file   Housing Stability:     Unable to Pay for Housing in the Last Year: Not on file    Number of [place] in the Last Year: Not on file    Unstable Housing in the Last Year: Not on file       SCREENINGS    [place] Coma Scale  Eye Opening: Spontaneous  Best Verbal Response: Oriented  Best Motor Response: Obeys commands  [place] Coma Scale Score: 15          PHYSICAL EXAM    (up to 7 for level 4, 8 or more for level 5)     ED Triage Vitals   BP Temp Temp Source Pulse Resp SpO2 Height Weight 02/07/22 2256 02/07/22 2256 02/07/22 2256 02/07/22 2256 02/07/22 2256 02/07/22 2256 02/07/22 2239 02/07/22 2239   (!) 188/99 97.8 °F (36.6 °C) Oral 80 16 98 % 5' 10\" (1.778 m) 165 lb (74.8 kg)       Physical Exam  Vitals reviewed. Constitutional:       Appearance: He is not ill-appearing or toxic-appearing. HENT:      Head: Normocephalic and atraumatic. Nose: No congestion or rhinorrhea. Mouth/Throat:      Mouth: Mucous membranes are moist.      Pharynx: No oropharyngeal exudate or posterior oropharyngeal erythema. Eyes:      General:         Right eye: No discharge. Left eye: No discharge. Extraocular Movements: Extraocular movements intact. Pupils: Pupils are equal, round, and reactive to light. Cardiovascular:      Rate and Rhythm: Normal rate. Pulmonary:      Effort: Pulmonary effort is normal. No respiratory distress. Breath sounds: No stridor. Abdominal:      Palpations: Abdomen is soft. Tenderness: There is no abdominal tenderness. There is no guarding. Musculoskeletal:         General: No tenderness. Normal range of motion. Cervical back: Normal range of motion and neck supple. Right lower leg: No edema. Left lower leg: No edema. Skin:     General: Skin is warm. Capillary Refill: Capillary refill takes less than 2 seconds. Neurological:      General: No focal deficit present. Mental Status: He is alert and oriented to person, place, and time.          DIAGNOSTIC RESULTS     Labs Reviewed   CBC WITH AUTO DIFFERENTIAL - Abnormal; Notable for the following components:       Result Value    MCH 31.8 (*)     Monocytes % 11.6 (*)     All other components within normal limits   COMPREHENSIVE METABOLIC PANEL W/ REFLEX TO MG FOR LOW K   TROPONIN          EKG: All EKG's are interpreted by the Emergency Department Physician who either signs or Co-signs this chart in the absence of a cardiologist.       EKG Interpretation    Interpreted by emergency department physician    EKG is interpreted by me. EKG shows sinus rhythm at 67 bpm, axis is nondeviated, no remarkable ST segmentations or depressions, T waves are unremarkable, RI interval 198, QRS duration of 90, QTC of 4 June 2012. Final impression, nonspecific EKG    Tootei Ramírez MD     RADIOLOGY:     Non-plain film images such as CT, Ultrasound and MRI are read by the radiologist. Plain radiographic images are visualized and preliminarily interpreted by the emergency physician. Interpretation per the Radiologist below, if available at the time of this note:    No orders to display         ED BEDSIDE ULTRASOUND:   Performed by ED Physician Tootie Ramírez MD       LABS:  Labs Reviewed   CBC WITH AUTO DIFFERENTIAL - Abnormal; Notable for the following components:       Result Value    MCH 31.8 (*)     Monocytes % 11.6 (*)     All other components within normal limits   COMPREHENSIVE METABOLIC PANEL W/ REFLEX TO MG FOR LOW K   TROPONIN       All other labs were within normal range or not returned as of this dictation. EMERGENCY DEPARTMENT COURSE and DIFFERENTIAL DIAGNOSIS/MDM:   Vitals:    Vitals:    02/07/22 2239 02/07/22 2256   BP:  (!) 188/99   Pulse:  80   Resp:  16   Temp:  97.8 °F (36.6 °C)   TempSrc:  Oral   SpO2:  98%   Weight: 165 lb (74.8 kg)    Height: 5' 10\" (1.778 m)            MDM  Number of Diagnoses or Management Options  Hypertension, unspecified type  Diagnosis management comments: 5year-old male presents with concern for elevated blood pressure. He has a history of hypertension that is managed by his cardiologist.  He states he is taking his medications as prescribed. Reports getting blood pressure numbers at home today with systolics in the 336J. He is overall asymptomatic. Not have any chest pain. No focal neurological deficits. No headache.   He presents the emergency department and nonfocal neurological exam.  EKG is nondiagnostic showing no acute signs of ischemia or arrhythmia. Labs are obtained. Labs are nonacute. He is still asymptomatic. He will be discharged home. Follow-up with his cardiologist for further medication management. We did discuss strict return precautions. We also discussed measures for taking good blood pressure readings at home. He is discharged ambulatory in stable condition with return precautions. -  Patient seen and evaluated in the emergency department. -  Triage and nursing notes reviewed and incorporated. -  Old chart records reviewed and incorporated. -  Work-up included:  See above  -  Results discussed with patient. CONSULTS:  None    PROCEDURES:  None performed unless otherwise noted below     Procedures        FINAL IMPRESSION      1. Hypertension, unspecified type          DISPOSITION/PLAN   DISPOSITION        PATIENT REFERRED TO:  Steve Del Rio MD  [facility]  [phone]    Schedule an appointment as soon as possible for a visit in 3 days      Blayne Castellon MD  [address] [address]  [phone]    Schedule an appointment as soon as possible for a visit in 3 days        DISCHARGE MEDICATIONS:  New Prescriptions    No medications on file       ED Provider Disposition Time  DISPOSITION        Appropriate personal protective equipment was worn during the patient's evaluation. These included surgical, eye protection, surgical mask or in 95 respirator and gloves. The patient was also placed in a surgical mask for the prevention of possible spread of respiratory viral illnesses. The Patient was instructed to read the package inserts with any medication that was prescribed. Major potential reactions and medication interactions were discussed. The Patient understands that there are numerous possible adverse reactions not covered.     The patient was also instructed to arrange follow-up with his or her primary care provider for review of any pending labwork or incidental findings on any radiology results that were obtained. All efforts were made to discuss any incidental findings that require further monitoring. Controlled Substances Monitoring:     RX Monitoring 11/18/2021   Periodic Controlled Substance Monitoring Obtaining appropriate analgesic effect of treatment.        (Please note that portions of this note were completed with a voice recognition program.  Efforts were made to edit the dictations but occasionally words are mis-transcribed.)    Ovidio Leonardo MD (electronically signed)  Attending Emergency Physician           Ovidio Leonardo MD  02/08/22 0017

## 2022-02-08 NOTE — PATIENT INSTRUCTIONS
Please be informed that if you contact our office outside of normal business hours the physician on call cannot help with any scheduling or rescheduling issues, procedure instruction questions or any type of medication issue. We advise you for any urgent/emergency that you go to the nearest emergency room! PLEASE CALL OUR OFFICE DURING NORMAL BUSINESS HOURS    Monday - Friday   8 am to 5 pm    [place]: [phone]    [place]:  [phone]    **It is YOUR responsibilty to bring medication bottles and/or updated medication list to [address] [place] [address].  This will allow us to better serve you and all your healthcare needs**

## 2022-02-10 LAB
EKG ATRIAL RATE: 67 BPM
EKG DIAGNOSIS: NORMAL
EKG P AXIS: 70 DEGREES
EKG P-R INTERVAL: 198 MS
EKG Q-T INTERVAL: 390 MS
EKG QRS DURATION: 90 MS
EKG QTC CALCULATION (BAZETT): 412 MS
EKG R AXIS: 14 DEGREES
EKG T AXIS: 61 DEGREES
EKG VENTRICULAR RATE: 67 BPM

## 2022-02-10 PROCEDURE — 93010 ELECTROCARDIOGRAM REPORT: CPT | Performed by: INTERNAL MEDICINE

## 2022-02-14 ENCOUNTER — HOSPITAL ENCOUNTER (OUTPATIENT)
Age: 82
Discharge: HOME | End: 2022-02-14
Payer: MEDICARE

## 2022-02-14 ENCOUNTER — OFFICE VISIT (OUTPATIENT)
Dept: FAMILY MEDICINE CLINIC | Age: 82
End: 2022-02-14
Payer: MEDICARE

## 2022-02-14 VITALS
HEART RATE: 72 BPM | BODY MASS INDEX: 23.32 KG/M2 | OXYGEN SATURATION: 95 % | DIASTOLIC BLOOD PRESSURE: 60 MMHG | HEIGHT: 70 IN | SYSTOLIC BLOOD PRESSURE: 102 MMHG | WEIGHT: 162.9 LBS

## 2022-02-14 DIAGNOSIS — G62.9 PERIPHERAL POLYNEUROPATHY: ICD-10-CM

## 2022-02-14 DIAGNOSIS — I95.89 HYPOTENSION, IATROGENIC: ICD-10-CM

## 2022-02-14 DIAGNOSIS — G89.29 CHRONIC BILATERAL LOW BACK PAIN WITH BILATERAL SCIATICA: Chronic | ICD-10-CM

## 2022-02-14 DIAGNOSIS — M54.42 CHRONIC BILATERAL LOW BACK PAIN WITH BILATERAL SCIATICA: Chronic | ICD-10-CM

## 2022-02-14 DIAGNOSIS — J44.9 CHRONIC OBSTRUCTIVE PULMONARY DISEASE, UNSPECIFIED COPD TYPE (HCC): ICD-10-CM

## 2022-02-14 DIAGNOSIS — K21.9 GASTROESOPHAGEAL REFLUX DISEASE WITHOUT ESOPHAGITIS: ICD-10-CM

## 2022-02-14 DIAGNOSIS — I10 PRIMARY HYPERTENSION: ICD-10-CM

## 2022-02-14 DIAGNOSIS — M54.41 CHRONIC BILATERAL LOW BACK PAIN WITH BILATERAL SCIATICA: Chronic | ICD-10-CM

## 2022-02-14 DIAGNOSIS — I65.21 STENOSIS OF RIGHT CAROTID ARTERY: ICD-10-CM

## 2022-02-14 DIAGNOSIS — E03.9 ACQUIRED HYPOTHYROIDISM: Primary | Chronic | ICD-10-CM

## 2022-02-14 LAB
ANION GAP SERPL CALCULATED.3IONS-SCNC: 9 MMOL/L (ref 4–16)
BUN BLDV-MCNC: 25 MG/DL (ref 6–23)
CALCIUM SERPL-MCNC: 10 MG/DL (ref 8.3–10.6)
CHLORIDE BLD-SCNC: 99 MMOL/L (ref 99–110)
CO2: 30 MMOL/L (ref 21–32)
CREAT SERPL-MCNC: 1.3 MG/DL (ref 0.9–1.3)
GFR AFRICAN AMERICAN: >60 ML/MIN/1.73M2
GFR NON-AFRICAN AMERICAN: 53 ML/MIN/1.73M2
GLUCOSE BLD-MCNC: 85 MG/DL (ref 70–99)
POTASSIUM SERPL-SCNC: 4.5 MMOL/L (ref 3.5–5.1)
SODIUM BLD-SCNC: 138 MMOL/L (ref 135–145)

## 2022-02-14 PROCEDURE — 1036F TOBACCO NON-USER: CPT | Performed by: FAMILY MEDICINE

## 2022-02-14 PROCEDURE — G8484 FLU IMMUNIZE NO ADMIN: HCPCS | Performed by: FAMILY MEDICINE

## 2022-02-14 PROCEDURE — 80048 BASIC METABOLIC PNL TOTAL CA: CPT

## 2022-02-14 PROCEDURE — 36415 COLL VENOUS BLD VENIPUNCTURE: CPT

## 2022-02-14 PROCEDURE — 1123F ACP DISCUSS/DSCN MKR DOCD: CPT | Performed by: FAMILY MEDICINE

## 2022-02-14 PROCEDURE — G8427 DOCREV CUR MEDS BY ELIG CLIN: HCPCS | Performed by: FAMILY MEDICINE

## 2022-02-14 PROCEDURE — G8420 CALC BMI NORM PARAMETERS: HCPCS | Performed by: FAMILY MEDICINE

## 2022-02-14 PROCEDURE — 99214 OFFICE O/P EST MOD 30 MIN: CPT | Performed by: FAMILY MEDICINE

## 2022-02-14 PROCEDURE — 3023F SPIROM DOC REV: CPT | Performed by: FAMILY MEDICINE

## 2022-02-14 PROCEDURE — 4040F PNEUMOC VAC/ADMIN/RCVD: CPT | Performed by: FAMILY MEDICINE

## 2022-02-14 RX ORDER — LEVOTHYROXINE SODIUM 0.05 MG/1
TABLET ORAL
Qty: 90 TABLET | Refills: 1 | Status: SHIPPED | OUTPATIENT
Start: 2022-02-14 | End: 2022-06-09

## 2022-02-14 RX ORDER — CLOPIDOGREL BISULFATE 75 MG/1
75 TABLET ORAL DAILY
Qty: 90 TABLET | Refills: 3 | Status: SHIPPED | OUTPATIENT
Start: 2022-02-14

## 2022-02-14 RX ORDER — HYDROCHLOROTHIAZIDE 25 MG/1
12.5 TABLET ORAL EVERY MORNING
Qty: 30 TABLET | Refills: 3 | Status: SHIPPED | OUTPATIENT
Start: 2022-02-14 | End: 2022-08-09

## 2022-02-14 RX ORDER — PANTOPRAZOLE SODIUM 40 MG/1
40 TABLET, DELAYED RELEASE ORAL DAILY
Qty: 90 TABLET | Refills: 0 | Status: SHIPPED | OUTPATIENT
Start: 2022-02-14 | End: 2022-05-27

## 2022-02-14 RX ORDER — GABAPENTIN 300 MG/1
300 CAPSULE ORAL 3 TIMES DAILY
Qty: 270 CAPSULE | Refills: 0 | Status: SHIPPED | OUTPATIENT
Start: 2022-02-14 | End: 2022-05-18

## 2022-02-14 NOTE — PROGRESS NOTES
2/14/2022    [facility]    Chief Complaint   Patient presents with    6 Month Follow-Up     high bp issues, getting tired easy       HPI    Rosanna Javed is a 79 y.o. male who presents today with follow-up. Patient was seen last week this time with some accelerated hypertension and was placed on water pill hydrochlorothiazide 25 mg daily. Since then he is felt tired and fatigue with his latest blood pressures have been 102/60 and 15/60 at 79years old. Patient has a history of carotid stenosis but last ultrasound a year and a half ago was unremarkable. Patient denies chest pain or chest pressure. Patient denies significant emotional stress or pain. REVIEW OF SYSTEMS    Constitutional:  Denies fever, chills, weight loss or weakness  Eyes:  no photophobia or discharge  ENT:  no sore throat or ear pain  Cardiovascular:  Denies chest pain, palpitations or swelling  Respiratory:  Denies cough or shortness of breath  GI:  no abdominal pain, nausea, vomiting, or diarrhea  Musculoskeletal:  no back pain  Skin:  No rashes  Neurologic:  no headache, focal weakness, or sensory changes  Endocrine:  no polyuria or polydipsia      PAST MEDICAL HISTORY  Past Medical History:   Diagnosis Date    Anesthesia complication     Patient reports in 1973 following a Back Surgery he was kept in the hospital due to high temperatures in the evenings. He stated they kept him for observation and doesn't remember receiving any treatment for the high temperatures. Patient reports the anesthesia name started with flur? ?.    BPH with obstruction/lower urinary tract symptoms     Chest pain 7/1/2018    Chronic low back pain     GERD (gastroesophageal reflux disease)     H/O 24 hour EKG monitoring 3/12  12/11    3/12/12  essentially a normal event monitor without significant arrhythmias noted    H/O cardiac catheterization 2/2/12 4/02 10/98    2/22/12  LM, LAD and RCA all without significant disease    H/O cardiac catheterization 2/2/12 4/02 10/98    Done R/T false pos on stress test.    H/O cardiac catheterization 08/06/2018    normal study    H/O cardiovascular stress test 07/02/2018    normal study    H/O echocardiogram 12/10      3/14 EF55-60% normal LV function 12/10/10  complete 2 dim transthoracic echj. LVSF normal  EF >55%, transmitral spectral Doppler flow pattern is suggestive of  impaired LV relaxation    History of cardiovascular stress test 2/12 11/09 7/08 9/06 2/02 4/00 8/99 2/8/2012  evidence of mild ischemia  in the RCA. abnormal study , restingEF is 70%, global LVSF is  normal    History of Holter monitoring 9/17/14    14 day EVENT - no RVR    History of nuclear stress test 08/03/2017    cardiolite-normal,EF60%    Hx of chest x-ray 2/20/12    chest is considereed non acute. COPD    Hx of echocardiogram 08/03/2017    MQ76-08%,VOXSN 2 diastolic dysfunction    Hyperlipidemia     Idiopathic peripheral neuropathy     Kidney stone     Left facial numbness 8/30/2017    Lumbar radiculopathy     Malignant melanoma of skin of face (HCC)     MVP (mitral valve prolapse)     Myotonia     chronic increased CPK's    Peripelvic (lymphatic) cyst     per CT    Precordial pain 8/3/2018    Vitamin B12 deficiency        FAMILY HISTORY  Family History   Problem Relation Age of Onset    Heart Disease Mother         ASCAD    Heart Disease Father     Heart Disease Brother     Diabetes Other     Cancer Other         unknown type    Prostate Cancer Other     Coronary Art Dis Other     Coronary Art Dis Other        SOCIAL HISTORY  Social History     Socioeconomic History    Marital status:       Spouse name: Not on file    Number of children: Not on file    Years of education: Not on file    Highest education level: Not on file   Occupational History    Not on file   Tobacco Use    Smoking status: Never Smoker    Smokeless tobacco: Never Used   Vaping Use    Vaping Use: Never used   Substance and Sexual Activity    Alcohol use: Yes     Comment: Occassional    Drug use: No    Sexual activity: Not Currently     Partners: Female     Comment:    Other Topics Concern    Not on file   Social History Narrative    Not on file     Social Determinants of Health     Financial Resource Strain: Low Risk     Difficulty of Paying Living Expenses: Not hard at all   Food Insecurity: No Food Insecurity    Worried About [address] in the Last Year: Never true    [address] in the Last Year: Never true   Transportation Needs:     Lack of Transportation (Medical): Not on file    Lack of Transportation (Non-Medical): Not on file   Physical Activity:     Days of Exercise per Week: Not on file    Minutes of Exercise per Session: Not on file   Stress:     Feeling of Stress : Not on file   Social Connections:     Frequency of Communication with Friends and Family: Not on file    Frequency of Social Gatherings with Friends and Family: Not on file    Attends Restoration[employer] Services: Not on file    Active Member of [address] [place] [address] or Organizations: Not on file    Attends Club or Organization Meetings: Not on file    Marital Status: Not on file   Intimate Partner Violence:     Fear of Current or Ex-Partner: Not on file    Emotionally Abused: Not on file    Physically Abused: Not on file    Sexually Abused: Not on file   Housing Stability:     Unable to Pay for Housing in the Last Year: Not on file    Number of [place] in the Last Year: Not on file    Unstable Housing in the Last Year: Not on file        SURGICAL HISTORY  Past Surgical History:   Procedure Laterality Date    CARDIAC CATHETERIZATION  2/2/12    EF 55% No sugnificant disease.     COLONOSCOPY  5/5/14    diverticulosis, 1 polyp, hemorrhoids    COLONOSCOPY  07/10/2017    single 5 mm polyp found in sigmoid colon, moderate diverticulosis found in sigmoid colon    LUMBAR DISC SURGERY  11/2002    L4-5 hemilaminectomy and disk    LUMBAR DISCECTOMY [id number]  09/26/2011    s/p robotic radical prostatectomy    ROTATOR CUFF REPAIR Right 10/2016    TIBIA FRACTURE SURGERY Right     childhood    URETEROLITHOTOMY  1992       CURRENT MEDICATIONS  Current Outpatient Medications   Medication Sig Dispense Refill    pantoprazole (PROTONIX) 40 MG tablet Take 1 tablet by mouth daily 90 tablet 0    gabapentin (NEURONTIN) 300 MG capsule Take 1 capsule by mouth 3 times daily for 90 days. 270 capsule 0    clopidogrel (PLAVIX) 75 MG tablet Take 1 tablet by mouth daily 90 tablet 3    levothyroxine (SYNTHROID) 50 MCG tablet Take one tablet by mouth in the morning 90 tablet 1    hydroCHLOROthiazide (HYDRODIURIL) 25 MG tablet Take 0.5 tablets by mouth every morning 30 tablet 3    traMADol (ULTRAM) 50 MG tablet Take 2 tablets by mouth 2 times daily as needed for Pain for up to 90 days. 360 tablet 0    DILT- MG extended release capsule Take 1 capsule by mouth daily 90 capsule 1    aspirin 81 MG tablet Take 81 mg by mouth daily       No current facility-administered medications for this visit. ALLERGIES  No Known Allergies    PHYSICAL EXAM  /60 (Site: Left Upper Arm, Position: Sitting, Cuff Size: Medium Adult)   Pulse 72   Ht 5' 10\" (1.778 m)   Wt 162 lb 14.4 oz (73.9 kg)   SpO2 95%   BMI 23.37 kg/m²     ASSESSMENT & PLAN  0.  Relative hypotension  -Decrease hydrochlorothiazide by half  Daily blood pressure and pulse checks  Call back Monday with results    1. Acquired hypothyroidism  Reviewed prior labs. Currently at goal  Needs rechecked on follow-up. Continue same for now. - levothyroxine (SYNTHROID) 50 MCG tablet; Take one tablet by mouth in the morning  Dispense: 90 tablet; Refill: 1    2. Chronic obstructive pulmonary disease, unspecified COPD type [facility]  Issue controlled. Continue meds. Refilled meds. 3. Gastroesophageal reflux disease without esophagitis  Issue controlled. Continue meds. Refilled meds.   - pantoprazole (PROTONIX) 40 MG tablet; Take 1 tablet by mouth daily  Dispense: 90 tablet; Refill: 0    4. Peripheral polyneuropathy  Issue controlled. Continue meds. Refilled meds. - gabapentin (NEURONTIN) 300 MG capsule; Take 1 capsule by mouth 3 times daily for 90 days. Dispense: 270 capsule; Refill: 0    5. Chronic bilateral low back pain with bilateral sciatica  Issue controlled. Continue meds. Refilled meds. - gabapentin (NEURONTIN) 300 MG capsule; Take 1 capsule by mouth 3 times daily for 90 days. Dispense: 270 capsule; Refill: 0    6. 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% 8/29/17 US  Rule out symptomatic stenosis due to the labile hypertension    - clopidogrel (PLAVIX) 75 MG tablet; Take 1 tablet by mouth daily  Dispense: 90 tablet; Refill: 3  - US CAROTID ARTERY BILATERAL;  Future           Electronically signed by Gualberto Albarrna MD on 2/14/2022 PRINCIPAL DISCHARGE DIAGNOSIS  Diagnosis: Appendicitis, acute  Assessment and Plan of Treatment: Surgery Follow Up:  Please follow up with Dr. Myers in one week; you may call the office to make an appointment at your earliest convenience.

## 2022-02-15 ENCOUNTER — TELEPHONE (OUTPATIENT)
Dept: CARDIOLOGY CLINIC | Age: 82
End: 2022-02-15

## 2022-02-15 NOTE — TELEPHONE ENCOUNTER
----- Message from NELLIE Stewart - CNP sent at 2/14/2022  3:10 PM EST -----  Please phone labs- labs look good       Spoke to pt about normal labs, pt was pleased!   [facility]

## 2022-02-17 ENCOUNTER — APPOINTMENT (OUTPATIENT)
Dept: GENERAL RADIOLOGY | Age: 82
End: 2022-02-17
Payer: MEDICARE

## 2022-02-17 ENCOUNTER — HOSPITAL ENCOUNTER (OUTPATIENT)
Age: 82
Setting detail: OBSERVATION
Discharge: HOME | End: 2022-02-19
Attending: INTERNAL MEDICINE | Admitting: INTERNAL MEDICINE
Payer: MEDICARE

## 2022-02-17 DIAGNOSIS — R07.9 CHEST PAIN, UNSPECIFIED TYPE: Primary | ICD-10-CM

## 2022-02-17 LAB
ALBUMIN SERPL-MCNC: 3.6 GM/DL (ref 3.4–5)
ALP BLD-CCNC: 43 IU/L (ref 40–129)
ALT SERPL-CCNC: 15 U/L (ref 10–40)
ANION GAP SERPL CALCULATED.3IONS-SCNC: 11 MMOL/L (ref 4–16)
AST SERPL-CCNC: 24 IU/L (ref 15–37)
BASOPHILS ABSOLUTE: 0 K/CU MM
BASOPHILS RELATIVE PERCENT: 0.4 % (ref 0–1)
BILIRUB SERPL-MCNC: 0.4 MG/DL (ref 0–1)
BUN BLDV-MCNC: 15 MG/DL (ref 6–23)
CALCIUM SERPL-MCNC: 9.5 MG/DL (ref 8.3–10.6)
CHLORIDE BLD-SCNC: 96 MMOL/L (ref 99–110)
CO2: 29 MMOL/L (ref 21–32)
CREAT SERPL-MCNC: 1 MG/DL (ref 0.9–1.3)
DIFFERENTIAL TYPE: ABNORMAL
EOSINOPHILS ABSOLUTE: 0.1 K/CU MM
EOSINOPHILS RELATIVE PERCENT: 1 % (ref 0–3)
GFR AFRICAN AMERICAN: >60 ML/MIN/1.73M2
GFR NON-AFRICAN AMERICAN: >60 ML/MIN/1.73M2
GLUCOSE BLD-MCNC: 138 MG/DL (ref 70–99)
HCT VFR BLD CALC: 45.2 % (ref 42–52)
HEMOGLOBIN: 14.6 GM/DL (ref 13.5–18)
IMMATURE NEUTROPHIL %: 0.2 % (ref 0–0.43)
INR BLD: 1.05 INDEX
LYMPHOCYTES ABSOLUTE: 0.7 K/CU MM
LYMPHOCYTES RELATIVE PERCENT: 15.4 % (ref 24–44)
MCH RBC QN AUTO: 31.7 PG (ref 27–31)
MCHC RBC AUTO-ENTMCNC: 32.3 % (ref 32–36)
MCV RBC AUTO: 98 FL (ref 78–100)
MONOCYTES ABSOLUTE: 0.4 K/CU MM
MONOCYTES RELATIVE PERCENT: 8.4 % (ref 0–4)
NUCLEATED RBC %: 0 %
PDW BLD-RTO: 12.4 % (ref 11.7–14.9)
PLATELET # BLD: 196 K/CU MM (ref 140–440)
PMV BLD AUTO: 9.7 FL (ref 7.5–11.1)
POTASSIUM SERPL-SCNC: 3.9 MMOL/L (ref 3.5–5.1)
PRO-BNP: 30.86 PG/ML
PROTHROMBIN TIME: 13.5 SECONDS (ref 11.7–14.5)
RBC # BLD: 4.61 M/CU MM (ref 4.6–6.2)
SEGMENTED NEUTROPHILS ABSOLUTE COUNT: 3.6 K/CU MM
SEGMENTED NEUTROPHILS RELATIVE PERCENT: 74.6 % (ref 36–66)
SODIUM BLD-SCNC: 136 MMOL/L (ref 135–145)
TOTAL IMMATURE NEUTOROPHIL: 0.01 K/CU MM
TOTAL NUCLEATED RBC: 0 K/CU MM
TOTAL PROTEIN: 6.1 GM/DL (ref 6.4–8.2)
TROPONIN T: <0.01 NG/ML
TSH HIGH SENSITIVITY: 1.07 UIU/ML (ref 0.27–4.2)
WBC # BLD: 4.8 K/CU MM (ref 4–10.5)

## 2022-02-17 PROCEDURE — 36415 COLL VENOUS BLD VENIPUNCTURE: CPT

## 2022-02-17 PROCEDURE — 94761 N-INVAS EAR/PLS OXIMETRY MLT: CPT

## 2022-02-17 PROCEDURE — 84484 ASSAY OF TROPONIN QUANT: CPT

## 2022-02-17 PROCEDURE — 85610 PROTHROMBIN TIME: CPT

## 2022-02-17 PROCEDURE — 83880 ASSAY OF NATRIURETIC PEPTIDE: CPT

## 2022-02-17 PROCEDURE — G0378 HOSPITAL OBSERVATION PER HR: HCPCS

## 2022-02-17 PROCEDURE — 84443 ASSAY THYROID STIM HORMONE: CPT

## 2022-02-17 PROCEDURE — 6370000000 HC RX 637 (ALT 250 FOR IP): Performed by: PHYSICIAN ASSISTANT

## 2022-02-17 PROCEDURE — 80053 COMPREHEN METABOLIC PANEL: CPT

## 2022-02-17 PROCEDURE — 85025 COMPLETE CBC W/AUTO DIFF WBC: CPT

## 2022-02-17 PROCEDURE — 6370000000 HC RX 637 (ALT 250 FOR IP): Performed by: NURSE PRACTITIONER

## 2022-02-17 PROCEDURE — 2580000003 HC RX 258: Performed by: NURSE PRACTITIONER

## 2022-02-17 PROCEDURE — 99285 EMERGENCY DEPT VISIT HI MDM: CPT

## 2022-02-17 PROCEDURE — 99214 OFFICE O/P EST MOD 30 MIN: CPT | Performed by: INTERNAL MEDICINE

## 2022-02-17 PROCEDURE — 71045 X-RAY EXAM CHEST 1 VIEW: CPT

## 2022-02-17 PROCEDURE — 93005 ELECTROCARDIOGRAM TRACING: CPT | Performed by: EMERGENCY MEDICINE

## 2022-02-17 RX ORDER — SODIUM CHLORIDE 0.9 % (FLUSH) 0.9 %
5-40 SYRINGE (ML) INJECTION EVERY 12 HOURS SCHEDULED
Status: DISCONTINUED | OUTPATIENT
Start: 2022-02-17 | End: 2022-02-19 | Stop reason: HOSPADM

## 2022-02-17 RX ORDER — TRAMADOL HYDROCHLORIDE 50 MG/1
50 TABLET ORAL EVERY 6 HOURS PRN
Status: DISCONTINUED | OUTPATIENT
Start: 2022-02-17 | End: 2022-02-19 | Stop reason: HOSPADM

## 2022-02-17 RX ORDER — GABAPENTIN 300 MG/1
300 CAPSULE ORAL 3 TIMES DAILY
Status: DISCONTINUED | OUTPATIENT
Start: 2022-02-17 | End: 2022-02-19 | Stop reason: HOSPADM

## 2022-02-17 RX ORDER — LEVOTHYROXINE SODIUM 0.1 MG/1
100 TABLET ORAL DAILY
Status: DISCONTINUED | OUTPATIENT
Start: 2022-02-18 | End: 2022-02-19 | Stop reason: HOSPADM

## 2022-02-17 RX ORDER — ACETAMINOPHEN 650 MG/1
650 SUPPOSITORY RECTAL EVERY 6 HOURS PRN
Status: DISCONTINUED | OUTPATIENT
Start: 2022-02-17 | End: 2022-02-19 | Stop reason: HOSPADM

## 2022-02-17 RX ORDER — POLYETHYLENE GLYCOL 3350 17 G/17G
17 POWDER, FOR SOLUTION ORAL DAILY PRN
Status: DISCONTINUED | OUTPATIENT
Start: 2022-02-17 | End: 2022-02-19 | Stop reason: HOSPADM

## 2022-02-17 RX ORDER — CLOPIDOGREL BISULFATE 75 MG/1
75 TABLET ORAL DAILY
Status: DISCONTINUED | OUTPATIENT
Start: 2022-02-18 | End: 2022-02-19 | Stop reason: HOSPADM

## 2022-02-17 RX ORDER — HYDROCHLOROTHIAZIDE 12.5 MG/1
12.5 TABLET ORAL EVERY MORNING
Status: DISCONTINUED | OUTPATIENT
Start: 2022-02-18 | End: 2022-02-19 | Stop reason: HOSPADM

## 2022-02-17 RX ORDER — ONDANSETRON 2 MG/ML
4 INJECTION INTRAMUSCULAR; INTRAVENOUS EVERY 6 HOURS PRN
Status: DISCONTINUED | OUTPATIENT
Start: 2022-02-17 | End: 2022-02-19 | Stop reason: HOSPADM

## 2022-02-17 RX ORDER — ASPIRIN 325 MG
325 TABLET ORAL ONCE
Status: COMPLETED | OUTPATIENT
Start: 2022-02-17 | End: 2022-02-17

## 2022-02-17 RX ORDER — PANTOPRAZOLE SODIUM 40 MG/1
40 TABLET, DELAYED RELEASE ORAL DAILY
Status: DISCONTINUED | OUTPATIENT
Start: 2022-02-18 | End: 2022-02-19

## 2022-02-17 RX ORDER — LANOLIN ALCOHOL/MO/W.PET/CERES
9 CREAM (GRAM) TOPICAL NIGHTLY PRN
Status: DISCONTINUED | OUTPATIENT
Start: 2022-02-17 | End: 2022-02-19 | Stop reason: HOSPADM

## 2022-02-17 RX ORDER — NITROGLYCERIN 0.4 MG/1
0.4 TABLET SUBLINGUAL EVERY 5 MIN PRN
Status: DISCONTINUED | OUTPATIENT
Start: 2022-02-17 | End: 2022-02-19 | Stop reason: HOSPADM

## 2022-02-17 RX ORDER — DILTIAZEM HYDROCHLORIDE 120 MG/1
120 CAPSULE, COATED, EXTENDED RELEASE ORAL DAILY
Status: DISCONTINUED | OUTPATIENT
Start: 2022-02-18 | End: 2022-02-18

## 2022-02-17 RX ORDER — ASPIRIN 81 MG/1
81 TABLET ORAL DAILY
Status: DISCONTINUED | OUTPATIENT
Start: 2022-02-18 | End: 2022-02-19 | Stop reason: HOSPADM

## 2022-02-17 RX ORDER — SODIUM CHLORIDE 9 MG/ML
25 INJECTION, SOLUTION INTRAVENOUS PRN
Status: DISCONTINUED | OUTPATIENT
Start: 2022-02-17 | End: 2022-02-19 | Stop reason: HOSPADM

## 2022-02-17 RX ORDER — SODIUM CHLORIDE 0.9 % (FLUSH) 0.9 %
5-40 SYRINGE (ML) INJECTION PRN
Status: DISCONTINUED | OUTPATIENT
Start: 2022-02-17 | End: 2022-02-19 | Stop reason: HOSPADM

## 2022-02-17 RX ORDER — ACETAMINOPHEN 325 MG/1
650 TABLET ORAL EVERY 6 HOURS PRN
Status: DISCONTINUED | OUTPATIENT
Start: 2022-02-17 | End: 2022-02-19 | Stop reason: HOSPADM

## 2022-02-17 RX ORDER — ONDANSETRON 4 MG/1
4 TABLET, ORALLY DISINTEGRATING ORAL EVERY 8 HOURS PRN
Status: DISCONTINUED | OUTPATIENT
Start: 2022-02-17 | End: 2022-02-19 | Stop reason: HOSPADM

## 2022-02-17 RX ORDER — ATORVASTATIN CALCIUM 40 MG/1
40 TABLET, FILM COATED ORAL NIGHTLY
Status: DISCONTINUED | OUTPATIENT
Start: 2022-02-17 | End: 2022-02-19 | Stop reason: HOSPADM

## 2022-02-17 RX ADMIN — ACETAMINOPHEN 650 MG: 325 TABLET ORAL at 20:18

## 2022-02-17 RX ADMIN — GABAPENTIN 300 MG: 300 CAPSULE ORAL at 20:18

## 2022-02-17 RX ADMIN — TRAMADOL HYDROCHLORIDE 50 MG: 50 TABLET, COATED ORAL at 20:18

## 2022-02-17 RX ADMIN — ATORVASTATIN CALCIUM 40 MG: 40 TABLET, FILM COATED ORAL at 20:18

## 2022-02-17 RX ADMIN — SODIUM CHLORIDE, PRESERVATIVE FREE 10 ML: 5 INJECTION INTRAVENOUS at 20:18

## 2022-02-17 RX ADMIN — MELATONIN TAB 3 MG 9 MG: 3 TAB at 23:25

## 2022-02-17 RX ADMIN — ASPIRIN 325 MG ORAL TABLET 325 MG: 325 PILL ORAL at 14:04

## 2022-02-17 ASSESSMENT — ENCOUNTER SYMPTOMS
SHORTNESS OF BREATH: 0
ALLERGIC/IMMUNOLOGIC NEGATIVE: 1
EYES NEGATIVE: 1
VOMITING: 0
NAUSEA: 0
CHEST TIGHTNESS: 1

## 2022-02-17 ASSESSMENT — PAIN SCALES - GENERAL: PAINLEVEL_OUTOF10: 6

## 2022-02-17 NOTE — CONSULTS
Chart reviewed  Full note to follow                      Name:  Salazar John /Age/Sex: 1940  (79 y.o. male)   MRN & CSN:  [id number] & [id number] Admission Date/Time: 2022  1:07 PM   Location:  ED25/ED-25 PCP: Norma Stallworth MD       Hospital Day: 1          Referring physician:  No admitting provider for patient encounter. Reason for consultation: Chest pain        Thanks for referral.    Information source: patient    CC; chest pain      HPI:   Thank you for involving me in taking  care of Salazar John who  is a 79 y. o.year  Old male  Presents with history of COPD, hypertension, gastroesophageal reflux disease, now presents with recurrent midsternal chest pain occasionally with exertion ongoing for the past 3 to 4 days, he had initial work-up including EKG and tropes are unremarkable. Per patient he has been feeling funny in his chest feels palpitations as well he saw his PCP for these complaints and has been scheduled for an echocardiogram in near future  Patient had a cath done in 2018 which did not show any significant CAD                 Past medical history:    has a past medical history of Anesthesia complication, BPH with obstruction/lower urinary tract symptoms, Chest pain, Chronic low back pain, GERD (gastroesophageal reflux disease), H/O 24 hour EKG monitoring, H/O cardiac catheterization, H/O cardiac catheterization, H/O cardiac catheterization, H/O cardiovascular stress test, H/O echocardiogram, History of cardiovascular stress test, History of Holter monitoring, History of nuclear stress test, Hx of chest x-ray, Hx of echocardiogram, Hyperlipidemia, Idiopathic peripheral neuropathy, Kidney stone, Left facial numbness, Lumbar radiculopathy, Malignant melanoma of skin of face ([address] MVP (mitral valve prolapse), Myotonia, Peripelvic (lymphatic) cyst, Precordial pain, and Vitamin B12 deficiency.   Past surgical history:   has a past surgical history that includes Cardiac catheterization (2/2/12); Prostate surgery (09/26/2011); Rotator cuff repair (Right, 10/2016); Colonoscopy (5/5/14); Colonoscopy (07/10/2017); lumbar discectomy (2066); ureterolithotomy (1992); Tibia fracture surgery (Right); and Lumbar disc surgery (11/2002). Social History:   reports that he has never smoked. He has never used smokeless tobacco. He reports current alcohol use. He reports that he does not use drugs. Family history:  family history includes Cancer in an other family member; Coronary Art Dis in some other family members; Diabetes in an other family member; Heart Disease in his brother, father, and mother; Prostate Cancer in an other family member.     No Known Allergies    [START ON 2/18/2022] aspirin EC tablet 81 mg, Daily  [START ON 2/18/2022] pantoprazole (PROTONIX) tablet 40 mg, Daily  gabapentin (NEURONTIN) capsule 300 mg, TID  [START ON 2/18/2022] clopidogrel (PLAVIX) tablet 75 mg, Daily  [START ON 2/18/2022] hydroCHLOROthiazide (HYDRODIURIL) tablet 12.5 mg, QAM  [START ON 2/18/2022] dilTIAZem (CARDIZEM CD) extended release capsule 120 mg, Daily  [START ON 2/18/2022] levothyroxine (SYNTHROID) tablet 100 mcg, Daily  traMADol (ULTRAM) tablet 50 mg, Q6H PRN  sodium chloride flush 0.9 % injection 5-40 mL, 2 times per day  sodium chloride flush 0.9 % injection 5-40 mL, PRN  0.9 % sodium chloride infusion, PRN  ondansetron (ZOFRAN-ODT) disintegrating tablet 4 mg, Q8H PRN   Or  ondansetron (ZOFRAN) injection 4 mg, Q6H PRN  acetaminophen (TYLENOL) tablet 650 mg, Q6H PRN   Or  acetaminophen (TYLENOL) suppository 650 mg, Q6H PRN  polyethylene glycol (GLYCOLAX) packet 17 g, Daily PRN  atorvastatin (LIPITOR) tablet 40 mg, Nightly  bisacodyl (DULCOLAX) EC tablet 5 mg, Daily  enoxaparin (LOVENOX) injection 40 mg, Daily  nitroGLYCERIN (NITROSTAT) SL tablet 0.4 mg, Q5 Min PRN      Current Facility-Administered Medications   Medication Dose Route Frequency Provider Last Rate Last Admin    [START ON 2/18/2022] aspirin EC tablet 81 mg  81 mg Oral Daily NELLIE Jacobson CNP        [START ON 2/18/2022] pantoprazole (PROTONIX) tablet 40 mg  40 mg Oral Daily NELLIE Jacobson CNP        gabapentin (NEURONTIN) capsule 300 mg  300 mg Oral TID NELLIE Armenta CNP        [START ON 2/18/2022] clopidogrel (PLAVIX) tablet 75 mg  75 mg Oral Daily NELLIE Jacobson CNP        [START ON 2/18/2022] hydroCHLOROthiazide (HYDRODIURIL) tablet 12.5 mg  12.5 mg Oral QAM NELLIE Jacobson CNP        [START ON 2/18/2022] dilTIAZem (CARDIZEM CD) extended release capsule 120 mg  120 mg Oral Daily NELLIE Jacobson CNP        [START ON 2/18/2022] levothyroxine (SYNTHROID) tablet 100 mcg  100 mcg Oral Daily NELLIE Jacobson CNP        traMADol (ULTRAM) tablet 50 mg  50 mg Oral Q6H PRN NELLIE Jacobson CNP        sodium chloride flush 0.9 % injection 5-40 mL  5-40 mL IntraVENous 2 times per day NELLIE Jacobson CNP        sodium chloride flush 0.9 % injection 5-40 mL  5-40 mL IntraVENous PRN NELLIE Jacobson CNP        0.9 % sodium chloride infusion  25 mL IntraVENous PRN NELLIE Jacobson CNP        ondansetron (ZOFRAN-ODT) disintegrating tablet 4 mg  4 mg Oral Q8H PRN NELLIE Jacobson CNP        Or    ondansetron (ZOFRAN) injection 4 mg  4 mg IntraVENous Q6H PRN NELLIE Jacobson CNP        acetaminophen (TYLENOL) tablet 650 mg  650 mg Oral Q6H PRN NELLIE Jacobson CNP        Or    acetaminophen (TYLENOL) suppository 650 mg  650 mg Rectal Q6H PRN Gay I Thomas, APRN - CNP        polyethylene glycol (GLYCOLAX) packet 17 g  17 g Oral Daily PRN NELLIE Jacobson - CNP        atorvastatin (LIPITOR) tablet 40 mg  40 mg Oral Nightly NELLIE Jacobson - CNP        bisacodyl (DULCOLAX) EC tablet 5 mg  5 mg Oral Daily Obdulia Sinha, APRN - CNP        enoxaparin (LOVENOX) injection 40 mg  40 mg SubCUTAneous Daily NELLIE Jacobson CNP        nitroGLYCERIN (NITROSTAT) SL tablet 0.4 mg  0.4 mg SubLINGual Q5 Min PRN NELLIE Jacobson - CNP         Current Outpatient Medications   Medication Sig Dispense Refill    pantoprazole (PROTONIX) 40 MG tablet Take 1 tablet by mouth daily 90 tablet 0    gabapentin (NEURONTIN) 300 MG capsule Take 1 capsule by mouth 3 times daily for 90 days. 270 capsule 0    clopidogrel (PLAVIX) 75 MG tablet Take 1 tablet by mouth daily 90 tablet 3    levothyroxine (SYNTHROID) 50 MCG tablet Take one tablet by mouth in the morning 90 tablet 1    hydroCHLOROthiazide (HYDRODIURIL) 25 MG tablet Take 0.5 tablets by mouth every morning 30 tablet 3    traMADol (ULTRAM) 50 MG tablet Take 2 tablets by mouth 2 times daily as needed for Pain for up to 90 days. 360 tablet 0    DILT- MG extended release capsule Take 1 capsule by mouth daily 90 capsule 1    aspirin 81 MG tablet Take 81 mg by mouth daily       Review of Systems:  All 14 systems reviewed, all negative except for chest pain, palpitations    Physical Examination:    BP (!) 159/88   Pulse 79   Temp 97.9 °F (36.6 °C) (Oral)   Resp 10   Ht 5' 10\" (1.778 m)   Wt 165 lb (74.8 kg)   SpO2 100%   BMI 23.68 kg/m²      Wt Readings from Last 3 Encounters:   02/17/22 165 lb (74.8 kg)   02/14/22 162 lb 14.4 oz (73.9 kg)   02/08/22 160 lb (72.6 kg)     Body mass index is 23.68 kg/m².       General Appearance:  fair  Head: normocephalic     Eyes: normal, noninjected conjunctiva    ENT: normal mucosa, noninjected throat, normal     NECK: No JVP  No thyromegaly        Cardiovascular: No thrills palpated   Auscultation: Normal S1 and S2,  no murmur   carotid bruit no   Abdominal Aorta no bruit    Respiratory:    Breath sounds Clear = 0    Extremities:  none Edema clubbing ,   no cyanosis    SKIN: Warm and well perfused, no pallor or cyanosis    Vascular exam: Pedal Pulses: palp  bilaterally        Abdomen:  No masses or tenderness. No organomegaly noted. Neurological:  Oriented to time, place, and person   No focal neurological deficit noted.   Psychiatric:normal mood, no anxiety    Lab Review   Recent Results (from the past 24 hour(s))   EKG 12 Lead    Collection Time: 02/17/22  1:08 PM   Result Value Ref Range    Ventricular Rate 77 BPM    Atrial Rate 77 BPM    P-R Interval 192 ms    QRS Duration 92 ms    Q-T Interval 392 ms    QTc Calculation (Bazett) 443 ms    P Axis 58 degrees    R Axis 17 degrees    T Axis 51 degrees    Diagnosis       Normal sinus rhythm  Normal ECG  When compared with ECG of 08-FEB-2022 00:42,  No significant change was found     CBC with Auto Differential    Collection Time: 02/17/22  1:19 PM   Result Value Ref Range    WBC 4.8 4.0 - 10.5 K/CU MM    RBC 4.61 4.6 - 6.2 M/CU MM    Hemoglobin 14.6 13.5 - 18.0 GM/DL    Hematocrit 45.2 42 - 52 %    MCV 98.0 78 - 100 FL    MCH 31.7 (H) 27 - 31 PG    MCHC 32.3 32.0 - 36.0 %    RDW 12.4 11.7 - 14.9 %    Platelets 656 650 - 109 K/CU MM    MPV 9.7 7.5 - 11.1 FL    Differential Type AUTOMATED DIFFERENTIAL     Segs Relative 74.6 (H) 36 - 66 %    Lymphocytes % 15.4 (L) 24 - 44 %    Monocytes % 8.4 (H) 0 - 4 %    Eosinophils % 1.0 0 - 3 %    Basophils % 0.4 0 - 1 %    Segs Absolute 3.6 K/CU MM    Lymphocytes Absolute 0.7 K/CU MM    Monocytes Absolute 0.4 K/CU MM    Eosinophils Absolute 0.1 K/CU MM    Basophils Absolute 0.0 K/CU MM    Nucleated RBC % 0.0 %    Total Nucleated RBC 0.0 K/CU MM    Total Immature Neutrophil 0.01 K/CU MM    Immature Neutrophil % 0.2 0 - 0.43 %   Comprehensive Metabolic Panel    Collection Time: 02/17/22  1:19 PM   Result Value Ref Range    Sodium 136 135 - 145 MMOL/L    Potassium 3.9 3.5 - 5.1 MMOL/L    Chloride 96 (L) 99 - 110 mMol/L    CO2 29 21 - 32 MMOL/L    BUN 15 6 - 23 MG/DL    CREATININE 1.0 0.9 - 1.3 MG/DL    Glucose 138 (H) 70 - 99 MG/DL    Calcium 9.5 8.3 - 10.6 MG/DL Albumin 3.6 3.4 - 5.0 GM/DL    Total Protein 6.1 (L) 6.4 - 8.2 GM/DL    Total Bilirubin 0.4 0.0 - 1.0 MG/DL    ALT 15 10 - 40 U/L    AST 24 15 - 37 IU/L    Alkaline Phosphatase 43 40 - 129 IU/L    GFR Non-African American >60 >60 mL/min/1.73m2    GFR African American >60 >60 mL/min/1.73m2    Anion Gap 11 4 - 16   Troponin    Collection Time: 02/17/22  1:19 PM   Result Value Ref Range    Troponin T <0.010 <0.01 NG/ML   Brain Natriuretic Peptide    Collection Time: 02/17/22  1:19 PM   Result Value Ref Range    Pro-BNP 30.86 <300 PG/ML   Protime-INR    Collection Time: 02/17/22  1:19 PM   Result Value Ref Range    Protime 13.5 11.7 - 14.5 SECONDS    INR 1.05 INDEX   TSH    Collection Time: 02/17/22  1:19 PM   Result Value Ref Range    TSH, High Sensitivity 1.070 0.270 - 4.20 uIu/ml           Assessment/Recommendations:     -Chest pain: Given his multiple risk factors he is at moderate risk for having coronary ischemia  His chest pain is not very typical and associated with palpitations as well  Troponin is negative so far  EKG does not show any acute changes    -Hypertension: For hypertension patient is on Cardizem and hydrochlorothiazide blood pressure is not well controlled, will monitor and adjust medications as needed    -Hyperlipidemia: On Lipitor last LDL was 120    -Last cardiac catheterization done in August 2018 did not show any significant CAD    -Patient has 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 daily, last TSH is within normal range 1.070    -Palpitations patient has also been complaining of palpitations as well along with the anginal symptoms patient is on telemetry which will be closely monitor, his EKG is normal           Zari MD Miguelangel, 2/17/2022 4:36 PM       Please note this report has been partially produced using speech recognition software and may contain errors related to that system including errors in grammar, punctuation, and spelling, as well as words and phrases that may be inappropriate. If there are any questions or concerns please feel free to contact the dictating provider for clarification.

## 2022-02-17 NOTE — ED NOTES
Report called to Samanta Mcclellan for room 4012. Patient placed on transport at this time.      Lukasz De La Paz RN  02/17/22 [id number]

## 2022-02-17 NOTE — ED PROVIDER NOTES
Patient Identification  Amena Bagley is a 79 y.o. male    Chief Complaint  Chest Pain and Palpitations      HPI  (History provided by patient)  This is a 79 y.o. male who was brought in by self for chief complaint of chest pain, palpitations. Onset was yesterday. Patient reports left anterior chest pain while out walking, began suddenly, resolved before he made it home. Today after being upset he developed palpitations and substernal CP that has been intermittent since onset, currently not present. Had normal cardiac cath in August 2018. Compliant with home meds.         REVIEW OF SYSTEMS    Constitutional:  Denies fever, chills  HENT:  Denies sore throat or ear pain   Eyes: Denies vision changes, eye pain  Cardiovascular:  Denies syncope  Respiratory:  Denies shortness of breath, cough   GI:  Denies abdominal pain, nausea, vomiting  :  Denies dysuria, discharge  Musculoskeletal:  Denies back pain, joint pain  Skin:  Denies rash, pruritis  Neurologic:  Denies headache, focal weakness, or sensory changes     See HPI and nursing notes for additional information     I have reviewed the following nursing documentation:  Allergies: No Known Allergies    Past medical history:  has a past medical history of Anesthesia complication, BPH with obstruction/lower urinary tract symptoms, Chest pain (7/1/2018), Chronic low back pain, GERD (gastroesophageal reflux disease), H/O 24 hour EKG monitoring (3/12  12/11), H/O cardiac catheterization (2/2/12 4/02 10/98), H/O cardiac catheterization (2/2/12 4/02 10/98), H/O cardiac catheterization (08/06/2018), H/O cardiovascular stress test (07/02/2018), H/O echocardiogram (12/10  ), History of cardiovascular stress test (2/12 11/09 7/08 9/06 2/02 4/00 8/99), History of Holter monitoring (9/17/14), History of nuclear stress test (08/03/2017), chest x-ray (2/20/12), echocardiogram (08/03/2017), Hyperlipidemia, Idiopathic peripheral neuropathy, Kidney stone, Left facial numbness (8/30/2017), Lumbar radiculopathy, Malignant melanoma of skin of face ([facility] [address] MVP (mitral valve prolapse), Myotonia, Peripelvic (lymphatic) cyst, Precordial pain (8/3/2018), and Vitamin B12 deficiency. Past surgical history:  has a past surgical history that includes Cardiac catheterization (2/2/12); Prostate surgery (09/26/2011); Rotator cuff repair (Right, 10/2016); Colonoscopy (5/5/14); Colonoscopy (07/10/2017); lumbar discectomy (8477); ureterolithotomy (1992); Tibia fracture surgery (Right); and Lumbar disc surgery (11/2002). Home medications:   Prior to Admission medications    Medication Sig Start Date End Date Taking? Authorizing Provider   pantoprazole (PROTONIX) 40 MG tablet Take 1 tablet by mouth daily 2/14/22 5/15/22 Yes Harjit Romero MD   gabapentin (NEURONTIN) 300 MG capsule Take 1 capsule by mouth 3 times daily for 90 days. 2/14/22 5/15/22 Yes Harjit Romero MD   clopidogrel (PLAVIX) 75 MG tablet Take 1 tablet by mouth daily 2/14/22  Yes Harjit Romero MD   levothyroxine (SYNTHROID) 50 MCG tablet Take one tablet by mouth in the morning 2/14/22  Yes Harjit Romero MD   hydroCHLOROthiazide (HYDRODIURIL) 25 MG tablet Take 0.5 tablets by mouth every morning 2/14/22  Yes Harjit Romero MD   traMADol (ULTRAM) 50 MG tablet Take 2 tablets by mouth 2 times daily as needed for Pain for up to 90 days. 11/18/21 2/17/22 Yes Harjit Romero MD   DILT- MG extended release capsule Take 1 capsule by mouth daily 9/7/21  Yes NELLIE Guzman CNP   aspirin 81 MG tablet Take 81 mg by mouth daily   Yes Historical Provider, MD       Social history:  reports that he has never smoked. He has never used smokeless tobacco. He reports current alcohol use. He reports that he does not use drugs.     Family history:    Family History   Problem Relation Age of Onset    Heart Disease Mother         ASCAD    Heart Disease Father     Heart Disease Brother     Diabetes Other     Cancer Other         unknown type    Prostate Cancer Other     Coronary Art Dis Other     Coronary Art Dis Other          Exam  BP (!) 131/94   Pulse 74   Temp 97.9 °F (36.6 °C) (Oral)   Resp 18   Ht 5' 10\" (1.778 m)   Wt 165 lb (74.8 kg)   SpO2 92%   BMI 23.68 kg/m²   Nursing note and vitals reviewed. Constitutional: Well developed, well nourished. No acute distress. HENT:      Head: Normocephalic and atraumatic. Ears: External ears normal.      Nose: Nose normal.     Mouth: Membrane mucosa moist and pink. No posterior oropharynx erythema or tonsillar edema  Eyes: Anicteric sclera. No discharge, PERRL  Neck: Supple. Trachea midline. Cardiovascular: RRR, no murmurs, rubs, or gallops, radial pulses 2+ bilaterally. Pulmonary/Chest: Effort normal. No respiratory distress. CTAB. No stridor. No wheezes. No rales. Abdominal: Soft. Nontender to palpation. No distension. No guarding, rebound tenderness, or evidence of ascites. : No CVA tenderness. Musculoskeletal: Moves all extremities. No gross deformity. No LE edema or TTP noted bilat. Neurological: Alert and oriented to person, place, and time. Normal muscle tone. Skin: Warm and dry. No rash. Psychiatric: Normal mood and affect. Behavior is normal.      EKG   Please see Dr. Patrick Vargas note for EKG read. Radiographs (if obtained):  [] The following radiograph was interpreted by myself in the absence of a radiologist:   [x] Radiologist's Report Reviewed:  XR CHEST PORTABLE   Final Result   No cardiomegaly, pneumonia or interstitial edema.                 Labs  Results for orders placed or performed during the hospital encounter of 02/17/22   CBC with Auto Differential   Result Value Ref Range    WBC 4.8 4.0 - 10.5 K/CU MM    RBC 4.61 4.6 - 6.2 M/CU MM    Hemoglobin 14.6 13.5 - 18.0 GM/DL    Hematocrit 45.2 42 - 52 %    MCV 98.0 78 - 100 FL    MCH 31.7 (H) 27 - 31 PG    MCHC 32.3 32.0 - 36.0 %    RDW 12.4 11.7 - 14.9 % Platelets 635 110 - 069 K/CU MM    MPV 9.7 7.5 - 11.1 FL    Differential Type AUTOMATED DIFFERENTIAL     Segs Relative 74.6 (H) 36 - 66 %    Lymphocytes % 15.4 (L) 24 - 44 %    Monocytes % 8.4 (H) 0 - 4 %    Eosinophils % 1.0 0 - 3 %    Basophils % 0.4 0 - 1 %    Segs Absolute 3.6 K/CU MM    Lymphocytes Absolute 0.7 K/CU MM    Monocytes Absolute 0.4 K/CU MM    Eosinophils Absolute 0.1 K/CU MM    Basophils Absolute 0.0 K/CU MM    Nucleated RBC % 0.0 %    Total Nucleated RBC 0.0 K/CU MM    Total Immature Neutrophil 0.01 K/CU MM    Immature Neutrophil % 0.2 0 - 0.43 %   Comprehensive Metabolic Panel   Result Value Ref Range    Sodium 136 135 - 145 MMOL/L    Potassium 3.9 3.5 - 5.1 MMOL/L    Chloride 96 (L) 99 - 110 mMol/L    CO2 29 21 - 32 MMOL/L    BUN 15 6 - 23 MG/DL    CREATININE 1.0 0.9 - 1.3 MG/DL    Glucose 138 (H) 70 - 99 MG/DL    Calcium 9.5 8.3 - 10.6 MG/DL    Albumin 3.6 3.4 - 5.0 GM/DL    Total Protein 6.1 (L) 6.4 - 8.2 GM/DL    Total Bilirubin 0.4 0.0 - 1.0 MG/DL    ALT 15 10 - 40 U/L    AST 24 15 - 37 IU/L    Alkaline Phosphatase 43 40 - 129 IU/L    GFR Non-African American >60 >60 mL/min/1.73m2    GFR African American >60 >60 mL/min/1.73m2    Anion Gap 11 4 - 16   Troponin   Result Value Ref Range    Troponin T <0.010 <0.01 NG/ML   Brain Natriuretic Peptide   Result Value Ref Range    Pro-BNP 30.86 <300 PG/ML   Protime-INR   Result Value Ref Range    Protime 13.5 11.7 - 14.5 SECONDS    INR 1.05 INDEX   TSH   Result Value Ref Range    TSH, High Sensitivity 1.070 0.270 - 4.20 uIu/ml   EKG 12 Lead   Result Value Ref Range    Ventricular Rate 77 BPM    Atrial Rate 77 BPM    P-R Interval 192 ms    QRS Duration 92 ms    Q-T Interval 392 ms    QTc Calculation (Bazett) 443 ms    P Axis 58 degrees    R Axis 17 degrees    T Axis 51 degrees    Diagnosis       Normal sinus rhythm  Normal ECG  When compared with ECG of 08-FEB-2022 00:42,  No significant change was found           MDM  Patient presents for chest pain. Vital signs are unremarkable. His laboratory testing is negative. EKG is normal sinus rhythm. Chest x-ray negative. Discussed results with patient. Heart score is 4. Recommended observation in the hospital.  Patient is in agreement. Spoke with Shani Leonardo CNP with hospitalist group who agreed to admit. I have independently evaluated this patient. Final Impression  1. Chest pain, unspecified type        Blood pressure (!) 131/94, pulse 74, temperature 97.9 °F (36.6 °C), temperature source Oral, resp. rate 18, height 5' 10\" (1.778 m), weight 165 lb (74.8 kg), SpO2 92 %. Disposition:  Discharge to home in stable condition. Patient was given scripts for the following medications. I counseled patient how to take these medications. New Prescriptions    No medications on file       This chart was generated using the [address] [place] [address] Animated Dynamicsation system. I created this record but it may contain dictation errors given the limitations of this technology.        Dipti Cabrera PA-C  02/17/22 1774

## 2022-02-17 NOTE — ED NOTES
[address] hospitalist     Chirinos Jury  02/17/22 [phone] [facility] with [facility]S group returned call      Chirinos Jury  02/17/22 [id number]

## 2022-02-17 NOTE — H&P
V2.0  History and Physical      Name:  Laura Rousseau /Age/Sex: 1940  (79 y.o. male)   MRN & CSN:  [id number] & [id number] Encounter Date/Time: 2022 4:00 PM EST   Location:  ED25/ED-25 PCP: Princess Jose MD       Hospital Day: 1    Assessment and Plan:   Laura Rousseau is a 79 y.o. male with a pmh of hypertension who presents with chest pain    Chest pain r/o ACS     Initial troponin negative, trend x3              Telemetry monitoring    Chest x-ray reviewed, no acute cardiopulmonary process              EKG reviewed,NSR. No evidence of acute ST elevation or depression . Repeat EKG in AM              Cardiology consult. Antiplatelet/BB/Statin/Sl Nitro    Check lipid panel in am      Essential hypertension  Right carotid artery stenosis     Check carotid duplex as planned by outpatient PCP   Continue home antihypertensive     Hypothyroidism   TSH stable, continue Synthroid      Chronic Conditions: continue home medication as ordered       All testing  and results reviewed with patient . All questions answered. Patient and family voiced understanding    This patient was seen and examined in conjunction withChest pain r/o ACS     Initial troponin negative, trend x3              Telemetry monitoring    Chest x-ray reviewed, no acute cardiopulmonary process              EKG reviewed,NSR. No evidence of acute ST elevation or depression . Repeat EKG in AM              Cardiology consult. Antiplatelet/BB/Statin/Sl Nitro    Check lipid panel in am      Essential hypertension  Right carotid artery stenosis   Continue home antihypertensive, plavix     Hypothyroidism   TSH stable, continue Synthroid   The patient was seen and examined in conjumction with Dr Paolo Marley. . He/She was agreeable with the plan and management as dictated above. Disposition:   Current Living situation: Home  Expected Disposition: home  Estimated D/C: < 2 days    Diet ADULT DIET; Regular;  No Caffeine   GI Prophylaxis  [x] PPI,  [] H2 Blocker,  [] Carafate,  [] Diet/Tube Feeds   DVT Prophylaxis [x] Lovenox, []  Heparin, [] SCDs, [x] Ambulation,  [] NOAC   Code Status Full Code   Surrogate Decision Maker/ POA          History from:     patient    History of Present Illness:     Chief Complaint: <principal problem not specified>  Hardy Torres is a 79 y.o. male with pmh of COPD, GERD, peripheral neuropathy, and hypertension who presents with chest pain with exertion. Onset was 2 to 3 days ago. Patient reports left anterior chest pain which began out while walking, resolved before he made at home. States today began after he got upset with associated palpitations, states it is intermittent now. Currently not present. Patient had normal cardiac cath last in August 2018. States he is compliant with his home medications. Denies headache or blurred vision. Denies associated shortness of breath. Denies fevers chills or recent travel. Denies nausea vomiting or diarrhea. Review of Systems: Need 10 Elements   Review of Systems   Constitutional: Negative for fatigue. HENT: Negative. Eyes: Negative. Respiratory: Positive for chest tightness. Negative for shortness of breath. Cardiovascular: Positive for chest pain. Negative for leg swelling. Gastrointestinal: Negative for nausea and vomiting. Endocrine: Negative for cold intolerance and heat intolerance. Genitourinary: Negative for dysuria and hematuria. Musculoskeletal: Negative for myalgias. Skin: Negative. Allergic/Immunologic: Negative. Neurological: Negative for dizziness and light-headedness. Hematological: Does not bruise/bleed easily. Psychiatric/Behavioral: Negative for agitation. The patient is not nervous/anxious.              Objective:   No intake or output data in the 24 hours ending 02/17/22 1600   Vitals:   Vitals:    02/17/22 1308 02/17/22 1310 02/17/22 1413 02/17/22 1430   BP:  120/75 (!) 148/54 (!) 131/94   Pulse: 82  74 74   Resp: 18  18    Temp: 97.9 °F (36.6 °C)      TempSrc: Oral      SpO2: 93% 97% 100% 92%   Weight: 165 lb (74.8 kg)      Height: 5' 10\" (1.778 m)          Medications Prior to Admission     Prior to Admission medications    Medication Sig Start Date End Date Taking? Authorizing Provider   pantoprazole (PROTONIX) 40 MG tablet Take 1 tablet by mouth daily 2/14/22 5/15/22 Yes Norma Stallworth MD   gabapentin (NEURONTIN) 300 MG capsule Take 1 capsule by mouth 3 times daily for 90 days. 2/14/22 5/15/22 Yes Norma Stallworth MD   clopidogrel (PLAVIX) 75 MG tablet Take 1 tablet by mouth daily 2/14/22  Yes Norma Stallworth MD   levothyroxine (SYNTHROID) 50 MCG tablet Take one tablet by mouth in the morning 2/14/22  Yes Norma Stallworth MD   hydroCHLOROthiazide (HYDRODIURIL) 25 MG tablet Take 0.5 tablets by mouth every morning 2/14/22  Yes Norma Stallworth MD   traMADol (ULTRAM) 50 MG tablet Take 2 tablets by mouth 2 times daily as needed for Pain for up to 90 days. 11/18/21 2/17/22 Yes Norma Stallworth MD   DILT- MG extended release capsule Take 1 capsule by mouth daily 9/7/21  Yes NELLIE Mccarty CNP   aspirin 81 MG tablet Take 81 mg by mouth daily   Yes Historical Provider, MD       Physical Exam: Need 8 Elements   Physical Exam  Vitals and nursing note reviewed. Constitutional:       General: He is not in acute distress. Appearance: Normal appearance. HENT:      Head: Normocephalic. Cardiovascular:      Pulses: Normal pulses. Pulmonary:      Effort: Pulmonary effort is normal. No respiratory distress. Breath sounds: No wheezing or rhonchi. Abdominal:      General: Abdomen is flat. Bowel sounds are normal. There is no distension. Musculoskeletal:         General: Normal range of motion. Skin:     General: Skin is warm and dry. Capillary Refill: Capillary refill takes 2 to 3 seconds.    Neurological:      General: No focal deficit present. Mental Status: He is alert and oriented to person, place, and time. Past Medical History:   PMHx   Past Medical History:   Diagnosis Date    Anesthesia complication     Patient reports in 1973 following a Back Surgery he was kept in the hospital due to high temperatures in the evenings. He stated they kept him for observation and doesn't remember receiving any treatment for the high temperatures. Patient reports the anesthesia name started with flur? ?.    BPH with obstruction/lower urinary tract symptoms     Chest pain 7/1/2018    Chronic low back pain     GERD (gastroesophageal reflux disease)     H/O 24 hour EKG monitoring 3/12  12/11    3/12/12  essentially a normal event monitor without significant arrhythmias noted    H/O cardiac catheterization 2/2/12 4/02 10/98    2/22/12  LM, LAD and RCA all without significant disease    H/O cardiac catheterization 2/2/12 4/02 10/98    Done R/T false pos on stress test.    H/O cardiac catheterization 08/06/2018    normal study    H/O cardiovascular stress test 07/02/2018    normal study    H/O echocardiogram 12/10      3/14 EF55-60% normal LV function 12/10/10  complete 2 dim transthoracic echj. LVSF normal  EF >55%, transmitral spectral Doppler flow pattern is suggestive of  impaired LV relaxation    History of cardiovascular stress test 2/12 11/09 7/08 9/06 2/02 4/00 8/99 2/8/2012  evidence of mild ischemia  in the RCA. abnormal study , restingEF is 70%, global LVSF is  normal    History of Holter monitoring 9/17/14    14 day EVENT - no RVR    History of nuclear stress test 08/03/2017    cardiolite-normal,EF60%    Hx of chest x-ray 2/20/12    chest is considereed non acute.   COPD    Hx of echocardiogram 08/03/2017    SS27-48%,VXPXA 2 diastolic dysfunction    Hyperlipidemia     Idiopathic peripheral neuropathy     Kidney stone     Left facial numbness 8/30/2017    Lumbar radiculopathy     Malignant melanoma of skin of face ([facility] [address]     MVP (mitral valve prolapse)     Myotonia     chronic increased CPK's    Peripelvic (lymphatic) cyst     per CT    Precordial pain 8/3/2018    Vitamin B12 deficiency      PSHX:  has a past surgical history that includes Cardiac catheterization (2/2/12); Prostate surgery (09/26/2011); Rotator cuff repair (Right, 10/2016); Colonoscopy (5/5/14); Colonoscopy (07/10/2017); lumbar discectomy (1066); ureterolithotomy (1992); Tibia fracture surgery (Right); and Lumbar disc surgery (11/2002). Allergies: No Known Allergies  Fam HX:  family history includes Cancer in an other family member; Coronary Art Dis in some other family members; Diabetes in an other family member; Heart Disease in his brother, father, and mother; Prostate Cancer in an other family member. Soc HX:   Social History     Socioeconomic History    Marital status:      Spouse name: None    Number of children: None    Years of education: None    Highest education level: None   Occupational History    None   Tobacco Use    Smoking status: Never Smoker    Smokeless tobacco: Never Used   Vaping Use    Vaping Use: Never used   Substance and Sexual Activity    Alcohol use: Yes     Comment: Occassional    Drug use: No    Sexual activity: Not Currently     Partners: Female     Comment:    Other Topics Concern    None   Social History Narrative    None     Social Determinants of Health     Financial Resource Strain: Low Risk     Difficulty of Paying Living Expenses: Not hard at all   Food Insecurity: No Food Insecurity    Worried About Running Out of Food in the Last Year: Never true    [address] in the Last Year: Never true   Transportation Needs:     Lack of Transportation (Medical): Not on file    Lack of Transportation (Non-Medical):  Not on file   Physical Activity:     Days of Exercise per Week: Not on file    Minutes of Exercise per Session: Not on file   Stress:     Feeling of Stress : Not on file   Social Connections:     Frequency of Communication with Friends and Family: Not on file    Frequency of Social Gatherings with Friends and Family: Not on file    Attends [employer] Services: Not on file    Active Member of Clubs or Organizations: Not on file    Attends Club or Organization Meetings: Not on file    Marital Status: Not on file   Intimate Partner Violence:     Fear of Current or Ex-Partner: Not on file    Emotionally Abused: Not on file    Physically Abused: Not on file    Sexually Abused: Not on file   Housing Stability:     Unable to Pay for Housing in the Last Year: Not on file    Number of Places Lived in the Last Year: Not on file    Unstable Housing in the Last Year: Not on file       Medications:   Medications:    aspirin  81 mg Oral Daily    pantoprazole  40 mg Oral Daily    gabapentin  300 mg Oral TID    clopidogrel  75 mg Oral Daily    [START ON 2/18/2022] hydroCHLOROthiazide  12.5 mg Oral QAM    dilTIAZem  120 mg Oral Daily    levothyroxine  100 mcg Oral Daily    sodium chloride flush  5-40 mL IntraVENous 2 times per day    atorvastatin  40 mg Oral Nightly    bisacodyl  5 mg Oral Daily    enoxaparin  40 mg SubCUTAneous Daily      Infusions:    sodium chloride       PRN Meds: traMADol, 50 mg, Q6H PRN  sodium chloride flush, 5-40 mL, PRN  sodium chloride, 25 mL, PRN  ondansetron, 4 mg, Q8H PRN   Or  ondansetron, 4 mg, Q6H PRN  acetaminophen, 650 mg, Q6H PRN   Or  acetaminophen, 650 mg, Q6H PRN  polyethylene glycol, 17 g, Daily PRN  nitroGLYCERIN, 0.4 mg, Q5 Min PRN        Labs      CBC:   Recent Labs     02/17/22  1319   WBC 4.8   HGB 14.6        BMP:    Recent Labs     02/17/22  1319      K 3.9   CL 96*   CO2 29   BUN 15   CREATININE 1.0   GLUCOSE 138*     Hepatic:   Recent Labs     02/17/22  1319   AST 24   ALT 15   BILITOT 0.4   ALKPHOS 43     Lipids:   Lab Results   Component Value Date    CHOL 162 04/10/2021    HDL 34 04/10/2021    TRIG 74 04/10/2021     Hemoglobin A1C:   Lab Results   Component Value Date    LABA1C 5.5 04/10/2021     TSH:   Lab Results   Component Value Date    TSH 0.56 04/18/2019     Troponin:   Lab Results   Component Value Date    TROPONINT <0.010 02/17/2022    TROPONINT <0.010 02/08/2022    TROPONINT <0.010 01/22/2022     Lactic Acid: No results for input(s): LACTA in the last 72 hours. BNP:   Recent Labs     02/17/22  1319   PROBNP 30.86     UA:  Lab Results   Component Value Date    NITRU NEGATIVE 01/22/2022    COLORU STRAW 01/22/2022    WBCUA <1 01/22/2022    RBCUA 1 01/22/2022    MUCUS RARE 01/22/2022    TRICHOMONAS NONE SEEN 01/22/2022    BACTERIA NEGATIVE 01/22/2022    CLARITYU CLEAR 01/22/2022    SPECGRAV 1.006 01/22/2022    LEUKOCYTESUR NEGATIVE 01/22/2022    UROBILINOGEN NEGATIVE 01/22/2022    BILIRUBINUR NEGATIVE 01/22/2022    BLOODU SMALL 01/22/2022    GLUCOSEU negative 11/21/2013    KETUA NEGATIVE 01/22/2022     Urine Cultures: No results found for: Shirley Duran  Blood Cultures: No results found for: BC  No results found for: BLOODCULT2  Organism: No results found for: ORG    Imaging/Diagnostics Last 24 Hours   XR CHEST PORTABLE    Result Date: 2/17/2022  EXAMINATION: ONE XRAY VIEW OF THE CHEST 2/17/2022 2:08 pm COMPARISON: 01/22/2022. HISTORY: ORDERING SYSTEM PROVIDED HISTORY: palpitations TECHNOLOGIST PROVIDED HISTORY: Reason for exam:->palpitations Reason for Exam: palpitations FINDINGS: Subtle subsegmental atelectasis was noted in the right lower lobe. No tracheal or mediastinal shift was noted. No cardiomegaly, pneumonia, interstitial edema or pleural effusions were identified. No hilar mass was noted. The regional skeleton was unremarkable. No cardiomegaly, pneumonia or interstitial edema. Electronically signed by NELLIE Moses CNP on 2/17/2022 at 4:00 PM          This dictation was created with voice recognition software.  While attempts have been made to review the dictation as it is transcribed, on occasion the spoken word can be misinterpreted by the technology leading to omissions or inappropriate words, phrases or sentences.      Electronically signed by NELLIE Win CNP on 2/17/2022 at 4:00 PM

## 2022-02-18 ENCOUNTER — APPOINTMENT (OUTPATIENT)
Dept: NUCLEAR MEDICINE | Age: 82
End: 2022-02-18
Payer: MEDICARE

## 2022-02-18 LAB
ALBUMIN SERPL-MCNC: 3.8 GM/DL (ref 3.4–5)
ALP BLD-CCNC: 33 IU/L (ref 40–128)
ALT SERPL-CCNC: 14 U/L (ref 10–40)
ANION GAP SERPL CALCULATED.3IONS-SCNC: 9 MMOL/L (ref 4–16)
AST SERPL-CCNC: 22 IU/L (ref 15–37)
BILIRUB SERPL-MCNC: 0.4 MG/DL (ref 0–1)
BUN BLDV-MCNC: 14 MG/DL (ref 6–23)
CALCIUM SERPL-MCNC: 9.5 MG/DL (ref 8.3–10.6)
CHLORIDE BLD-SCNC: 101 MMOL/L (ref 99–110)
CHOLESTEROL: 174 MG/DL
CO2: 28 MMOL/L (ref 21–32)
CREAT SERPL-MCNC: 1.1 MG/DL (ref 0.9–1.3)
EKG ATRIAL RATE: 77 BPM
EKG ATRIAL RATE: 79 BPM
EKG ATRIAL RATE: 81 BPM
EKG DIAGNOSIS: NORMAL
EKG P AXIS: 111 DEGREES
EKG P AXIS: 58 DEGREES
EKG P AXIS: 64 DEGREES
EKG P-R INTERVAL: 180 MS
EKG P-R INTERVAL: 182 MS
EKG P-R INTERVAL: 192 MS
EKG Q-T INTERVAL: 370 MS
EKG Q-T INTERVAL: 376 MS
EKG Q-T INTERVAL: 392 MS
EKG QRS DURATION: 88 MS
EKG QRS DURATION: 92 MS
EKG QRS DURATION: 92 MS
EKG QTC CALCULATION (BAZETT): 429 MS
EKG QTC CALCULATION (BAZETT): 431 MS
EKG QTC CALCULATION (BAZETT): 443 MS
EKG R AXIS: 17 DEGREES
EKG R AXIS: 192 DEGREES
EKG R AXIS: 4 DEGREES
EKG T AXIS: 126 DEGREES
EKG T AXIS: 51 DEGREES
EKG T AXIS: 55 DEGREES
EKG VENTRICULAR RATE: 77 BPM
EKG VENTRICULAR RATE: 79 BPM
EKG VENTRICULAR RATE: 81 BPM
ESTIMATED AVERAGE GLUCOSE: 108 MG/DL
GFR AFRICAN AMERICAN: >60 ML/MIN/1.73M2
GFR NON-AFRICAN AMERICAN: >60 ML/MIN/1.73M2
GLUCOSE BLD-MCNC: 85 MG/DL (ref 70–99)
HBA1C MFR BLD: 5.4 % (ref 4.2–6.3)
HCT VFR BLD CALC: 44 % (ref 42–52)
HDLC SERPL-MCNC: 31 MG/DL
HEMOGLOBIN: 14.3 GM/DL (ref 13.5–18)
LDL CHOLESTEROL CALCULATED: 109 MG/DL
LV EF: 55 %
LVEF MODALITY: NORMAL
MCH RBC QN AUTO: 31.7 PG (ref 27–31)
MCHC RBC AUTO-ENTMCNC: 32.5 % (ref 32–36)
MCV RBC AUTO: 97.6 FL (ref 78–100)
PDW BLD-RTO: 12.3 % (ref 11.7–14.9)
PLATELET # BLD: 184 K/CU MM (ref 140–440)
PMV BLD AUTO: 9.7 FL (ref 7.5–11.1)
POTASSIUM SERPL-SCNC: 4.4 MMOL/L (ref 3.5–5.1)
RBC # BLD: 4.51 M/CU MM (ref 4.6–6.2)
SODIUM BLD-SCNC: 138 MMOL/L (ref 135–145)
TOTAL PROTEIN: 5.8 GM/DL (ref 6.4–8.2)
TRIGL SERPL-MCNC: 172 MG/DL
WBC # BLD: 3.7 K/CU MM (ref 4–10.5)

## 2022-02-18 PROCEDURE — 85027 COMPLETE CBC AUTOMATED: CPT

## 2022-02-18 PROCEDURE — G0378 HOSPITAL OBSERVATION PER HR: HCPCS

## 2022-02-18 PROCEDURE — 99226 PR SBSQ OBSERVATION CARE/DAY 35 MINUTES: CPT | Performed by: INTERNAL MEDICINE

## 2022-02-18 PROCEDURE — A9500 TC99M SESTAMIBI: HCPCS | Performed by: INTERNAL MEDICINE

## 2022-02-18 PROCEDURE — 78452 HT MUSCLE IMAGE SPECT MULT: CPT

## 2022-02-18 PROCEDURE — 80061 LIPID PANEL: CPT

## 2022-02-18 PROCEDURE — 93010 ELECTROCARDIOGRAM REPORT: CPT | Performed by: INTERNAL MEDICINE

## 2022-02-18 PROCEDURE — 80053 COMPREHEN METABOLIC PANEL: CPT

## 2022-02-18 PROCEDURE — 96372 THER/PROPH/DIAG INJ SC/IM: CPT

## 2022-02-18 PROCEDURE — 6360000002 HC RX W HCPCS: Performed by: NURSE PRACTITIONER

## 2022-02-18 PROCEDURE — APPSS45 APP SPLIT SHARED TIME 31-45 MINUTES: Performed by: NURSE PRACTITIONER

## 2022-02-18 PROCEDURE — 6360000002 HC RX W HCPCS: Performed by: INTERNAL MEDICINE

## 2022-02-18 PROCEDURE — 2580000003 HC RX 258: Performed by: NURSE PRACTITIONER

## 2022-02-18 PROCEDURE — 93017 CV STRESS TEST TRACING ONLY: CPT

## 2022-02-18 PROCEDURE — 93005 ELECTROCARDIOGRAM TRACING: CPT | Performed by: INTERNAL MEDICINE

## 2022-02-18 PROCEDURE — 83036 HEMOGLOBIN GLYCOSYLATED A1C: CPT

## 2022-02-18 PROCEDURE — 93005 ELECTROCARDIOGRAM TRACING: CPT | Performed by: NURSE PRACTITIONER

## 2022-02-18 PROCEDURE — 6370000000 HC RX 637 (ALT 250 FOR IP): Performed by: NURSE PRACTITIONER

## 2022-02-18 PROCEDURE — 3430000000 HC RX DIAGNOSTIC RADIOPHARMACEUTICAL: Performed by: INTERNAL MEDICINE

## 2022-02-18 PROCEDURE — 36415 COLL VENOUS BLD VENIPUNCTURE: CPT

## 2022-02-18 PROCEDURE — 94761 N-INVAS EAR/PLS OXIMETRY MLT: CPT

## 2022-02-18 RX ORDER — DILTIAZEM HYDROCHLORIDE 180 MG/1
180 CAPSULE, COATED, EXTENDED RELEASE ORAL DAILY
Status: DISCONTINUED | OUTPATIENT
Start: 2022-02-19 | End: 2022-02-19 | Stop reason: HOSPADM

## 2022-02-18 RX ORDER — AMINOPHYLLINE DIHYDRATE 25 MG/ML
75 INJECTION, SOLUTION INTRAVENOUS ONCE
Status: COMPLETED | OUTPATIENT
Start: 2022-02-18 | End: 2022-02-18

## 2022-02-18 RX ADMIN — ENOXAPARIN SODIUM 40 MG: 100 INJECTION SUBCUTANEOUS at 12:29

## 2022-02-18 RX ADMIN — ASPIRIN 81 MG: 81 TABLET, COATED ORAL at 12:29

## 2022-02-18 RX ADMIN — HYDROCHLOROTHIAZIDE 12.5 MG: 12.5 TABLET ORAL at 12:29

## 2022-02-18 RX ADMIN — CLOPIDOGREL 75 MG: 75 TABLET, FILM COATED ORAL at 12:29

## 2022-02-18 RX ADMIN — LEVOTHYROXINE SODIUM 100 MCG: 0.1 TABLET ORAL at 05:31

## 2022-02-18 RX ADMIN — REGADENOSON 0.4 MG: 0.08 INJECTION, SOLUTION INTRAVENOUS at 10:49

## 2022-02-18 RX ADMIN — SODIUM CHLORIDE, PRESERVATIVE FREE 10 ML: 5 INJECTION INTRAVENOUS at 09:17

## 2022-02-18 RX ADMIN — KIT FOR THE PREPARATION OF TECHNETIUM TC99M SESTAMIBI 10 MILLICURIE: 1 INJECTION, POWDER, LYOPHILIZED, FOR SOLUTION PARENTERAL at 07:50

## 2022-02-18 RX ADMIN — KIT FOR THE PREPARATION OF TECHNETIUM TC99M SESTAMIBI 30 MILLICURIE: 1 INJECTION, POWDER, LYOPHILIZED, FOR SOLUTION PARENTERAL at 10:50

## 2022-02-18 RX ADMIN — GABAPENTIN 300 MG: 300 CAPSULE ORAL at 14:29

## 2022-02-18 RX ADMIN — LIDOCAINE HYDROCHLORIDE: 20 SOLUTION ORAL; TOPICAL at 23:50

## 2022-02-18 RX ADMIN — PANTOPRAZOLE SODIUM 40 MG: 40 TABLET, DELAYED RELEASE ORAL at 12:30

## 2022-02-18 RX ADMIN — DILTIAZEM HYDROCHLORIDE 120 MG: 120 CAPSULE, COATED, EXTENDED RELEASE ORAL at 12:30

## 2022-02-18 RX ADMIN — AMINOPHYLLINE 75 MG: 25 INJECTION, SOLUTION INTRAVENOUS at 10:51

## 2022-02-18 RX ADMIN — NITROGLYCERIN 0.4 MG: 0.4 TABLET, ORALLY DISINTEGRATING SUBLINGUAL at 22:32

## 2022-02-18 NOTE — ED PROVIDER NOTES
ED attending EKG interpretation (I otherwise did not participate in the care of this patient)    EKG Interpretation  Interpreted by me  Compared to 2/7/2022 rhythm: normal sinus   Rate: normal  Axis: normal  Ectopy: none  Conduction: normal  ST Segments: no acute change  T Waves: no acute change  Clinical Impression: normal sinus rhythm, no acute change     Tyshawn Wells MD  02/19/22 0045

## 2022-02-18 NOTE — PROGRESS NOTES
Douglas Otero is a 79year old male presents with chest pain. He had stress test today. The stress test was negative. Cardiology is OK to discharge him. However, pt complaint of some nausea and palpitation in heart. Likely due to the side effect from cardiolite. Explained to patient. Pt is worried. We will keep patient overnight.

## 2022-02-18 NOTE — DISCHARGE SUMMARY
Discharge Summary    Name:  Radha Law /Age/Sex: 1940  (79 y.o. male)   MRN & CSN:  [id number] & [id number] Admission Date/Time: 2022  1:07 PM   Attending:  Leann Muhammad MD Discharging Physician: Sindi Gomez [address] [facility] Course:   Radha Law is a 79 y.o.  male with history of hypertension, hypothyroidism presents with chest pain. Pt has negative troponin x3, cardiology consulted. Pt had stress test this morning, the result is negative. Dr. Mnan Muñoz is OK to discharge patient. Pt reported chest pain free today. He will continue current home medication. Follow up with cardiology in 1-2 weeks    Chest pain r/o ACS  -appreciate cardiology input: stress test this morning                Essential hypertension  Right carotid artery stenosis  -Check carotid duplex as planned by outpatient PCP   -Continue home antihypertensive                Hypothyroidism  -TSH stable, continue Synthroid     The patient expressed appropriate understanding of and agreement with the discharge recommendations, medications, and plan. Consults this admission:  IP CONSULT TO HOSPITALIST  IP CONSULT TO CARDIOLOGY    Discharge Instruction:   Follow up appointments: cardiology  Primary care physician:  within 2 weeks    Diet:  regular diet   Activity: activity as tolerated  Disposition: Discharged to:   [x]Home, []C, []SNF, []Acute Rehab, []Hospice   Condition on discharge: Stable    Discharge Medications:        Medication List      CONTINUE taking these medications    aspirin 81 MG tablet     clopidogrel 75 MG tablet  Commonly known as: PLAVIX  Take 1 tablet by mouth daily     Dilt- MG extended release capsule  Generic drug: dilTIAZem  Take 1 capsule by mouth daily     gabapentin 300 MG capsule  Commonly known as: NEURONTIN  Take 1 capsule by mouth 3 times daily for 90 days.      hydroCHLOROthiazide 25 MG tablet  Commonly known as: HYDRODIURIL  Take 0.5 tablets by mouth every morning     levothyroxine 50 MCG tablet  Commonly known as: SYNTHROID  Take one tablet by mouth in the morning     pantoprazole 40 MG tablet  Commonly known as: PROTONIX  Take 1 tablet by mouth daily     traMADol 50 MG tablet  Commonly known as: ULTRAM  Take 2 tablets by mouth 2 times daily as needed for Pain for up to 90 days. Objective Findings at Discharge:   BP (!) 149/74   Pulse 70   Temp 98 °F (36.7 °C) (Oral)   Resp 17   Ht 5' 10\" (1.778 m)   Wt 165 lb (74.8 kg)   SpO2 98%   BMI 23.68 kg/m²            PHYSICAL EXAM 02/18  GEN Awake male, sitting upright in bed in no apparent distress. Appears given age. EYES Pupils are equally round. No scleral erythema, discharge, or conjunctivitis. HENT Mucous membranes are moist. Oral pharynx without exudates, no evidence of thrush. NECK Supple, no apparent thyromegaly or masses. RESP Clear to auscultation, no wheezes, rales or rhonchi. Symmetric chest movement while on room air. CARDIO/VASC S1/S2 auscultated. Regular rate without appreciable murmurs, rubs, or gallops. No JVD or carotid bruits. Peripheral pulses equal bilaterally and palpable. No peripheral edema. GI Abdomen is soft without significant tenderness, masses, or guarding. Bowel sounds are normoactive. Rectal exam deferred.  No costovertebral angle tenderness. Normal appearing external genitalia. Siegle catheter is not present. HEME/LYMPH No palpable cervical lymphadenopathy and no hepatosplenomegaly. No petechiae or ecchymoses. MSK No gross joint deformities. SKIN Normal coloration, warm, dry. NEURO Cranial nerves appear grossly intact, normal speech, no lateralizing weakness. PSYCH Awake, alert, oriented x 4. Affect appropriate. BMP/CBC  Recent Labs     02/17/22  1319 02/18/22  0018    138   K 3.9 4.4   CL 96* 101   CO2 29 28   BUN 15 14   CREATININE 1.0 1.1   WBC 4.8 3.7*   HCT 45.2 44.0    184       IMAGING:  Stress test on 02/18  Radiology exam is complete. No Radiologist dictation. Please follow up    with ordering provider.          Chest xray on 02/18  No cardiomegaly, pneumonia or interstitial edema.       Discharge Time of 35  minutes    Electronically signed by Chuyita Sanderson CNP on 2/18/2022 at 1:13 PM

## 2022-02-18 NOTE — PROGRESS NOTES
Cardiology Progress Note     Today's Plan sign off    Admit Date:  2/17/2022    Consult reason/ Seen today for: chest     Subjective and  Overnight Events:  Had intermittent chest discomfort- brief in nature  Also noted palpitations - infrequent        Assessment / Plan:     Chest pain- negative trop x 3- NM stress test normal perfusion and EF- risk factor modification: continue ASA and statin: HTN- controlled with Cardizem and HCTZ. HLD on statin - HDL is low 31         History of Presenting Illness:    Chief complain on admission : 79 y. o.year old who is admitted for  Chief Complaint   Patient presents with    Chest Pain    Palpitations        Past medical history:    has a past medical history of Anesthesia complication, BPH with obstruction/lower urinary tract symptoms, Chest pain, Chronic low back pain, GERD (gastroesophageal reflux disease), H/O 24 hour EKG monitoring, H/O cardiac catheterization, H/O cardiac catheterization, H/O cardiac catheterization, H/O cardiovascular stress test, H/O echocardiogram, History of cardiovascular stress test, History of Holter monitoring, History of nuclear stress test, Hx of chest x-ray, Hx of echocardiogram, Hyperlipidemia, Idiopathic peripheral neuropathy, Kidney stone, Left facial numbness, Lumbar radiculopathy, Malignant melanoma of skin of face ([address] MVP (mitral valve prolapse), Myotonia, Peripelvic (lymphatic) cyst, Precordial pain, and Vitamin B12 deficiency. Past surgical history:   has a past surgical history that includes Cardiac catheterization (2/2/12); Prostate surgery (09/26/2011); Rotator cuff repair (Right, 10/2016); Colonoscopy (5/5/14); Colonoscopy (07/10/2017); lumbar discectomy (5552); ureterolithotomy (1992); Tibia fracture surgery (Right); and Lumbar disc surgery (11/2002). Social History:   reports that he has never smoked.  He has never used smokeless tobacco. He reports current alcohol use. He reports that he does not use drugs. Family history:  family history includes Cancer in an other family member; Coronary Art Dis in some other family members; Diabetes in an other family member; Heart Disease in his brother, father, and mother; Prostate Cancer in an other family member. No Known Allergies    Review of Systems:   All 14 systems were reviewed and are negative  Except for the positive findings which are documented     BP (!) 149/74   Pulse 70   Temp 98 °F (36.7 °C) (Oral)   Resp 17   Ht 5' 10\" (1.778 m)   Wt 165 lb (74.8 kg)   SpO2 98%   BMI 23.68 kg/m²       Intake/Output Summary (Last 24 hours) at 2/18/2022 1235  Last data filed at 2/17/2022 2013  Gross per 24 hour   Intake 120 ml   Output --   Net 120 ml       Physical Exam:  Physical Exam  Neck:      Vascular: No carotid bruit. Cardiovascular:      Rate and Rhythm: Normal rate and regular rhythm. Pulses: Normal pulses. Heart sounds: Normal heart sounds. Pulmonary:      Breath sounds: Normal breath sounds. Abdominal:      General: Abdomen is flat. Palpations: Abdomen is soft. Musculoskeletal:      Left lower leg: No edema. Skin:     General: Skin is warm and dry. Capillary Refill: Capillary refill takes less than 2 seconds. Neurological:      Mental Status: He is alert and oriented to person, place, and time.    Psychiatric:         Mood and Affect: Mood normal.         Behavior: Behavior normal.          Medications:    aspirin  81 mg Oral Daily    pantoprazole  40 mg Oral Daily    gabapentin  300 mg Oral TID    clopidogrel  75 mg Oral Daily    hydroCHLOROthiazide  12.5 mg Oral QAM    dilTIAZem  120 mg Oral Daily    levothyroxine  100 mcg Oral Daily    sodium chloride flush  5-40 mL IntraVENous 2 times per day    atorvastatin  40 mg Oral Nightly    bisacodyl  5 mg Oral Daily    enoxaparin  40 mg SubCUTAneous Daily      sodium chloride       traMADol, sodium chloride flush, sodium chloride, ondansetron **OR** ondansetron, acetaminophen **OR** acetaminophen, polyethylene glycol, nitroGLYCERIN, melatonin    Lab Data:  CBC:   Recent Labs     22  1319 22  0018   WBC 4.8 3.7*   HGB 14.6 14.3   HCT 45.2 44.0   MCV 98.0 97.6    184     BMP:   Recent Labs     22  1319 22  0018    138   K 3.9 4.4   CL 96* 101   CO2 29 28   BUN 15 14   CREATININE 1.0 1.1     PT/INR:   Recent Labs     22  1319   PROTIME 13.5   INR 1.05     BNP:    Recent Labs     22  1319   PROBNP 30.86     TROPONIN:   Recent Labs     22  1319 22  1757 22   TROPONINT <0.010 <0.010 <0.010              Impression:  Active Problems:    Chest pain  Resolved Problems:    * No resolved hospital problems. *       All labs, medications and tests reviewed by myself, continue all other medications of all above medical condition listed as is except for changes mentioned above. Thank you   Please call with questions. Electronically signed by NELLIE Guzman CNP on 2022 at 12:35 PM     I have seen ,spoken to  and examined this patient personally, independently of the SVETA. I have reviewed the hospital care given to date and reviewed all pertinent labs and imaging. I have spoken with patient, nursing staff and provided written and verbal instructions . The above note has been reviewed     CARDIOLOGY ATTENDING ADDENDUM    HPI:  I have reviewed the above HPI  And agree with above     Pulse Range: Pulse  Av.1  Min: 70  Max: 82    Blood Presuure Range:  Systolic (26SNT), TZ , Min:120 , EOL:087   ; Diastolic (93QWJ), [place], Min:54, Max:94      Pulse ox Range: SpO2  Av.9 %  Min: 92 %  Max: 100 %    24hr I & O:    Intake/Output Summary (Last 24 hours) at 2022 1307  Last data filed at 2022 2013  Gross per 24 hour   Intake 120 ml   Output --   Net 120 ml         BP (!) 149/74   Pulse 70   Temp 98 °F (36.7 °C) (Oral) Resp 17   Ht 5' 10\" (1.778 m)   Wt 165 lb (74.8 kg)   SpO2 98%   BMI 23.68 kg/m²       Physical Exam:  General:  Awake, alert, NAD  Head:normal  Eye:normal  Neck:  No JVD   Chest:  Clear to auscultation, respiration easy  Cardiovascular:  RRR S1S2  Abdomen:   nontender  Extremities:  fair edema  Pulses; palpable  Neuro: grossly normal      MEDICAL DECISION MAKING;    Pt assessed , chart reviewed, patient examined examined , all available data was reviewed, following is the plan which was discussed with SVETA as well:    -Chest pain: Given his multiple risk factors he is at moderate risk for having coronary ischemia  His chest pain is not very typical and associated with palpitations as well  Troponin is negative so far  EKG does not show any acute changes patient's   Cardiolite today is normal has no ischemia and normal EF     -Hypertension: For hypertension patient is on Cardizem and hydrochlorothiazide blood pressure is still not well controlled, will monitor and adjust medications as needed  Will increase Cardizem CD to 240     -Hyperlipidemia: On Lipitor last LDL was 120     -Last cardiac catheterization done in August 2018 did not show any significant CAD     -Patient has moderate stenosis of the right coronary artery, is being managed with Plavix for now        -Hypothyroidism is on Synthroid 100 mcg p.o. daily, last TSH is within normal range 1.070     -Palpitations patient has also been complaining of palpitations as well along with the anginal symptoms patient is on telemetry which will be closely monitor, his EKG is normal  No arrhythmias noted on the telemetry                     Dolores Carey MD [address] [facility]

## 2022-02-19 ENCOUNTER — APPOINTMENT (OUTPATIENT)
Dept: CT IMAGING | Age: 82
End: 2022-02-19
Payer: MEDICARE

## 2022-02-19 VITALS
RESPIRATION RATE: 20 BRPM | HEIGHT: 70 IN | HEART RATE: 99 BPM | DIASTOLIC BLOOD PRESSURE: 63 MMHG | BODY MASS INDEX: 23.62 KG/M2 | WEIGHT: 165 LBS | SYSTOLIC BLOOD PRESSURE: 130 MMHG | OXYGEN SATURATION: 95 % | TEMPERATURE: 97.9 F

## 2022-02-19 LAB
D DIMER: <200 NG/ML(DDU)
TROPONIN T: <0.01 NG/ML
TROPONIN T: <0.01 NG/ML

## 2022-02-19 PROCEDURE — 94761 N-INVAS EAR/PLS OXIMETRY MLT: CPT

## 2022-02-19 PROCEDURE — 84484 ASSAY OF TROPONIN QUANT: CPT

## 2022-02-19 PROCEDURE — 96374 THER/PROPH/DIAG INJ IV PUSH: CPT

## 2022-02-19 PROCEDURE — 74177 CT ABD & PELVIS W/CONTRAST: CPT

## 2022-02-19 PROCEDURE — 96375 TX/PRO/DX INJ NEW DRUG ADDON: CPT

## 2022-02-19 PROCEDURE — 36415 COLL VENOUS BLD VENIPUNCTURE: CPT

## 2022-02-19 PROCEDURE — 6360000004 HC RX CONTRAST MEDICATION: Performed by: NURSE PRACTITIONER

## 2022-02-19 PROCEDURE — 2580000003 HC RX 258: Performed by: NURSE PRACTITIONER

## 2022-02-19 PROCEDURE — 6370000000 HC RX 637 (ALT 250 FOR IP): Performed by: NURSE PRACTITIONER

## 2022-02-19 PROCEDURE — 96372 THER/PROPH/DIAG INJ SC/IM: CPT

## 2022-02-19 PROCEDURE — G0378 HOSPITAL OBSERVATION PER HR: HCPCS

## 2022-02-19 PROCEDURE — 6360000002 HC RX W HCPCS: Performed by: NURSE PRACTITIONER

## 2022-02-19 PROCEDURE — 85379 FIBRIN DEGRADATION QUANT: CPT

## 2022-02-19 PROCEDURE — C9113 INJ PANTOPRAZOLE SODIUM, VIA: HCPCS | Performed by: NURSE PRACTITIONER

## 2022-02-19 PROCEDURE — 6370000000 HC RX 637 (ALT 250 FOR IP): Performed by: INTERNAL MEDICINE

## 2022-02-19 RX ORDER — PANTOPRAZOLE SODIUM 40 MG/10ML
40 INJECTION, POWDER, LYOPHILIZED, FOR SOLUTION INTRAVENOUS DAILY
Status: DISCONTINUED | OUTPATIENT
Start: 2022-02-19 | End: 2022-02-19 | Stop reason: HOSPADM

## 2022-02-19 RX ORDER — 0.9 % SODIUM CHLORIDE 0.9 %
250 INTRAVENOUS SOLUTION INTRAVENOUS ONCE
Status: DISCONTINUED | OUTPATIENT
Start: 2022-02-19 | End: 2022-02-19

## 2022-02-19 RX ORDER — DIPHENHYDRAMINE HYDROCHLORIDE 50 MG/ML
25 INJECTION INTRAMUSCULAR; INTRAVENOUS ONCE
Status: COMPLETED | OUTPATIENT
Start: 2022-02-19 | End: 2022-02-19

## 2022-02-19 RX ORDER — MORPHINE SULFATE 2 MG/ML
2 INJECTION, SOLUTION INTRAMUSCULAR; INTRAVENOUS ONCE
Status: COMPLETED | OUTPATIENT
Start: 2022-02-19 | End: 2022-02-19

## 2022-02-19 RX ORDER — PROCHLORPERAZINE EDISYLATE 5 MG/ML
10 INJECTION INTRAMUSCULAR; INTRAVENOUS ONCE
Status: COMPLETED | OUTPATIENT
Start: 2022-02-19 | End: 2022-02-19

## 2022-02-19 RX ORDER — SODIUM CHLORIDE 9 MG/ML
INJECTION, SOLUTION INTRAVENOUS CONTINUOUS
Status: DISPENSED | OUTPATIENT
Start: 2022-02-19 | End: 2022-02-19

## 2022-02-19 RX ADMIN — ATORVASTATIN CALCIUM 40 MG: 40 TABLET, FILM COATED ORAL at 00:08

## 2022-02-19 RX ADMIN — PROCHLORPERAZINE EDISYLATE 10 MG: 5 INJECTION INTRAMUSCULAR; INTRAVENOUS at 10:28

## 2022-02-19 RX ADMIN — ASPIRIN 81 MG: 81 TABLET, COATED ORAL at 13:48

## 2022-02-19 RX ADMIN — ONDANSETRON 4 MG: 2 INJECTION INTRAMUSCULAR; INTRAVENOUS at 05:51

## 2022-02-19 RX ADMIN — CLOPIDOGREL 75 MG: 75 TABLET, FILM COATED ORAL at 13:47

## 2022-02-19 RX ADMIN — ENOXAPARIN SODIUM 40 MG: 100 INJECTION SUBCUTANEOUS at 13:47

## 2022-02-19 RX ADMIN — MORPHINE SULFATE 2 MG: 2 INJECTION, SOLUTION INTRAMUSCULAR; INTRAVENOUS at 05:44

## 2022-02-19 RX ADMIN — GABAPENTIN 300 MG: 300 CAPSULE ORAL at 13:48

## 2022-02-19 RX ADMIN — HYDROCHLOROTHIAZIDE 12.5 MG: 12.5 TABLET ORAL at 13:48

## 2022-02-19 RX ADMIN — SODIUM CHLORIDE: 9 INJECTION, SOLUTION INTRAVENOUS at 10:42

## 2022-02-19 RX ADMIN — SODIUM CHLORIDE, PRESERVATIVE FREE 10 ML: 5 INJECTION INTRAVENOUS at 10:37

## 2022-02-19 RX ADMIN — TRAMADOL HYDROCHLORIDE 50 MG: 50 TABLET, COATED ORAL at 00:08

## 2022-02-19 RX ADMIN — LEVOTHYROXINE SODIUM 100 MCG: 0.1 TABLET ORAL at 06:09

## 2022-02-19 RX ADMIN — DIPHENHYDRAMINE HYDROCHLORIDE 25 MG: 50 INJECTION INTRAMUSCULAR; INTRAVENOUS at 10:28

## 2022-02-19 RX ADMIN — SODIUM CHLORIDE, PRESERVATIVE FREE 10 ML: 5 INJECTION INTRAVENOUS at 00:43

## 2022-02-19 RX ADMIN — GABAPENTIN 300 MG: 300 CAPSULE ORAL at 00:08

## 2022-02-19 RX ADMIN — ACETAMINOPHEN 650 MG: 325 TABLET ORAL at 00:08

## 2022-02-19 RX ADMIN — IOPAMIDOL 75 ML: 755 INJECTION, SOLUTION INTRAVENOUS at 13:04

## 2022-02-19 RX ADMIN — PANTOPRAZOLE SODIUM 40 MG: 40 INJECTION, POWDER, FOR SOLUTION INTRAVENOUS at 10:27

## 2022-02-19 RX ADMIN — DILTIAZEM HYDROCHLORIDE 180 MG: 180 CAPSULE, COATED, EXTENDED RELEASE ORAL at 13:47

## 2022-02-19 ASSESSMENT — PAIN DESCRIPTION - PAIN TYPE
TYPE: ACUTE PAIN
TYPE: CHRONIC PAIN
TYPE: ACUTE PAIN
TYPE: ACUTE PAIN

## 2022-02-19 ASSESSMENT — PAIN SCALES - GENERAL
PAINLEVEL_OUTOF10: 7
PAINLEVEL_OUTOF10: 4
PAINLEVEL_OUTOF10: 7
PAINLEVEL_OUTOF10: 0
PAINLEVEL_OUTOF10: 7
PAINLEVEL_OUTOF10: 0
PAINLEVEL_OUTOF10: 7
PAINLEVEL_OUTOF10: 2

## 2022-02-19 ASSESSMENT — PAIN DESCRIPTION - LOCATION
LOCATION: CHEST
LOCATION: LEG
LOCATION: CHEST
LOCATION: CHEST

## 2022-02-19 ASSESSMENT — PAIN DESCRIPTION - DESCRIPTORS
DESCRIPTORS: DULL
DESCRIPTORS: DULL
DESCRIPTORS: ACHING;DULL
DESCRIPTORS: DULL

## 2022-02-19 ASSESSMENT — PAIN DESCRIPTION - FREQUENCY
FREQUENCY: CONTINUOUS

## 2022-02-19 NOTE — FLOWSHEET NOTE
PT given Discharge instructions, friend at bedside. PT IV removed and dressing applied. Pt has no C/O pain or nausea. Pt taken out to lobby in wheelchair and home with Girl Friend. Statement Selected

## 2022-02-19 NOTE — DISCHARGE SUMMARY
Discharge Summary    Name:  Hardy Torres /Age/Sex: 1940  (79 y.o. male)   MRN & CSN:  [id number] & [id number] Admission Date/Time: 2022  1:07 PM   Attending:  Bc Khalil MD Discharging Physician: Ben Rdz [address] [facility] Course:   Hardy Torres is a 79 y.o.  male with history of hypertension, hypothyroidism presents with chest pain. Pt has negative troponin x3, cardiology consulted. Pt had stress test this morning, the result is negative. Dr. Janine Turpin is OK to discharge patient. Patient was discharged on . However patient reported that he felt funny in chest prior discharge. Patient worried there is something wrong. The discharge was canceled. Rapid response was called on the night due to chest pain. Patient had another 2 sets of negative troponin and negative D-dimer. Patient received 2 doses of nitro. This morning patient had mild headache and nausea vomiting. Abdomen CT came back negative. Patient received IV fluids, Compazine and Benadryl. Patient reported feeling much better this afternoon. All the symptoms are gone. He finished the lunch. Patient stated he is ready to go home. He will follow up with cardiology in 1 to 2 weeks. Continue other home medications.     Chest pain r/o ACS  -appreciate cardiology input: Stress test was negative on                 Essential hypertension  Right carotid artery stenosis  -Check carotid duplex as planned by outpatient PCP   -Continue home antihypertensive                Hypothyroidism  -TSH stable, continue Synthroid     The patient expressed appropriate understanding of and agreement with the discharge recommendations, medications, and plan.          The patient expressed appropriate understanding of and agreement with the discharge recommendations, medications, and plan.      Consults this admission:  IP CONSULT TO HOSPITALIST  IP CONSULT TO CARDIOLOGY    Discharge Instruction:   Follow up appointments: Cardiology  Primary care physician:  within 2 weeks    Diet:  regular diet   Activity: activity as tolerated  Disposition: Discharged to:   [x]Home, []C, []SNF, []Acute Rehab, []Hospice   Condition on discharge: Stable    Discharge Medications:        Medication List      CONTINUE taking these medications    aspirin 81 MG tablet     clopidogrel 75 MG tablet  Commonly known as: PLAVIX  Take 1 tablet by mouth daily     Dilt- MG extended release capsule  Generic drug: dilTIAZem  Take 1 capsule by mouth daily     gabapentin 300 MG capsule  Commonly known as: NEURONTIN  Take 1 capsule by mouth 3 times daily for 90 days. hydroCHLOROthiazide 25 MG tablet  Commonly known as: HYDRODIURIL  Take 0.5 tablets by mouth every morning     levothyroxine 50 MCG tablet  Commonly known as: SYNTHROID  Take one tablet by mouth in the morning     pantoprazole 40 MG tablet  Commonly known as: PROTONIX  Take 1 tablet by mouth daily     traMADol 50 MG tablet  Commonly known as: ULTRAM  Take 2 tablets by mouth 2 times daily as needed for Pain for up to 90 days. Objective Findings at Discharge:   /63   Pulse 99   Temp 97.9 °F (36.6 °C) (Oral)   Resp 20   Ht 5' 10\" (1.778 m)   Wt 165 lb (74.8 kg)   SpO2 95%   BMI 23.68 kg/m²            PHYSICAL EXAM 02/19/2022  GEN Awake male, sitting upright in bed in no apparent distress. Appears given age. EYES Pupils are equally round. No scleral erythema, discharge, or conjunctivitis. HENT Mucous membranes are moist. Oral pharynx without exudates, no evidence of thrush. NECK Supple, no apparent thyromegaly or masses. RESP Clear to auscultation, no wheezes, rales or rhonchi. Symmetric chest movement while on room air. CARDIO/VASC S1/S2 auscultated. Regular rate without appreciable murmurs, rubs, or gallops. No JVD or carotid bruits. Peripheral pulses equal bilaterally and palpable. No peripheral edema.   GI Abdomen is soft without significant tenderness, masses, or guarding. Bowel sounds are normoactive. Rectal exam deferred.  No costovertebral angle tenderness. Normal appearing external genitalia. Siegel catheter is not present. HEME/LYMPH No palpable cervical lymphadenopathy and no hepatosplenomegaly. No petechiae or ecchymoses. MSK No gross joint deformities. SKIN Normal coloration, warm, dry. NEURO Cranial nerves appear grossly intact, normal speech, no lateralizing weakness. PSYCH Awake, alert, oriented x 4. Affect appropriate. BMP/CBC  Recent Labs     02/17/22  1319 02/18/22  0018    138   K 3.9 4.4   CL 96* 101   CO2 29 28   BUN 15 14   CREATININE 1.0 1.1   WBC 4.8 3.7*   HCT 45.2 44.0    184       IMAGING:  No acute abnormality in the abdomen or pelvis.  No bowel obstruction.     Discharge Time of 35 minutes    Electronically signed by Go Sanderson CNP on 2/19/2022 at 3:05 PM

## 2022-02-19 NOTE — PROGRESS NOTES
Patient complained of chest pain 7/10 and pointed to the left side of his chest. Rapid response called. Vitals taken and nitro given to patient to help with pain. Troponin and GI cocktail ordered.  Will continue to monitor

## 2022-02-19 NOTE — PROGRESS NOTES
Pt called out c/o chest pain again. Vitals were /80, HR 90, R16, O2 96 on RA. Gave one nitro tab at 0335. Paged Lacie Maurice NP. Order received to have a stat d-dimer drawn. Gave a second dose of nitro at 0340 and no orders to give a third dose. Pt called son to come to room and is now here arriving at 56. Asked Lacie Maurice if another ekg was needed and was told no. Pt is alert and calm. States the pain in his left chest is the same and has not eased up. Will continue to monitor and waiting on lab results. Current vitals at 0416 are as followed.     /68 (map of 82)  HR 87  R- 16  O2 96 on RA

## 2022-02-19 NOTE — PROGRESS NOTES
Pt feeling nauseous and had a small amount of emesis after receiving the one time dose of 2mg morphine IV. Giving 4 mg of zofran IV at this time.

## 2022-02-19 NOTE — SIGNIFICANT EVENT
Significant event documentation:   Patient assessed at bedside due to rapid response being called for left-sided chest pain. Patient states that he was lying in bed when he began having left-sided chest pain, nonradiating. Patient denies any shortness of breath or nausea. Patient rates pain 5 out of 10 described as ache. Patient denies any alleviating or provoking factors. Patient with negative troponin x2, negative stress test today seen by Dr. Preston Plascencia, patient denies any other symptoms and states that he otherwise feels well.       Reason for call/time of call:   Rapid response called for chest pain    Physical Exam:   Vitals:    02/18/22 2102   BP: 129/71   Pulse: 78   Resp: 17   Temp: 98 °F (36.7 °C)   SpO2: 98%      Mental status: A&Ox3, no lateralizing weakness  CV:RRR  Lungs: CTA    Interventions/orders placed:  Continue cycling troponin x2  Stat EKG-normal sinus rhythm, no significant ST/T wave abnormalities  SL nitroglycerin given x1, no relief  will give GI cocktail and assess for improvement in pain level, could be GI related  Continue telemetry monitoring    NELLIE Belcher - NP  Hospitalist

## 2022-02-20 LAB
EKG ATRIAL RATE: 80 BPM
EKG DIAGNOSIS: NORMAL
EKG P AXIS: 71 DEGREES
EKG P-R INTERVAL: 194 MS
EKG Q-T INTERVAL: 384 MS
EKG QRS DURATION: 94 MS
EKG QTC CALCULATION (BAZETT): 442 MS
EKG R AXIS: 27 DEGREES
EKG T AXIS: 76 DEGREES
EKG VENTRICULAR RATE: 80 BPM

## 2022-02-20 PROCEDURE — 93010 ELECTROCARDIOGRAM REPORT: CPT | Performed by: INTERNAL MEDICINE

## 2022-02-23 ENCOUNTER — NURSE ONLY (OUTPATIENT)
Dept: CARDIOLOGY CLINIC | Age: 82
End: 2022-02-23
Payer: MEDICARE

## 2022-02-23 VITALS
WEIGHT: 164.8 LBS | HEIGHT: 70 IN | HEART RATE: 76 BPM | SYSTOLIC BLOOD PRESSURE: 136 MMHG | DIASTOLIC BLOOD PRESSURE: 66 MMHG | BODY MASS INDEX: 23.59 KG/M2

## 2022-02-23 DIAGNOSIS — I10 PRIMARY HYPERTENSION: Primary | ICD-10-CM

## 2022-02-23 PROCEDURE — 99214 OFFICE O/P EST MOD 30 MIN: CPT | Performed by: NURSE PRACTITIONER

## 2022-02-23 RX ORDER — DILTIAZEM HYDROCHLORIDE 240 MG/1
240 CAPSULE, EXTENDED RELEASE ORAL DAILY
Qty: 90 CAPSULE | Refills: 3 | Status: SHIPPED | OUTPATIENT
Start: 2022-02-23 | End: 2022-06-22 | Stop reason: CLARIF

## 2022-02-23 NOTE — PATIENT INSTRUCTIONS
**It is YOUR responsibilty to bring medication bottles and/or updated medication list to [address] [place] [address]. This will allow us to better serve you and all your healthcare needs**  Please be informed that if you contact our office outside of normal business hours the physician on call cannot help with any scheduling or rescheduling issues, procedure instruction questions or any type of medication issue. We advise you for any urgent/emergency that you go to the nearest emergency room!     PLEASE CALL OUR OFFICE DURING NORMAL BUSINESS HOURS    Monday - Friday   8 am to 5 pm    [place]: [phone]    [place]:  [phone]

## 2022-02-28 ENCOUNTER — HOSPITAL ENCOUNTER (OUTPATIENT)
Dept: ULTRASOUND IMAGING | Age: 82
Discharge: HOME | End: 2022-02-28
Payer: MEDICARE

## 2022-02-28 DIAGNOSIS — I65.21 STENOSIS OF RIGHT CAROTID ARTERY: ICD-10-CM

## 2022-02-28 PROCEDURE — 93880 EXTRACRANIAL BILAT STUDY: CPT

## 2022-03-03 ENCOUNTER — CARE COORDINATION (OUTPATIENT)
Dept: CARE COORDINATION | Age: 82
End: 2022-03-03

## 2022-03-03 NOTE — CARE COORDINATION
Ambulatory Care Coordination Note  3/3/2022  CM Risk Score: 5  Charlson 10 Year Mortality Risk Score: 100%     ACC: Lito Easley, TANJA    Summary Note: Call to pt for acm outreach. Pt reports he is doing ok. Reports he was Putting up light in garage this morning with son and had some chest pain but it went away. Has a lot of bp fluctuations recently but it has been better yesterday and today. Reports bp Whitney Granados been real low\" a few days ago in the 90's (SBP). .Advised if would have cp or sob to call 911, voices understanding. Denies any cp/sob or other concerns at this time. Pt informed of results form carotid ultrasound from dr. Chanel Novoa and voices understanding. Has f/u with cardiology net week. Will continue to follow to assess. Advised to call md with any new/worsening symptoms. Plan:complete assessment at next encounter. Ambulatory Care Coordination Assessment    Care Coordination Protocol  Program Enrollment: Complex Care  Referral from Primary Care Provider: No  Week 1 - Initial Assessment                             Suggested Interventions and Community Resources                  Prior to Admission medications    Medication Sig Start Date End Date Taking? Authorizing Provider   dilTIAZem (DILT-XR) 240 MG extended release capsule Take 1 capsule by mouth daily 2/23/22   NELLIE Grimaldo - CNP   pantoprazole (PROTONIX) 40 MG tablet Take 1 tablet by mouth daily 2/14/22 5/15/22  Noah Leslie MD   gabapentin (NEURONTIN) 300 MG capsule Take 1 capsule by mouth 3 times daily for 90 days.  2/14/22 5/15/22  Noah Leslie MD   clopidogrel (PLAVIX) 75 MG tablet Take 1 tablet by mouth daily 2/14/22   Noah Leslie MD   levothyroxine (SYNTHROID) 50 MCG tablet Take one tablet by mouth in the morning 2/14/22   Noah Leslie MD   hydroCHLOROthiazide (HYDRODIURIL) 25 MG tablet Take 0.5 tablets by mouth every morning 2/14/22   Noah Leslie MD   traMADol (ULTRAM) 50 MG tablet Take 2 tablets by mouth 2 times daily as needed for Pain for up to 90 days.  11/18/21 2/23/22  Gualberto Albarran MD   aspirin 81 MG tablet Take 81 mg by mouth daily    Historical Provider, MD       Future Appointments   Date Time Provider Devan Haque   3/11/2022 10:20 AM Fredricka Mcardle, APRN - CNP [facility]   5/16/2022 10:00 AM Gualberto Albarran MD [facility]     ,   COPD Assessment       No patient-reported symptoms         Symptoms:     Have you had a recent diagnosis of pneumonia either by PCP or at a hospital?: No      and   General Assessment    Do you have any symptoms that are causing concern?: No

## 2022-03-09 RX ORDER — DILTIAZEM HYDROCHLORIDE 120 MG/1
CAPSULE, EXTENDED RELEASE ORAL
Qty: 90 CAPSULE | Refills: 1 | OUTPATIENT
Start: 2022-03-09

## 2022-03-11 ENCOUNTER — NURSE ONLY (OUTPATIENT)
Dept: CARDIOLOGY CLINIC | Age: 82
End: 2022-03-11

## 2022-03-11 DIAGNOSIS — I65.21 STENOSIS OF RIGHT CAROTID ARTERY: ICD-10-CM

## 2022-03-11 DIAGNOSIS — E78.2 MIXED HYPERLIPIDEMIA: ICD-10-CM

## 2022-03-11 DIAGNOSIS — I10 PRIMARY HYPERTENSION: Primary | ICD-10-CM

## 2022-03-11 DIAGNOSIS — I34.1 MVP (MITRAL VALVE PROLAPSE): ICD-10-CM

## 2022-03-11 NOTE — PROGRESS NOTES
[facility] PHYSICAL [facility]  [address]  Phone: [phone]    Fax [phone]                  Jessy Rivas MD, Fede Amaya MD, Monica Hudson MD, MD Yasmin Galarza MD Kelsey Caroline, MD Laurann Parr, MD Osie Paras, NELLIE Blevins, NELLIE Redd, NELLIE Amin    BP Check Visit    Pt is here for a 2 week BP check. At the last office visit patient was found to have a blood pressure of 136/66. At that time the patient was instructed to increase cardizem  to 240 mg. Blood pressure today was 128/66. BP Readings from Last 3 Encounters:   02/23/22 136/66   02/19/22 130/63   02/14/22 102/60       Plan for pt is to continue cardizem and HCTZ Daily.      Follow up in 3 months     Pt is to report any dizziness or syncope to the office    Electronically signed by NELLIE Felipe CNP on 3/11/2022 at 8:03 AM

## 2022-03-15 ENCOUNTER — CARE COORDINATION (OUTPATIENT)
Dept: CARE COORDINATION | Age: 82
End: 2022-03-15

## 2022-03-15 SDOH — ECONOMIC STABILITY: HOUSING INSECURITY
IN THE LAST 12 MONTHS, WAS THERE A TIME WHEN YOU DID NOT HAVE A STEADY PLACE TO SLEEP OR SLEPT IN A SHELTER (INCLUDING NOW)?: NO

## 2022-03-15 SDOH — ECONOMIC STABILITY: HOUSING INSECURITY: IN THE LAST 12 MONTHS, HOW MANY PLACES HAVE YOU LIVED?: 1

## 2022-03-15 SDOH — ECONOMIC STABILITY: TRANSPORTATION INSECURITY
IN THE PAST 12 MONTHS, HAS LACK OF TRANSPORTATION KEPT YOU FROM MEETINGS, WORK, OR FROM GETTING THINGS NEEDED FOR DAILY LIVING?: NO

## 2022-03-15 SDOH — ECONOMIC STABILITY: INCOME INSECURITY: IN THE LAST 12 MONTHS, WAS THERE A TIME WHEN YOU WERE NOT ABLE TO PAY THE MORTGAGE OR RENT ON TIME?: NO

## 2022-03-15 SDOH — HEALTH STABILITY: PHYSICAL HEALTH: ON AVERAGE, HOW MANY DAYS PER WEEK DO YOU ENGAGE IN MODERATE TO STRENUOUS EXERCISE (LIKE A BRISK WALK)?: 7 DAYS

## 2022-03-15 SDOH — ECONOMIC STABILITY: TRANSPORTATION INSECURITY
IN THE PAST 12 MONTHS, HAS THE LACK OF TRANSPORTATION KEPT YOU FROM MEDICAL APPOINTMENTS OR FROM GETTING MEDICATIONS?: NO

## 2022-03-15 SDOH — HEALTH STABILITY: PHYSICAL HEALTH: ON AVERAGE, HOW MANY MINUTES DO YOU ENGAGE IN EXERCISE AT THIS LEVEL?: 40 MIN

## 2022-03-15 ASSESSMENT — SOCIAL DETERMINANTS OF HEALTH (SDOH)
HOW OFTEN DO YOU ATTENT MEETINGS OF THE CLUB OR ORGANIZATION YOU BELONG TO?: NEVER
HOW OFTEN DO YOU ATTEND CHURCH OR RELIGIOUS SERVICES?: NEVER
IN A TYPICAL WEEK, HOW MANY TIMES DO YOU TALK ON THE PHONE WITH FAMILY, FRIENDS, OR NEIGHBORS?: TWICE A WEEK
DO YOU BELONG TO ANY CLUBS OR ORGANIZATIONS SUCH AS CHURCH GROUPS UNIONS, FRATERNAL OR ATHLETIC GROUPS, OR SCHOOL GROUPS?: NO
HOW OFTEN DO YOU GET TOGETHER WITH FRIENDS OR RELATIVES?: ONCE A WEEK

## 2022-03-15 ASSESSMENT — LIFESTYLE VARIABLES
HOW MANY STANDARD DRINKS CONTAINING ALCOHOL DO YOU HAVE ON A TYPICAL DAY: 1 OR 2
HOW OFTEN DO YOU HAVE A DRINK CONTAINING ALCOHOL: MONTHLY OR LESS

## 2022-03-15 NOTE — CARE COORDINATION
Ambulatory Care Coordination Note  3/15/2022  CM Risk Score: 5  Charlson 10 Year Mortality Risk Score: 100%     ACC: Azael Sylvester RN    Summary Note: Call to pt for acm f/u. Pt denies symptoms/concerns. Advised to call with an needs. Plan[de-identified] continue to assess pt for needs and educate on copd zm. Ambulatory Care Coordination Assessment    Care Coordination Protocol  Program Enrollment: Complex Care  Referral from Primary Care Provider: No  Week 1 - Initial Assessment     Do you have all of your prescriptions and are they filled?: Yes  Barriers to medication adherence: None  Are you able to afford your medications?: Yes  How often do you have trouble taking your medications the way you have been told to take them?: I always take them as prescribed. Do you have Home O2 Therapy?: No      Ability to seek help/take action for Emergent Urgent situations i.e. fire, crime, inclement weather or health crisis. : Independent  Ability to ambulate to restroom: Independent  Ability handle personal hygeine needs (bathing/dressing/grooming): Independent  Ability to manage Medications: Independent  Is patient able to live independently?: Yes     Current Housing: Private Residence        Per the Fall Risk Screening, did the patient have 2 or more falls or 1 fall with injury in the past year?: No     Frequent urination at night?: Yes  Do you use rails/bars?: Yes  Do you have a non-slip tub mat?: No     Are you experiencing loss of meaning?: No  Are you experiencing loss of hope and peace?: No     Suggested Interventions and Community Resources                  Prior to Admission medications    Medication Sig Start Date End Date Taking?  Authorizing Provider   dilTIAZem (DILT-XR) 240 MG extended release capsule Take 1 capsule by mouth daily 2/23/22   NELLIE Lazcano - CNP   pantoprazole (PROTONIX) 40 MG tablet Take 1 tablet by mouth daily 2/14/22 5/15/22  Zaida Burr MD   gabapentin (NEURONTIN) 300 MG capsule Take 1 capsule by mouth 3 times daily for 90 days. 2/14/22 5/15/22  Owen Sheffield MD   clopidogrel (PLAVIX) 75 MG tablet Take 1 tablet by mouth daily 2/14/22   Owen Sheffield MD   levothyroxine (SYNTHROID) 50 MCG tablet Take one tablet by mouth in the morning 2/14/22   Owen Sheffield MD   hydroCHLOROthiazide (HYDRODIURIL) 25 MG tablet Take 0.5 tablets by mouth every morning 2/14/22   Owen Sheffield MD   traMADol (ULTRAM) 50 MG tablet Take 2 tablets by mouth 2 times daily as needed for Pain for up to 90 days.  11/18/21 2/23/22  Owen Sheffield MD   aspirin 81 MG tablet Take 81 mg by mouth daily    Historical Provider, MD       Future Appointments   Date Time Provider Devan Haque   5/16/2022 10:00 AM Owen Sheffield MD [facility]   6/15/2022 10:20 AM NELLIE Garza - CNP [facility]     ,   COPD Assessment    Does the patient understand envrionmental exposure?: Yes  Is the patient able to verbalize Rescue vs. Long Acting medications?: No  Does the patient have a nebulizer?: No  Does the patient use a space with inhaled medications?: No     No patient-reported symptoms         Symptoms:  None: Yes      Symptom course: stable  Breathlessness: none  Increase use of rapid acting/rescue inhaled medications?: Not Applicable  Change in chronic cough?: No/At Baseline  Change in sputum?: No/At Baseline  Self Monitoring - SaO2: No  Have you had a recent diagnosis of pneumonia either by PCP or at a hospital?: No      and   General Assessment    Do you have any symptoms that are causing concern?: No

## 2022-03-24 ENCOUNTER — CARE COORDINATION (OUTPATIENT)
Dept: CARE COORDINATION | Age: 82
End: 2022-03-24

## 2022-03-24 NOTE — CARE COORDINATION
Ambulatory Care Coordination Note  CM Risk Score: 5  Charlson 10 Year Mortality Risk Score: 100%     ACC: Isaías Hong, RN     Summary Note: Call to pt quentine sameer fu. Pt reports he has been working outside and is doing well. Denies symptoms or needs, reveiwed copd zm. Plan continue to educate pt on copd and work toward graduation if no needs. Ambulatory Care Coordination Assessment    Care Coordination Protocol  Program Enrollment: Complex Care  Referral from Primary Care Provider: No  Week 1 - Initial Assessment     Do you have all of your prescriptions and are they filled?: Yes  Barriers to medication adherence: None  Are you able to afford your medications?: Yes  How often do you have trouble taking your medications the way you have been told to take them?: I always take them as prescribed. Do you have Home O2 Therapy?: No      Ability to seek help/take action for Emergent Urgent situations i.e. fire, crime, inclement weather or health crisis. : Independent  Ability to ambulate to restroom: Independent  Ability handle personal hygeine needs (bathing/dressing/grooming): Independent  Ability to manage Medications: Independent  Is patient able to live independently?: Yes     Current Housing: Private Residence        Per the Fall Risk Screening, did the patient have 2 or more falls or 1 fall with injury in the past year?: No     Frequent urination at night?: Yes  Do you use rails/bars?: Yes  Do you have a non-slip tub mat?: No     Are you experiencing loss of meaning?: No  Are you experiencing loss of hope and peace?: No     Suggested Interventions and Community Resources                  Prior to Admission medications    Medication Sig Start Date End Date Taking?  Authorizing Provider   dilTIAZem (DILT-XR) 240 MG extended release capsule Take 1 capsule by mouth daily 2/23/22   NELLIE Lawton CNP   pantoprazole (PROTONIX) 40 MG tablet Take 1 tablet by mouth daily 2/14/22 5/15/22  Priscilla Geronimo Shukri Donahue MD   gabapentin (NEURONTIN) 300 MG capsule Take 1 capsule by mouth 3 times daily for 90 days. 2/14/22 5/15/22  Gregory Oneill MD   clopidogrel (PLAVIX) 75 MG tablet Take 1 tablet by mouth daily 2/14/22   Gregory Oneill MD   levothyroxine (SYNTHROID) 50 MCG tablet Take one tablet by mouth in the morning 2/14/22   Gregory Oneill MD   hydroCHLOROthiazide (HYDRODIURIL) 25 MG tablet Take 0.5 tablets by mouth every morning 2/14/22   Gregory Oneill MD   traMADol (ULTRAM) 50 MG tablet Take 2 tablets by mouth 2 times daily as needed for Pain for up to 90 days.  11/18/21 2/23/22  Gregory Oneill MD   aspirin 81 MG tablet Take 81 mg by mouth daily    Historical Provider, MD       Future Appointments   Date Time Provider Devan Haque   5/16/2022 10:00 AM [facility]   6/15/2022 10:20 AM Dianna Espinal, APRN - CNP [facility]     ,   COPD Assessment    Does the patient understand envrionmental exposure?: Yes  Is the patient able to verbalize Rescue vs. Long Acting medications?: No  Does the patient have a nebulizer?: No  Does the patient use a space with inhaled medications?: No     No patient-reported symptoms         Symptoms:  None: Yes      Symptom course: stable  Breathlessness: none  Increase use of rapid acting/rescue inhaled medications?: No  Change in chronic cough?: No/At Baseline  Change in sputum?: No/At Baseline  Self Monitoring - SaO2: No  Have you had a recent diagnosis of pneumonia either by PCP or at a hospital?: No      and   General Assessment    Do you have any symptoms that are causing concern?: No

## 2022-04-14 ENCOUNTER — CARE COORDINATION (OUTPATIENT)
Dept: CARE COORDINATION | Age: 82
End: 2022-04-14

## 2022-04-14 NOTE — CARE COORDINATION
Ambulatory Care Coordination Note  4/14/2022  CM Risk Score: 5  Charlson 10 Year Mortality Risk Score: 100%     ACC: Rita Ellison RN    Summary Note: Call to pt for acm f/u. Gets tired more easily, gets going in morning and then rests some and then does some more in the afternoon. Taking water pill in evening now, has had no urinary frequency. Taking thyroid medicine as ordered. Has been on thyroid meds for a few years. Pt monitors bp at home this am 119/66, after walking 109/61. No dizziness, just tired. PLan: continue to follow to assess symptoms and educate on copd, work toward graduation. Has pcp ov net month, alexandreas need to move apptmt to sooner date at this time. WIll call if symptoms worsen. Care Coordination Interventions    Program Enrollment: Complex Care  Referral from Primary Care Provider: No  Suggested Interventions and Community Resources  Zone Management Tools: In Process         Goals Addressed                    This Visit's Progress      Patient Stated (pt-stated)   On track      Walk 10,000 steps daily  Barriers: none  Plan for overcoming my barriers: N/A  Confidence: 9/10  Anticipated Goal Completion Date: 4/15/22            Prior to Admission medications    Medication Sig Start Date End Date Taking? Authorizing Provider   dilTIAZem (DILT-XR) 240 MG extended release capsule Take 1 capsule by mouth daily 2/23/22   NELLIE Kelley - CNP   pantoprazole (PROTONIX) 40 MG tablet Take 1 tablet by mouth daily 2/14/22 5/15/22  Polina Howard MD   gabapentin (NEURONTIN) 300 MG capsule Take 1 capsule by mouth 3 times daily for 90 days.  2/14/22 5/15/22  Polina Howard MD   clopidogrel (PLAVIX) 75 MG tablet Take 1 tablet by mouth daily 2/14/22   Polina Howard MD   levothyroxine (SYNTHROID) 50 MCG tablet Take one tablet by mouth in the morning 2/14/22   Polina Howard MD   hydroCHLOROthiazide (HYDRODIURIL) 25 MG tablet Take 0.5 tablets by mouth every morning 2/14/22   Luz Castro MD   traMADol (ULTRAM) 50 MG tablet Take 2 tablets by mouth 2 times daily as needed for Pain for up to 90 days.  11/18/21 2/23/22  Luz Castro MD   aspirin 81 MG tablet Take 81 mg by mouth daily    Historical Provider, MD       Future Appointments   Date Time Provider Devan Haque   5/16/2022  4:15 PM Geoffrey Spring DO [facility]   6/15/2022 10:20 AM Tiffany Dione Dancer, APRN - CNP [facility]     ,   COPD Assessment    Does the patient understand envrionmental exposure?: Yes  Is the patient able to verbalize Rescue vs. Long Acting medications?: No  Does the patient have a nebulizer?: No  Does the patient use a space with inhaled medications?: No     No patient-reported symptoms         Symptoms:  None: Yes      Symptom course: stable  Breathlessness: none  Increase use of rapid acting/rescue inhaled medications?: No  Change in chronic cough?: No/At Baseline  Change in sputum?: No/At Baseline  Self Monitoring - SaO2: No  Have you had a recent diagnosis of pneumonia either by PCP or at a hospital?: No      and   General Assessment    Do you have any symptoms that are causing concern?: No

## 2022-05-02 ENCOUNTER — CARE COORDINATION (OUTPATIENT)
Dept: CARE COORDINATION | Age: 82
End: 2022-05-02

## 2022-05-09 ENCOUNTER — CARE COORDINATION (OUTPATIENT)
Dept: CARE COORDINATION | Age: 82
End: 2022-05-09

## 2022-05-09 NOTE — CARE COORDINATION
Ambulatory Care Coordination Note  5/9/2022  CM Risk Score: 1  Charlson 10 Year Mortality Risk Score: 100%     ACC: Cornelius Calvillo RN    Summary Note: Call to pt for acm f/u. Reports he is doing well and enjoying weather. Reviewed copd zm. Denies symptoms or concerns at this time. Has ov with pcp next week. Advised to call if any needs arise. WIll plan to graduate over next 2 encounters if no new needs. Care Coordination Interventions    Program Enrollment: Complex Care  Referral from Primary Care Provider: No  Suggested Interventions and Community Resources  Zone Management Tools: In Process       Goals Addressed                    This Visit's Progress      Patient Stated (pt-stated)   On track      Walk 10,000 steps daily  Barriers: none  Plan for overcoming my barriers: N/A  Confidence: 9/10  Anticipated Goal Completion Date: 4/15/22            Prior to Admission medications    Medication Sig Start Date End Date Taking? Authorizing Provider   dilTIAZem (DILT-XR) 240 MG extended release capsule Take 1 capsule by mouth daily 2/23/22   NELLIE Escamilla - CNP   pantoprazole (PROTONIX) 40 MG tablet Take 1 tablet by mouth daily 2/14/22 5/15/22  Stephen Diop MD   gabapentin (NEURONTIN) 300 MG capsule Take 1 capsule by mouth 3 times daily for 90 days. 2/14/22 5/15/22  Stephen Diop MD   clopidogrel (PLAVIX) 75 MG tablet Take 1 tablet by mouth daily 2/14/22   Stephen Diop MD   levothyroxine (SYNTHROID) 50 MCG tablet Take one tablet by mouth in the morning 2/14/22   Stephen Diop MD   hydroCHLOROthiazide (HYDRODIURIL) 25 MG tablet Take 0.5 tablets by mouth every morning 2/14/22   Stephen Diop MD   traMADol (ULTRAM) 50 MG tablet Take 2 tablets by mouth 2 times daily as needed for Pain for up to 90 days.  11/18/21 2/23/22  Stephen Diop MD   aspirin 81 MG tablet Take 81 mg by mouth daily    Historical Provider, MD       Future Appointments   Date Time Provider Devan Haque   5/16/2022  4:15 PM Juan Pugh DO [address] [facility] Ricki Layo [facility]   6/15/2022 10:20 AM NELLIE Merrill CNP [facility]     ,   COPD Assessment    Does the patient understand envrionmental exposure?: Yes  Is the patient able to verbalize Rescue vs. Long Acting medications?: No  Does the patient have a nebulizer?: No  Does the patient use a space with inhaled medications?: No     No patient-reported symptoms         Symptoms:  None: Yes      Symptom course: stable  Breathlessness: none  Increase use of rapid acting/rescue inhaled medications?: No  Change in chronic cough?: No/At Baseline  Change in sputum?: No/At Baseline  Self Monitoring - SaO2: No  Have you had a recent diagnosis of pneumonia either by PCP or at a hospital?: No      and   General Assessment    Do you have any symptoms that are causing concern?: No

## 2022-05-17 DIAGNOSIS — G62.9 PERIPHERAL POLYNEUROPATHY: ICD-10-CM

## 2022-05-17 DIAGNOSIS — M54.41 CHRONIC BILATERAL LOW BACK PAIN WITH BILATERAL SCIATICA: Chronic | ICD-10-CM

## 2022-05-17 DIAGNOSIS — G89.29 CHRONIC BILATERAL LOW BACK PAIN WITH BILATERAL SCIATICA: Chronic | ICD-10-CM

## 2022-05-17 DIAGNOSIS — M54.42 CHRONIC BILATERAL LOW BACK PAIN WITH BILATERAL SCIATICA: Chronic | ICD-10-CM

## 2022-05-18 RX ORDER — GABAPENTIN 300 MG/1
CAPSULE ORAL
Qty: 270 CAPSULE | Refills: 1 | Status: SHIPPED | OUTPATIENT
Start: 2022-05-18 | End: 2022-11-14

## 2022-05-26 ENCOUNTER — CARE COORDINATION (OUTPATIENT)
Dept: CARE COORDINATION | Age: 82
End: 2022-05-26

## 2022-05-26 DIAGNOSIS — K21.9 GASTROESOPHAGEAL REFLUX DISEASE WITHOUT ESOPHAGITIS: ICD-10-CM

## 2022-05-26 NOTE — CARE COORDINATION
Ambulatory Care Coordination Note  CM Risk Score: 5  Charlson 10 Year Mortality Risk Score: 100%     ACC: Aryan Allison RN    Summary Note: Call to pt for acm f/u. Pt denies needs/concerns at this time. Reviewed copd zm and dneies symptoms. Declines acp referral at this time. Will plan to graduate at next encounter if no needs. Lab Results     None          Care Coordination Interventions    Program Enrollment: Complex Care  Referral from Primary Care Provider: No  Suggested Interventions and Community Resources  Zone Management Tools: Completed         Goals Addressed                    This Visit's Progress      Patient Stated (pt-stated)   On track      Walk 10,000 steps daily  Barriers: none  Plan for overcoming my barriers: N/A  Confidence: 9/10  Anticipated Goal Completion Date: 4/15/22            Prior to Admission medications    Medication Sig Start Date End Date Taking? Authorizing Provider   pantoprazole (PROTONIX) 40 MG tablet TAKE 1 TABLET BY MOUTH  DAILY 5/27/22 8/25/22  Blake Segovia DO   gabapentin (NEURONTIN) 300 MG capsule TAKE 1 CAPSULE BY MOUTH 3  TIMES DAILY 5/18/22 11/14/22  LIBERTAD Reed   dilTIAZem (DILT-XR) 240 MG extended release capsule Take 1 capsule by mouth daily 2/23/22   NELLIE Bryant - CNP   clopidogrel (PLAVIX) 75 MG tablet Take 1 tablet by mouth daily 2/14/22   Jackson Jones MD   levothyroxine (SYNTHROID) 50 MCG tablet Take one tablet by mouth in the morning 2/14/22   Jackson Jones MD   hydroCHLOROthiazide (HYDRODIURIL) 25 MG tablet Take 0.5 tablets by mouth every morning 2/14/22   Jackson Jones MD   traMADol (ULTRAM) 50 MG tablet Take 2 tablets by mouth 2 times daily as needed for Pain for up to 90 days.  11/18/21 2/23/22  Jackson Jones MD   aspirin 81 MG tablet Take 81 mg by mouth daily    Historical Provider, MD       Future Appointments   Date Time Provider Devan Haque   6/15/2022 10:20 AM NELLIE Bryant - CNP [facility]   6/22/2022  2:30 PM Blake Segovia, DO [facility]     ,   COPD Assessment    Does the patient understand envrionmental exposure?: Yes  Is the patient able to verbalize Rescue vs. Long Acting medications?: No  Does the patient have a nebulizer?: No  Does the patient use a space with inhaled medications?: No     No patient-reported symptoms         Symptoms:  None: Yes      Symptom course: stable  Breathlessness: none  Increase use of rapid acting/rescue inhaled medications?: No  Change in chronic cough?: No/At Baseline  Change in sputum?: No/At Baseline  Self Monitoring - SaO2: No  Have you had a recent diagnosis of pneumonia either by PCP or at a hospital?: No      and   General Assessment    Do you have any symptoms that are causing concern?: No

## 2022-05-27 ENCOUNTER — HOSPITAL ENCOUNTER (OUTPATIENT)
Age: 82
Setting detail: SPECIMEN
Discharge: HOME | End: 2022-05-27
Payer: MEDICARE

## 2022-05-27 ENCOUNTER — OFFICE VISIT (OUTPATIENT)
Dept: FAMILY MEDICINE CLINIC | Age: 82
End: 2022-05-27
Payer: MEDICARE

## 2022-05-27 VITALS
TEMPERATURE: 97.6 F | BODY MASS INDEX: 23.85 KG/M2 | OXYGEN SATURATION: 95 % | HEART RATE: 63 BPM | WEIGHT: 166.6 LBS | HEIGHT: 70 IN

## 2022-05-27 DIAGNOSIS — R51.9 GENERALIZED HEADACHE: Primary | ICD-10-CM

## 2022-05-27 DIAGNOSIS — R53.83 FATIGUE, UNSPECIFIED TYPE: ICD-10-CM

## 2022-05-27 DIAGNOSIS — Z20.822 CLOSE EXPOSURE TO COVID-19 VIRUS: ICD-10-CM

## 2022-05-27 LAB
SARS-COV-2: NOT DETECTED
SOURCE: NORMAL

## 2022-05-27 PROCEDURE — U0005 INFEC AGEN DETEC AMPLI PROBE: HCPCS

## 2022-05-27 PROCEDURE — G8420 CALC BMI NORM PARAMETERS: HCPCS | Performed by: NURSE PRACTITIONER

## 2022-05-27 PROCEDURE — G8427 DOCREV CUR MEDS BY ELIG CLIN: HCPCS | Performed by: NURSE PRACTITIONER

## 2022-05-27 PROCEDURE — 99213 OFFICE O/P EST LOW 20 MIN: CPT | Performed by: NURSE PRACTITIONER

## 2022-05-27 PROCEDURE — U0003 INFECTIOUS AGENT DETECTION BY NUCLEIC ACID (DNA OR RNA); SEVERE ACUTE RESPIRATORY SYNDROME CORONAVIRUS 2 (SARS-COV-2) (CORONAVIRUS DISEASE [COVID-19]), AMPLIFIED PROBE TECHNIQUE, MAKING USE OF HIGH THROUGHPUT TECHNOLOGIES AS DESCRIBED BY CMS-2020-01-R: HCPCS

## 2022-05-27 PROCEDURE — 1036F TOBACCO NON-USER: CPT | Performed by: NURSE PRACTITIONER

## 2022-05-27 PROCEDURE — 1123F ACP DISCUSS/DSCN MKR DOCD: CPT | Performed by: NURSE PRACTITIONER

## 2022-05-27 RX ORDER — PANTOPRAZOLE SODIUM 40 MG/1
40 TABLET, DELAYED RELEASE ORAL DAILY
Qty: 90 TABLET | Refills: 1 | Status: SHIPPED | OUTPATIENT
Start: 2022-05-27 | End: 2022-10-18

## 2022-05-27 NOTE — PATIENT INSTRUCTIONS
Your COVID 19 test can take 1-5 days for the results to come back. We ask that you make a Mychart page and view your test results this way. You will need to Self quarantine until you know your results. If positive, please work on contact tracing. Increase fluids and rest  Saline nasal spray as needed for nasal congestion  Warm salt gargles as needed for throat discomfort  Monitor temperature twice a day  Tylenol as needed for fevers and/or discomfort. Big deep breaths periodically throughout the day  Regular Mucinex over the counter as needed for chest congestion  If symptoms worsen -Go to the ER. Follow up with your primary care provider      To Whom it May Concern:    Eliezer Solis was tested for COVID-19 5/27/2022. He/she must stay home until test results are back. If test is positive, isolate for a total of 5 days, starting from day 1 of symptom onset. After 5 days, if fever-free for 24 hours and there has been a gradual improvement in symptoms, may come out of isolation, but must consistently wear a mask when around other people for 5 additional days. (5 days isolation, 5 days mask-wearing). We do not recommend retesting as patients may continue to test positive for months even though no longer contagious. It is suggested you call [address] [facility] or [address] with any questions regarding isolation timeframe/return to work/school details.

## 2022-05-27 NOTE — PROGRESS NOTES
2022    HPI:  Chief complaint and history of present illness as per medical assistant/nurse documented today in the Flu/COVID-19 clinic. Patient is here with complaints of fever, headache, fatigue, and a bad taste in mouth x 1 day. Patient states he has been in close contact with his son in law who has recently tested positive for covid. Patient states he has had his covid vaccine. MEDICATIONS:  Prior to Visit Medications    Medication Sig Taking? Authorizing Provider   pantoprazole (PROTONIX) 40 MG tablet TAKE 1 TABLET BY MOUTH  DAILY  Blake Segovia DO   gabapentin (NEURONTIN) 300 MG capsule TAKE 1 CAPSULE BY MOUTH 3  TIMES DAILY  Lazarus Plenty, PA   dilTIAZem (DILT-XR) 240 MG extended release capsule Take 1 capsule by mouth daily  Dianna Frazier APRN - CNP   clopidogrel (PLAVIX) 75 MG tablet Take 1 tablet by mouth daily  Saddie Apgar, MD   levothyroxine (SYNTHROID) 50 MCG tablet Take one tablet by mouth in the morning  Saddie Apgar, MD   hydroCHLOROthiazide (HYDRODIURIL) 25 MG tablet Take 0.5 tablets by mouth every morning  Saddie Apgar, MD   traMADol (ULTRAM) 50 MG tablet Take 2 tablets by mouth 2 times daily as needed for Pain for up to 90 days. Saddie Apgar, MD   aspirin 81 MG tablet Take 81 mg by mouth daily  Historical Provider, MD       Allergies   Allergen Reactions    Fenofibrate    ,   Past Medical History:   Diagnosis Date    Anesthesia complication     Patient reports in  following a Back Surgery he was kept in the hospital due to high temperatures in the evenings. He stated they kept him for observation and doesn't remember receiving any treatment for the high temperatures. Patient reports the anesthesia name started with flur? ?.    BPH with obstruction/lower urinary tract symptoms     Chest pain 2018    Chronic low back pain     GERD (gastroesophageal reflux disease)     H/O 24 hour EKG monitoring 3/12  12/11    3/12/12  essentially a normal event monitor without significant arrhythmias noted    H/O cardiac catheterization 2/2/12 4/02 10/98    2/22/12  LM, LAD and RCA all without significant disease    H/O cardiac catheterization 2/2/12 4/02 10/98    Done R/T false pos on stress test.    H/O cardiac catheterization 08/06/2018    normal study    H/O cardiovascular stress test 07/02/2018    normal study    H/O echocardiogram 12/10      3/14 EF55-60% normal LV function 12/10/10  complete 2 dim transthoracic echj. LVSF normal  EF >55%, transmitral spectral Doppler flow pattern is suggestive of  impaired LV relaxation    History of cardiovascular stress test 2/12 11/09 7/08 9/06 2/02 4/00 8/99 2/8/2012  evidence of mild ischemia  in the RCA. abnormal study , restingEF is 70%, global LVSF is  normal    History of Holter monitoring 9/17/14    14 day EVENT - no RVR    History of nuclear stress test 08/03/2017    cardiolite-normal,EF60%    Hx of chest x-ray 2/20/12    chest is considereed non acute. COPD    Hx of echocardiogram 08/03/2017    NE28-43%,ASMYV 2 diastolic dysfunction    Hyperlipidemia     Idiopathic peripheral neuropathy     Kidney stone     Left facial numbness 8/30/2017    Lumbar radiculopathy     Malignant melanoma of skin of face (HCC)     MVP (mitral valve prolapse)     Myotonia     chronic increased CPK's    Peripelvic (lymphatic) cyst     per CT    Precordial pain 8/3/2018    Vitamin B12 deficiency    ,   Past Surgical History:   Procedure Laterality Date    CARDIAC CATHETERIZATION  2/2/12    EF 55% No sugnificant disease.     COLONOSCOPY  5/5/14    diverticulosis, 1 polyp, hemorrhoids    COLONOSCOPY  07/10/2017    single 5 mm polyp found in sigmoid colon, moderate diverticulosis found in sigmoid colon   Rob 71 SURGERY  11/2002    L4-5 hemilaminectomy and disk    LUMBAR DISCECTOMY  1973    PROSTATE SURGERY  09/26/2011    s/p robotic radical prostatectomy    ROTATOR CUFF REPAIR Right 10/2016    TIBIA FRACTURE SURGERY Right     childhood    URETEROLITHOTOMY  1992   ,   Social History     Tobacco Use    Smoking status: Never Smoker    Smokeless tobacco: Never Used   Vaping Use    Vaping Use: Never used   Substance Use Topics    Alcohol use: Yes     Comment: Occassional    Drug use: No   ,   Family History   Problem Relation Age of Onset    Heart Disease Mother         ASCAD    Heart Disease Father     Heart Disease Brother     Diabetes Other     Cancer Other         unknown type    Prostate Cancer Other     Coronary Art Dis Other     Coronary Art Dis Other    ,   Immunization History   Administered Date(s) Administered    COVID-19, Moderna, Primary or Immunocompromised, PF, 100mcg/0.5mL 01/19/2021, 02/16/2021, 11/18/2021    Influenza Virus Vaccine 11/22/2018    Influenza, High Dose (Fluzone 65 yrs and older) 10/15/2015, 11/19/2018    Influenza, Quadv, IM, PF (6 mo and older Fluzone, Flulaval, Fluarix, and 3 yrs and older Afluria) 09/11/2020    Influenza, Quadv, adjuvanted, 65 yrs +, IM, PF (Fluad) 09/11/2020    Influenza, Triv, inactivated, subunit, adjuvanted, IM (Fluad 65 yrs and older) 11/08/2019    Pneumococcal Conjugate 13-valent (Umfulqt93) 02/18/2016    Pneumococcal Conjugate 7-valent (Prevnar7) 09/12/2018    Pneumococcal Polysaccharide ([id number]) 09/27/2010    Tdap (Boostrix, Adacel) 08/28/2014   ,   Health Maintenance   Topic Date Due    Shingles vaccine (1 of 2) Never done    Prostate Specific Antigen (PSA) Screening or Monitoring  02/08/2022    Depression Screen  11/18/2022    Annual Wellness Visit (AWV)  11/19/2022    DTaP/Tdap/Td vaccine (2 - Td or Tdap) 08/28/2024    Flu vaccine  Completed    Pneumococcal 65+ years Vaccine  Completed    COVID-19 Vaccine  Completed    Hepatitis A vaccine  Aged Out    Hepatitis B vaccine  Aged Out    Hib vaccine  Aged Out    Meningococcal (ACWY) vaccine  Aged Out       PHYSICAL EXAM:  Physical Exam  Constitutional: Appearance: Normal appearance. HENT:      Head: Normocephalic. Right Ear: Tympanic membrane, ear canal and external ear normal.      Left Ear: Tympanic membrane, ear canal and external ear normal.      Nose: Nose normal.      Mouth/Throat:      Lips: Pink. Mouth: Mucous membranes are moist.      Pharynx: Oropharynx is clear. Cardiovascular:      Rate and Rhythm: Normal rate and regular rhythm. Heart sounds: Normal heart sounds. Pulmonary:      Effort: Pulmonary effort is normal.      Breath sounds: Normal breath sounds. Musculoskeletal:      Cervical back: Neck supple. Skin:     General: Skin is warm and dry. Neurological:      Mental Status: He is alert and oriented to person, place, and time. Psychiatric:         Mood and Affect: Mood normal.         Behavior: Behavior normal.         ASSESSMENT/PLAN:  1. Generalized headache  Your COVID 19 test can take 1-5 days for the results to come back. We ask that you make a Mychart page and view your test results this way. You will need to Self quarantine until you know your results. If positive, please work on contact tracing. Increase fluids and rest  Saline nasal spray as needed for nasal congestion  Warm salt gargles as needed for throat discomfort  Monitor temperature twice a day  Tylenol as needed for fevers and/or discomfort. Big deep breaths periodically throughout the day  Regular Mucinex over the counter as needed for chest congestion  If symptoms worsen -Go to the ER. Follow up with your primary care provider  - COVID-19    2. Fatigue, unspecified type  - COVID-19    3. Close exposure to COVID-19 virus  - COVID-19      FOLLOW-UP:  If symptoms worse or fail to improve.      In addition to other information, the printed after visit summary provided to the patient includes:  [x] COVID-19 Self care instructions  [x] COVID-19 General patient information

## 2022-05-27 NOTE — PROGRESS NOTES
5/27/22  Glynn Florez  1940    FLU/COVID-19 CLINIC EVALUATION    HPI SYMPTOMS:    Employer:Retired    [x] Fevers  [] Chills  [] Cough  [] Coughing up blood  [] Chest Congestion  [] Nasal Congestion  [] Feeling short of breath  [] Sometimes  [] Frequently  [] All the time  [] Chest pain  [x] Headaches  []Tolerable  [] Severe  [] Sore throat  [] Muscle aches  [] Nausea  [] Vomiting  []Unable to keep fluids down  [] Diarrhea  []Severe    [x] OTHER SYMPTOMS:    Fatigue  \"bad taste in mouth \"    Symptom Duration:   [x] 1  [] 2   [] 3   [] 4    [] 5   [] 6   [] 7   [] 8   [] 9   [] 10   [] 11   [] 12   [] 13   [] 14   [] Longer than 14 days    Symptom course:   [] Worsening     [x] Stable     [] Improving    RISK FACTORS:    [] Pregnant or possibly pregnant  [x] Age over 61  [] Diabetes  [] Heart disease  [] Asthma  [] COPD/Other chronic lung diseases  [] Active Cancer  [] On Chemotherapy  [] Taking oral steroids  [] History Lymphoma/Leukemia  [x] Close contact with a lab confirmed COVID-19 patient within 14 days of symptom onset-son in law  [] History of travel from affected geographical areas within 14 days of symptom onset       VITALS:  There were no vitals filed for this visit. TESTS:    POCT FLU:  [] Positive     []Negative    ASSESSMENT:    [] Flu  [] Possible COVID-19  [] Strep    PLAN:    [] Discharge home with written instructions for:  [] Flu management  [] Possible COVID-19 infection with self-quarantine and management of symptoms  [] Follow-up with primary care physician or emergency department if worsens  [] Evaluation per physician/NP/PA in clinic  [] Sent to ER       An  electronic signature was used to authenticate this note.      --Sri Carrero on 5/27/2022 at 11:43 AM

## 2022-06-08 DIAGNOSIS — E03.9 ACQUIRED HYPOTHYROIDISM: Chronic | ICD-10-CM

## 2022-06-09 RX ORDER — LEVOTHYROXINE SODIUM 0.05 MG/1
TABLET ORAL
Qty: 90 TABLET | Refills: 0 | Status: SHIPPED | OUTPATIENT
Start: 2022-06-09 | End: 2022-08-30

## 2022-06-15 ENCOUNTER — OFFICE VISIT (OUTPATIENT)
Dept: CARDIOLOGY CLINIC | Age: 82
End: 2022-06-15
Payer: MEDICARE

## 2022-06-15 VITALS
DIASTOLIC BLOOD PRESSURE: 62 MMHG | WEIGHT: 163 LBS | BODY MASS INDEX: 23.34 KG/M2 | HEIGHT: 70 IN | HEART RATE: 65 BPM | SYSTOLIC BLOOD PRESSURE: 116 MMHG

## 2022-06-15 DIAGNOSIS — E78.2 MIXED HYPERLIPIDEMIA: ICD-10-CM

## 2022-06-15 DIAGNOSIS — I65.21 STENOSIS OF RIGHT CAROTID ARTERY: ICD-10-CM

## 2022-06-15 DIAGNOSIS — I10 PRIMARY HYPERTENSION: Primary | ICD-10-CM

## 2022-06-15 DIAGNOSIS — R00.2 PALPITATIONS: ICD-10-CM

## 2022-06-15 PROCEDURE — 99214 OFFICE O/P EST MOD 30 MIN: CPT | Performed by: NURSE PRACTITIONER

## 2022-06-15 PROCEDURE — 1036F TOBACCO NON-USER: CPT | Performed by: NURSE PRACTITIONER

## 2022-06-15 PROCEDURE — 1123F ACP DISCUSS/DSCN MKR DOCD: CPT | Performed by: NURSE PRACTITIONER

## 2022-06-15 PROCEDURE — G8420 CALC BMI NORM PARAMETERS: HCPCS | Performed by: NURSE PRACTITIONER

## 2022-06-15 PROCEDURE — G8427 DOCREV CUR MEDS BY ELIG CLIN: HCPCS | Performed by: NURSE PRACTITIONER

## 2022-06-15 ASSESSMENT — ENCOUNTER SYMPTOMS
SHORTNESS OF BREATH: 0
ORTHOPNEA: 0

## 2022-06-15 NOTE — PROGRESS NOTES
6/15/2022  Primary cardiologist: Dr. Trini Marin  is an established 79 y.o.  male here for follow-up on emergency department visit for hypertension      SUBJECTIVE/OBJECTIVE:    HPI :   Samantha Castañeda reports that he is feeling good. He has been checking his blood pressures at home. He has only had one blood pressure > 140/90. HE was in the ED 3 months ago for HTN. Noted blood pressure found to be 188/99. Days blood pressure is much improved. He states he is being active outside. He denies headaches, lightheadedness or dizziness. Review of Systems   Constitutional: Negative for diaphoresis and malaise/fatigue. Cardiovascular: Negative for chest pain, claudication, dyspnea on exertion, irregular heartbeat, leg swelling, near-syncope, orthopnea, palpitations and paroxysmal nocturnal dyspnea. Respiratory: Negative for shortness of breath. Neurological: Negative for dizziness and light-headedness. Vitals:    06/15/22 1009   BP: 116/62   Pulse: 65   Weight: 163 lb (73.9 kg)   Height: 5' 10\" (1.778 m)     No flowsheet data found. Wt Readings from Last 3 Encounters:   06/15/22 163 lb (73.9 kg)   05/27/22 166 lb 9.6 oz (75.6 kg)   02/23/22 164 lb 12.8 oz (74.8 kg)     Body mass index is 23.39 kg/m². Physical Exam  Neck:      Vascular: No carotid bruit. Cardiovascular:      Rate and Rhythm: Normal rate and regular rhythm. Pulses: Normal pulses. Heart sounds: Normal heart sounds. Pulmonary:      Effort: Pulmonary effort is normal.      Breath sounds: Normal breath sounds. Musculoskeletal:      Right lower leg: No edema. Left lower leg: No edema. Skin:     General: Skin is warm and dry. Capillary Refill: Capillary refill takes less than 2 seconds. Neurological:      Mental Status: He is alert.           Current Outpatient Medications   Medication Sig Dispense Refill    levothyroxine (SYNTHROID) 50 MCG tablet TAKE 1 TABLET BY MOUTH IN  THE MORNING 90 tablet 0    pantoprazole (PROTONIX) 40 MG tablet TAKE 1 TABLET BY MOUTH  DAILY 90 tablet 1    gabapentin (NEURONTIN) 300 MG capsule TAKE 1 CAPSULE BY MOUTH 3  TIMES DAILY 270 capsule 1    dilTIAZem (DILT-XR) 240 MG extended release capsule Take 1 capsule by mouth daily 90 capsule 3    clopidogrel (PLAVIX) 75 MG tablet Take 1 tablet by mouth daily 90 tablet 3    hydroCHLOROthiazide (HYDRODIURIL) 25 MG tablet Take 0.5 tablets by mouth every morning 30 tablet 3    traMADol (ULTRAM) 50 MG tablet Take 2 tablets by mouth 2 times daily as needed for Pain for up to 90 days. 360 tablet 0    aspirin 81 MG tablet Take 81 mg by mouth daily       No current facility-administered medications for this visit. Carotid US 2/2022  Impression  The right internal carotid artery demonstrates 0-50% stenosis. The left internal carotid artery demonstrates 0-50% stenosis. Bilateral vertebral arteries are patent with flow in the normal direction. All pertinent data reviewed and discussed with patient       ASSESSMENT/PLAN:    Hypertension   Controlled. On cardizem and HCTZ. No reported adverse events or reported side effects from medication(s). He is stable on current dose. Reviewed Goal SBP < 140    Carotid Artery stenosis  US does not have significant stenosis. Palpitations  Resolved. Continue Cardizem. No reported adverse events or reported side effects from medication(s). He is stable on current dose. dyslipidemia  Lipid panel at goal  Continue to exercise and monitor dietary intake. Medications reviewed and confirmed with patient     Tests ordered:  none      Follow-up: 6 months     Signed:  NELLIE Koehler CNP, 6/15/2022, 10:43 AM    An electronic signature was used to authenticate this note.     Please note this report has been partially produced using speech recognition software and may contain errors related to that system including errors in grammar, punctuation, and spelling, as well as words and phrases that may be inappropriate. If there are any questions or concerns please feel free to contact the dictating provider for clarification.

## 2022-06-22 ENCOUNTER — OFFICE VISIT (OUTPATIENT)
Dept: FAMILY MEDICINE CLINIC | Age: 82
End: 2022-06-22
Payer: MEDICARE

## 2022-06-22 VITALS
HEIGHT: 70 IN | HEART RATE: 70 BPM | WEIGHT: 163 LBS | BODY MASS INDEX: 23.34 KG/M2 | SYSTOLIC BLOOD PRESSURE: 100 MMHG | OXYGEN SATURATION: 97 % | DIASTOLIC BLOOD PRESSURE: 62 MMHG

## 2022-06-22 DIAGNOSIS — C61 PROSTATE CA (HCC): ICD-10-CM

## 2022-06-22 DIAGNOSIS — E78.2 MODERATE MIXED HYPERLIPIDEMIA NOT REQUIRING STATIN THERAPY: ICD-10-CM

## 2022-06-22 DIAGNOSIS — E03.9 ACQUIRED HYPOTHYROIDISM: Chronic | ICD-10-CM

## 2022-06-22 DIAGNOSIS — I10 PRIMARY HYPERTENSION: ICD-10-CM

## 2022-06-22 DIAGNOSIS — M54.16 LUMBAR RADICULOPATHY: Primary | Chronic | ICD-10-CM

## 2022-06-22 DIAGNOSIS — M96.1 POST LAMINECTOMY SYNDROME: ICD-10-CM

## 2022-06-22 DIAGNOSIS — J44.9 CHRONIC OBSTRUCTIVE PULMONARY DISEASE, UNSPECIFIED COPD TYPE (HCC): Chronic | ICD-10-CM

## 2022-06-22 PROCEDURE — 1036F TOBACCO NON-USER: CPT | Performed by: STUDENT IN AN ORGANIZED HEALTH CARE EDUCATION/TRAINING PROGRAM

## 2022-06-22 PROCEDURE — 1123F ACP DISCUSS/DSCN MKR DOCD: CPT | Performed by: STUDENT IN AN ORGANIZED HEALTH CARE EDUCATION/TRAINING PROGRAM

## 2022-06-22 PROCEDURE — G8427 DOCREV CUR MEDS BY ELIG CLIN: HCPCS | Performed by: STUDENT IN AN ORGANIZED HEALTH CARE EDUCATION/TRAINING PROGRAM

## 2022-06-22 PROCEDURE — 3023F SPIROM DOC REV: CPT | Performed by: STUDENT IN AN ORGANIZED HEALTH CARE EDUCATION/TRAINING PROGRAM

## 2022-06-22 PROCEDURE — G8420 CALC BMI NORM PARAMETERS: HCPCS | Performed by: STUDENT IN AN ORGANIZED HEALTH CARE EDUCATION/TRAINING PROGRAM

## 2022-06-22 PROCEDURE — 99213 OFFICE O/P EST LOW 20 MIN: CPT | Performed by: STUDENT IN AN ORGANIZED HEALTH CARE EDUCATION/TRAINING PROGRAM

## 2022-06-22 ASSESSMENT — PATIENT HEALTH QUESTIONNAIRE - PHQ9
2. FEELING DOWN, DEPRESSED OR HOPELESS: 0
SUM OF ALL RESPONSES TO PHQ QUESTIONS 1-9: 0
SUM OF ALL RESPONSES TO PHQ9 QUESTIONS 1 & 2: 0
SUM OF ALL RESPONSES TO PHQ QUESTIONS 1-9: 0
1. LITTLE INTEREST OR PLEASURE IN DOING THINGS: 0

## 2022-06-22 ASSESSMENT — ENCOUNTER SYMPTOMS
NAUSEA: 0
ABDOMINAL PAIN: 0
SHORTNESS OF BREATH: 0
WHEEZING: 0
SORE THROAT: 0

## 2022-06-22 NOTE — PROGRESS NOTES
6/22/2022    Pola Gab    Chief Complaint   Patient presents with    3 Month Follow-Up    Other     no complaints       HPI  History was obtained from pateint. Samantha Castañeda is a 79 y.o. male with a PMHx as listed below who presents today for 3 monthf follow up chronic condtions. Pain located lower back s/p laminectomy    Hx. Prostate ca. Now 10 years in remission per urology     Hx. CAD   Carotid stenosis minimal    Hx. Of COPD currently not inhalers breathing    No falls in past year per patient    1. Lumbar radiculopathy    2. Post laminectomy syndrome    3. Acquired hypothyroidism    4. Prostate CA ([facility] [address]    5. Primary hypertension    6. Chronic obstructive pulmonary disease, unspecified COPD type ([facility] [address]             REVIEW OF SYMPTOMS    Review of Systems   Constitutional: Negative for chills and fatigue. HENT: Negative for congestion and sore throat. Respiratory: Negative for shortness of breath and wheezing. Cardiovascular: Negative for chest pain and palpitations. Gastrointestinal: Negative for abdominal pain and nausea. Genitourinary: Negative for frequency and urgency. Neurological: Negative for light-headedness. PAST MEDICAL HISTORY  Past Medical History:   Diagnosis Date    Anesthesia complication     Patient reports in 1973 following a Back Surgery he was kept in the hospital due to high temperatures in the evenings. He stated they kept him for observation and doesn't remember receiving any treatment for the high temperatures. Patient reports the anesthesia name started with flur? ?.    BPH with obstruction/lower urinary tract symptoms     Chest pain 7/1/2018    Chronic low back pain     GERD (gastroesophageal reflux disease)     H/O 24 hour EKG monitoring 3/12  12/11    3/12/12  essentially a normal event monitor without significant arrhythmias noted    H/O cardiac catheterization 2/2/12 4/02 10/98    2/22/12  LM, LAD and RCA all without significant disease    H/O cardiac catheterization 2/2/12 4/02 10/98    Done R/T false pos on stress test.    H/O cardiac catheterization 08/06/2018    normal study    H/O cardiovascular stress test 07/02/2018    normal study    H/O echocardiogram 12/10      3/14 EF55-60% normal LV function 12/10/10  complete 2 dim transthoracic echj. LVSF normal  EF >55%, transmitral spectral Doppler flow pattern is suggestive of  impaired LV relaxation    History of cardiovascular stress test 2/12 11/09 7/08 9/06 2/02 4/00 8/99 2/8/2012  evidence of mild ischemia  in the RCA. abnormal study , restingEF is 70%, global LVSF is  normal    History of Holter monitoring 9/17/14    14 day EVENT - no RVR    History of nuclear stress test 08/03/2017    cardiolite-normal,EF60%    Hx of chest x-ray 2/20/12    chest is considereed non acute. COPD    Hx of echocardiogram 08/03/2017    CU01-68%,IVSER 2 diastolic dysfunction    Hyperlipidemia     Idiopathic peripheral neuropathy     Kidney stone     Left facial numbness 8/30/2017    Lumbar radiculopathy     Malignant melanoma of skin of face (HCC)     MVP (mitral valve prolapse)     Myotonia     chronic increased CPK's    Peripelvic (lymphatic) cyst     per CT    Precordial pain 8/3/2018    Vitamin B12 deficiency        FAMILY HISTORY  Family History   Problem Relation Age of Onset    Heart Disease Mother         ASCAD    Heart Disease Father     Heart Disease Brother     Diabetes Other     Cancer Other         unknown type    Prostate Cancer Other     Coronary Art Dis Other     Coronary Art Dis Other        SOCIAL HISTORY  Social History     Socioeconomic History    Marital status:       Spouse name: Not on file    Number of children: Not on file    Years of education: Not on file    Highest education level: Not on file   Occupational History    Not on file   Tobacco Use    Smoking status: Never Smoker    Smokeless tobacco: Never Used   Vaping Use    Vaping Use: Never used Substance and Sexual Activity    Alcohol use: Yes     Comment: Occassional    Drug use: No    Sexual activity: Not Currently     Partners: Female     Comment:    Other Topics Concern    Not on file   Social History Narrative    Not on file     Social Determinants of Health     Financial Resource Strain: Low Risk     Difficulty of Paying Living Expenses: Not hard at all   Food Insecurity: No Food Insecurity    Worried About Running Out of Food in the Last Year: Never true    Jg of Food in the Last Year: Never true   Transportation Needs: No Transportation Needs    Lack of Transportation (Medical): No    Lack of Transportation (Non-Medical): No   Physical Activity: Sufficiently Active    Days of Exercise per Week: 7 days    Minutes of Exercise per Session: 40 min   Stress: No Stress Concern Present    Feeling of Stress : Not at all   Social Connections: Socially Isolated    Frequency of Communication with Friends and Family: Twice a week    Frequency of Social Gatherings with Friends and Family: Once a week    Attends Sikh Services: Never    Active Member of Clubs or Organizations: No    Attends Club or Organization Meetings: Never    Marital Status:    Intimate Partner Violence:     Fear of Current or Ex-Partner: Not on file    Emotionally Abused: Not on file    Physically Abused: Not on file    Sexually Abused: Not on file   Housing Stability: [address] Unable to Pay for Housing in the Last Year: No    Number of [place] in the Last Year: 1    Unstable Housing in the Last Year: No        SURGICAL HISTORY  Past Surgical History:   Procedure Laterality Date    CARDIAC CATHETERIZATION  2/2/12    EF 55% No sugnificant disease.     COLONOSCOPY  5/5/14    diverticulosis, 1 polyp, hemorrhoids    COLONOSCOPY  07/10/2017    single 5 mm polyp found in sigmoid colon, moderate diverticulosis found in sigmoid colon    LUMBAR DISC SURGERY  11/2002    L4-5 hemilaminectomy and disk    LUMBAR DISCECTOMY  1973    PROSTATE SURGERY  09/26/2011    s/p robotic radical prostatectomy    ROTATOR CUFF REPAIR Right 10/2016    TIBIA FRACTURE SURGERY Right     childhood    URETEROLITHOTOMY  1992                 CURRENT MEDICATIONS  Current Outpatient Medications   Medication Sig Dispense Refill    levothyroxine (SYNTHROID) 50 MCG tablet TAKE 1 TABLET BY MOUTH IN  THE MORNING 90 tablet 0    pantoprazole (PROTONIX) 40 MG tablet TAKE 1 TABLET BY MOUTH  DAILY 90 tablet 1    gabapentin (NEURONTIN) 300 MG capsule TAKE 1 CAPSULE BY MOUTH 3  TIMES DAILY 270 capsule 1    clopidogrel (PLAVIX) 75 MG tablet Take 1 tablet by mouth daily 90 tablet 3    hydroCHLOROthiazide (HYDRODIURIL) 25 MG tablet Take 0.5 tablets by mouth every morning 30 tablet 3    traMADol (ULTRAM) 50 MG tablet Take 2 tablets by mouth 2 times daily as needed for Pain for up to 90 days. 360 tablet 0    aspirin 81 MG tablet Take 81 mg by mouth daily       No current facility-administered medications for this visit. ALLERGIES  Allergies   Allergen Reactions    Fenofibrate        PHYSICAL EXAM    /62   Pulse 70   Ht 5' 10\" (1.778 m)   Wt 163 lb (73.9 kg)   SpO2 97%   BMI 23.39 kg/m²     Physical Exam  Constitutional:       Appearance: Normal appearance. HENT:      Head: Normocephalic and atraumatic. Eyes:      Extraocular Movements: Extraocular movements intact. Pupils: Pupils are equal, round, and reactive to light. Cardiovascular:      Rate and Rhythm: Normal rate and regular rhythm. Pulses: Normal pulses. Heart sounds: No murmur heard. No friction rub. No gallop. Pulmonary:      Effort: Pulmonary effort is normal.      Breath sounds: Normal breath sounds. Musculoskeletal:      Cervical back: Neck supple. Skin:     General: Skin is warm and dry. Neurological:      General: No focal deficit present. Mental Status: He is alert.    Psychiatric:         Mood and Affect: Mood normal.         Behavior: Behavior normal.         Gait unassisted, wide stance    ASSESSMENT & PLAN    1. Lumbar radiculopathy      2. Post laminectomy syndrome      3. Acquired hypothyroidism      4. Prostate CA ([facility]      5. Primary hypertension      6. Chronic obstructive pulmonary disease, unspecified COPD type ([facility]      Pain stable on current regimen  Prior labs reviewed, some elevation in cholesterol we will monitor  Bp stable  Hx of COPD on PMHx no inhaler use, no known hx. exaceration      No follow-ups on file.          Electronically signed by Mireya De Leon DO on 6/22/2022

## 2022-06-27 ENCOUNTER — CARE COORDINATION (OUTPATIENT)
Dept: CARE COORDINATION | Age: 82
End: 2022-06-27

## 2022-06-27 NOTE — CARE COORDINATION
Ambulatory Care Coordination Note  6/27/2022  CM Risk Score: 5  Charlson 10 Year Mortality Risk Score: 100%     ACC: José Miguel De Leon RN    Summary Note: Call to pt for acm f/u. Pt reports he is doing well and denies symptoms or needs at this time. Has recently had o with specialist and pcp. Will plan to graduate at this time. Advised  acm aailable in future if can be of any assistance, voices understanding. Lab Results     None          Care Coordination Interventions    Program Enrollment: Complex Care  Referral from Primary Care Provider: No  Suggested Interventions and Community Resources  Zone Management Tools: Completed         Goals Addressed                    This Visit's Progress      Patient Stated (pt-stated)   On track      Walk 10,000 steps daily  Barriers: none  Plan for overcoming my barriers: N/A  Confidence: 9/10  Anticipated Goal Completion Date: 4/15/22            Prior to Admission medications    Medication Sig Start Date End Date Taking? Authorizing Provider   levothyroxine (SYNTHROID) 50 MCG tablet TAKE 1 TABLET BY MOUTH IN  THE MORNING 6/9/22   NELLIE Parkinson - CNP   pantoprazole (PROTONIX) 40 MG tablet TAKE 1 TABLET BY MOUTH  DAILY 5/27/22 8/25/22  Blake Segovia DO   gabapentin (NEURONTIN) 300 MG capsule TAKE 1 CAPSULE BY MOUTH 3  TIMES DAILY 5/18/22 11/14/22  LIBERTAD Anderson   clopidogrel (PLAVIX) 75 MG tablet Take 1 tablet by mouth daily 2/14/22   Jody Grigsby MD   hydroCHLOROthiazide (HYDRODIURIL) 25 MG tablet Take 0.5 tablets by mouth every morning 2/14/22   Jody Grigsby MD   traMADol (ULTRAM) 50 MG tablet Take 2 tablets by mouth 2 times daily as needed for Pain for up to 90 days.  11/18/21 6/22/22  Jody Grigsby MD   aspirin 81 MG tablet Take 81 mg by mouth daily    Historical Provider, MD       Future Appointments   Date Time Provider Devan Haque   9/23/2022  9:15 AM Kishore Thompson DO [facility]   12/15/2022 10:00 AM Dianna Palacios Anirudh Bello, NELLIE - CNP [facility]     ,   COPD Assessment    Does the patient understand envrionmental exposure?: Yes  Is the patient able to verbalize Rescue vs. Long Acting medications?: No  Does the patient have a nebulizer?: No  Does the patient use a space with inhaled medications?: No     No patient-reported symptoms         Symptoms:         and   General Assessment    Do you have any symptoms that are causing concern?: No

## 2022-08-09 RX ORDER — HYDROCHLOROTHIAZIDE 25 MG/1
25 TABLET ORAL DAILY
Qty: 30 TABLET | Refills: 5 | Status: ON HOLD | OUTPATIENT
Start: 2022-08-09 | End: 2022-08-25 | Stop reason: SDUPTHER

## 2022-08-19 ENCOUNTER — APPOINTMENT (OUTPATIENT)
Dept: GENERAL RADIOLOGY | Age: 82
End: 2022-08-19
Payer: MEDICARE

## 2022-08-19 ENCOUNTER — HOSPITAL ENCOUNTER (EMERGENCY)
Age: 82
Discharge: HOME | End: 2022-08-19
Attending: EMERGENCY MEDICINE
Payer: MEDICARE

## 2022-08-19 VITALS
WEIGHT: 165 LBS | BODY MASS INDEX: 23.62 KG/M2 | RESPIRATION RATE: 16 BRPM | TEMPERATURE: 98 F | HEIGHT: 70 IN | SYSTOLIC BLOOD PRESSURE: 132 MMHG | OXYGEN SATURATION: 98 % | HEART RATE: 66 BPM | DIASTOLIC BLOOD PRESSURE: 68 MMHG

## 2022-08-19 DIAGNOSIS — R23.2 HOT FLASHES: Primary | ICD-10-CM

## 2022-08-19 LAB
ALBUMIN SERPL-MCNC: 4.2 GM/DL (ref 3.4–5)
ALP BLD-CCNC: 42 IU/L (ref 40–129)
ALT SERPL-CCNC: 21 U/L (ref 10–40)
ANION GAP SERPL CALCULATED.3IONS-SCNC: 10 MMOL/L (ref 4–16)
AST SERPL-CCNC: 30 IU/L (ref 15–37)
BACTERIA: NEGATIVE /HPF
BASOPHILS ABSOLUTE: 0 K/CU MM
BASOPHILS RELATIVE PERCENT: 0.3 % (ref 0–1)
BILIRUB SERPL-MCNC: 0.5 MG/DL (ref 0–1)
BILIRUBIN URINE: NEGATIVE MG/DL
BLOOD, URINE: NEGATIVE
BUN BLDV-MCNC: 24 MG/DL (ref 6–23)
CALCIUM SERPL-MCNC: 9.5 MG/DL (ref 8.3–10.6)
CHLORIDE BLD-SCNC: 100 MMOL/L (ref 99–110)
CLARITY: CLEAR
CO2: 27 MMOL/L (ref 21–32)
COLOR: YELLOW
CREAT SERPL-MCNC: 1.1 MG/DL (ref 0.9–1.3)
DIFFERENTIAL TYPE: ABNORMAL
EKG ATRIAL RATE: 56 BPM
EKG DIAGNOSIS: NORMAL
EKG P AXIS: 69 DEGREES
EKG P-R INTERVAL: 204 MS
EKG Q-T INTERVAL: 410 MS
EKG QRS DURATION: 96 MS
EKG QTC CALCULATION (BAZETT): 395 MS
EKG R AXIS: 46 DEGREES
EKG T AXIS: 65 DEGREES
EKG VENTRICULAR RATE: 56 BPM
EOSINOPHILS ABSOLUTE: 0.1 K/CU MM
EOSINOPHILS RELATIVE PERCENT: 1.8 % (ref 0–3)
GFR AFRICAN AMERICAN: >60 ML/MIN/1.73M2
GFR NON-AFRICAN AMERICAN: >60 ML/MIN/1.73M2
GLUCOSE BLD-MCNC: 105 MG/DL (ref 70–99)
GLUCOSE, URINE: NEGATIVE MG/DL
HCT VFR BLD CALC: 43.4 % (ref 42–52)
HEMOGLOBIN: 14.6 GM/DL (ref 13.5–18)
IMMATURE NEUTROPHIL %: 0.3 % (ref 0–0.43)
KETONES, URINE: NEGATIVE MG/DL
LEUKOCYTE ESTERASE, URINE: NEGATIVE
LIPASE: 20 IU/L (ref 13–60)
LYMPHOCYTES ABSOLUTE: 0.8 K/CU MM
LYMPHOCYTES RELATIVE PERCENT: 25.5 % (ref 24–44)
MCH RBC QN AUTO: 32.3 PG (ref 27–31)
MCHC RBC AUTO-ENTMCNC: 33.6 % (ref 32–36)
MCV RBC AUTO: 96 FL (ref 78–100)
MONOCYTES ABSOLUTE: 0.4 K/CU MM
MONOCYTES RELATIVE PERCENT: 11.2 % (ref 0–4)
NITRITE URINE, QUANTITATIVE: NEGATIVE
NUCLEATED RBC %: 0 %
PDW BLD-RTO: 12.3 % (ref 11.7–14.9)
PH, URINE: 6 (ref 5–8)
PLATELET # BLD: 186 K/CU MM (ref 140–440)
PMV BLD AUTO: 9.6 FL (ref 7.5–11.1)
POTASSIUM SERPL-SCNC: 4.5 MMOL/L (ref 3.5–5.1)
PROTEIN UA: NEGATIVE MG/DL
RBC # BLD: 4.52 M/CU MM (ref 4.6–6.2)
RBC URINE: NORMAL /HPF (ref 0–3)
SEGMENTED NEUTROPHILS ABSOLUTE COUNT: 2 K/CU MM
SEGMENTED NEUTROPHILS RELATIVE PERCENT: 60.9 % (ref 36–66)
SODIUM BLD-SCNC: 137 MMOL/L (ref 135–145)
SPECIFIC GRAVITY UA: <1.005 (ref 1–1.03)
T3 FREE: 2.5 PG/ML (ref 2.3–4.2)
T4 FREE: 1.26 NG/DL (ref 0.9–1.8)
TOTAL IMMATURE NEUTOROPHIL: 0.01 K/CU MM
TOTAL NUCLEATED RBC: 0 K/CU MM
TOTAL PROTEIN: 6.6 GM/DL (ref 6.4–8.2)
TRICHOMONAS: NORMAL /HPF
TROPONIN T: <0.01 NG/ML
TSH HIGH SENSITIVITY: 3.95 UIU/ML (ref 0.27–4.2)
UROBILINOGEN, URINE: 0.2 MG/DL (ref 0.2–1)
WBC # BLD: 3.3 K/CU MM (ref 4–10.5)
WBC UA: NORMAL /HPF (ref 0–2)

## 2022-08-19 PROCEDURE — 85025 COMPLETE CBC W/AUTO DIFF WBC: CPT

## 2022-08-19 PROCEDURE — 93005 ELECTROCARDIOGRAM TRACING: CPT | Performed by: EMERGENCY MEDICINE

## 2022-08-19 PROCEDURE — 84439 ASSAY OF FREE THYROXINE: CPT

## 2022-08-19 PROCEDURE — 83690 ASSAY OF LIPASE: CPT

## 2022-08-19 PROCEDURE — 81001 URINALYSIS AUTO W/SCOPE: CPT

## 2022-08-19 PROCEDURE — 84443 ASSAY THYROID STIM HORMONE: CPT

## 2022-08-19 PROCEDURE — 93010 ELECTROCARDIOGRAM REPORT: CPT | Performed by: INTERNAL MEDICINE

## 2022-08-19 PROCEDURE — 80053 COMPREHEN METABOLIC PANEL: CPT

## 2022-08-19 PROCEDURE — 84481 FREE ASSAY (FT-3): CPT

## 2022-08-19 PROCEDURE — 71046 X-RAY EXAM CHEST 2 VIEWS: CPT

## 2022-08-19 PROCEDURE — 84484 ASSAY OF TROPONIN QUANT: CPT

## 2022-08-19 PROCEDURE — 99285 EMERGENCY DEPT VISIT HI MDM: CPT

## 2022-08-19 ASSESSMENT — PAIN - FUNCTIONAL ASSESSMENT
PAIN_FUNCTIONAL_ASSESSMENT: NONE - DENIES PAIN
PAIN_FUNCTIONAL_ASSESSMENT: NONE - DENIES PAIN

## 2022-08-19 ASSESSMENT — ENCOUNTER SYMPTOMS
RHINORRHEA: 0
SORE THROAT: 0
COUGH: 0
WHEEZING: 0
SINUS PRESSURE: 0
SHORTNESS OF BREATH: 0
VOMITING: 0
ABDOMINAL PAIN: 0
DIARRHEA: 0
CONSTIPATION: 0
NAUSEA: 0

## 2022-08-19 NOTE — ED PROVIDER NOTES
[address] ENCOUNTER      Pt Name: Raulito Blanca  MRN: [id number]  Chergfbaltazar 1940  Date of evaluation: 8/19/2022  Provider: Melissa Dolan MD    CHIEF COMPLAINT       Chief Complaint   Patient presents with    Other     Having hot flashes since 0000         HISTORY OF PRESENT ILLNESS   (Location/Symptom, Timing/Onset, Context/Setting, Quality, Duration, Modifying Factors, Severity)  Note limiting factors. Raulito Blanca is a 79 y.o. male who presents to the emergency department due to spontaneous hot flash    HPI    Nursing Notes were reviewed. This patient was initially evaluated by Keeley Beth. He was signed out at 0 600. Please see her documentation for her initial impression and plan on this patient. I evaluated this patient independently. This is an 69-year-old man who comes to the emergency department due to spontaneous hot flashes that occurred in the middle of the night. The patient says she was walking back from the bathroom when he began to feel continuously overheated while resting. He denies diaphoresis. He denies nausea or vomiting. He denies chest pain or difficulty breathing. Denies headache. Denies visual changes. Denies dysuria or frequency. Denies abdominal pain. Denies cough. Denies sick contacts    REVIEW OF SYSTEMS    (2-9 systems for level 4, 10 or more for level 5)     Review of Systems    10 systems were reviewed with the patient and all were negative except for those noted in history of present illness above      PAST MEDICAL HISTORY     Past Medical History:   Diagnosis Date    Anesthesia complication     Patient reports in 1973 following a Back Surgery he was kept in the hospital due to high temperatures in the evenings. He stated they kept him for observation and doesn't remember receiving any treatment for the high temperatures.  Patient reports the anesthesia name started with flur? ?.    BPH with obstruction/lower urinary tract symptoms     Chest pain 7/1/2018    Chronic low back pain     GERD (gastroesophageal reflux disease)     H/O 24 hour EKG monitoring 3/12  12/11    3/12/12  essentially a normal event monitor without significant arrhythmias noted    H/O cardiac catheterization 2/2/12 4/02 10/98    2/22/12  LM, LAD and RCA all without significant disease    H/O cardiac catheterization 2/2/12 4/02 10/98    Done R/T false pos on stress test.    H/O cardiac catheterization 08/06/2018    normal study    H/O cardiovascular stress test 07/02/2018    normal study    H/O echocardiogram 12/10      3/14 EF55-60% normal LV function 12/10/10  complete 2 dim transthoracic echj. LVSF normal  EF >55%, transmitral spectral Doppler flow pattern is suggestive of  impaired LV relaxation    History of cardiovascular stress test 2/12 11/09 7/08 9/06 2/02 4/00 8/99 2/8/2012  evidence of mild ischemia  in the RCA. abnormal study , restingEF is 70%, global LVSF is  normal    History of Holter monitoring 9/17/14    14 day EVENT - no RVR    History of nuclear stress test 08/03/2017    cardiolite-normal,EF60%    Hx of chest x-ray 2/20/12    chest is considereed non acute. COPD    Hx of echocardiogram 08/03/2017    JT21-16%,KJTVB 2 diastolic dysfunction    Hyperlipidemia     Idiopathic peripheral neuropathy     Kidney stone     Left facial numbness 8/30/2017    Lumbar radiculopathy     Malignant melanoma of skin of face (HCC)     MVP (mitral valve prolapse)     Myotonia     chronic increased CPK's    Peripelvic (lymphatic) cyst     per CT    Precordial pain 8/3/2018    Vitamin B12 deficiency          SURGICAL HISTORY       Past Surgical History:   Procedure Laterality Date    CARDIAC CATHETERIZATION  2/2/12    EF 55% No sugnificant disease.     COLONOSCOPY  5/5/14    diverticulosis, 1 polyp, hemorrhoids    COLONOSCOPY  07/10/2017    single 5 mm polyp found in sigmoid colon, moderate diverticulosis found in sigmoid colon    LUMBAR DISC SURGERY  11/2002    L4-5 hemilaminectomy and disk    LUMBAR DISCECTOMY  [address]  09/26/2011    s/p robotic radical prostatectomy    ROTATOR CUFF REPAIR Right 10/2016    TIBIA FRACTURE SURGERY Right     childhood    URETEROLITHOTOMY  1992         CURRENT MEDICATIONS       Previous Medications    ASPIRIN 81 MG TABLET    Take 81 mg by mouth daily    CLOPIDOGREL (PLAVIX) 75 MG TABLET    Take 1 tablet by mouth daily    GABAPENTIN (NEURONTIN) 300 MG CAPSULE    TAKE 1 CAPSULE BY MOUTH 3  TIMES DAILY    HYDROCHLOROTHIAZIDE (HYDRODIURIL) 25 MG TABLET    Take 1 tablet by mouth in the morning. LEVOTHYROXINE (SYNTHROID) 50 MCG TABLET    TAKE 1 TABLET BY MOUTH IN  THE MORNING    PANTOPRAZOLE (PROTONIX) 40 MG TABLET    TAKE 1 TABLET BY MOUTH  DAILY    TRAMADOL (ULTRAM) 50 MG TABLET    Take 2 tablets by mouth 2 times daily as needed for Pain for up to 90 days. ALLERGIES     Fenofibrate    FAMILY HISTORY       Family History   Problem Relation Age of Onset    Heart Disease Mother         ASCAD    Heart Disease Father     Heart Disease Brother     Diabetes Other     Cancer Other         unknown type    Prostate Cancer Other     Coronary Art Dis Other     Coronary Art Dis Other           SOCIAL HISTORY       Social History     Socioeconomic History    Marital status:      Spouse name: None    Number of children: None    Years of education: None    Highest education level: None   Tobacco Use    Smoking status: Never    Smokeless tobacco: Never   Vaping Use    Vaping Use: Never used   Substance and Sexual Activity    Alcohol use: Yes     Comment: Occassional    Drug use: No    Sexual activity: Not Currently     Partners: Female     Comment:      Social Determinants of Health     Transportation Needs: No Transportation Needs    Lack of Transportation (Medical): No    Lack of Transportation (Non-Medical):  No   Physical Activity: Sufficiently Active    Days of Exercise per Week: 7 days    Minutes of Exercise per Session: 40 min   Stress: No Stress Concern Present    Feeling of Stress : Not at all   Social Connections: Socially Isolated    Frequency of Communication with Friends and Family: Twice a week    Frequency of Social Gatherings with Friends and Family: Once a week    Attends Congregational Services: Never    Active Member of Clubs or Organizations: No    Attends Club or Organization Meetings: Never    Marital Status:    Housing Stability: Low Risk     Unable to Pay for Housing in the Last Year: No    Number of Places Lived in the Last Year: 1    Unstable Housing in the Last Year: No       SCREENINGS         Danika Coma Scale  Eye Opening: Spontaneous  Best Verbal Response: Oriented  Best Motor Response: Obeys commands  Danika Coma Scale Score: 15                     CIWA Assessment  BP: 114/60  Heart Rate: 66                 PHYSICAL EXAM    (up to 7 for level 4, 8 or more for level 5)     ED Triage Vitals   BP Temp Temp Source Heart Rate Resp SpO2 Height Weight   08/19/22 0424 08/19/22 0424 08/19/22 0424 08/19/22 0424 08/19/22 0424 08/19/22 0424 08/19/22 0807 08/19/22 0807   (!) 147/81 97.7 °F (36.5 °C) Oral 63 18 97 % 5' 10\" (1.778 m) 165 lb (74.8 kg)       Physical Exam  Vitals reviewed. Constitutional:       General: He is not in acute distress. Appearance: Normal appearance. He is not ill-appearing or toxic-appearing. HENT:      Head: Normocephalic and atraumatic. Nose: Nose normal.      Mouth/Throat:      Mouth: Mucous membranes are moist.   Eyes:      Extraocular Movements: Extraocular movements intact. Pupils: Pupils are equal, round, and reactive to light. Cardiovascular:      Rate and Rhythm: Normal rate and regular rhythm. Pulses: Normal pulses. Heart sounds: Normal heart sounds. Pulmonary:      Effort: Pulmonary effort is normal.      Breath sounds: Normal breath sounds. Abdominal:      General: Abdomen is flat. Palpations: Abdomen is soft. Musculoskeletal:         General: Normal range of motion. Cervical back: Normal range of motion. Skin:     General: Skin is warm and dry. Capillary Refill: Capillary refill takes less than 2 seconds. Neurological:      General: No focal deficit present. Mental Status: He is alert. Psychiatric:         Mood and Affect: Mood normal.         Behavior: Behavior normal.       DIAGNOSTIC RESULTS     EKG: All EKG's are interpreted by the Emergency Department Physician who either signs or Co-signs this chart in the absence of a cardiologist.    Mild sinus bradycardia with first-degree AV block. Ventricular to 56. . QRS is 96. QTc is 395. There are no abnormal ST elevations or depressions. There is no ectopy. Other than the mild bradycardia, there is no significant change from prior EKG from February 2022    RADIOLOGY:   Non-plain film images such as CT, Ultrasound and MRI are read by the radiologist. Plain radiographic images are visualized and preliminarily interpreted by the emergency physician with the below findings:    Interpretation per the Radiologist below, if available at the time of this note:    XR CHEST (2 VW)   Final Result   Stable exam without acute abnormality. ED BEDSIDE ULTRASOUND:   Performed by ED Physician - none    LABS:  Labs Reviewed   CBC WITH AUTO DIFFERENTIAL - Abnormal; Notable for the following components:       Result Value    WBC 3.3 (*)     RBC 4.52 (*)     MCH 32.3 (*)     Monocytes % 11.2 (*)     All other components within normal limits   COMPREHENSIVE METABOLIC PANEL W/ REFLEX TO MG FOR LOW K - Abnormal; Notable for the following components:    BUN 24 (*)     Glucose 105 (*)     All other components within normal limits   LIPASE   TROPONIN   URINALYSIS WITH MICROSCOPIC   TSH   T3, FREE   T4, FREE       All other labs were within normal range or not returned as of this dictation.     EMERGENCY DEPARTMENT COURSE and DIFFERENTIAL DIAGNOSIS/MDM:   Vitals:    Vitals:    08/19/22 0424 08/19/22 0619 08/19/22 0805 08/19/22 0807   BP: (!) 147/81 123/65 114/60    Pulse: 63 55 66    Resp: 18 20 16    Temp: 97.7 °F (36.5 °C)      TempSrc: Oral      SpO2: 97% 98% 97%    Weight:    165 lb (74.8 kg)   Height:    5' 10\" (1.778 m)       MDM  Etiologies considered in this patient are infectious, metabolic, and cardiac. There is no evidence of infection. Chest x-ray is normal.  Urinalysis is normal.  Patient has no leukocytosis or other evidence of acute infectious process. He is afebrile. The patient is on levothyroxine however his TSH, T3 and T4 are normal and this is unlikely to be the etiology of the patient's symptoms. Patient has a mild bradycardia with first-degree AV block, however this is unlikely to be the etiology of his episodic hot flash which was not repeated here in the emergency department. Patient has been asymptomatic during his time in the emergency department. There are no acute findings and no definitive etiology has been identified. I did discuss the possibility of neoplastic origin for his symptoms. I had a lengthy conversation with the patient regarding this possible etiology. I explained to the patient that there is no additional work-up that can be completed in the emergency department to identify an occult neoplastic etiology, however I have strongly recommended that he follow-up with his primary care physician to investigate this possibility as well as follow-up with the urologist for his prior prostate cancer follow-up. I have encouraged him to come back to the emergency room if has any worsening symptoms including significant discomfort, chest pain, difficulty breathing, fever, or any other concerning symptoms. CONSULTS:  None    PROCEDURES:  Unless otherwise noted below, none     Procedures      FINAL IMPRESSION      1.  Hot flashes          DISPOSITION/PLAN   DISPOSITION Decision To Discharge 08/19/2022 09:16:16 AM      PATIENT REFERRED TO:  Mary Ann Briggs DO  [address] [address] [place]   [phone]    Call today      Liss Daniel, [address] [place] [address]  [phone]    Schedule an appointment as soon as possible for a visit   Prostate Cancer Follow up      DISCHARGE MEDICATIONS:  New Prescriptions    No medications on file     Controlled Substances Monitoring:     RX Monitoring 11/18/2021   Periodic Controlled Substance Monitoring Obtaining appropriate analgesic effect of treatment.        (Please note that portions of this note were completed with a voice recognition program.  Efforts were made to edit the dictations but occasionally words are mis-transcribed.)    Berenice Burkitt, MD (electronically signed)  Attending Emergency Physician           Berenice Burkitt, MD  08/19/22 2745

## 2022-08-19 NOTE — ED PROVIDER NOTES
temperatures. Patient reports the anesthesia name started with flur? ?.    BPH with obstruction/lower urinary tract symptoms     Chest pain 7/1/2018    Chronic low back pain     GERD (gastroesophageal reflux disease)     H/O 24 hour EKG monitoring 3/12  12/11    3/12/12  essentially a normal event monitor without significant arrhythmias noted    H/O cardiac catheterization 2/2/12 4/02 10/98    2/22/12  LM, LAD and RCA all without significant disease    H/O cardiac catheterization 2/2/12 4/02 10/98    Done R/T false pos on stress test.    H/O cardiac catheterization 08/06/2018    normal study    H/O cardiovascular stress test 07/02/2018    normal study    H/O echocardiogram 12/10      3/14 EF55-60% normal LV function 12/10/10  complete 2 dim transthoracic echj. LVSF normal  EF >55%, transmitral spectral Doppler flow pattern is suggestive of  impaired LV relaxation    History of cardiovascular stress test 2/12 11/09 7/08 9/06 2/02 4/00 8/99 2/8/2012  evidence of mild ischemia  in the RCA. abnormal study , restingEF is 70%, global LVSF is  normal    History of Holter monitoring 9/17/14    14 day EVENT - no RVR    History of nuclear stress test 08/03/2017    cardiolite-normal,EF60%    Hx of chest x-ray 2/20/12    chest is considereed non acute. COPD    Hx of echocardiogram 08/03/2017    MD01-75%,BANZD 2 diastolic dysfunction    Hyperlipidemia     Idiopathic peripheral neuropathy     Kidney stone     Left facial numbness 8/30/2017    Lumbar radiculopathy     Malignant melanoma of skin of face (HCC)     MVP (mitral valve prolapse)     Myotonia     chronic increased CPK's    Peripelvic (lymphatic) cyst     per CT    Precordial pain 8/3/2018    Vitamin B12 deficiency      Past Surgical History:   Procedure Laterality Date    CARDIAC CATHETERIZATION  2/2/12    EF 55% No sugnificant disease.     COLONOSCOPY  5/5/14    diverticulosis, 1 polyp, hemorrhoids    COLONOSCOPY  07/10/2017    single 5 mm polyp found in sigmoid colon, moderate diverticulosis found in sigmoid colon    LUMBAR DISC SURGERY  11/2002    L4-5 hemilaminectomy and disk    LUMBAR DISCECTOMY  [address]  09/26/2011    s/p robotic radical prostatectomy    ROTATOR CUFF REPAIR Right 10/2016    TIBIA FRACTURE SURGERY Right     childhood    URETEROLITHOTOMY  1992     Family History   Problem Relation Age of Onset    Heart Disease Mother         ASCAD    Heart Disease Father     Heart Disease Brother     Diabetes Other     Cancer Other         unknown type    Prostate Cancer Other     Coronary Art Dis Other     Coronary Art Dis Other      Social History     Socioeconomic History    Marital status:      Spouse name: Not on file    Number of children: Not on file    Years of education: Not on file    Highest education level: Not on file   Occupational History    Not on file   Tobacco Use    Smoking status: Never    Smokeless tobacco: Never   Vaping Use    Vaping Use: Never used   Substance and Sexual Activity    Alcohol use: Yes     Comment: Occassional    Drug use: No    Sexual activity: Not Currently     Partners: Female     Comment:    Other Topics Concern    Not on file   Social History Narrative    Not on file     Social Determinants of Health     Financial Resource Strain: Not on file   Food Insecurity: Not on file   Transportation Needs: No Transportation Needs    Lack of Transportation (Medical): No    Lack of Transportation (Non-Medical):  No   Physical Activity: Sufficiently Active    Days of Exercise per Week: 7 days    Minutes of Exercise per Session: 40 min   Stress: No Stress Concern Present    Feeling of Stress : Not at all   Social Connections: Socially Isolated    Frequency of Communication with Friends and Family: Twice a week    Frequency of Social Gatherings with Friends and Family: Once a week    Attends Tenriism Services: Never    Active Member of Clubs or Organizations: No    Attends Club or Organization Meetings: Never Marital Status:    Intimate Partner Violence: Not on file   Housing Stability: Low Risk     Unable to Pay for Housing in the Last Year: No    Number of Places Lived in the Last Year: 1    Unstable Housing in the Last Year: No     No current facility-administered medications for this encounter. Current Outpatient Medications   Medication Sig Dispense Refill    hydroCHLOROthiazide (HYDRODIURIL) 25 MG tablet Take 1 tablet by mouth in the morning. 30 tablet 5    levothyroxine (SYNTHROID) 50 MCG tablet TAKE 1 TABLET BY MOUTH IN  THE MORNING 90 tablet 0    pantoprazole (PROTONIX) 40 MG tablet TAKE 1 TABLET BY MOUTH  DAILY 90 tablet 1    gabapentin (NEURONTIN) 300 MG capsule TAKE 1 CAPSULE BY MOUTH 3  TIMES DAILY 270 capsule 1    clopidogrel (PLAVIX) 75 MG tablet Take 1 tablet by mouth daily 90 tablet 3    traMADol (ULTRAM) 50 MG tablet Take 2 tablets by mouth 2 times daily as needed for Pain for up to 90 days. 360 tablet 0    aspirin 81 MG tablet Take 81 mg by mouth daily       Allergies   Allergen Reactions    Fenofibrate        Nursing Notes Reviewed    Physical Exam:  Triage VS:    ED Triage Vitals [08/19/22 0424]   Enc Vitals Group      BP (!) 147/81      Heart Rate 63      Resp 18      Temp 97.7 °F (36.5 °C)      Temp Source Oral      SpO2 97 %      Weight       Height       Head Circumference       Peak Flow       Pain Score       Pain Loc       Pain Edu? Excl. in [address]? Physical Exam  Vitals and nursing note reviewed. Constitutional:       General: He is not in acute distress. Appearance: Normal appearance. He is not ill-appearing or toxic-appearing. HENT:      Head: Normocephalic and atraumatic. Nose: Nose normal.      Mouth/Throat:      Mouth: Mucous membranes are moist.   Eyes:      Extraocular Movements: Extraocular movements intact. Conjunctiva/sclera: Conjunctivae normal.      Pupils: Pupils are equal, round, and reactive to light.    Cardiovascular:      Rate and Rhythm: Normal rate and regular rhythm. Pulses: Normal pulses. Pulmonary:      Effort: Pulmonary effort is normal. No respiratory distress. Breath sounds: Normal breath sounds. No wheezing, rhonchi or rales. Abdominal:      General: Abdomen is flat. Bowel sounds are normal. There is no distension. Palpations: Abdomen is soft. Tenderness: There is no abdominal tenderness. There is no guarding or rebound. Musculoskeletal:         General: Normal range of motion. Skin:     General: Skin is warm. Capillary Refill: Capillary refill takes less than 2 seconds. Neurological:      Mental Status: He is alert and oriented to person, place, and time. Mental status is at baseline. Psychiatric:         Mood and Affect: Mood normal.            I have reviewed and interpreted all of the currently available lab results from this visit (if applicable):  Results for orders placed or performed during the hospital encounter of 08/19/22   EKG 12 Lead   Result Value Ref Range    Ventricular Rate 56 BPM    Atrial Rate 56 BPM    P-R Interval 204 ms    QRS Duration 96 ms    Q-T Interval 410 ms    QTc Calculation (Bazett) 395 ms    P Axis 69 degrees    R Axis 46 degrees    T Axis 65 degrees    Diagnosis       Sinus bradycardia  Otherwise normal ECG  When compared with ECG of 18-FEB-2022 22:30,  QT has shortened              EKG (if obtained): (All EKG's are interpreted by myself in the absence of a cardiologist)  Sinus bradycardia, rate 56, , QRS 96, QTc 395, no acute ST elevation. Sinus bradycardia has replaced normal sinus rhythm from EKG on 2/19/2022, otherwise unremarkable and no acute changes. Chart review shows recent radiographs:  No results found. MDM:  Patient seen and examined. Labs obtained. Patient with leukopenia, hemoglobin and platelets stable. UA without signs of infection. Time of completion of this note, labs are pending.   Care of patient transferred from me to [facility] physician, Dr. Aria Rm, who agrees to follow-up on labs, and disposition patient appropriately. Clinical Impression:  1. Hot flashes      Disposition referral (if applicable):  No follow-up provider specified. Disposition medications (if applicable):  New Prescriptions    No medications on file       Comment: Please note this report has been produced using speech recognition software and may contain errors related to that system including errors in grammar, punctuation, and spelling, as well as words and phrases that may be inappropriate. Efforts were made to edit the dictations.        [address] [place] [address],   08/19/22 5083

## 2022-08-19 NOTE — ED NOTES
Pt reports having \"hot spells\" since Sunday on and off. Reports he just starts feeling hot and checks his temp and it was 98.3 and he says he took 2 tylenol at 0300 this morning. Denies any urinary symptoms and denies any congestion or cough. Denies chest pain and shortness of breath. nad noted. rr even and unlabored. Mask in place. wctm.      Nan Rodriguez RN  08/19/22 9788

## 2022-08-22 ENCOUNTER — APPOINTMENT (OUTPATIENT)
Dept: CT IMAGING | Age: 82
End: 2022-08-22
Payer: MEDICARE

## 2022-08-22 ENCOUNTER — HOSPITAL ENCOUNTER (OUTPATIENT)
Age: 82
Setting detail: OBSERVATION
Discharge: HOME | End: 2022-08-25
Attending: EMERGENCY MEDICINE | Admitting: STUDENT IN AN ORGANIZED HEALTH CARE EDUCATION/TRAINING PROGRAM
Payer: MEDICARE

## 2022-08-22 ENCOUNTER — APPOINTMENT (OUTPATIENT)
Dept: GENERAL RADIOLOGY | Age: 82
End: 2022-08-22
Payer: MEDICARE

## 2022-08-22 DIAGNOSIS — R20.0 LEFT FACIAL NUMBNESS: Primary | ICD-10-CM

## 2022-08-22 DIAGNOSIS — I67.9 INTRACRANIAL VASCULAR STENOSIS: ICD-10-CM

## 2022-08-22 PROBLEM — R29.90 STROKE-LIKE SYMPTOMS: Status: ACTIVE | Noted: 2022-08-22

## 2022-08-22 LAB
ALBUMIN SERPL-MCNC: 4.6 GM/DL (ref 3.4–5)
ALP BLD-CCNC: 40 IU/L (ref 40–129)
ALT SERPL-CCNC: 20 U/L (ref 10–40)
ANION GAP SERPL CALCULATED.3IONS-SCNC: 10 MMOL/L (ref 4–16)
AST SERPL-CCNC: 31 IU/L (ref 15–37)
BASOPHILS ABSOLUTE: 0 K/CU MM
BASOPHILS RELATIVE PERCENT: 0.6 % (ref 0–1)
BILIRUB SERPL-MCNC: 0.5 MG/DL (ref 0–1)
BUN BLDV-MCNC: 18 MG/DL (ref 6–23)
CALCIUM SERPL-MCNC: 9.8 MG/DL (ref 8.3–10.6)
CHLORIDE BLD-SCNC: 102 MMOL/L (ref 99–110)
CHP ED QC CHECK: NORMAL
CO2: 29 MMOL/L (ref 21–32)
CREAT SERPL-MCNC: 1 MG/DL (ref 0.9–1.3)
DIFFERENTIAL TYPE: ABNORMAL
EKG ATRIAL RATE: 70 BPM
EKG DIAGNOSIS: NORMAL
EKG P AXIS: 44 DEGREES
EKG P-R INTERVAL: 172 MS
EKG Q-T INTERVAL: 404 MS
EKG QRS DURATION: 92 MS
EKG QTC CALCULATION (BAZETT): 436 MS
EKG R AXIS: 20 DEGREES
EKG T AXIS: 60 DEGREES
EKG VENTRICULAR RATE: 70 BPM
EOSINOPHILS ABSOLUTE: 0 K/CU MM
EOSINOPHILS RELATIVE PERCENT: 1.3 % (ref 0–3)
GFR AFRICAN AMERICAN: >60 ML/MIN/1.73M2
GFR NON-AFRICAN AMERICAN: >60 ML/MIN/1.73M2
GLUCOSE BLD-MCNC: 102 MG/DL (ref 70–99)
GLUCOSE BLD-MCNC: 113 MG/DL
GLUCOSE BLD-MCNC: 113 MG/DL (ref 70–99)
HCT VFR BLD CALC: 44.6 % (ref 42–52)
HEMOGLOBIN: 14.8 GM/DL (ref 13.5–18)
IMMATURE NEUTROPHIL %: 0 % (ref 0–0.43)
INR BLD: 1.08 INDEX
LYMPHOCYTES ABSOLUTE: 0.8 K/CU MM
LYMPHOCYTES RELATIVE PERCENT: 26.8 % (ref 24–44)
MCH RBC QN AUTO: 32 PG (ref 27–31)
MCHC RBC AUTO-ENTMCNC: 33.2 % (ref 32–36)
MCV RBC AUTO: 96.3 FL (ref 78–100)
MONOCYTES ABSOLUTE: 0.4 K/CU MM
MONOCYTES RELATIVE PERCENT: 11.2 % (ref 0–4)
NUCLEATED RBC %: 0 %
PDW BLD-RTO: 12.5 % (ref 11.7–14.9)
PLATELET # BLD: 193 K/CU MM (ref 140–440)
PMV BLD AUTO: 9.5 FL (ref 7.5–11.1)
POTASSIUM SERPL-SCNC: 4 MMOL/L (ref 3.5–5.1)
PRO-BNP: 38.21 PG/ML
PROTHROMBIN TIME: 14 SECONDS (ref 11.7–14.5)
RBC # BLD: 4.63 M/CU MM (ref 4.6–6.2)
SEGMENTED NEUTROPHILS ABSOLUTE COUNT: 1.9 K/CU MM
SEGMENTED NEUTROPHILS RELATIVE PERCENT: 60.1 % (ref 36–66)
SODIUM BLD-SCNC: 141 MMOL/L (ref 135–145)
TOTAL IMMATURE NEUTOROPHIL: 0 K/CU MM
TOTAL NUCLEATED RBC: 0 K/CU MM
TOTAL PROTEIN: 6.9 GM/DL (ref 6.4–8.2)
TROPONIN T: <0.01 NG/ML
WBC # BLD: 3.1 K/CU MM (ref 4–10.5)

## 2022-08-22 PROCEDURE — G0378 HOSPITAL OBSERVATION PER HR: HCPCS

## 2022-08-22 PROCEDURE — 84484 ASSAY OF TROPONIN QUANT: CPT

## 2022-08-22 PROCEDURE — 85610 PROTHROMBIN TIME: CPT

## 2022-08-22 PROCEDURE — 83880 ASSAY OF NATRIURETIC PEPTIDE: CPT

## 2022-08-22 PROCEDURE — 2580000003 HC RX 258: Performed by: NURSE PRACTITIONER

## 2022-08-22 PROCEDURE — 82962 GLUCOSE BLOOD TEST: CPT

## 2022-08-22 PROCEDURE — 70496 CT ANGIOGRAPHY HEAD: CPT

## 2022-08-22 PROCEDURE — 93010 ELECTROCARDIOGRAM REPORT: CPT | Performed by: INTERNAL MEDICINE

## 2022-08-22 PROCEDURE — 6360000004 HC RX CONTRAST MEDICATION: Performed by: EMERGENCY MEDICINE

## 2022-08-22 PROCEDURE — 99285 EMERGENCY DEPT VISIT HI MDM: CPT

## 2022-08-22 PROCEDURE — 80053 COMPREHEN METABOLIC PANEL: CPT

## 2022-08-22 PROCEDURE — 93005 ELECTROCARDIOGRAM TRACING: CPT | Performed by: EMERGENCY MEDICINE

## 2022-08-22 PROCEDURE — 94761 N-INVAS EAR/PLS OXIMETRY MLT: CPT

## 2022-08-22 PROCEDURE — 70450 CT HEAD/BRAIN W/O DYE: CPT

## 2022-08-22 PROCEDURE — 71045 X-RAY EXAM CHEST 1 VIEW: CPT

## 2022-08-22 PROCEDURE — 6370000000 HC RX 637 (ALT 250 FOR IP): Performed by: HOSPITALIST

## 2022-08-22 PROCEDURE — 85025 COMPLETE CBC W/AUTO DIFF WBC: CPT

## 2022-08-22 PROCEDURE — 6370000000 HC RX 637 (ALT 250 FOR IP): Performed by: NURSE PRACTITIONER

## 2022-08-22 PROCEDURE — 96372 THER/PROPH/DIAG INJ SC/IM: CPT

## 2022-08-22 PROCEDURE — 36415 COLL VENOUS BLD VENIPUNCTURE: CPT

## 2022-08-22 PROCEDURE — 6360000002 HC RX W HCPCS: Performed by: NURSE PRACTITIONER

## 2022-08-22 RX ORDER — PROMETHAZINE HYDROCHLORIDE 12.5 MG/1
12.5 TABLET ORAL EVERY 6 HOURS PRN
Status: DISCONTINUED | OUTPATIENT
Start: 2022-08-22 | End: 2022-08-25 | Stop reason: HOSPADM

## 2022-08-22 RX ORDER — ATORVASTATIN CALCIUM 40 MG/1
40 TABLET, FILM COATED ORAL NIGHTLY
Status: DISCONTINUED | OUTPATIENT
Start: 2022-08-22 | End: 2022-08-25 | Stop reason: HOSPADM

## 2022-08-22 RX ORDER — LABETALOL HYDROCHLORIDE 5 MG/ML
10 INJECTION, SOLUTION INTRAVENOUS EVERY 10 MIN PRN
Status: DISCONTINUED | OUTPATIENT
Start: 2022-08-22 | End: 2022-08-25 | Stop reason: HOSPADM

## 2022-08-22 RX ORDER — CLOPIDOGREL BISULFATE 75 MG/1
75 TABLET ORAL DAILY
Status: DISCONTINUED | OUTPATIENT
Start: 2022-08-22 | End: 2022-08-25 | Stop reason: HOSPADM

## 2022-08-22 RX ORDER — ENOXAPARIN SODIUM 100 MG/ML
40 INJECTION SUBCUTANEOUS DAILY
Status: DISCONTINUED | OUTPATIENT
Start: 2022-08-22 | End: 2022-08-25 | Stop reason: HOSPADM

## 2022-08-22 RX ORDER — PANTOPRAZOLE SODIUM 40 MG/1
40 TABLET, DELAYED RELEASE ORAL DAILY
Status: DISCONTINUED | OUTPATIENT
Start: 2022-08-23 | End: 2022-08-25 | Stop reason: HOSPADM

## 2022-08-22 RX ORDER — POLYETHYLENE GLYCOL 3350 17 G/17G
17 POWDER, FOR SOLUTION ORAL DAILY
Status: DISCONTINUED | OUTPATIENT
Start: 2022-08-22 | End: 2022-08-25 | Stop reason: HOSPADM

## 2022-08-22 RX ORDER — GABAPENTIN 300 MG/1
300 CAPSULE ORAL NIGHTLY
COMMUNITY
End: 2022-09-23

## 2022-08-22 RX ORDER — ACETAMINOPHEN 325 MG/1
650 TABLET ORAL EVERY 6 HOURS PRN
Status: DISCONTINUED | OUTPATIENT
Start: 2022-08-22 | End: 2022-08-25 | Stop reason: HOSPADM

## 2022-08-22 RX ORDER — GABAPENTIN 300 MG/1
300 CAPSULE ORAL NIGHTLY
Status: DISCONTINUED | OUTPATIENT
Start: 2022-08-22 | End: 2022-08-25 | Stop reason: HOSPADM

## 2022-08-22 RX ORDER — TRAZODONE HYDROCHLORIDE 50 MG/1
50 TABLET ORAL NIGHTLY PRN
Status: DISCONTINUED | OUTPATIENT
Start: 2022-08-22 | End: 2022-08-25 | Stop reason: HOSPADM

## 2022-08-22 RX ORDER — GABAPENTIN 300 MG/1
600 CAPSULE ORAL EVERY MORNING
Status: DISCONTINUED | OUTPATIENT
Start: 2022-08-23 | End: 2022-08-25 | Stop reason: HOSPADM

## 2022-08-22 RX ORDER — 0.9 % SODIUM CHLORIDE 0.9 %
10 VIAL (ML) INJECTION
Status: COMPLETED | OUTPATIENT
Start: 2022-08-22 | End: 2022-08-22

## 2022-08-22 RX ORDER — HYDROCHLOROTHIAZIDE 25 MG/1
25 TABLET ORAL DAILY
Status: DISCONTINUED | OUTPATIENT
Start: 2022-08-22 | End: 2022-08-25 | Stop reason: HOSPADM

## 2022-08-22 RX ORDER — SODIUM CHLORIDE 9 MG/ML
INJECTION, SOLUTION INTRAVENOUS PRN
Status: DISCONTINUED | OUTPATIENT
Start: 2022-08-22 | End: 2022-08-25 | Stop reason: HOSPADM

## 2022-08-22 RX ORDER — DILTIAZEM HYDROCHLORIDE 240 MG/1
240 CAPSULE, COATED, EXTENDED RELEASE ORAL DAILY
Status: DISCONTINUED | OUTPATIENT
Start: 2022-08-22 | End: 2022-08-25 | Stop reason: HOSPADM

## 2022-08-22 RX ORDER — NITROGLYCERIN 0.4 MG/1
0.4 TABLET SUBLINGUAL EVERY 5 MIN PRN
Status: DISCONTINUED | OUTPATIENT
Start: 2022-08-22 | End: 2022-08-25 | Stop reason: HOSPADM

## 2022-08-22 RX ORDER — ASPIRIN 81 MG/1
81 TABLET ORAL DAILY
Status: DISCONTINUED | OUTPATIENT
Start: 2022-08-23 | End: 2022-08-25 | Stop reason: HOSPADM

## 2022-08-22 RX ORDER — SODIUM CHLORIDE 0.9 % (FLUSH) 0.9 %
5-40 SYRINGE (ML) INJECTION EVERY 12 HOURS SCHEDULED
Status: DISCONTINUED | OUTPATIENT
Start: 2022-08-22 | End: 2022-08-25 | Stop reason: HOSPADM

## 2022-08-22 RX ORDER — DILTIAZEM HYDROCHLORIDE 240 MG/1
240 CAPSULE, EXTENDED RELEASE ORAL DAILY
COMMUNITY
End: 2022-09-07 | Stop reason: SDUPTHER

## 2022-08-22 RX ORDER — ONDANSETRON 2 MG/ML
4 INJECTION INTRAMUSCULAR; INTRAVENOUS EVERY 6 HOURS PRN
Status: DISCONTINUED | OUTPATIENT
Start: 2022-08-22 | End: 2022-08-25 | Stop reason: HOSPADM

## 2022-08-22 RX ORDER — SODIUM CHLORIDE 0.9 % (FLUSH) 0.9 %
5-40 SYRINGE (ML) INJECTION PRN
Status: DISCONTINUED | OUTPATIENT
Start: 2022-08-22 | End: 2022-08-25 | Stop reason: HOSPADM

## 2022-08-22 RX ORDER — CLOPIDOGREL BISULFATE 75 MG/1
75 TABLET ORAL DAILY
Status: DISCONTINUED | OUTPATIENT
Start: 2022-08-23 | End: 2022-08-22

## 2022-08-22 RX ADMIN — POLYETHYLENE GLYCOL (3350) 17 G: 17 POWDER, FOR SOLUTION ORAL at 17:52

## 2022-08-22 RX ADMIN — CLOPIDOGREL BISULFATE 75 MG: 75 TABLET ORAL at 17:51

## 2022-08-22 RX ADMIN — GABAPENTIN 300 MG: 300 CAPSULE ORAL at 20:42

## 2022-08-22 RX ADMIN — TRAZODONE HYDROCHLORIDE 50 MG: 50 TABLET ORAL at 23:45

## 2022-08-22 RX ADMIN — DILTIAZEM HYDROCHLORIDE 240 MG: 240 CAPSULE, COATED, EXTENDED RELEASE ORAL at 17:52

## 2022-08-22 RX ADMIN — SODIUM CHLORIDE, PRESERVATIVE FREE 10 ML: 5 INJECTION INTRAVENOUS at 20:42

## 2022-08-22 RX ADMIN — ENOXAPARIN SODIUM 40 MG: 100 INJECTION SUBCUTANEOUS at 17:52

## 2022-08-22 RX ADMIN — Medication 10 ML: at 11:52

## 2022-08-22 RX ADMIN — ATORVASTATIN CALCIUM 40 MG: 40 TABLET, FILM COATED ORAL at 20:42

## 2022-08-22 RX ADMIN — IOPAMIDOL 75 ML: 755 INJECTION, SOLUTION INTRAVENOUS at 11:52

## 2022-08-22 ASSESSMENT — ENCOUNTER SYMPTOMS
SORE THROAT: 0
VOMITING: 0
ABDOMINAL PAIN: 0
CHEST TIGHTNESS: 1
EYES NEGATIVE: 1
SHORTNESS OF BREATH: 0
COUGH: 0
NAUSEA: 0
CONSTIPATION: 0

## 2022-08-22 ASSESSMENT — PAIN DESCRIPTION - ORIENTATION: ORIENTATION: LEFT

## 2022-08-22 ASSESSMENT — PAIN DESCRIPTION - PAIN TYPE: TYPE: ACUTE PAIN

## 2022-08-22 ASSESSMENT — PAIN SCALES - GENERAL
PAINLEVEL_OUTOF10: 0
PAINLEVEL_OUTOF10: 7

## 2022-08-22 ASSESSMENT — PAIN DESCRIPTION - LOCATION: LOCATION: HEAD

## 2022-08-22 ASSESSMENT — PAIN DESCRIPTION - DESCRIPTORS: DESCRIPTORS: TINGLING;NUMBNESS

## 2022-08-22 ASSESSMENT — PAIN - FUNCTIONAL ASSESSMENT
PAIN_FUNCTIONAL_ASSESSMENT: NONE - DENIES PAIN
PAIN_FUNCTIONAL_ASSESSMENT: 0-10

## 2022-08-22 NOTE — ED PROVIDER NOTES
Triage Chief Complaint:   No chief complaint on file. [place]:  Lauren Barnes is a 79 y.o. male that presents with left-sided facial numbness. Patient's last known well is reported as 830 this morning, per patient. Patient reports that 2 days ago he developed left-sided facial and body numbness that was transient and spontaneously resolved. Patient was evaluated emergency department at that time but did not have neuroimaging performed. Patient is morning was feeling well until he was eating breakfast at approximately 830 when the numbness of the face returned. Patient reports its been waxing waning since onset. No weakness. No vision change. No difficulty with speech or understanding. Presently the numbness is very minimal.    Patient is on Plavix for anticoagulation. ROS:  At least 10 systems reviewed and otherwise acutely negative except as in the [address] Past Medical History:   Diagnosis Date    Anesthesia complication     Patient reports in 1973 following a Back Surgery he was kept in the hospital due to high temperatures in the evenings. He stated they kept him for observation and doesn't remember receiving any treatment for the high temperatures. Patient reports the anesthesia name started with flur? ?.    BPH with obstruction/lower urinary tract symptoms     Chest pain 7/1/2018    Chronic low back pain     GERD (gastroesophageal reflux disease)     H/O 24 hour EKG monitoring 3/12  12/11    3/12/12  essentially a normal event monitor without significant arrhythmias noted    H/O cardiac catheterization 2/2/12 4/02 10/98    2/22/12  LM, LAD and RCA all without significant disease    H/O cardiac catheterization 2/2/12 4/02 10/98    Done R/T false pos on stress test.    H/O cardiac catheterization 08/06/2018    normal study    H/O cardiovascular stress test 07/02/2018    normal study    H/O echocardiogram 12/10      3/14 EF55-60% normal LV function 12/10/10  complete 2 dim transthoracic echj.   LVSF normal  EF >55%, transmitral spectral Doppler flow pattern is suggestive of  impaired LV relaxation    History of cardiovascular stress test 2/12 11/09 7/08 9/06 2/02 4/00 8/99 2/8/2012  evidence of mild ischemia  in the RCA. abnormal study , restingEF is 70%, global LVSF is  normal    History of Holter monitoring 9/17/14    14 day EVENT - no RVR    History of nuclear stress test 08/03/2017    cardiolite-normal,EF60%    Hx of chest x-ray 2/20/12    chest is considereed non acute. COPD    Hx of echocardiogram 08/03/2017    WJ76-09%,ANFIM 2 diastolic dysfunction    Hyperlipidemia     Idiopathic peripheral neuropathy     Kidney stone     Left facial numbness 8/30/2017    Lumbar radiculopathy     Malignant melanoma of skin of face (HCC)     MVP (mitral valve prolapse)     Myotonia     chronic increased CPK's    Peripelvic (lymphatic) cyst     per CT    Precordial pain 8/3/2018    Vitamin B12 deficiency      Past Surgical History:   Procedure Laterality Date    CARDIAC CATHETERIZATION  2/2/12    EF 55% No sugnificant disease. COLONOSCOPY  5/5/14    diverticulosis, 1 polyp, hemorrhoids    COLONOSCOPY  07/10/2017    single 5 mm polyp found in sigmoid colon, moderate diverticulosis found in sigmoid colon    LUMBAR DISC SURGERY  11/2002    L4-5 hemilaminectomy and disk    LUMBAR DISCECTOMY  [address]  09/26/2011    s/p robotic radical prostatectomy    ROTATOR CUFF REPAIR Right 10/2016    TIBIA FRACTURE SURGERY Right     childhood    URETEROLITHOTOMY  1992     Family History   Problem Relation Age of Onset    Heart Disease Mother         ASCAD    Heart Disease Father     Heart Disease Brother     Diabetes Other     Cancer Other         unknown type    Prostate Cancer Other     Coronary Art Dis Other     Coronary Art Dis Other      Social History     Socioeconomic History    Marital status:       Spouse name: Not on file    Number of children: Not on file    Years of education: Not on file Highest education level: Not on file   Occupational History    Not on file   Tobacco Use    Smoking status: Never    Smokeless tobacco: Never   Vaping Use    Vaping Use: Never used   Substance and Sexual Activity    Alcohol use: Yes     Comment: Occassional    Drug use: No    Sexual activity: Not Currently     Partners: Female     Comment:    Other Topics Concern    Not on file   Social History Narrative    Not on file     Social Determinants of Health     Financial Resource Strain: Not on file   Food Insecurity: Not on file   Transportation Needs: No Transportation Needs    Lack of Transportation (Medical): No    Lack of Transportation (Non-Medical): No   Physical Activity: Sufficiently Active    Days of Exercise per Week: 7 days    Minutes of Exercise per Session: 40 min   Stress: No Stress Concern Present    Feeling of Stress : Not at all   Social Connections: Socially Isolated    Frequency of Communication with Friends and Family: Twice a week    Frequency of Social Gatherings with Friends and Family: Once a week    Attends Islam Services: Never    Active Member of Clubs or Organizations: No    Attends Club or Organization Meetings: Never    Marital Status:    Intimate Partner Violence: Not on file   Housing Stability: Low Risk     Unable to Pay for Housing in the Last Year: No    Number of Places Lived in the Last Year: 1    Unstable Housing in the Last Year: No     No current facility-administered medications for this encounter. Current Outpatient Medications   Medication Sig Dispense Refill    gabapentin (NEURONTIN) 300 MG capsule Take 300 mg by mouth nightly. dilTIAZem (DILACOR XR) 240 MG extended release capsule Take 240 mg by mouth daily      hydroCHLOROthiazide (HYDRODIURIL) 25 MG tablet Take 1 tablet by mouth in the morning.  (Patient taking differently: Take 25 mg by mouth daily 08/22/22 Patient states he only takes 1/2 tablet) 30 tablet 5    levothyroxine (SYNTHROID) 50 MCG tablet TAKE 1 TABLET BY MOUTH IN  THE MORNING (Patient taking differently: Take 50 mcg by mouth Daily TAKE 1 TABLET BY MOUTH IN  THE MORNING) 90 tablet 0    pantoprazole (PROTONIX) 40 MG tablet TAKE 1 TABLET BY MOUTH  DAILY 90 tablet 1    gabapentin (NEURONTIN) 300 MG capsule TAKE 1 CAPSULE BY MOUTH 3  TIMES DAILY (Patient taking differently: Take 600 mg by mouth every morning.) 270 capsule 1    clopidogrel (PLAVIX) 75 MG tablet Take 1 tablet by mouth daily 90 tablet 3    aspirin 81 MG tablet Take 81 mg by mouth daily       Allergies   Allergen Reactions    Fenofibrate        Nursing Notes Reviewed    Physical Exam:  ED Triage Vitals   Enc Vitals Group      BP 08/22/22 1122 (!) 149/86      Heart Rate 08/22/22 1122 80      Resp 08/22/22 1122 17      Temp 08/22/22 1122 97.7 °F (36.5 °C)      Temp Source 08/22/22 1138 Oral      SpO2 08/22/22 1138 95 %      Weight 08/22/22 1137 165 lb (74.8 kg)      Height 08/22/22 1137 5' 10\" (1.778 m)      Head Circumference --       Peak Flow --       Pain Score --       Pain Loc --       Pain Edu? --       Excl. in [address]? --      GENERAL APPEARANCE: Awake and alert. Cooperative. Sitting comfortably in bed. Pleasant. HEAD: Normocephalic. Atraumatic. EYES: EOM's grossly intact. Sclera anicteric. ENT: Mucous membranes are moist. Tolerates saliva. No trismus. NECK: Supple. No meningismus. Trachea midline. HEART: RRR. Radial pulses 2+. LUNGS: Respirations unlabored. CTAB  ABDOMEN: Soft. Non-tender. No guarding or rebound. EXTREMITIES: No acute deformities. SKIN: Warm and dry.   NEUROLOGICAL:   Glynn Florez's NIH Stroke Scale at 2:34 PM is:  Level of Consciousness:  0 - alert; keenly responsive    LOC Questions:  0 - answers both questions correctly    LOC Commands:  0 - performs both tasks correctly    Best Gaze:  0 - normal    Visual Fields:  0 - no visual loss    Facial Palsy:  0 - normal symmetric movement    Motor-Arm-Left:  0 - no drift, limb holds 90 (or 45) degrees for full 10 seconds    Motor-Leg-Left:  0 - no drift; leg holds 30 degree position for full 5 seconds    Motor-Arm-Right:  0 - no drift, limb holds 90 (or 45) degrees for full 10 seconds    Motor-Leg-Right:  0 - no drift; leg holds 30 degree position for full 5 seconds    Limb Ataxia:  0 - absent    Sensory:  1 - mild to moderate sensory loss; patient feels pinprick is less sharp or is dull on the affected side; there is a loss of superficial pain with pinprick but patient is aware of being touched     Best Language:  0 - no aphasia, normal    Dysarthria:  0 - normal    Extinction and Inattention:  0 - no abnormality  PSYCHIATRIC: Normal mood.     I have reviewed and interpreted all of the currently available lab results from this visit (if applicable):  Results for orders placed or performed during the hospital encounter of 08/22/22   CBC with Auto Differential   Result Value Ref Range    WBC 3.1 (L) 4.0 - 10.5 K/CU MM    RBC 4.63 4.6 - 6.2 M/CU MM    Hemoglobin 14.8 13.5 - 18.0 GM/DL    Hematocrit 44.6 42 - 52 %    MCV 96.3 78 - 100 FL    MCH 32.0 (H) 27 - 31 PG    MCHC 33.2 32.0 - 36.0 %    RDW 12.5 11.7 - 14.9 %    Platelets 369 073 - 793 K/CU MM    MPV 9.5 7.5 - 11.1 FL    Differential Type AUTOMATED DIFFERENTIAL     Segs Relative 60.1 36 - 66 %    Lymphocytes % 26.8 24 - 44 %    Monocytes % 11.2 (H) 0 - 4 %    Eosinophils % 1.3 0 - 3 %    Basophils % 0.6 0 - 1 %    Segs Absolute 1.9 K/CU MM    Lymphocytes Absolute 0.8 K/CU MM    Monocytes Absolute 0.4 K/CU MM    Eosinophils Absolute 0.0 K/CU MM    Basophils Absolute 0.0 K/CU MM    Nucleated RBC % 0.0 %    Total Nucleated RBC 0.0 K/CU MM    Total Immature Neutrophil 0.00 K/CU MM    Immature Neutrophil % 0.0 0 - 0.43 %   Comprehensive Metabolic Panel w/ Reflex to MG   Result Value Ref Range    Sodium 141 135 - 145 MMOL/L    Potassium 4.0 3.5 - 5.1 MMOL/L    Chloride 102 99 - 110 mMol/L    CO2 29 21 - 32 MMOL/L    BUN 18 6 - 23 MG/DL    Creatinine 1.0 0.9 - 1.3 MG/DL Glucose 102 (H) 70 - 99 MG/DL    Calcium 9.8 8.3 - 10.6 MG/DL    Albumin 4.6 3.4 - 5.0 GM/DL    Total Protein 6.9 6.4 - 8.2 GM/DL    Total Bilirubin 0.5 0.0 - 1.0 MG/DL    ALT 20 10 - 40 U/L    AST 31 15 - 37 IU/L    Alkaline Phosphatase 40 40 - 129 IU/L    GFR Non-African American >60 >60 mL/min/1.73m2    GFR African American >60 >60 mL/min/1.73m2    Anion Gap 10 4 - 16   Troponin   Result Value Ref Range    Troponin T <0.010 <0.01 NG/ML   Protime-INR   Result Value Ref Range    Protime 14.0 11.7 - 14.5 SECONDS    INR 1.08 INDEX   POCT Glucose   Result Value Ref Range    POC Glucose 113 (H) 70 - 99 MG/DL   POCT Glucose   Result Value Ref Range    Glucose 113 mg/dL    QC OK? ok    EKG 12 Lead   Result Value Ref Range    Ventricular Rate 70 BPM    Atrial Rate 70 BPM    P-R Interval 172 ms    QRS Duration 92 ms    Q-T Interval 404 ms    QTc Calculation (Bazett) 436 ms    P Axis 44 degrees    R Axis 20 degrees    T Axis 60 degrees    Diagnosis       Poor data quality, interpretation may be adversely affected  Normal sinus rhythm  Normal ECG  When compared with ECG of 19-AUG-2022 04:55,  No significant change was found          Radiographs (if obtained):  [] The following radiograph was interpreted by myself in the absence of a radiologist:  [x] Radiologist's Report Reviewed:  CT HEAD WO CONTRAST    Result Date: 8/22/2022  EXAMINATION: CT OF THE HEAD WITHOUT CONTRAST  8/22/2022 11:45 am TECHNIQUE: CT of the head was performed without the administration of intravenous contrast. Automated exposure control, iterative reconstruction, and/or weight based adjustment of the mA/kV was utilized to reduce the radiation dose to as low as reasonably achievable. COMPARISON: 01/22/2022 CT HISTORY: ORDERING SYSTEM PROVIDED HISTORY: Stroke Symptoms TECHNOLOGIST PROVIDED HISTORY: Has a \"code stroke\" or \"stroke alert\" been called? ->Yes Reason for exam:->Stroke Symptoms Decision Support Exception - unselect if not a suspected or exposure control, iterative reconstruction, and/or weight based adjustment of the mA/kV was utilized to reduce the radiation dose to as low as reasonably achievable. COMPARISON: None. HISTORY: ORDERING SYSTEM PROVIDED HISTORY: Stroke Symptoms TECHNOLOGIST PROVIDED HISTORY: Has a \"code stroke\" or \"stroke alert\" been called? ->Yes Reason for exam:->Stroke Symptoms Decision Support Exception - unselect if not a suspected or confirmed emergency medical condition->Emergency Medical Condition (MA) Reason for Exam: stroke alert Additional signs and symptoms: none Relevant Medical/Surgical History: 75ml isovue 370/ gfr>60 8- FINDINGS: CTA NECK: AORTIC ARCH/ARCH VESSELS: Thorough sclerosis of the aortic arch and arch vessels. There is no flow limiting stenosis of the innominate or subclavian arteries. CAROTID ARTERIES: No significant stenosis seen of the common carotid arteries. Atherosclerosis of the carotid bulbs and proximal internal carotid arteries bilaterally. There is no flow limiting stenosis of the internal carotid arteries by NASCET criteria. VERTEBRAL ARTERIES: There is a left dominant vertebral artery. There is severe stenosis at the origin of the right vertebral artery with moderate stenosis at the origin of the left vertebral artery. Moderate stenosis is seen of the right vertebral artery at the C7 level. No focal stenosis otherwise seen of the vertebral arteries. SOFT TISSUES: No focal consolidation within the visualized lung apices. No acute abnormality within the visualized superior mediastinum. BONES: No acute osseous abnormality seen. 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 CTA HEAD: Evaluation is limited due to venous contamination as the venous structures are enhancing to a greater degree of the arterial structures.  ANTERIOR CIRCULATION: Atherosclerosis seen of the internal carotid arteries bilaterally. There is moderate stenosis involving the right supraclinoid internal carotid artery. No significant stenosis seen of the left internal carotid artery. There is moderate diffuse irregularity involving the right anterior cerebral artery. No gross evidence of a significant stenosis otherwise seen of the anterior cerebral or middle cerebral arteries. No large vessel occlusion is seen. POSTERIOR CIRCULATION: No significant stenosis of the vertebral, basilar, or posterior cerebral arteries. No aneurysm. OTHER: The major dural venous sinuses appear patent without evidence of a thrombus. BRAIN: There is no evidence of mass effect or midline shift. 1. Stenosis seen at the origin of both vertebral arteries, severe on the right and moderate on the left. 2. Moderate stenosis involving the right vertebral artery at the C7 level. 3. No flow limiting stenosis seen of the cervical carotid/vertebral arteries. 4. Evaluation of the intracranial vasculature is partially limited due to the predominantly venous nature of the exam. 5. There appears to be moderate stenosis involving is the right supraclinoid internal carotid artery. 6. There is moderate diffuse irregularity involving the right anterior cerebral artery. 7. No significant stenosis or large vessel occlusion otherwise seen of the [id number]. 8. 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 EKG (if obtained): (All EKG's are interpreted by myself in the absence of a cardiologist)  12 lead EKG per my interpretation:  Normal Sinus Rhythm at 70  Axis is   Normal  QTc is  within an acceptable range  There is no specific T wave changes appreciated. There is no specific ST wave changes appreciated.   No STEMI    Prior EKG to compare with was available and no clinically significant change no morphology compared to prior        MDM:  Pt presents as above. Emergent conditions considered. Presentation prompted initial immediate evaluation. IVs established and patient is placed on monitor. Patient is paged out as a stroke alert given onset of symptoms within tPA window. Patient is expedited to CT. On conversation with radiology CT head is negative. Patient is discussed with [facility] stroke neurology attending, Dr. Nick Drake, and he is not recommending tPA/TNK at this time. He does recommend routine stroke evaluation and to call should CTA imaging demonstrate any other acute process. CTA with some stenosis at the origin of both vertebral arteries. No large vessel occlusion. Labs do demonstrate leukopenia but otherwise no clinically significant derangement. Presentation concerning for possible CVA and I will admit the patient for further evaluation and treatment. Case to be discussed with hospitalist and patient to be admitted to medicine. Questions sought and answered with the patient. They voice understanding and agree with plan. Instructed to return for any worsening or worrisome concerns. 1.  Physician evaluation performed at: 1132  2. Stroke alert called at:  1135  3. CT results obtained at:  1213  4. Decision was made that TPA is NOT indicated in consultation with [facility] Stroke Neurologist, Dr. Nick Drake.     t-PA NOT given due to the following EXCLUSION CRITERIA:  X NIH of 1   [] Administration of t-PA can not be initiated within 4.5 hours of onset of symptoms  [] Evidence of intracranial hemorrhage on pretreatment CT  [] Clinical presentation suggestive of subarachnoid hemorrhage (even with normal CT)  [] CT shows multilobar infarction (hypodensity of > 1/3 cerebral hemisphere)  [] Known intracranial neoplasm, arteriovenous malformation or aneurysm  [] Significant head trauma (with sustained loss of consciousness), intracranial, or  intraspinal surgery within past 3 months  [] *Blood pressure elevated (systolic > 969 mm Hg or diastolic > 879 mm Hg)   [] *Abnormal blood glucose (< 50 or > 400mg/dL)  [] Active internal bleeding  [] Known bleeding risk (including but not limited to below)   Heparin, argatroban, or bivalirudin received within 48 hours with     PTT > upper limit of normal   Platelet count < 179,787/AG9   Current or recent use of anticoagulants including but not limited to: Warfarin (Coumadin®) within 5 days or INR > 1.7     Apixiban (Eliquis®) within 48 hours**     Dabigatran (Pradaxa®) within 72 hours**     Enoxaparin (Lovenox®) within 24 hours**     Fondaparinux (Arixtra®) within 72 hours**     Rivaroxaban (Xarelto®) within 24 hours**     **For patients with normal renal function. Activity may be significantly   prolonged in the elderly or patients with renal dysfunction    *Remains thrombolytic eligible if BP/BG corrected while in treatment window    t-PA NOT given due to consideration of the following RELATIVE CONTRAINDICATIONS:  [] Prior ischemic stroke within last 3 months  [] Recent history of intracranial hemorrhage  [] Pregnancy  [] Current or recent use of: Prasugrel (Effient®) within last 7 days or Ticagrelor (Brilinta®) within last 5 days  [] Major surgery or serious trauma within last 14 days  [] Arterial puncture at non-compressible site or lumbar puncture within last 7 days    Note: Patient undergoing LP within last 24 hours may be at higher risk. Assess  for signs of traumatic or repeated punctures. [] Gastrointestinal or urinary tract hemorrhage within last 21 days  [] Myocardial infarction involving left anterior myocardium within last 3 months  [] Suspected or known infective endocarditis or pericarditis    t-PA NOT given due to consideration of the following RELATIVE CONTRAINDICATIONS in those patients with last known well within 3-4.5 hours:  []Taking an oral anticoagulant (apixaban, dabigatran, edoxaban, rivaroxaban) other than warfarin regardless of time since last ose.  Note: This does not include Adah Rincon. A patient on Adah Rincon remains eligible if INR less than or equal to 1.7    5. Patient will be dispositioned to [facility]. Clinical Impression:  1.  Left facial numbness      (Please note that portions of this note may have been completed with a voice recognition program. Efforts were made to edit the dictations but occasionally words are mis-transcribed.)    MD Dedra Arrington MD  08/22/22 [id number]

## 2022-08-22 NOTE — ED NOTES
Medication History  [facility]    Patient Name: Odalys Jones 1940     Medication history has been completed by: Ishmael Flores CPhT    Source(s) of information: patient and insurance claims     Primary Care Physician: Josseline Ascencio DO     Pharmacy: [facility]     Allergies as of 08/22/2022 - Fully Reviewed 08/22/2022   Allergen Reaction Noted    Fenofibrate  02/23/2022        Prior to Admission medications    Medication Sig Start Date End Date Taking? Authorizing Provider   gabapentin (NEURONTIN) 300 MG capsule Take 300 mg by mouth nightly. Yes Historical Provider, MD   dilTIAZem (DILACOR XR) 240 MG extended release capsule Take 240 mg by mouth daily   Yes Historical Provider, MD   hydroCHLOROthiazide (HYDRODIURIL) 25 MG tablet Take 1 tablet by mouth in the morning. Take 25 mg by mouth daily 08/22/22 Patient states he only takes 1/2 tablet 8/9/22   Marlena Scott APRN - CNP   levothyroxine (SYNTHROID) 50 MCG tablet Take 50 mcg by mouth Daily TAKE 1 TABLET BY MOUTH IN  THE MORNING 6/9/22   Giancarlo Ohara APRN - CNP   pantoprazole (PROTONIX) 40 MG tablet TAKE 1 TABLET BY MOUTH  DAILY 5/27/22 8/25/22  Blake Segovia DO   gabapentin (NEURONTIN) 300 MG capsule Take 600 mg by mouth every morning. 5/18/22 11/14/22  LIBERTAD Oneill   clopidogrel (PLAVIX) 75 MG tablet Take 1 tablet by mouth daily 2/14/22   Vinny Dang MD   traMADol (ULTRAM) 50 MG tablet Take 2 tablets by mouth 2 times daily as needed for Pain for up to 90 days. 11/18/21 6/22/22  Vinny Dang MD   aspirin 81 MG tablet Take 81 mg by mouth daily    Historical Provider, MD     Medications added or changed (ex. new medication, dosage change, interval change, formulation change):  Diltiazem XR (added)    Comments:  Medication list reviewed with patient and insurance claims verified. Patient reports he has taken first dose of medications today.     To my knowledge the above medication history is accurate as of 8/22/2022 12:43 PM.   Kassandra Ventura [facility]   8/22/2022 12:43 PM

## 2022-08-22 NOTE — ED NOTES
Patient remains in [address] [place] [address] 28, 4853 [facility]  08/22/22 [id number]

## 2022-08-22 NOTE — H&P
V2.0  History and Physical      Name:  Shalini Muir /Age/Sex: 1940  (79 y.o. male)   MRN & CSN:  [id number] & [id number] Encounter Date/Time: 2022 3:59 PM EDT   Location:  [facility]-28 PCP: Kate Carney Day: 1    Assessment and Plan:   Shalini Muir is a 79 y.o. male with a pmh as noted below presents with intermittent numbness, chest tightness    Strokelike symptoms  Left-sided facial numbness  Intermittent chest pressure  Next hyperlipidemia  Stenosis of right carotid artery  Essential Hypertension   Stroke Alert Called in ED    Last Known Well at 8:30 AM NIH 0   Not a TPA Cannidate due to low NIH   -CT head unremarkable, CTA neck right-sided severe sided artery stenosis vertebral arteries. High-grade stenosis of the right supraclinoid internal carotid artery. No significant stenosis or large vessel occlusion seen. MRI brain pending  Echocardiogram ordered  Check lipid panel, hemoglobin A1c  Aspirin 81 mg po qd, Statin initiated. On Plavix  Consult neurology              Neuro checks q4 hours    Permissive HTN hold home hydrochlorothiazide- PRN Labetolol for SBP > 220   [employer]  monitoring   Physical/ Occupational Therapy consults   Speech therapy consult   [id number] cardiology, patient follows with Dr. Omer Mcclain   EKG reviewed, normal sinus rhythm, rate 70, no acute ST elevation or depression   Troponin less than 0.010, trend every 3 hours x 2   Check lipid panel in a.m. Lumbar radiculopathy  Postlaminectomy syndrome   Continue home gabapentin    History of prostate cancer  COPD  Chronic Conditions: continue home medication as ordered  . All testing  and results reviewed with patient . All questions answered. Patient and family voiced understanding    This patient was seen and examined in conjunction with Dr. Zuleyma Doyle. He/She was agreeable with the plan and management as dictated above.     Disposition:   Expected Disposition: Home  Estimated D/C: 2 days    Diet Diet NPO   GI Prophylaxis  [x] PPI,  [] H2 Blocker,  [] Carafate,  [x] Diet/Tube Feeds   DVT Prophylaxis [x] Lovenox, []  Heparin, [] SCDs, [] Ambulation,  [] NOAC   Code Status Prior   Surrogate Decision Maker/ POA          History from:     patient, electronic medical record,     History of Present Illness:     Chief Complaint: Stroke-like symptoms  Angela Durant is a 79 y.o. male with pmh of prostate cancer, hypertension, hyperlipidemia, who presented the emergency department with complaints of recurrent left-sided facial numbness. Patient's last known well was reported as 830 this morning, per patient. Patient reports that 2 days ago he developed left-sided facial and body numbness that was transient and spontaneous resolved. Also tells me he had an episode last evening while sitting and watching TV. Patient states this morning he was feeling well, eating breakfast when approximately 831 numbness to his face return. Reports it has been waxing waning since onset. Denies any vision changes. Denies any weakness. Denies any difficulty with speech or understanding. Plavix for anticoagulation reports he took that medication this morning     Review of Systems: Need 10 Elements   Review of Systems   Constitutional: Negative. Negative for fever and unexpected weight change. HENT:  Negative for congestion and sore throat. Eyes: Negative. Respiratory:  Positive for chest tightness. Negative for cough and shortness of breath. Cardiovascular:  Negative for chest pain and leg swelling. Gastrointestinal:  Negative for abdominal pain, constipation, nausea and vomiting. Endocrine: Negative. Genitourinary:  Negative for dysuria and hematuria. Musculoskeletal:  Negative for neck pain. Skin:  Negative for wound. Neurological:  Positive for numbness. Negative for dizziness and light-headedness. All other systems reviewed and are negative.          Objective:   No intake or output data in the 24 hours ending 08/22/22 1559   Vitals:   Vitals:    08/22/22 1332 08/22/22 1414 08/22/22 1432 08/22/22 1502   BP: 126/78  129/85 (!) 152/87   Pulse: 59 70 73 73   Resp: 10 12 12 14   Temp:       TempSrc:       SpO2: 100% 100% 100% 98%   Weight:       Height:           Medications Prior to Admission     Prior to Admission medications    Medication Sig Start Date End Date Taking? Authorizing Provider   gabapentin (NEURONTIN) 300 MG capsule Take 300 mg by mouth nightly. Yes Historical Provider, MD   dilTIAZem (DILACOR XR) 240 MG extended release capsule Take 240 mg by mouth daily   Yes Historical Provider, MD   hydroCHLOROthiazide (HYDRODIURIL) 25 MG tablet Take 1 tablet by mouth in the morning. Patient taking differently: Take 25 mg by mouth daily 08/22/22 Patient states he only takes 1/2 tablet 8/9/22   NELLIE Inman CNP   levothyroxine (SYNTHROID) 50 MCG tablet TAKE 1 TABLET BY MOUTH IN  THE MORNING  Patient taking differently: Take 50 mcg by mouth Daily TAKE 1 TABLET BY MOUTH IN  THE MORNING 6/9/22   NELLIE Hudson CNP   pantoprazole (PROTONIX) 40 MG tablet TAKE 1 TABLET BY MOUTH  DAILY 5/27/22 8/25/22  Blake Segovia DO   gabapentin (NEURONTIN) 300 MG capsule TAKE 1 CAPSULE BY MOUTH 3  TIMES DAILY  Patient taking differently: Take 600 mg by mouth every morning. 5/18/22 11/14/22  LIBERTAD Ojeda   clopidogrel (PLAVIX) 75 MG tablet Take 1 tablet by mouth daily 2/14/22   Juice Brothers MD   aspirin 81 MG tablet Take 81 mg by mouth daily    Historical Provider, MD       Physical Exam: Need 8 Elements   Physical Exam  Vitals and nursing note reviewed. Constitutional:       General: He is not in acute distress. Appearance: Normal appearance. HENT:      Head: Normocephalic. Nose: Nose normal.      Mouth/Throat:      Pharynx: Oropharynx is clear. Eyes:      Pupils: Pupils are equal, round, and reactive to light.    Cardiovascular:      Rate and Rhythm: stress test.    H/O cardiac catheterization 08/06/2018    normal study    H/O cardiovascular stress test 07/02/2018    normal study    H/O echocardiogram 12/10      3/14 EF55-60% normal LV function 12/10/10  complete 2 dim transthoracic echj. LVSF normal  EF >55%, transmitral spectral Doppler flow pattern is suggestive of  impaired LV relaxation    History of cardiovascular stress test 2/12 11/09 7/08 9/06 2/02 4/00 8/99 2/8/2012  evidence of mild ischemia  in the RCA. abnormal study , restingEF is 70%, global LVSF is  normal    History of Holter monitoring 9/17/14    14 day EVENT - no RVR    History of nuclear stress test 08/03/2017    cardiolite-normal,EF60%    Hx of chest x-ray 2/20/12    chest is considereed non acute. COPD    Hx of echocardiogram 08/03/2017    XO27-25%,OMOIB 2 diastolic dysfunction    Hyperlipidemia     Idiopathic peripheral neuropathy     Kidney stone     Left facial numbness 8/30/2017    Lumbar radiculopathy     Malignant melanoma of skin of face (HCC)     MVP (mitral valve prolapse)     Myotonia     chronic increased CPK's    Peripelvic (lymphatic) cyst     per CT    Precordial pain 8/3/2018    Vitamin B12 deficiency      PSHX:  has a past surgical history that includes Cardiac catheterization (2/2/12); Prostate surgery (09/26/2011); Rotator cuff repair (Right, 10/2016); Colonoscopy (5/5/14); Colonoscopy (07/10/2017); lumbar discectomy (0999); ureterolithotomy (1992); Tibia fracture surgery (Right); and Lumbar disc surgery (11/2002). Allergies: Allergies   Allergen Reactions    Fenofibrate      Fam HX:  family history includes Cancer in an other family member; Coronary Art Dis in some other family members; Diabetes in an other family member; Heart Disease in his brother, father, and mother; Prostate Cancer in an other family member. Soc HX:   Social History     Socioeconomic History    Marital status:       Spouse name: None    Number of children: None    Years of education: None    Highest education level: None   Tobacco Use    Smoking status: Never    Smokeless tobacco: Never   Vaping Use    Vaping Use: Never used   Substance and Sexual Activity    Alcohol use: Yes     Comment: Occassional    Drug use: No    Sexual activity: Not Currently     Partners: Female     Comment:      Social Determinants of Health     Transportation Needs: No Transportation Needs    Lack of Transportation (Medical): No    Lack of Transportation (Non-Medical): No   Physical Activity: Sufficiently Active    Days of Exercise per Week: 7 days    Minutes of Exercise per Session: 40 min   Stress: No Stress Concern Present    Feeling of Stress : Not at all   Social Connections: Socially Isolated    Frequency of Communication with Friends and Family: Twice a week    Frequency of Social Gatherings with Friends and Family: Once a week    Attends Spiritism Services: Never    Active Member of Clubs or Organizations: No    Attends Club or Organization Meetings: Never    Marital Status:     Housing Stability: Low Risk     Unable to Pay for Housing in the Last Year: No    Number of Places Lived in the Last Year: 1    Unstable Housing in the Last Year: No       Medications:   Medications:    Infusions:   PRN Meds:     Labs      CBC:   Recent Labs     08/22/22  1135   WBC 3.1*   HGB 14.8        BMP:    Recent Labs     08/22/22  1131 08/22/22  1135   NA  --  141   K  --  4.0   CL  --  102   CO2  --  29   BUN  --  18   CREATININE  --  1.0   GLUCOSE 113 102*     Hepatic:   Recent Labs     08/22/22  1135   AST 31   ALT 20   BILITOT 0.5   ALKPHOS 40     Lipids:   Lab Results   Component Value Date/Time    CHOL 174 02/18/2022 12:18 AM    HDL 31 02/18/2022 12:18 AM    TRIG 172 02/18/2022 12:18 AM     Hemoglobin A1C:   Lab Results   Component Value Date/Time    LABA1C 5.4 02/18/2022 12:18 AM     TSH:   Lab Results   Component Value Date/Time    TSH 0.56 04/18/2019 09:51 AM     Troponin:   Lab Results   Component Value Date/Time    TROPONINT <0.010 08/22/2022 11:35 AM    TROPONINT <0.010 08/19/2022 04:56 AM    TROPONINT <0.010 02/19/2022 04:16 AM     Lactic Acid: No results for input(s): LACTA in the last 72 hours. BNP: No results for input(s): PROBNP in the last 72 hours. UA:  Lab Results   Component Value Date/Time    NITRU NEGATIVE 08/19/2022 05:26 AM    COLORU YELLOW 08/19/2022 05:26 AM    WBCUA NONE SEEN 08/19/2022 05:26 AM    RBCUA NONE SEEN 08/19/2022 05:26 AM    MUCUS RARE 01/22/2022 09:30 AM    TRICHOMONAS NONE SEEN 08/19/2022 05:26 AM    BACTERIA NEGATIVE 08/19/2022 05:26 AM    CLARITYU CLEAR 08/19/2022 05:26 AM    SPECGRAV <1.005 08/19/2022 05:26 AM    LEUKOCYTESUR NEGATIVE 08/19/2022 05:26 AM    UROBILINOGEN 0.2 08/19/2022 05:26 AM    BILIRUBINUR NEGATIVE 08/19/2022 05:26 AM    BLOODU NEGATIVE 08/19/2022 05:26 AM    GLUCOSEU negative 11/21/2013 07:41 AM    KETUA NEGATIVE 08/19/2022 05:26 AM     Urine Cultures: No results found for: Gerda Bowen  Blood Cultures: No results found for: BC  No results found for: BLOODCULT2  Organism: No results found for: ORG    Imaging/Diagnostics Last 24 Hours   XR CHEST (2 VW)    Result Date: 8/19/2022  EXAMINATION: TWO XRAY VIEWS OF THE CHEST 8/19/2022 8:18 am COMPARISON: 02/17/2022. HISTORY: ORDERING SYSTEM PROVIDED HISTORY: Hot Flashes TECHNOLOGIST PROVIDED HISTORY: Reason for exam:->Hot Flashes Reason for Exam: Hot Flashes Additional signs and symptoms: Hot Flashes Relevant Medical/Surgical History: Hot Flashes FINDINGS: Stable minimal linear scarring to the right lung base. Lungs are otherwise clear. Cardiac and mediastinal silhouettes are within normal limits. No pneumothoraces. Bony structures appear intact. Stable exam without acute abnormality.      CT HEAD WO CONTRAST    Result Date: 8/22/2022  EXAMINATION: CT OF THE HEAD WITHOUT CONTRAST  8/22/2022 11:45 am TECHNIQUE: CT of the head was performed without the administration of intravenous contrast. Automated exposure control, iterative reconstruction, and/or weight based adjustment of the mA/kV was utilized to reduce the radiation dose to as low as reasonably achievable. COMPARISON: 01/22/2022 CT HISTORY: ORDERING SYSTEM PROVIDED HISTORY: Stroke Symptoms TECHNOLOGIST PROVIDED HISTORY: Has a \"code stroke\" or \"stroke alert\" been called? ->Yes Reason for exam:->Stroke Symptoms Decision Support Exception - unselect if not a suspected or confirmed emergency medical condition->Emergency Medical Condition (MA) Reason for Exam: stroke symptoms Additional signs and symptoms: none Relevant Medical/Surgical History: none FINDINGS: BRAIN/VENTRICLES: The ventricles and sulci are prominent. Pattern is consistent with age-related atrophy. No extra-axial fluid collections, and no sign of recent intracranial hemorrhage. Decreased attenuation is noted within the periventricular white matter. Pattern is consistent with chronic small vessel ischemic change. No acute edema or mass effect. No mass lesions are detected. ORBITS: The visualized portion of the orbits demonstrate no acute abnormality. SINUSES: The visualized paranasal sinuses and mastoid air cells demonstrate no acute abnormality. SOFT TISSUES/SKULL:  No acute abnormality of the visualized skull or soft tissues. No acute intracranial abnormality. Findings were discussed with PRAVEENA DOSHI at 12:11 pm on 8/22/2022. XR CHEST PORTABLE    Result Date: 8/22/2022  EXAMINATION: ONE XRAY VIEW OF THE CHEST 8/22/2022 11:43 am COMPARISON: Chest x-ray dated 08/19/2022. HISTORY: ORDERING SYSTEM PROVIDED HISTORY: stroke symptoms TECHNOLOGIST PROVIDED HISTORY: Reason for exam:->stroke symptoms Reason for Exam: stroke symptoms FINDINGS: HEART/MEDIASTINUM: The cardiomediastinal silhouette is within normal limits. PLEURA/LUNGS: There are no focal consolidations or pleural effusions. There is no appreciable pneumothorax. BONES/SOFT TISSUE: No acute abnormality.      No radiographic evidence of acute pulmonary disease. CTA HEAD NECK W CONTRAST    Result Date: 8/22/2022  EXAMINATION: CTA OF THE HEAD AND NECK WITH CONTRAST 8/22/2022 11:48 am: TECHNIQUE: CTA of the head and neck was performed with the administration of intravenous contrast. Multiplanar reformatted images are provided for review. MIP images are provided for review. Stenosis of the internal carotid arteries measured using NASCET criteria. Automated exposure control, iterative reconstruction, and/or weight based adjustment of the mA/kV was utilized to reduce the radiation dose to as low as reasonably achievable. COMPARISON: None. HISTORY: ORDERING SYSTEM PROVIDED HISTORY: Stroke Symptoms TECHNOLOGIST PROVIDED HISTORY: Has a \"code stroke\" or \"stroke alert\" been called? ->Yes Reason for exam:->Stroke Symptoms Decision Support Exception - unselect if not a suspected or confirmed emergency medical condition->Emergency Medical Condition (MA) Reason for Exam: stroke alert Additional signs and symptoms: none Relevant Medical/Surgical History: 75ml isovue 370/ gfr>60 8- FINDINGS: CTA NECK: AORTIC ARCH/ARCH VESSELS: Thorough sclerosis of the aortic arch and arch vessels. There is no flow limiting stenosis of the innominate or subclavian arteries. CAROTID ARTERIES: No significant stenosis seen of the common carotid arteries. Atherosclerosis of the carotid bulbs and proximal internal carotid arteries bilaterally. There is no flow limiting stenosis of the internal carotid arteries by NASCET criteria. VERTEBRAL ARTERIES: There is a left dominant vertebral artery. There is severe stenosis at the origin of the right vertebral artery with moderate stenosis at the origin of the left vertebral artery. Moderate stenosis is seen of the right vertebral artery at the C7 level. No focal stenosis otherwise seen of the vertebral arteries. SOFT TISSUES: No focal consolidation within the visualized lung apices.   No acute abnormality within the visualized superior mediastinum. BONES: No acute osseous abnormality seen. 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 CTA HEAD: Evaluation is limited due to venous contamination as the venous structures are enhancing to a greater degree of the arterial structures. ANTERIOR CIRCULATION: Atherosclerosis seen of the internal carotid arteries bilaterally. There is moderate stenosis involving the right supraclinoid internal carotid artery. No significant stenosis seen of the left internal carotid artery. There is moderate diffuse irregularity involving the right anterior cerebral artery. No gross evidence of a significant stenosis otherwise seen of the anterior cerebral or middle cerebral arteries. No large vessel occlusion is seen. POSTERIOR CIRCULATION: No significant stenosis of the vertebral, basilar, or posterior cerebral arteries. No aneurysm. OTHER: The major dural venous sinuses appear patent without evidence of a thrombus. BRAIN: There is no evidence of mass effect or midline shift. 1. Stenosis seen at the origin of both vertebral arteries, severe on the right and moderate on the left. 2. Moderate stenosis involving the right vertebral artery at the C7 level. 3. No flow limiting stenosis seen of the cervical carotid/vertebral arteries. 4. Evaluation of the intracranial vasculature is partially limited due to the predominantly venous nature of the exam. 5. There appears to be moderate stenosis involving is the right supraclinoid internal carotid artery. 6. There is moderate diffuse irregularity involving the right anterior cerebral artery. 7. No significant stenosis or large vessel occlusion otherwise seen of the [id number]. 8. 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              Electronically signed by NELLIE Virk CNP on 8/22/2022 at 3:59 PM          This dictation was created with voice recognition software. While attempts have been made to review the dictation as it is transcribed, on occasion the spoken word can be misinterpreted by the technology leading to omissions or inappropriate words, phrases or sentences.      Electronically signed by NELLIE Virk CNP on 8/22/2022 at 3:59 PM

## 2022-08-23 ENCOUNTER — APPOINTMENT (OUTPATIENT)
Dept: MRI IMAGING | Age: 82
End: 2022-08-23
Payer: MEDICARE

## 2022-08-23 LAB
ALBUMIN SERPL-MCNC: 3.9 GM/DL (ref 3.4–5)
ALP BLD-CCNC: 29 IU/L (ref 40–128)
ALT SERPL-CCNC: 17 U/L (ref 10–40)
ANION GAP SERPL CALCULATED.3IONS-SCNC: 7 MMOL/L (ref 4–16)
AST SERPL-CCNC: 24 IU/L (ref 15–37)
BILIRUB SERPL-MCNC: 0.5 MG/DL (ref 0–1)
BUN BLDV-MCNC: 19 MG/DL (ref 6–23)
CALCIUM SERPL-MCNC: 9.5 MG/DL (ref 8.3–10.6)
CHLORIDE BLD-SCNC: 100 MMOL/L (ref 99–110)
CHOLESTEROL: 194 MG/DL
CO2: 28 MMOL/L (ref 21–32)
CREAT SERPL-MCNC: 1.2 MG/DL (ref 0.9–1.3)
ESTIMATED AVERAGE GLUCOSE: 114 MG/DL
GFR AFRICAN AMERICAN: >60 ML/MIN/1.73M2
GFR NON-AFRICAN AMERICAN: 58 ML/MIN/1.73M2
GLUCOSE BLD-MCNC: 91 MG/DL (ref 70–99)
HBA1C MFR BLD: 5.6 % (ref 4.2–6.3)
HCT VFR BLD CALC: 42.2 % (ref 42–52)
HDLC SERPL-MCNC: 33 MG/DL
HEMOGLOBIN: 14.1 GM/DL (ref 13.5–18)
LDL CHOLESTEROL CALCULATED: 122 MG/DL
LV EF: 58 %
LVEF MODALITY: NORMAL
MCH RBC QN AUTO: 32.2 PG (ref 27–31)
MCHC RBC AUTO-ENTMCNC: 33.4 % (ref 32–36)
MCV RBC AUTO: 96.3 FL (ref 78–100)
PDW BLD-RTO: 12.5 % (ref 11.7–14.9)
PLATELET # BLD: 194 K/CU MM (ref 140–440)
PMV BLD AUTO: 9.4 FL (ref 7.5–11.1)
POTASSIUM SERPL-SCNC: 4.2 MMOL/L (ref 3.5–5.1)
RBC # BLD: 4.38 M/CU MM (ref 4.6–6.2)
SODIUM BLD-SCNC: 135 MMOL/L (ref 135–145)
TOTAL PROTEIN: 6.2 GM/DL (ref 6.4–8.2)
TRIGL SERPL-MCNC: 193 MG/DL
WBC # BLD: 3.8 K/CU MM (ref 4–10.5)

## 2022-08-23 PROCEDURE — 83036 HEMOGLOBIN GLYCOSYLATED A1C: CPT

## 2022-08-23 PROCEDURE — G0378 HOSPITAL OBSERVATION PER HR: HCPCS

## 2022-08-23 PROCEDURE — 93005 ELECTROCARDIOGRAM TRACING: CPT | Performed by: NURSE PRACTITIONER

## 2022-08-23 PROCEDURE — 80061 LIPID PANEL: CPT

## 2022-08-23 PROCEDURE — 6360000002 HC RX W HCPCS: Performed by: NURSE PRACTITIONER

## 2022-08-23 PROCEDURE — 6370000000 HC RX 637 (ALT 250 FOR IP): Performed by: NURSE PRACTITIONER

## 2022-08-23 PROCEDURE — 99214 OFFICE O/P EST MOD 30 MIN: CPT | Performed by: INTERNAL MEDICINE

## 2022-08-23 PROCEDURE — 93306 TTE W/DOPPLER COMPLETE: CPT

## 2022-08-23 PROCEDURE — 80053 COMPREHEN METABOLIC PANEL: CPT

## 2022-08-23 PROCEDURE — 99204 OFFICE O/P NEW MOD 45 MIN: CPT | Performed by: PHYSICIAN ASSISTANT

## 2022-08-23 PROCEDURE — 94761 N-INVAS EAR/PLS OXIMETRY MLT: CPT

## 2022-08-23 PROCEDURE — 70551 MRI BRAIN STEM W/O DYE: CPT

## 2022-08-23 PROCEDURE — 92610 EVALUATE SWALLOWING FUNCTION: CPT | Performed by: SPEECH-LANGUAGE PATHOLOGIST

## 2022-08-23 PROCEDURE — 2580000003 HC RX 258: Performed by: NURSE PRACTITIONER

## 2022-08-23 PROCEDURE — 6370000000 HC RX 637 (ALT 250 FOR IP): Performed by: HOSPITALIST

## 2022-08-23 PROCEDURE — 85027 COMPLETE CBC AUTOMATED: CPT

## 2022-08-23 PROCEDURE — 96372 THER/PROPH/DIAG INJ SC/IM: CPT

## 2022-08-23 PROCEDURE — 36415 COLL VENOUS BLD VENIPUNCTURE: CPT

## 2022-08-23 RX ADMIN — GABAPENTIN 300 MG: 300 CAPSULE ORAL at 20:26

## 2022-08-23 RX ADMIN — ATORVASTATIN CALCIUM 40 MG: 40 TABLET, FILM COATED ORAL at 20:25

## 2022-08-23 RX ADMIN — SODIUM CHLORIDE, PRESERVATIVE FREE 10 ML: 5 INJECTION INTRAVENOUS at 20:26

## 2022-08-23 RX ADMIN — CLOPIDOGREL BISULFATE 75 MG: 75 TABLET ORAL at 09:41

## 2022-08-23 RX ADMIN — PANTOPRAZOLE SODIUM 40 MG: 40 TABLET, DELAYED RELEASE ORAL at 09:41

## 2022-08-23 RX ADMIN — ENOXAPARIN SODIUM 40 MG: 100 INJECTION SUBCUTANEOUS at 09:41

## 2022-08-23 RX ADMIN — GABAPENTIN 600 MG: 300 CAPSULE ORAL at 09:41

## 2022-08-23 RX ADMIN — TRAZODONE HYDROCHLORIDE 50 MG: 50 TABLET ORAL at 20:26

## 2022-08-23 RX ADMIN — POLYETHYLENE GLYCOL (3350) 17 G: 17 POWDER, FOR SOLUTION ORAL at 09:41

## 2022-08-23 RX ADMIN — ASPIRIN 81 MG: 81 TABLET, COATED ORAL at 09:41

## 2022-08-23 RX ADMIN — SODIUM CHLORIDE, PRESERVATIVE FREE 10 ML: 5 INJECTION INTRAVENOUS at 09:41

## 2022-08-23 NOTE — CONSULTS
CARDIOLOGY CONSULT NOTE   Reason for consultation:  TIA    Referring physician:  Hughes Cushing, DO     Primary care physician: Josseline Ascencio DO      Dear   Thanks for the consult. History of present illness:Glynn is a 79 y. o.year old who  presents with several facial numbness and left-sided body numbness, patient had MRI which was negative for acute stroke, patient was noted to be candidate, patient's symptom improved, patient has CTA neck and head which shows moderate stenosis on the left vertebral artery origin and the seriousness of the right vertebral artery stenosis patient was seen by neurosurgery and recommended to follow-up with cardiology and outpatient work-up. Patient had echo today which shows normal LVEF with mild mitral regurgitation no other significant pathology noted on the echo no mass or no thrombus noted. Patient is very poor historian all 4 short-term we will review medical record discussion with staff and normal coronaries on heart cardiac on 2018   blood pressure is labile initial blood pressure was elevated now as low, total cholesterol is normal LDL is 122 HDL is 33 l, blood glucose and weight are well controlled.     Past medical history:    has a past medical history of Anesthesia complication, BPH with obstruction/lower urinary tract symptoms, Chest pain, Chronic low back pain, GERD (gastroesophageal reflux disease), H/O 24 hour EKG monitoring, H/O cardiac catheterization, H/O cardiac catheterization, H/O cardiac catheterization, H/O cardiovascular stress test, H/O echocardiogram, History of cardiovascular stress test, History of Holter monitoring, History of nuclear stress test, Hx of chest x-ray, Hx of echocardiogram, Hyperlipidemia, Idiopathic peripheral neuropathy, Kidney stone, Left facial numbness, Lumbar radiculopathy, Malignant melanoma of skin of face ([address] MVP (mitral valve prolapse), Myotonia, Peripelvic (lymphatic) cyst, Precordial pain, and Vitamin B12 deficiency. Past surgical history:   has a past surgical history that includes Cardiac catheterization (2/2/12); Prostate surgery (09/26/2011); Rotator cuff repair (Right, 10/2016); Colonoscopy (5/5/14); Colonoscopy (07/10/2017); lumbar discectomy (5434); ureterolithotomy (1992); Tibia fracture surgery (Right); and Lumbar disc surgery (11/2002). Social History:   reports that he has never smoked. He has never used smokeless tobacco. He reports current alcohol use. He reports that he does not use drugs.   Family history:   no family history of CAD, STROKE of DM    Allergies   Allergen Reactions    Fenofibrate        aspirin EC tablet 81 mg, Daily  [Held by provider] dilTIAZem (CARDIZEM CD) extended release capsule 240 mg, Daily  gabapentin (NEURONTIN) capsule 600 mg, QAM  gabapentin (NEURONTIN) capsule 300 mg, Nightly  [Held by provider] hydroCHLOROthiazide (HYDRODIURIL) tablet 25 mg, Daily  pantoprazole (PROTONIX) tablet 40 mg, Daily  sodium chloride flush 0.9 % injection 5-40 mL, 2 times per day  sodium chloride flush 0.9 % injection 5-40 mL, PRN  0.9 % sodium chloride infusion, PRN  acetaminophen (TYLENOL) tablet 650 mg, Q6H PRN   Or  acetaminophen (TYLENOL) suppository 650 mg, Q6H PRN  atorvastatin (LIPITOR) tablet 40 mg, Nightly  enoxaparin (LOVENOX) injection 40 mg, Daily  polyethylene glycol (GLYCOLAX) packet 17 g, Daily  promethazine (PHENERGAN) tablet 12.5 mg, Q6H PRN   Or  ondansetron (ZOFRAN) injection 4 mg, Q6H PRN  nitroGLYCERIN (NITROSTAT) SL tablet 0.4 mg, Q5 Min PRN  labetalol (NORMODYNE;TRANDATE) injection 10 mg, Q10 Min PRN  clopidogrel (PLAVIX) tablet 75 mg, Daily  traZODone (DESYREL) tablet 50 mg, Nightly PRN      Current Facility-Administered Medications   Medication Dose Route Frequency Provider Last Rate Last Admin    aspirin EC tablet 81 mg  81 mg Oral Daily NELLIE Jacobson CNP   81 mg at 08/23/22 0941    [Held by provider] dilTIAZem (CARDIZEM CD) extended release capsule 240 mg 240 mg Oral Daily Sriram Fela, APRN - CNP   240 mg at 08/22/22 1752    gabapentin (NEURONTIN) capsule 600 mg  600 mg Oral QAM Sriram Torrestorsten, APRN - CNP   600 mg at 08/23/22 0941    gabapentin (NEURONTIN) capsule 300 mg  300 mg Oral Nightly Sriram Fela, APRN - CNP   300 mg at 08/22/22 2042    [Held by provider] hydroCHLOROthiazide (HYDRODIURIL) tablet 25 mg  25 mg Oral Daily Gay Guzman APRN - TERESITA        pantoprazole (PROTONIX) tablet 40 mg  40 mg Oral Daily Gay Guzman, APRN - CNP   40 mg at 08/23/22 0941    sodium chloride flush 0.9 % injection 5-40 mL  5-40 mL IntraVENous 2 times per day NELLIE Martinez - CNP   10 mL at 08/23/22 0941    sodium chloride flush 0.9 % injection 5-40 mL  5-40 mL IntraVENous PRN Gay Guzman APRN - CNP        0.9 % sodium chloride infusion   IntraVENous PRN Gay Guzman APRN - CNP        acetaminophen (TYLENOL) tablet 650 mg  650 mg Oral Q6H PRN Gay Guzman APRN - CNP        Or    acetaminophen (TYLENOL) suppository 650 mg  650 mg Rectal Q6H PRN Gay Guzman APRN - CNP        atorvastatin (LIPITOR) tablet 40 mg  40 mg Oral Nightly Sriram Chahal APRN - CNP   40 mg at 08/22/22 2042    enoxaparin (LOVENOX) injection 40 mg  40 mg SubCUTAneous Daily Gay Guzman APRN - CNP   40 mg at 08/23/22 0941    polyethylene glycol (GLYCOLAX) packet 17 g  17 g Oral Daily Gay Guzman APRN - CNP   17 g at 08/23/22 0941    promethazine (PHENERGAN) tablet 12.5 mg  12.5 mg Oral Q6H PRN Gay I NELLIE Guzman - CNP        Or    ondansetron (ZOFRAN) injection 4 mg  4 mg IntraVENous Q6H PRN Gay Guzman APRN - CNP        nitroGLYCERIN (NITROSTAT) SL tablet 0.4 mg  0.4 mg SubLINGual Q5 Min PRN NELLIE Jacobson CNP        labetalol (NORMODYNE;TRANDATE) injection 10 mg  10 mg IntraVENous Q10 Min PRN NELLIE Jacobson CNP        clopidogrel (PLAVIX) tablet 75 mg 75 mg Oral Daily Saadalicia Byrne APRFREDDY Kin CNP   75 mg at 08/23/22 0941    traZODone (DESYREL) tablet 50 mg  50 mg Oral Nightly PRN Debbie Harrington MD   50 mg at 08/22/22 1919     Review of Systems:   Constitutional: No Fever or Weight Loss   Eyes: No Decreased Vision  ENT: No Headaches, Hearing Loss or Vertigo  Cardiovascular: No chest pain, dyspnea on exertion, palpitations or loss of consciousness  Respiratory: No cough or wheezing    Gastrointestinal: No abdominal pain, appetite loss, blood in stools, constipation, diarrhea or heartburn  Genitourinary: No dysuria, trouble voiding, or hematuria  Musculoskeletal:  No gait disturbance, weakness or joint complaints  Integumentary: No rash or pruritis  Neurological: + TIA or stroke symptoms  Psychiatric: No anxiety or depression  Endocrine: No malaise, fatigue or temperature intolerance  Hematologic/Lymphatic: No bleeding problems, blood clots or swollen lymph nodes  Allergic/Immunologic: No nasal congestion or hives  All systems negative except as marked. Physical Examination:    Vitals:    08/23/22 1430   BP: (!) 107/49   Pulse: 72   Resp: 18   Temp: 97.8   SpO2:       Wt Readings from Last 3 Encounters:   08/22/22 165 lb (74.8 kg)   08/22/22 165 lb (74.8 kg)   08/19/22 165 lb (74.8 kg)     Body mass index is 23.68 kg/m². General Appearance:  No distress, conversant    Constitutional:  Well developed, Well nourished, No acute distress, Non-toxic appearance. HENT:  Normocephalic, Atraumatic, Bilateral external ears normal, Oropharynx moist, No oral exudates, Nose normal. Neck- Normal range of motion, No tenderness, Supple, No stridor,no apical-carotid delay, no carotid bruit  Eyes:  PERRL, EOMI, Conjunctiva normal, No discharge. Respiratory:  Normal breath sounds, No respiratory distress, No wheezing, No chest tenderness. ,no use of accessory muscles, diaphragm movement is normal  Cardiovascular: (PMI) apex non displaced,no lifts no thrills, no s3,no s4, Normal heart rate, Normal rhythm, No murmurs, No rubs, No gallops. Carotid arteries pulse and amplitude are normal no bruit, no abdominal bruit noted ( normal abdominal aorta ausculation), femoral arteries pulse and amplitude are normal no bruit, pedal pulses are normal  GI:  Bowel sounds normal, Soft, No tenderness, No masses, No pulsatile masses, no hepatosplenomegally, no bruits  : External genitalia appear normal, No masses or lesions. No discharge. No CVA tenderness. Musculoskeletal:  Intact distal pulses, No edema, No tenderness, No cyanosis, No clubbing. Good range of motion in all major joints. No tenderness to palpation or major deformities noted. Back- No tenderness. Integument:  Warm, Dry, No erythema, No rash. Skin: no rash, no ulcers  Lymphatic:  No lymphadenopathy noted. Neurologic:  Alert & oriented x 3, Normal motor function, Normal sensory function, No focal deficits noted. Psychiatric:  Affect normal, Judgment normal, Mood normal.   Lab Review   Recent Labs     08/23/22 0113   WBC 3.8*   HGB 14.1   HCT 42.2         Recent Labs     08/23/22 0113      K 4.2      CO2 28   BUN 19   CREATININE 1.2     Recent Labs     08/23/22 0113   AST 24   ALT 17   BILITOT 0.5   ALKPHOS 29*     No results for input(s): TROPONINI in the last 72 hours. No results found for: BNP  Lab Results   Component Value Date    INR 1.08 08/22/2022    PROTIME 14.0 08/22/2022         EKG: Normal sinus rhythm-    Chest Xray: NAD    ECHO:normal  Labs, echo, meds reviewed  Assessment: 79 y. o.year old with PMH of  has a past medical history of Anesthesia complication, BPH with obstruction/lower urinary tract symptoms, Chest pain, Chronic low back pain, GERD (gastroesophageal reflux disease), H/O 24 hour EKG monitoring, H/O cardiac catheterization, H/O cardiac catheterization, H/O cardiac catheterization, H/O cardiovascular stress test, H/O echocardiogram, History of cardiovascular stress test, History of Holter monitoring, History of nuclear stress test, Hx of chest x-ray, Hx of echocardiogram, Hyperlipidemia, Idiopathic peripheral neuropathy, Kidney stone, Left facial numbness, Lumbar radiculopathy, Malignant melanoma of skin of face ([address] MVP (mitral valve prolapse), Myotonia, Peripelvic (lymphatic) cyst, Precordial pain, and Vitamin B12 deficiency. Recommendations:    TIA, resolved CTA neck and head noted patient has moderate left-sided stenosis of the vertebral artery origin and severe right-sided origin of the vertebral artery patient has moderate spinal stenosis also in the cervical region seen by neurosurgery already, had a normal echo today, has moderate stenosis of the right internal carotid artery supraclinoid region. Will recommend he continue aspirin and Plavix statinsAnd blood pressure control  Dyslipidemia: continue statins  HTN: stable, continue Cardizem hctz medicatons  Health maintenance: exerise and diet  All labs, medications and tests reviewed, continue all other medications of all above medical condition listed as is.          Yogesh Domingo MD, 8/23/2022 6:33 PM

## 2022-08-23 NOTE — PROGRESS NOTES
Speech Language Pathology  Facility/Department: [facility] 4E   CLINICAL BEDSIDE SWALLOW EVALUATION    NAME: Kyle Almodovar  : 1940  MRN: [id number]    ADMISSION DATE: 2022  ADMITTING DIAGNOSIS: has Moderate mixed hyperlipidemia not requiring statin therapy; MVP (mitral valve prolapse); Frequent PVCs; Headache; 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% 17 US; Chest pain; Kidney stone; Myotonia; Vitamin B12 deficiency; Peripelvic (lymphatic) cyst; Chronic low back pain; Idiopathic peripheral neuropathy; GERD (gastroesophageal reflux disease); Lumbar radiculopathy; Malignant melanoma of skin of face ([facility] [address] Peripheral polyneuropathy; H/O prostate cancer; COPD (chronic obstructive pulmonary disease) ([facility] [address] Acquired hypothyroidism; History of Helicobacter pylori infection; Diverticulosis; Chronic migraine without aura without status migrainosus, not intractable; Prostate CA ([facility] [address] TIA (transient ischemic attack); Primary hypertension; Post laminectomy syndrome; and Stroke-like symptoms on their problem list.    Impression  Dysphagia Diagnosis: Swallow function appears [facility]  Dysphagia Outcome Severity Scale: Level 7: Normal in all situations     Kyle Almodovar was seen for a clinical bedside swallow evaluation following admission for c/o left sided numbness and left sided headache. Pt was alert, pleasant, and cooperative and denies any symptoms at this time. He followed complex commands for the oral motor exam which was WNL. Right eye impairment noted (baseline). Vocal quality and cough strength were normal.  PO trials of regular solids and thin liquids completed with normal oropharyngeal swallow. Speech and language were screened and judged to be WNL. Suspect mild cognitive impairment at baseline based on informal cognitive screening. Recommend:  Regular/Thins. No needs identified at this time.       ONSET DATE:  2022     Recent Chest Xray/CT of Chest:  negative    Date of Eval: WNL    Prognosis  Individuals consulted  Consulted and agree with results and recommendations: Patient    Education  Patient Education: results WNL  Patient Education Response: Verbalizes understanding  Safety Devices in place: Yes  Type of devices: All fall risk precautions in place; Left in bed       Therapy Time   [address], SLP  8/23/2022 9:09 AM

## 2022-08-23 NOTE — CARE COORDINATION
Chart reviewed and pt discussed in IDR. Pt from home, independent PTA. Pt has PCP and insurance. Pt has been up independently ambulating the halls. Pt plans to return home. No needs at this time. CM available.

## 2022-08-23 NOTE — CONSULTS
Neurosurgery   Consult Note      Reason for Consult: Multivessel cranial and neck stenosis  Consulting Physician: Dipika Chapman MD  Attending Physician: Amina Loaiza MD  Date of Admission: 8/22/2022  Subjective:   CHIEF COMPLAINT: \"Whole body chills, numbness/tingling\"    HPI:  79 y.o. 1940  Who presented to the ED 8/22/22 with complaints of, \"whole body chills and numbness/tingling\". There is some inconsistencies noted in patient's story and in the chart. Apparently patient was reporting left-sided facial numbness when he presented to the ER. This morning during my interview with him he states that he had a headache and pressure bilaterally, he stated he did not have any facial numbness. Patient recently presented to the ER 8/19/2022 where he reported hot flashes and felt like he had a fever. Lab work-up was completed without findings of significant abnormalities and patient was discharged with PCP follow-up. Head CT was obtained as well as CTA head and neck on 8/22/2022. This does reveal multilevel stenosis both extracranially and intracranially. Moderate stenosis of the right vertebral artery at C7 is noted, severe stenosis at the origin of the vertebral artery on the right and moderate on the left is noted. Patient found to have moderate stenosis of the right supraclinoid internal carotid artery. Potential ossification of the PLL at C6/7 is also noted. Patient went down for MRI of the brain this morning. He tells me in the hospitalist NP that he feels back to his baseline. He denies any changes in his vision. 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 He does not endorse any lateralizing numbness/tingling or weakness. He does state that he feels weak all over and is asking to walk around the unit today.   He was able to ambulate to the bathroom, stated that he felt somewhat unsteady initially but was able to get to the bathroom and back without too many difficulties. Patient is on plavix. PMHx positive for BPH, GERD, hyperlipidemia, mitral valve prolapse. Past surgical history positive for cardiac catheterization, lumbar disc ectomy, prostate surgery, right rotator cuff repair, right tibia fracture surgery, ureterolithotomy. Past Medical and Surgical History:       Diagnosis Date    Anesthesia complication     Patient reports in 1973 following a Back Surgery he was kept in the hospital due to high temperatures in the evenings. He stated they kept him for observation and doesn't remember receiving any treatment for the high temperatures. Patient reports the anesthesia name started with flur? ?.    BPH with obstruction/lower urinary tract symptoms     Chest pain 7/1/2018    Chronic low back pain     GERD (gastroesophageal reflux disease)     H/O 24 hour EKG monitoring 3/12  12/11    3/12/12  essentially a normal event monitor without significant arrhythmias noted    H/O cardiac catheterization 2/2/12 4/02 10/98    2/22/12  LM, LAD and RCA all without significant disease    H/O cardiac catheterization 2/2/12 4/02 10/98    Done R/T false pos on stress test.    H/O cardiac catheterization 08/06/2018    normal study    H/O cardiovascular stress test 07/02/2018    normal study    H/O echocardiogram 12/10      3/14 EF55-60% normal LV function 12/10/10  complete 2 dim transthoracic echj. LVSF normal  EF >55%, transmitral spectral Doppler flow pattern is suggestive of  impaired LV relaxation    History of cardiovascular stress test 2/12 11/09 7/08 9/06 2/02 4/00 8/99 2/8/2012  evidence of mild ischemia  in the RCA. abnormal study , restingEF is 70%, global LVSF is  normal    History of Holter monitoring 9/17/14    14 day EVENT - no RVR    History of nuclear stress test 08/03/2017    cardiolite-normal,EF60%    Hx of chest x-ray 2/20/12    chest is considereed non acute.   COPD    Hx of echocardiogram 08/03/2017    VA27-63%,XCBJG 2 diastolic dysfunction    Hyperlipidemia     Idiopathic peripheral neuropathy     Kidney stone     Left facial numbness 8/30/2017    Lumbar radiculopathy     Malignant melanoma of skin of face (HCC)     MVP (mitral valve prolapse)     Myotonia     chronic increased CPK's    Peripelvic (lymphatic) cyst     per CT    Precordial pain 8/3/2018    Vitamin B12 deficiency          Procedure Laterality Date    CARDIAC CATHETERIZATION  2/2/12    EF 55% No sugnificant disease. COLONOSCOPY  5/5/14    diverticulosis, 1 polyp, hemorrhoids    COLONOSCOPY  07/10/2017    single 5 mm polyp found in sigmoid colon, moderate diverticulosis found in sigmoid colon    LUMBAR DISC SURGERY  11/2002    L4-5 hemilaminectomy and disk    LUMBAR DISCECTOMY  [address] [place]  09/26/2011    s/p robotic radical prostatectomy    ROTATOR CUFF REPAIR Right 10/2016    TIBIA FRACTURE SURGERY Right     childhood    URETEROLITHOTOMY  1992       Social History:    TOBACCO:   reports that he has never smoked. He has never used smokeless tobacco.  ETOH:   reports current alcohol use.     Family History:       Problem Relation Age of Onset    Heart Disease Mother         ASCAD    Heart Disease Father     Heart Disease Brother     Diabetes Other     Cancer Other         unknown type    Prostate Cancer Other     Coronary Art Dis Other     Coronary Art Dis Other        Current Medications:    Current Facility-Administered Medications: aspirin EC tablet 81 mg, 81 mg, Oral, Daily  [Held by provider] dilTIAZem (CARDIZEM CD) extended release capsule 240 mg, 240 mg, Oral, Daily  gabapentin (NEURONTIN) capsule 600 mg, 600 mg, Oral, QAM  gabapentin (NEURONTIN) capsule 300 mg, 300 mg, Oral, Nightly  [Held by provider] hydroCHLOROthiazide (HYDRODIURIL) tablet 25 mg, 25 mg, Oral, Daily  pantoprazole (PROTONIX) tablet 40 mg, 40 mg, Oral, Daily  sodium chloride flush 0.9 % injection 5-40 mL, 5-40 mL, IntraVENous, 2 times per day  sodium chloride flush 0.9 % injection 5-40 mL, 5-40 mL, IntraVENous, PRN  0.9 % sodium chloride infusion, , IntraVENous, PRN  acetaminophen (TYLENOL) tablet 650 mg, 650 mg, Oral, Q6H PRN **OR** acetaminophen (TYLENOL) suppository 650 mg, 650 mg, Rectal, Q6H PRN  atorvastatin (LIPITOR) tablet 40 mg, 40 mg, Oral, Nightly  enoxaparin (LOVENOX) injection 40 mg, 40 mg, SubCUTAneous, Daily  polyethylene glycol (GLYCOLAX) packet 17 g, 17 g, Oral, Daily  promethazine (PHENERGAN) tablet 12.5 mg, 12.5 mg, Oral, Q6H PRN **OR** ondansetron (ZOFRAN) injection 4 mg, 4 mg, IntraVENous, Q6H PRN  nitroGLYCERIN (NITROSTAT) SL tablet 0.4 mg, 0.4 mg, SubLINGual, Q5 Min PRN  labetalol (NORMODYNE;TRANDATE) injection 10 mg, 10 mg, IntraVENous, Q10 Min PRN  clopidogrel (PLAVIX) tablet 75 mg, 75 mg, Oral, Daily  traZODone (DESYREL) tablet 50 mg, 50 mg, Oral, Nightly PRN    Allergies   Allergen Reactions    Fenofibrate         REVIEW OF SYSTEMS:    CONSTITUTIONAL:  negative for fevers, chills, diaphoresis, activity change, appetite change, fatigue  EYES:  negative for blurred vision, eye discharge, visual disturbance and icterus  HEENT:  negative for hearing loss, tinnitus, ear drainage, sinus pressure, nasal congestion  RESPIRATORY:  No cough, shortness of breath, hemoptysis  GASTROINTESTINAL:  negative for nausea, vomiting, diarrhea, constipation, blood in stool and abdominal pain  GENITOURINARY:  negative for frequency, dysuria, urinary incontinence, decreased urine volume  HEMATOLOGIC/LYMPHATIC:  negative for easy bruising, bleeding and lymphadenopathy  MUSCULOSKELETAL:  negative for pain, joint swelling, decreased range of motion and muscle weakness  NEUROLOGICAL:  negative for headaches, slurred speech, unilateral weakness  PSYCHIATRIC/BEHAVIORAL: negative for hallucinations, behavioral problems, confusion and agitation.        Objective:   PHYSICAL EXAM:      VITALS:  BP (!) 90/50   Pulse 72   Temp 98.8 °F (37.1 °C) (Oral) Resp 16   Ht 5' 10\" (1.778 m)   Wt 165 lb (74.8 kg)   SpO2 94%   BMI 23.68 kg/m²      24HR INTAKE/OUTPUT:  No intake or output data in the 24 hours ending 08/23/22 0949  CONSTITUTIONAL:  Awake, alert, cooperative, no apparent distress, and appears stated age  HEENT: Connee Danker at 4mm bilaterally, esotopia of right eye, EOMI, tongue protrudes midline  PSYCHIATRIC: Oriented to person place and time. No obvious depression or anxiety. MUSCULOSKELETAL: No obvious misalignment or effusion of the joints. No clubbing, cyanosis of the digits. SKIN:  normal skin color, texture, turgor and no redness, warmth, or swelling. NEUROLOGIC: Alert and oriented x4, face symmetrical, no obvious droop, speech clear and coherent, no pronator drift, no significant finger to nose dysmetria, sensation intact to light touch and pinprick sensation. Upper extremity strength: bilateral upper extremities 5/5 throughout, no francis's present, no clonus bilaterally  Lower extremity strength: bilateral lower extremities 5/5 throughout, bilateral patellar DTR 2+, no clonus bilaterally    DATA:    Old records have been reviewed    CBC:  Recent Labs     08/22/22  1135 08/23/22  0113   WBC 3.1* 3.8*   RBC 4.63 4.38*   HGB 14.8 14.1   HCT 44.6 42.2    194   MCV 96.3 96.3   MCH 32.0* 32.2*   MCHC 33.2 33.4   RDW 12.5 12.5   SEGSPCT 60.1  --       BMP:  Recent Labs     08/22/22  1131 08/22/22  1135 08/23/22  0113   NA  --  141 135   K  --  4.0 4.2   CL  --  102 100   CO2  --  29 28   BUN  --  18 19   CREATININE  --  1.0 1.2   CALCIUM  --  9.8 9.5   GLUCOSE 113 102* 91        Radiology Review:  All pertinent images / reports were reviewed as a part of this visit. Ct head 8/22/22  Impression   No acute intracranial abnormality. Findings were discussed with PRAVEENA DOSHI at 12:11 pm on 8/22/2022. CTA head and neck 8/22/22  Impression   1.  Stenosis seen at the origin of both vertebral arteries, severe on the   right and moderate on the left. 2. Moderate stenosis involving the right vertebral artery at the C7 level. 3. No flow limiting stenosis seen of the cervical carotid/vertebral arteries. 4. Evaluation of the intracranial vasculature is partially limited due to the   predominantly venous nature of the exam.   5. There appears to be moderate stenosis involving is the right supraclinoid   internal carotid artery. 6. There is moderate diffuse irregularity involving the right anterior   cerebral artery. 7. No significant stenosis or large vessel occlusion otherwise seen of the   [id number]. 8. There is suggestion of ossification of the posterior longitudinal ligament   at the C6-C7 level, which contributes to least moderate spinal canal stenosis. MRI brain wo contrast 8/23/22  Impression   1. No acute intracranial abnormality. No acute infarct. 2. Minimal global parenchymal volume loss with moderate chronic microvascular   ischemic changes. 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 CT of the head without any acute intracranial abnormalities, CTA of the head and neck does show multivessel stenosis, most significantly severe stenosis at the origin of the bilateral vertebral arteries, stenosis of the vertebral artery at C7, moderate stenosis of the right internal carotid artery at the supraclinoid region. Patient is also noted to have ossification of the posterior longitudinal ligament at C6-7.   He is not myelopathic on my exam.  Patient is on Plavix at baseline, is on atorvastatin and aspirin as well here in the hospital.  MRI of the brain this morning without evidence of acute strokes, does have a

## 2022-08-23 NOTE — PROGRESS NOTES
Physical Therapy  Attempted to see pt for PT eval. Per PT triage process Per pt, he is back to normal and has already been ambulating the unit independently. Will discontinue order. Please re-order if functional mobility changes.

## 2022-08-23 NOTE — PROGRESS NOTES
Hospitalist Progress Note      Name:  Mary Pandya /Age/Sex: 1940  (79 y.o. male)   MRN & CSN:  [id number] & [id number] Admission Date/Time: 2022 11:26 AM   Location:  [address] [place] [address]-A PCP: [address] Day: 2                                               Attending Physician Dr Donny Mercado and Plan:   Mary Pandya is a 79 y.o.  male  who presents with Stroke-like symptoms    Strokelike symptoms. Presenting with left-sided paresthesias. R [address] reported by patient as  approximately 0830. MRI ()-nonacute   Neurochecks   Neurology consulted   Continue secondary stroke prevention     Bilateral vertebral artery stenosis  Right-sided carotid stenosis   Neurosurgery evaluation with recommendation for continued secondary prevention and nonurgent follow-up with neuro surgical intervention outpatient    Chest pain-intermittent pressure episodes   Cardiology consulted   Pending echo   ASA/DAPT   Statin    HTN   Monitor blood pressure trends   Appears controlled    HLD   Lipid panel: , , HDL 33,    Continue Lipitor    History of lumbar radiculopathy   Continue gabapentin    Diet ADULT DIET; Regular; Low Fat/Low Chol/High Fiber/TURNER   DVT Prophylaxis [x] Lovenox, []  Heparin, [] SCDs, [] Ambulation   GI Prophylaxis [] PPI,  [] H2 Blocker,  [] Carafate,  [x] Diet/Tube Feeds   Code Status Full Code     -Patient assessment and plan discussed with supervising physician-  Subjective 2022     Mary Pandya is a 79 y.o.  male, who presented with paresthesias . The patient states he presented with a recurrent episode of diffuse paresthesias  . States initial onset was 3 days prior. 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 States onset yesterday upon awakening and starting ADLs.   Described as reoccurrence of diffuse paresthesias described as \"I got a chill all over\" but in the emergency room described as left-sided    Reports feeling back to baseline at present with some generalized weakness attributed to lying in bed 1 day    Ten point ROS reviewed negative, unless as noted above    Objective:   No intake or output data in the 24 hours ending 08/23/22 1002   Vitals:   Vitals:    08/22/22 1642 08/22/22 2006 08/23/22 0600 08/23/22 0930   BP: (!) 164/74 (!) 145/67 132/71 (!) 90/50   Pulse: 70 73 81 72   Resp: 16 16 16 16   Temp: 97.8 °F (36.6 °C) 98.1 °F (36.7 °C) 98 °F (36.7 °C) 98.8 °F (37.1 °C)   TempSrc: Oral Oral Oral Oral   SpO2: 98% 94% 93% 94%   Weight: 165 lb (74.8 kg)      Height: 5' 10\" (1.778 m)        Physical Exam: 08/23/22     GEN -Awake nontoxic appearing male, sitting upright in bed , NAD. Normal body habitus. Appears given age. EYES -Pupils are equally round. No scleral erythema, discharge, or conjunctivitis. HENT -MM are moist. Oral pharynx without exudates, no evidence of thrush. NECK -Supple, no apparent thyromegaly or masses. RESP -CTA, no wheezes, rales or rhonchi. Symmetric chest movement while on room air. C/V -S1/S2 auscultated. RRR without appreciable M/R/G. No JVD or carotid bruits. Peripheral pulses equal bilaterally and palpable. No peripheral edema. GI -Abdomen is soft non distended and without significant tenderness. No masses or guarding. + BS Rectal exam deferred.  -No CVA tenderness. Siegel catheter is not present. LYMPH-No palpable cervical lymphadenopathy and no hepatosplenomegaly. No petechiae or ecchymoses. MS -No gross joint deformities. SKIN -Normal coloration, warm, dry. NEURO-Cranial nerves appear grossly intact, normal speech, no lateralizing weakness. PSYC-Awake, alert, oriented x 4. Appropriate affect.     Medications:   Medications:    aspirin  81 mg Oral Daily    [Held by provider] dilTIAZem  240 mg Oral Daily    gabapentin  600 mg Oral QAM    gabapentin  300 mg Oral Nightly    [Held by provider] hydroCHLOROthiazide  25 mg Oral Daily pantoprazole  40 mg Oral Daily    sodium chloride flush  5-40 mL IntraVENous 2 times per day    atorvastatin  40 mg Oral Nightly    enoxaparin  40 mg SubCUTAneous Daily    polyethylene glycol  17 g Oral Daily    clopidogrel  75 mg Oral Daily      Infusions:    sodium chloride       PRN Meds: sodium chloride flush, 5-40 mL, PRN  sodium chloride, , PRN  acetaminophen, 650 mg, Q6H PRN   Or  acetaminophen, 650 mg, Q6H PRN  promethazine, 12.5 mg, Q6H PRN   Or  ondansetron, 4 mg, Q6H PRN  nitroGLYCERIN, 0.4 mg, Q5 Min PRN  labetalol, 10 mg, Q10 Min PRN  traZODone, 50 mg, Nightly PRN        LABS  CBC   Recent Labs     08/22/22  1135 08/23/22  0113   WBC 3.1* 3.8*   HGB 14.8 14.1   HCT 44.6 42.2    194      RENAL  Recent Labs     08/22/22  1135 08/23/22  0113    135   K 4.0 4.2    100   CO2 29 28   BUN 18 19   CREATININE 1.0 1.2     LFT'S  Recent Labs     08/22/22  1135 08/23/22  0113   AST 31 24   ALT 20 17   BILITOT 0.5 0.5   ALKPHOS 40 29*     COAG  Recent Labs     08/22/22  1135   INR 1.08     CARDIAC ENZYMES  No results for input(s): CKTOTAL, CKMB, CKMBINDEX, TROPONINI in the last 72 hours. Radiology this visit:  Reviewed. XR CHEST (2 VW)    Result Date: 8/19/2022  EXAMINATION: TWO XRAY VIEWS OF THE CHEST 8/19/2022 8:18 am COMPARISON: 02/17/2022. HISTORY: ORDERING SYSTEM PROVIDED HISTORY: Hot Flashes TECHNOLOGIST PROVIDED HISTORY: Reason for exam:->Hot Flashes Reason for Exam: Hot Flashes Additional signs and symptoms: Hot Flashes Relevant Medical/Surgical History: Hot Flashes FINDINGS: Stable minimal linear scarring to the right lung base. Lungs are otherwise clear. Cardiac and mediastinal silhouettes are within normal limits. No pneumothoraces. Bony structures appear intact. Stable exam without acute abnormality.      CT HEAD WO CONTRAST    Result Date: 8/22/2022  EXAMINATION: CT OF THE HEAD WITHOUT CONTRAST  8/22/2022 11:45 am TECHNIQUE: CT of the head was performed without the administration of intravenous contrast. Automated exposure control, iterative reconstruction, and/or weight based adjustment of the mA/kV was utilized to reduce the radiation dose to as low as reasonably achievable. COMPARISON: 01/22/2022 CT HISTORY: ORDERING SYSTEM PROVIDED HISTORY: Stroke Symptoms TECHNOLOGIST PROVIDED HISTORY: Has a \"code stroke\" or \"stroke alert\" been called? ->Yes Reason for exam:->Stroke Symptoms Decision Support Exception - unselect if not a suspected or confirmed emergency medical condition->Emergency Medical Condition (MA) Reason for Exam: stroke symptoms Additional signs and symptoms: none Relevant Medical/Surgical History: none FINDINGS: BRAIN/VENTRICLES: The ventricles and sulci are prominent. Pattern is consistent with age-related atrophy. No extra-axial fluid collections, and no sign of recent intracranial hemorrhage. Decreased attenuation is noted within the periventricular white matter. Pattern is consistent with chronic small vessel ischemic change. No acute edema or mass effect. No mass lesions are detected. ORBITS: The visualized portion of the orbits demonstrate no acute abnormality. SINUSES: The visualized paranasal sinuses and mastoid air cells demonstrate no acute abnormality. SOFT TISSUES/SKULL:  No acute abnormality of the visualized skull or soft tissues. No acute intracranial abnormality. Findings were discussed with PRAVEENA DOSHI at 12:11 pm on 8/22/2022. XR CHEST PORTABLE    Result Date: 8/22/2022  EXAMINATION: ONE XRAY VIEW OF THE CHEST 8/22/2022 11:43 am COMPARISON: Chest x-ray dated 08/19/2022. HISTORY: ORDERING SYSTEM PROVIDED HISTORY: stroke symptoms TECHNOLOGIST PROVIDED HISTORY: Reason for exam:->stroke symptoms Reason for Exam: stroke symptoms FINDINGS: HEART/MEDIASTINUM: The cardiomediastinal silhouette is within normal limits. PLEURA/LUNGS: There are no focal consolidations or pleural effusions. There is no appreciable pneumothorax.  BONES/SOFT TISSUE: No acute abnormality. No radiographic evidence of acute pulmonary disease. CTA HEAD NECK W CONTRAST    Result Date: 8/22/2022  EXAMINATION: CTA OF THE HEAD AND NECK WITH CONTRAST 8/22/2022 11:48 am: TECHNIQUE: CTA of the head and neck was performed with the administration of intravenous contrast. Multiplanar reformatted images are provided for review. MIP images are provided for review. Stenosis of the internal carotid arteries measured using NASCET criteria. Automated exposure control, iterative reconstruction, and/or weight based adjustment of the mA/kV was utilized to reduce the radiation dose to as low as reasonably achievable. COMPARISON: None. HISTORY: ORDERING SYSTEM PROVIDED HISTORY: Stroke Symptoms TECHNOLOGIST PROVIDED HISTORY: Has a \"code stroke\" or \"stroke alert\" been called? ->Yes Reason for exam:->Stroke Symptoms Decision Support Exception - unselect if not a suspected or confirmed emergency medical condition->Emergency Medical Condition (MA) Reason for Exam: stroke alert Additional signs and symptoms: none Relevant Medical/Surgical History: 75ml isovue 370/ gfr>60 8- FINDINGS: CTA NECK: AORTIC ARCH/ARCH VESSELS: Thorough sclerosis of the aortic arch and arch vessels. There is no flow limiting stenosis of the innominate or subclavian arteries. CAROTID ARTERIES: No significant stenosis seen of the common carotid arteries. Atherosclerosis of the carotid bulbs and proximal internal carotid arteries bilaterally. There is no flow limiting stenosis of the internal carotid arteries by NASCET criteria. VERTEBRAL ARTERIES: There is a left dominant vertebral artery. There is severe stenosis at the origin of the right vertebral artery with moderate stenosis at the origin of the left vertebral artery. Moderate stenosis is seen of the right vertebral artery at the C7 level. No focal stenosis otherwise seen of the vertebral arteries.  SOFT TISSUES: No focal consolidation within the visualized lung apices. No acute abnormality within the visualized superior mediastinum. BONES: No acute osseous abnormality seen. 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 CTA HEAD: Evaluation is limited due to venous contamination as the venous structures are enhancing to a greater degree of the arterial structures. ANTERIOR CIRCULATION: Atherosclerosis seen of the internal carotid arteries bilaterally. There is moderate stenosis involving the right supraclinoid internal carotid artery. No significant stenosis seen of the left internal carotid artery. There is moderate diffuse irregularity involving the right anterior cerebral artery. No gross evidence of a significant stenosis otherwise seen of the anterior cerebral or middle cerebral arteries. No large vessel occlusion is seen. POSTERIOR CIRCULATION: No significant stenosis of the vertebral, basilar, or posterior cerebral arteries. No aneurysm. OTHER: The major dural venous sinuses appear patent without evidence of a thrombus. BRAIN: There is no evidence of mass effect or midline shift. 1. Stenosis seen at the origin of both vertebral arteries, severe on the right and moderate on the left. 2. Moderate stenosis involving the right vertebral artery at the C7 level. 3. No flow limiting stenosis seen of the cervical carotid/vertebral arteries. 4. Evaluation of the intracranial vasculature is partially limited due to the predominantly venous nature of the exam. 5. There appears to be moderate stenosis involving is the right supraclinoid internal carotid artery. 6. There is moderate diffuse irregularity involving the right anterior cerebral artery. 7. No significant stenosis or large vessel occlusion otherwise seen of the [id number].  8. 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 MRI brain without contrast    Result Date: 8/23/2022  EXAMINATION: MRI OF THE BRAIN WITHOUT CONTRAST  8/23/2022 7:53 am TECHNIQUE: Multiplanar multisequence MRI of the brain was performed without the administration of intravenous contrast. COMPARISON: 04/10/2021. HISTORY: ORDERING SYSTEM PROVIDED HISTORY: left sided facial numbness, stroke like symptoms TECHNOLOGIST PROVIDED HISTORY: Reason for exam:->left sided facial numbness, stroke like symptoms Reason for Exam: left sided facial numbness, stroke like symptoms Additional signs and symptoms: NONE Relevant Medical/Surgical History: NONE Initial evaluation. FINDINGS: INTRACRANIAL STRUCTURES/VENTRICLES: There is no acute infarct. No mass effect or midline shift. No evidence of an acute intracranial hemorrhage. Areas of T2 FLAIR hyperintensity are seen in the periventricular and subcortical white matter, which are nonspecific, but may represent chronic microvascular ischemic change. There is minimal global parenchymal volume loss. Otherwise, the ventricles and sulci are normal in size and configuration. The sellar/suprasellar regions appear unremarkable. The normal signal voids within the major intracranial vessels appear maintained. ORBITS: The visualized portion of the orbits demonstrate no acute abnormality. SINUSES: Moderate mucosal thickening of the left maxillary sinus. There appears to be a trace right mastoid effusion. BONES/SOFT TISSUES: The bone marrow signal intensity appears normal. The soft tissues demonstrate no acute abnormality. 1. No acute intracranial abnormality. No acute infarct. 2. Minimal global parenchymal volume loss with moderate chronic microvascular ischemic changes.              Electronically signed by NELLIE Reece CNP on 8/23/2022 at 10:02 AM

## 2022-08-24 LAB
EKG ATRIAL RATE: 75 BPM
EKG DIAGNOSIS: NORMAL
EKG P AXIS: 71 DEGREES
EKG P-R INTERVAL: 186 MS
EKG Q-T INTERVAL: 398 MS
EKG QRS DURATION: 88 MS
EKG QTC CALCULATION (BAZETT): 444 MS
EKG R AXIS: 16 DEGREES
EKG T AXIS: 72 DEGREES
EKG VENTRICULAR RATE: 75 BPM
FOLATE: 18.7 NG/ML (ref 3.1–17.5)
GLUCOSE BLD-MCNC: 116 MG/DL (ref 70–99)
GLUCOSE BLD-MCNC: 98 MG/DL (ref 70–99)
TROPONIN T: <0.01 NG/ML
VITAMIN B-12: 213.3 PG/ML (ref 211–911)

## 2022-08-24 PROCEDURE — 6370000000 HC RX 637 (ALT 250 FOR IP): Performed by: HOSPITALIST

## 2022-08-24 PROCEDURE — 84484 ASSAY OF TROPONIN QUANT: CPT

## 2022-08-24 PROCEDURE — G0378 HOSPITAL OBSERVATION PER HR: HCPCS

## 2022-08-24 PROCEDURE — 99215 OFFICE O/P EST HI 40 MIN: CPT | Performed by: STUDENT IN AN ORGANIZED HEALTH CARE EDUCATION/TRAINING PROGRAM

## 2022-08-24 PROCEDURE — 6360000002 HC RX W HCPCS: Performed by: NURSE PRACTITIONER

## 2022-08-24 PROCEDURE — 2580000003 HC RX 258: Performed by: NURSE PRACTITIONER

## 2022-08-24 PROCEDURE — 93005 ELECTROCARDIOGRAM TRACING: CPT | Performed by: FAMILY MEDICINE

## 2022-08-24 PROCEDURE — 6370000000 HC RX 637 (ALT 250 FOR IP): Performed by: NURSE PRACTITIONER

## 2022-08-24 PROCEDURE — 99214 OFFICE O/P EST MOD 30 MIN: CPT | Performed by: INTERNAL MEDICINE

## 2022-08-24 PROCEDURE — 96372 THER/PROPH/DIAG INJ SC/IM: CPT

## 2022-08-24 PROCEDURE — 82962 GLUCOSE BLOOD TEST: CPT

## 2022-08-24 PROCEDURE — 95819 EEG AWAKE AND ASLEEP: CPT

## 2022-08-24 PROCEDURE — 93010 ELECTROCARDIOGRAM REPORT: CPT | Performed by: INTERNAL MEDICINE

## 2022-08-24 PROCEDURE — 82607 VITAMIN B-12: CPT

## 2022-08-24 PROCEDURE — 6360000002 HC RX W HCPCS: Performed by: FAMILY MEDICINE

## 2022-08-24 PROCEDURE — 94761 N-INVAS EAR/PLS OXIMETRY MLT: CPT

## 2022-08-24 PROCEDURE — 82746 ASSAY OF FOLIC ACID SERUM: CPT

## 2022-08-24 RX ORDER — CYANOCOBALAMIN 1000 UG/ML
1000 INJECTION INTRAMUSCULAR; SUBCUTANEOUS ONCE
Status: DISCONTINUED | OUTPATIENT
Start: 2022-08-24 | End: 2022-08-24

## 2022-08-24 RX ORDER — LANOLIN ALCOHOL/MO/W.PET/CERES
500 CREAM (GRAM) TOPICAL DAILY
Status: DISCONTINUED | OUTPATIENT
Start: 2022-08-24 | End: 2022-08-25 | Stop reason: HOSPADM

## 2022-08-24 RX ORDER — CYANOCOBALAMIN 1000 UG/ML
1000 INJECTION INTRAMUSCULAR; SUBCUTANEOUS ONCE
Status: COMPLETED | OUTPATIENT
Start: 2022-08-24 | End: 2022-08-24

## 2022-08-24 RX ADMIN — CLOPIDOGREL BISULFATE 75 MG: 75 TABLET ORAL at 09:24

## 2022-08-24 RX ADMIN — TRAZODONE HYDROCHLORIDE 50 MG: 50 TABLET ORAL at 20:19

## 2022-08-24 RX ADMIN — ENOXAPARIN SODIUM 40 MG: 100 INJECTION SUBCUTANEOUS at 09:24

## 2022-08-24 RX ADMIN — SODIUM CHLORIDE, PRESERVATIVE FREE 10 ML: 5 INJECTION INTRAVENOUS at 09:25

## 2022-08-24 RX ADMIN — ATORVASTATIN CALCIUM 40 MG: 40 TABLET, FILM COATED ORAL at 20:19

## 2022-08-24 RX ADMIN — ASPIRIN 81 MG: 81 TABLET, COATED ORAL at 09:24

## 2022-08-24 RX ADMIN — PANTOPRAZOLE SODIUM 40 MG: 40 TABLET, DELAYED RELEASE ORAL at 09:24

## 2022-08-24 RX ADMIN — SODIUM CHLORIDE, PRESERVATIVE FREE 10 ML: 5 INJECTION INTRAVENOUS at 20:21

## 2022-08-24 RX ADMIN — GABAPENTIN 300 MG: 300 CAPSULE ORAL at 20:19

## 2022-08-24 RX ADMIN — CYANOCOBALAMIN 1000 MCG: 1000 INJECTION, SOLUTION INTRAMUSCULAR at 20:19

## 2022-08-24 RX ADMIN — GABAPENTIN 600 MG: 300 CAPSULE ORAL at 09:24

## 2022-08-24 ASSESSMENT — PAIN DESCRIPTION - PAIN TYPE: TYPE: ACUTE PAIN

## 2022-08-24 ASSESSMENT — PAIN SCALES - GENERAL: PAINLEVEL_OUTOF10: 3

## 2022-08-24 ASSESSMENT — PAIN DESCRIPTION - LOCATION: LOCATION: CHEST

## 2022-08-24 NOTE — DISCHARGE SUMMARY
pressure trends              Appears controlled     HLD              Lipid panel: , , HDL 33,               Continue Lipitor     History of lumbar radiculopathy              Continue gabapentin      The patient expressed appropriate understanding of and agreement with the discharge recommendations, medications, and plan. Consults this admission:  IP CONSULT TO PHARMACY  PHARMACY TO CHANGE BASE FLUIDS  IP CONSULT TO HOSPITALIST  IP CONSULT TO CARDIOLOGY  IP CONSULT TO NEUROLOGY    Discharge Instruction:   Follow up appointments: Neuro intervention-Dr. Santiago  Primary care physician:  within 2 weeks    Diet:  low fat, low cholesterol diet   Activity: activity as tolerated  Disposition: Discharged to:   [x]Home, [][facility], []SNF, []Acute Rehab, []Hospice   Condition on discharge: Stable    Discharge Medications:        Medication List        START taking these medications      atorvastatin 40 MG tablet  Commonly known as: LIPITOR  Take 1 tablet by mouth nightly     cyanocobalamin 500 MCG tablet  Take 1 tablet by mouth daily     nitroGLYCERIN 0.4 MG SL tablet  Commonly known as: NITROSTAT  up to max of 3 total doses. If no relief after 1 dose, call 911. traZODone 50 MG tablet  Commonly known as: DESYREL  Take 1 tablet by mouth nightly as needed for Sleep            CHANGE how you take these medications      * gabapentin 300 MG capsule  Commonly known as: NEURONTIN  What changed: Another medication with the same name was changed. Make sure you understand how and when to take each. * gabapentin 300 MG capsule  Commonly known as: NEURONTIN  TAKE 1 CAPSULE BY MOUTH 3  TIMES DAILY  What changed: See the new instructions. levothyroxine 50 MCG tablet  Commonly known as: SYNTHROID  TAKE 1 TABLET BY MOUTH IN  THE MORNING  What changed:   how much to take  how to take this  when to take this           * This list has 2 medication(s) that are the same as other medications prescribed for you.  Read the Peripheral pulses equal bilaterally and palpable. No peripheral edema. GI -Abdomen is soft non distended and without significant tenderness. No masses or guarding. + BS Rectal exam deferred.  -No CVA tenderness. Siegel catheter is not present. LYMPH-No palpable cervical lymphadenopathy and no hepatosplenomegaly. No petechiae or ecchymoses. MS -No gross joint deformities. SKIN -Normal coloration, warm, dry. NEURO-Cranial nerves appear grossly intact, normal speech, no lateralizing weakness. PSYC-Awake, alert, oriented x 4. Appropriate affect. Data:     Laboratory this visit:  Reviewed  Recent Labs     08/22/22  1135 08/23/22  0113   WBC 3.1* 3.8*   HGB 14.8 14.1   HCT 44.6 42.2    194      Recent Labs     08/22/22  1135 08/23/22  0113    135   K 4.0 4.2    100   CO2 29 28   BUN 18 19   CREATININE 1.0 1.2     Recent Labs     08/22/22  1135 08/23/22  0113   AST 31 24   ALT 20 17   BILITOT 0.5 0.5   ALKPHOS 40 29*     Recent Labs     08/22/22  1135   INR 1.08     Radiology this visit:  Reviewed. Echocardiogram complete 2D with doppler with color    Result Date: 8/23/2022  Transthoracic Echocardiography Report (TTE)  Demographics   Patient Name       Casandra HDEZ    Date of Study       08/23/2022   Date of Birth      1940         Gender              Male   Age                79 year(s)         Race                   Patient Number     [id number]         Room Number         4454   Visit Number       [id number]   Corporate ID       [id number]   Accession Number   [id number]         [facility] Herrera [facility]   Ordering Physician Grace Xiong Interpreting        Celestina Linares                     CNP                Physician           Arleth Fay MD  Procedure Type of Study   TTE procedure:ECHOCARDIOGRAM COMPLETE 2D W DOPPLER W COLOR.   Procedure Date Date: 08/23/2022 Start: 11:05 AM Study Location: Portable Technical Quality: Technically difficult study Indications:CVA. Height: 70 inches Weight: 165 pounds BSA: 1.92 m2 BMI: 23.68 kg/m2 HR: 69 bpm BP: 90/50 mmHg  Conclusions   Summary  Left ventricular systolic function is normal.  Left ventricle size is normal.  Ejection fraction is visually estimated at 55-60%. No regional wall motion abnormalites. Indeterminate diastolic function; E/A flow reversal is noted. Structurally normal mitral valve. Trace mitral regurgitation. Trace tricuspid regurgitation; RVSP: 23 mmHg. No evidence of any pericardial effusion. IVC and abdominal aorta are not well visualized   Signature   ------------------------------------------------------------------  Electronically signed by Jana Moore MD  (Interpreting physician) on 08/23/2022 at 06:29 PM  ------------------------------------------------------------------   Findings   Left Ventricle  Left ventricular systolic function is normal.  Left ventricle size is normal.  Ejection fraction is visually estimated at 55-60%. No regional wall motion abnormalites. Indeterminate diastolic function; E/A flow reversal is noted. Left Atrium  Essentially normal left atrium. Right Atrium  Essentially normal right atrium. Right Ventricle  Essentially normal right ventricle. Aortic Valve  Structurally normal aortic valve. Aortic valve appears tricuspid. Mitral Valve  Structurally normal mitral valve. Trace mitral regurgitation. Tricuspid Valve  Trace tricuspid regurgitation; RVSP: 23 mmHg. Pulmonic Valve  Pulmonic valve is structurally normal.   Pericardial Effusion  No evidence of any pericardial effusion.    Miscellaneous  IVC and abdominal aorta are not well visualized  M-Mode/2D Measurements & Calculations   LV Diastolic Dimension:  LV Systolic Dimension:  LA Dimension: 2.7 cmAO Root  3.78 cm                  2.65 cm                 Dimension: 3.5 cmLA Area:  LV FS:29.9 %             LV Volume Diastolic: 38 59.6 cm2  LV PW Diastolic: 2.90 cm ml  LV PW Systolic: 0.84 cm  LV Volume Systolic: 14  Septum Diastolic: 0.81   ml  cm                       LV EDV/LV EDV Index: 38  Septum Systolic: 0.05 cm TORRES/81 O6LM ESV/LV ESV   LA/Aorta: 0.77  CO: 5.98 l/min           Index: 14 ml/7 m2       Ascending Aorta: 3.8 cm  CI: 3.11 l/m*m2          EF Calculated (A4C):    LA volume/Index: 25 ml                           63.2 %                  /91W0  LV Area Diastolic: 43.3  EF Calculated (2D):  cm2                      57.8 %  LV Area Systolic: 0.95  cm2                      LV Length: 6.2 cm                            LVOT: 2 cm  Doppler Measurements & Calculations   MV Peak E-Wave: 41.5 AV Peak Velocity: 137 cm/s    LVOT Peak Velocity: 150  cm/s                 AV Peak Gradient: 7.51 mmHg   cm/s  MV Peak A-Wave: 73.5 AV Mean Velocity: 95.6 cm/s   LVOT Mean Velocity: 98.3  cm/s                 AV Mean Gradient: 4 mmHg      cm/s  MV E/A Ratio: 0.56   AV VTI: 26.2 cm               LVOT Peak Gradient: 9  MV Peak Gradient:    AV Area (Continuity):3.31 cm2 mmHgLVOT Mean Gradient: 5  0.69 mmHg                                          mmHg                       LVOT VTI: 27.6 cm             Estimated RVSP: 23 mmHg  MV P1/2t: 76 msec                                  Estimated RAP:3 mmHg  MVA by PHT:2.89 cm2  Estimated PASP: 22.54 mmHg   MV E' Septal                                       TR Velocity:221 cm/s  Velocity: 4.14 cm/s                                TR Gradient:19.54 mmHg  MV E' Lateral                                      PV Peak Velocity: 82.8  Velocity: 6.91 cm/s                                cm/s  MV E/E' septal:                                    PV Peak Gradient: 2.74  10.02                                              mmHg  MV E/E' lateral:  6.01      XR CHEST (2 VW)    Result Date: 8/19/2022  EXAMINATION: TWO XRAY VIEWS OF THE CHEST 8/19/2022 8:18 am COMPARISON: 02/17/2022.  HISTORY: ORDERING SYSTEM PROVIDED HISTORY: Hot Flashes TECHNOLOGIST PROVIDED HISTORY: Reason for exam:->Hot Flashes Reason for Exam: Hot Flashes Additional signs and symptoms: Hot Flashes Relevant Medical/Surgical History: Hot Flashes FINDINGS: Stable minimal linear scarring to the right lung base. Lungs are otherwise clear. Cardiac and mediastinal silhouettes are within normal limits. No pneumothoraces. Bony structures appear intact. Stable exam without acute abnormality. CT HEAD WO CONTRAST    Result Date: 8/22/2022  EXAMINATION: CT OF THE HEAD WITHOUT CONTRAST  8/22/2022 11:45 am TECHNIQUE: CT of the head was performed without the administration of intravenous contrast. Automated exposure control, iterative reconstruction, and/or weight based adjustment of the mA/kV was utilized to reduce the radiation dose to as low as reasonably achievable. COMPARISON: 01/22/2022 CT HISTORY: ORDERING SYSTEM PROVIDED HISTORY: Stroke Symptoms TECHNOLOGIST PROVIDED HISTORY: Has a \"code stroke\" or \"stroke alert\" been called? ->Yes Reason for exam:->Stroke Symptoms Decision Support Exception - unselect if not a suspected or confirmed emergency medical condition->Emergency Medical Condition (MA) Reason for Exam: stroke symptoms Additional signs and symptoms: none Relevant Medical/Surgical History: none FINDINGS: BRAIN/VENTRICLES: The ventricles and sulci are prominent. Pattern is consistent with age-related atrophy. No extra-axial fluid collections, and no sign of recent intracranial hemorrhage. Decreased attenuation is noted within the periventricular white matter. Pattern is consistent with chronic small vessel ischemic change. No acute edema or mass effect. No mass lesions are detected. ORBITS: The visualized portion of the orbits demonstrate no acute abnormality. SINUSES: The visualized paranasal sinuses and mastoid air cells demonstrate no acute abnormality. SOFT TISSUES/SKULL:  No acute abnormality of the visualized skull or soft tissues. No acute intracranial abnormality.  Findings were discussed with PRAVEENA DOSHI at 12:11 pm on 8/22/2022. XR CHEST PORTABLE    Result Date: 8/22/2022  EXAMINATION: ONE XRAY VIEW OF THE CHEST 8/22/2022 11:43 am COMPARISON: Chest x-ray dated 08/19/2022. HISTORY: ORDERING SYSTEM PROVIDED HISTORY: stroke symptoms TECHNOLOGIST PROVIDED HISTORY: Reason for exam:->stroke symptoms Reason for Exam: stroke symptoms FINDINGS: HEART/MEDIASTINUM: The cardiomediastinal silhouette is within normal limits. PLEURA/LUNGS: There are no focal consolidations or pleural effusions. There is no appreciable pneumothorax. BONES/SOFT TISSUE: No acute abnormality. No radiographic evidence of acute pulmonary disease. CTA HEAD NECK W CONTRAST    Result Date: 8/22/2022  EXAMINATION: CTA OF THE HEAD AND NECK WITH CONTRAST 8/22/2022 11:48 am: TECHNIQUE: CTA of the head and neck was performed with the administration of intravenous contrast. Multiplanar reformatted images are provided for review. MIP images are provided for review. Stenosis of the internal carotid arteries measured using NASCET criteria. Automated exposure control, iterative reconstruction, and/or weight based adjustment of the mA/kV was utilized to reduce the radiation dose to as low as reasonably achievable. COMPARISON: None. HISTORY: ORDERING SYSTEM PROVIDED HISTORY: Stroke Symptoms TECHNOLOGIST PROVIDED HISTORY: Has a \"code stroke\" or \"stroke alert\" been called? ->Yes Reason for exam:->Stroke Symptoms Decision Support Exception - unselect if not a suspected or confirmed emergency medical condition->Emergency Medical Condition (MA) Reason for Exam: stroke alert Additional signs and symptoms: none Relevant Medical/Surgical History: 75ml isovue 370/ gfr>60 8- FINDINGS: CTA NECK: AORTIC ARCH/ARCH VESSELS: Thorough sclerosis of the aortic arch and arch vessels. There is no flow limiting stenosis of the innominate or subclavian arteries. CAROTID ARTERIES: No significant stenosis seen of the common carotid arteries.   Atherosclerosis of the carotid bulbs and proximal internal carotid arteries bilaterally. There is no flow limiting stenosis of the internal carotid arteries by NASCET criteria. VERTEBRAL ARTERIES: There is a left dominant vertebral artery. There is severe stenosis at the origin of the right vertebral artery with moderate stenosis at the origin of the left vertebral artery. Moderate stenosis is seen of the right vertebral artery at the C7 level. No focal stenosis otherwise seen of the vertebral arteries. SOFT TISSUES: No focal consolidation within the visualized lung apices. No acute abnormality within the visualized superior mediastinum. BONES: No acute osseous abnormality seen. 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 CTA HEAD: Evaluation is limited due to venous contamination as the venous structures are enhancing to a greater degree of the arterial structures. ANTERIOR CIRCULATION: Atherosclerosis seen of the internal carotid arteries bilaterally. There is moderate stenosis involving the right supraclinoid internal carotid artery. No significant stenosis seen of the left internal carotid artery. There is moderate diffuse irregularity involving the right anterior cerebral artery. No gross evidence of a significant stenosis otherwise seen of the anterior cerebral or middle cerebral arteries. No large vessel occlusion is seen. POSTERIOR CIRCULATION: No significant stenosis of the vertebral, basilar, or posterior cerebral arteries. No aneurysm. OTHER: The major dural venous sinuses appear patent without evidence of a thrombus. BRAIN: There is no evidence of mass effect or midline shift. 1. Stenosis seen at the origin of both vertebral arteries, severe on the right and moderate on the left. 2. Moderate stenosis involving the right vertebral artery at the C7 level.  3. No flow limiting stenosis seen of the cervical carotid/vertebral arteries. 4. Evaluation of the intracranial vasculature is partially limited due to the predominantly venous nature of the exam. 5. There appears to be moderate stenosis involving is the right supraclinoid internal carotid artery. 6. There is moderate diffuse irregularity involving the right anterior cerebral artery. 7. No significant stenosis or large vessel occlusion otherwise seen of the [id number]. 8. 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 MRI brain without contrast    Result Date: 8/23/2022  EXAMINATION: MRI OF THE BRAIN WITHOUT CONTRAST  8/23/2022 7:53 am TECHNIQUE: Multiplanar multisequence MRI of the brain was performed without the administration of intravenous contrast. COMPARISON: 04/10/2021. HISTORY: ORDERING SYSTEM PROVIDED HISTORY: left sided facial numbness, stroke like symptoms TECHNOLOGIST PROVIDED HISTORY: Reason for exam:->left sided facial numbness, stroke like symptoms Reason for Exam: left sided facial numbness, stroke like symptoms Additional signs and symptoms: NONE Relevant Medical/Surgical History: NONE Initial evaluation. FINDINGS: INTRACRANIAL STRUCTURES/VENTRICLES: There is no acute infarct. No mass effect or midline shift. No evidence of an acute intracranial hemorrhage. Areas of T2 FLAIR hyperintensity are seen in the periventricular and subcortical white matter, which are nonspecific, but may represent chronic microvascular ischemic change. There is minimal global parenchymal volume loss. Otherwise, the ventricles and sulci are normal in size and configuration. The sellar/suprasellar regions appear unremarkable. The normal signal voids within the major intracranial vessels appear maintained. ORBITS: The visualized portion of the orbits demonstrate no acute abnormality. SINUSES: Moderate mucosal thickening of the left maxillary sinus.   There appears to be a trace right mastoid effusion. BONES/SOFT TISSUES: The bone marrow signal intensity appears normal. The soft tissues demonstrate no acute abnormality. 1. No acute intracranial abnormality. No acute infarct. 2. Minimal global parenchymal volume loss with moderate chronic microvascular ischemic changes.        Discharge Time of < 30 minutes    Electronically signed by NELLIE Mar CNP on 8/24/2022 at 10:00 AM

## 2022-08-24 NOTE — PROCEDURES
ROUTINE ELECTROENCEPHALOGRAM    Identifying Information:  Name: Lauren Barnes  MRN: [id number]  : 1940  Interpreting Physician: Marlene Castorena DO  Referring Provider: NELLIE Mobley CNP  Date of EE22  Procedure Location: Inpatient     Clinical History:  Lauren Barnes is a 79 y.o. male with focal symptoms concerning for seizure. Current Medications:    cyanocobalamin  1,000 mcg IntraMUSCular Once    vitamin B-12  500 mcg Oral Daily    aspirin  81 mg Oral Daily    [Held by provider] dilTIAZem  240 mg Oral Daily    gabapentin  600 mg Oral QAM    gabapentin  300 mg Oral Nightly    [Held by provider] hydroCHLOROthiazide  25 mg Oral Daily    pantoprazole  40 mg Oral Daily    sodium chloride flush  5-40 mL IntraVENous 2 times per day    atorvastatin  40 mg Oral Nightly    enoxaparin  40 mg SubCUTAneous Daily    polyethylene glycol  17 g Oral Daily    clopidogrel  75 mg Oral Daily        Indication:  Rule out seizure/seizure disorder     Technical Summary:  28 channels of EEG were recorded in a digital format on a patient who was reported to be awake and drowsy during the recording. The patient was not sleep deprived prior to the EEG. The PDR consisted of well-developed, well-regulated 8-9 Hz alpha activity, maximal over the posterior head regions and reactive to eye opening and closure. Photic stimulation was performed and did not produce any abnormalities. During the recording stage II sleep was not seen. The EKG lead revealed no rhythm abnormalities. EEG Interpretation: This EEG was within normal limits for a patient of this age in the awake and drowsy states. No focal, lateralizing, or epileptiform features were seen during the recording. Clinical correlation is recommended.     Marlene Castorena DO  Epileptologist  2022 5:35 PM

## 2022-08-24 NOTE — CONSULTS
Neurology Service Consult Note  [facility]  Patient Name: Chani Friedman  : 1940        Subjective:   Reason for consult: Stroke like sympotms  79 y.o. male with history of COPD, HCL, HTN, malignant melanoma of face, prostate cancer presenting to [facility]  with left facial numbness that started while eating breakfast. Symptoms were described as waxing and waning in intensity. Patient reported that he had similar symptoms  as well as body numbness that spontaneously resolved. Patient was evaluated by [facility] for stroke alert and was not felt to be a tPA/TNK candidate due to low NIH. CTA head and neck does note severe right sided vertebral artery stenosis and moderate left vertebral artery stenosis as well as high grade stenosis of the right supraclinoid ICA. MRI was completed and did not identify acute stroke. Patient is known to our service and was evaluated in  for lightheadedness and numbness of bilateral lower extremities. He was also evaluated in  by Dr. Giovanni Nino for headache with numbness to the left side of  his face. Chart was reviewed in detail. Patient is seen and assessed. He is resting in bed, awake alert and oriented. He states that his symptoms have resolved at this time. He describes recurrent events where he feels flushed followed by chilled sensation or a flushed sensation. He then experienced numbness over his entire body then resolving and becoming more focal to his left lateral face from the top of his head, along the jaw line, stopping at the chin. He had associated head pressure but not headache with this event. Patient denies any significant headache history however he has had several hospitalizations over time for severe headache. During yesterdays event he did not have any change in vision, speech and denies any weakness. He drove himself to the hospital to be assessed. Patient is on Plavix and aspirin at home but not statin medications.  He states he takes his medication every day and does not miss doses. Patient states that he was recently seen in the ED for similar complaints however felt flushed not chilled. Addendum: Rapid response called on patient. Presented to room to evaluate patient. He describes worsening left sided sensory changes to the face. He states that it is the left half of his face and is receding to the lateral aspect of the face as with prior episodes. He has no facial weakness on exam. Sensation is intact to face but decreased from midline forehead and moving laterally in a diagonal fashion on his left face with pin prick. There is no sensory change to neck, chest, upper or lower extremity. Speech is clear, language is fluent. 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 Strength is intact to all extremities. Balance is not impaired as he was able to walk to nurses station from his room three times to report sensory changes. Patient denies chest pain, dizziness, N/V. He was not noted to have hypo/hpertension or cardiac changes on monitor. EEG was ordered to rule out focal seizure. Do not feel that patient requires repeat imaging based on exam.       Past Medical History:   Diagnosis Date    Anesthesia complication     Patient reports in 1973 following a Back Surgery he was kept in the hospital due to high temperatures in the evenings. He stated they kept him for observation and doesn't remember receiving any treatment for the high temperatures. Patient reports the anesthesia name started with flur? ?.    BPH with obstruction/lower urinary tract symptoms     Chest pain 7/1/2018    Chronic low back pain     GERD (gastroesophageal reflux disease)     H/O 24 hour EKG monitoring 3/12  12/11    3/12/12  essentially a normal event monitor without significant arrhythmias noted    H/O cardiac catheterization 2/2/12 4/02 10/98    2/22/12  LM, LAD and RCA all without significant disease    H/O cardiac catheterization 2/2/12 4/02 10/98    Done R/T false pos on stress test.    H/O cardiac catheterization 08/06/2018    normal study    H/O cardiovascular stress test 07/02/2018    normal study    H/O echocardiogram 12/10      3/14 EF55-60% normal LV function 12/10/10  complete 2 dim transthoracic echj. LVSF normal  EF >55%, transmitral spectral Doppler flow pattern is suggestive of  impaired LV relaxation    History of cardiovascular stress test 2/12 11/09 7/08 9/06 2/02 4/00 8/99 2/8/2012  evidence of mild ischemia  in the RCA. abnormal study , restingEF is 70%, global LVSF is  normal    History of Holter monitoring 9/17/14    14 day EVENT - no RVR    History of nuclear stress test 08/03/2017    cardiolite-normal,EF60%    Hx of chest x-ray 2/20/12    chest is considereed non acute. COPD    Hx of echocardiogram 08/03/2017    FQ11-81%,TOQOY 2 diastolic dysfunction    Hyperlipidemia     Idiopathic peripheral neuropathy     Kidney stone     Left facial numbness 8/30/2017    Lumbar radiculopathy     Malignant melanoma of skin of face (HCC)     MVP (mitral valve prolapse)     Myotonia     chronic increased CPK's    Peripelvic (lymphatic) cyst     per CT    Precordial pain 8/3/2018    Vitamin B12 deficiency     :   Past Surgical History:   Procedure Laterality Date    CARDIAC CATHETERIZATION  2/2/12    EF 55% No sugnificant disease.     COLONOSCOPY  5/5/14    diverticulosis, 1 polyp, hemorrhoids    COLONOSCOPY  07/10/2017    single 5 mm polyp found in sigmoid colon, moderate diverticulosis found in sigmoid colon    LUMBAR DISC SURGERY  11/2002    L4-5 hemilaminectomy and disk    LUMBAR DISCECTOMY  [address]  09/26/2011    s/p robotic radical prostatectomy    ROTATOR CUFF REPAIR Right 10/2016    TIBIA FRACTURE SURGERY Right     childhood    URETEROLITHOTOMY  1992     Medications:  Scheduled Meds:   aspirin  81 mg Oral Daily    [Held by provider] dilTIAZem  240 mg Oral Daily    gabapentin  600 mg Oral QAM    gabapentin 300 mg Oral Nightly    [Held by provider] hydroCHLOROthiazide  25 mg Oral Daily    pantoprazole  40 mg Oral Daily    sodium chloride flush  5-40 mL IntraVENous 2 times per day    atorvastatin  40 mg Oral Nightly    enoxaparin  40 mg SubCUTAneous Daily    polyethylene glycol  17 g Oral Daily    clopidogrel  75 mg Oral Daily     Continuous Infusions:   sodium chloride       PRN Meds:.sodium chloride flush, sodium chloride, acetaminophen **OR** acetaminophen, promethazine **OR** ondansetron, nitroGLYCERIN, labetalol, traZODone    Allergies   Allergen Reactions    Fenofibrate      Social History     Socioeconomic History    Marital status:      Spouse name: Not on file    Number of children: Not on file    Years of education: Not on file    Highest education level: Not on file   Occupational History    Not on file   Tobacco Use    Smoking status: Never    Smokeless tobacco: Never   Vaping Use    Vaping Use: Never used   Substance and Sexual Activity    Alcohol use: Yes     Comment: Occassional    Drug use: No    Sexual activity: Not Currently     Partners: Female     Comment:    Other Topics Concern    Not on file   Social History Narrative    Not on file     Social Determinants of Health     Financial Resource Strain: Not on file   Food Insecurity: Not on file   Transportation Needs: No Transportation Needs    Lack of Transportation (Medical): No    Lack of Transportation (Non-Medical):  No   Physical Activity: Sufficiently Active    Days of Exercise per Week: 7 days    Minutes of Exercise per Session: 40 min   Stress: No Stress Concern Present    Feeling of Stress : Not at all   Social Connections: Socially Isolated    Frequency of Communication with Friends and Family: Twice a week    Frequency of Social Gatherings with Friends and Family: Once a week    Attends Scientologist Services: Never    Active Member of Clubs or Organizations: No    Attends Club or Organization Meetings: Never    Marital Status:    Intimate Partner Violence: Not on file   Housing Stability: Low Risk     Unable to Pay for Housing in the Last Year: No    Number of Places Lived in the Last Year: 1    Unstable Housing in the Last Year: No      Family History   Problem Relation Age of Onset    Heart Disease Mother         ASCAD    Heart Disease Father     Heart Disease Brother     Diabetes Other     Cancer Other         unknown type    Prostate Cancer Other     Coronary Art Dis Other     Coronary Art Dis Other          ROS (10 systems)  In addition to that documented in the HPI above, the additional ROS was obtained:  Constitutional: Denies fevers or chills, does describe episode of feeling flushed or chilled  Eyes: Denies vision changes  ENMT: Denies sore throat  CV: Denies chest pain  Resp: Denies SOB  GI: Denies vomiting or diarrhea  : Denies painful urination  MSK: Denies recent trauma  Skin: Denies new rashes  Neuro: Denies new numbness or tingling or weakness  Endocrine: Denies unexpected weight loss  Heme: Denies bleeding disorders    Physical Exam:       Wt Readings from Last 3 Encounters:   08/22/22 165 lb (74.8 kg)   08/22/22 165 lb (74.8 kg)   08/19/22 165 lb (74.8 kg)     Temp Readings from Last 3 Encounters:   08/24/22 98.1 °F (36.7 °C)   08/19/22 98 °F (36.7 °C) (Oral)   05/27/22 97.6 °F (36.4 °C) (Infrared)     BP Readings from Last 3 Encounters:   08/24/22 (!) 101/56   08/19/22 132/68   06/22/22 100/62     Pulse Readings from Last 3 Encounters:   08/24/22 89   08/19/22 66   06/22/22 70        Gen: A&O x 4, NAD, cooperative  HEENT: NC/AT, EOMI, PERRL, mmm, no carotid bruits, neck supple, no meningeal signs  Heart: NSR  Lungs: Respirations even and unlabored  Ext: no edema, no calf tenderness b/l  Psych: normal mood and affect  Skin: no rashes or lesions    NEUROLOGIC EXAM:    Mental Status: A&O to self, location, month and year, NAD, speech clear, language fluent, repetition and naming intact, follows commands appropriately    Cranial Nerve Exam:   CN II-XII:  PERRL, VFF on left, decreased vision to right at baseline, no nystagmus, no gaze paresis, right eye strabismus at baseline sensation V1-V3 intact b/l, muscles of facial expression symmetric; hearing intact to conversational tone, palate elevates symmetrically, shoulder elevation symmetric and tongue protrudes midline with movement side to side. Motor Exam:       Strength 5/5 UE's/LE's b/l  Tone and bulk normal   No pronator drift    Deep Tendon Reflexes: 2/4 biceps, triceps, brachioradialis, patellar, and achilles b/l; flexor plantar responses b/l    Sensation: Intact light touch/pinprick/vibration Ue's. Decreased to bilateral lower extremities secondary to history of polyneuropathy     Coordination/Cerebellum:       Tremors--none      Rapidly alternating movements: no dysdiadochokinesia b/l                Heel-to-Shin: no dysmetria b/l      Finger-to-Nose: no dysmetria b/l    Gait and stance:      Gait: deferred      LABS:     Recent Labs     08/22/22  1131 08/22/22  1135 08/23/22  0113   WBC  --  3.1* 3.8*   NA  --  141 135   K  --  4.0 4.2   CL  --  102 100   CO2  --  29 28   BUN  --  18 19   CREATININE  --  1.0 1.2   GLUCOSE 113 102* 91   INR  --  1.08  --        IMAGING:    CT head w/o contrast:  Impression   No acute intracranial abnormality. CTA head and neck:  Impression   1. Stenosis seen at the origin of both vertebral arteries, severe on the   right and moderate on the left. 2. Moderate stenosis involving the right vertebral artery at the C7 level. 3. No flow limiting stenosis seen of the cervical carotid/vertebral arteries. 4. Evaluation of the intracranial vasculature is partially limited due to the   predominantly venous nature of the exam.   5. There appears to be moderate stenosis involving is the right supraclinoid   internal carotid artery. 6. There is moderate diffuse irregularity involving the right anterior   cerebral artery.    7. No significant stenosis or large vessel occlusion otherwise seen of the   uobsqw-md-Gajome. 8. There is suggestion of ossification of the posterior longitudinal ligament   at the C6-C7 level, which contributes to least moderate spinal canal stenosis. MRI brain w/o contrast:  Impression   1. No acute intracranial abnormality. No acute infarct. 2. Minimal global parenchymal volume loss with moderate chronic microvascular   ischemic changes. Echocardiogram:  Summary   Left ventricular systolic function is normal.   Left ventricle size is normal.   Ejection fraction is visually estimated at 55-60%. No regional wall motion abnormalites. Indeterminate diastolic function; E/A flow reversal is noted. Structurally normal mitral valve. Trace mitral regurgitation. Trace tricuspid regurgitation; RVSP: 23 mmHg. No evidence of any pericardial effusion. IVC and abdominal aorta are not well visualized    ASSESSMENT/PLAN:   79year old gentleman presents with left facial sensory changes that are transient in nature. Patient is sitting up in bed, alert and oriented x 4. He denies headache. 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 Face is symmetrical, sensation is intact. Speech is clear, language is fluent. Strength and sensation are intact in upper and lower extremities. There are no new focal or lateralizing findings on exam.    Intermittent episodes of left facial sensory change proceeded by chills or flushing and whole body numbness. Consideration for complex headache given prior history or focal seizure. TIA can not fully be excluded however symptoms are very focal not consistent with TIA. CT head as above - non acute  MRI brain - non acute  CTA head and neck - notes stenosis of the vertebral arteries and right supraclinoid ICA.  These findings are similar to prior CTA study completed 4/9/21  Recommend outpatient follow up with  [facility], neuro intervention for consideration of diagnostic cerebral angiogram.   Do not feel that this stenosis is a contributing factor to patients symptoms. Continue Plavix, aspirin and atorvastatin for secondary stroke prevention  Cardiology following  Routine EEG ordered following Rapid Response. Will order B12, Folate  Will continue to follow    > 70 minutes of time spent included chart review, obtaining history, patient examination, developing plan of care, and documentation. Patient was discussed with attending neurologist Dr. Jo-Ann Smith. Thank you for allowing us to participate in the care of your patient. If there are any questions regarding evaluation please feel free to contact us. NELLIE Salomon CNP, 8/24/2022     ------------------------------------    Attending Note:  I have rounded on this patient with GREGORIO Urbina. I have reviewed the chart and we have discussed this case in detail. The patient was seen and examined by myself. Pertinent labs and imaging have been personally reviewed. Our findings and impressions were discussed with the patient. I concur with the Nurse Specialist's assessment and plan. Seen and evaluated at bedside. Family present at bedside for my examination. Patient reports that he has been having intermittent episodes of left-sided facial numbness that he reports has only happened on a couple occasions, however, in chart review and in discussion with the patient's daughter has been going on for years intermittently. Patient is very inconsistent in his story line. He describes having episodes of left facial numbness that occur for hours at a time but are often if not always associated with other symptoms such as chest pain, flushing or headache. He has had multiple MRI brain's now which have all been unremarkable.   His MRI brain completed this admission is without evidence of an acute stroke and given the duration of his symptoms this would effectively rule out TIA as the etiology. Although he has abnormalities on his CTA head and neck, I do not feel these are contributing to his symptoms. Nonetheless, he is already medically optimized on dual antiplatelet therapy and statin. We have obtained an EEG when he had recurrence of his left facial numbness this afternoon. On personal review at bedside this appears unremarkable. If routine EEG is normal, then I would recommend an outpatient ambulatory EEG for further evaluation. I will asked that internal medicine also complete work-up for alternative causes and did discuss with family it is very possible that the facial numbness could be associated with anxiety in the setting of chest pain etc.  Ultimately if EEGs are unremarkable then I will not have a neurologic etiology for his symptoms. Additionally, he does not give history consistent with trigeminal autonomic cephalalgia such as cluster/SUNCT/ASUNCION headache types that would explain this. If EEG is unremarkable then patient is okay for discharge fromneurology standpoint. Follow Up: Outpatient with neurology    Lamonte Pagan DO 8/13/2022 2:08 PM    Addendum:     B12 noted to be low, will add supplementation. This can cause paresthesias, however would be atypical to be focal in nature.      Lamonte Pagan DO 8/24/2022 4:58 PM

## 2022-08-24 NOTE — PROGRESS NOTES
Today's plan: Okay for discharge from cardiac standpoint continue Cardizem hydrochlorothiazide statins      Admit Date:  8/22/2022    Subjective: Okay      Chief complaints on admission  TIA      History of present illness:Glynn is a 79 y. o.year old who  presents Lydia Goins  Past medical history:    has a past medical history of Anesthesia complication, BPH with obstruction/lower urinary tract symptoms, Chest pain, Chronic low back pain, GERD (gastroesophageal reflux disease), H/O 24 hour EKG monitoring, H/O cardiac catheterization, H/O cardiac catheterization, H/O cardiac catheterization, H/O cardiovascular stress test, H/O echocardiogram, History of cardiovascular stress test, History of Holter monitoring, History of nuclear stress test, Hx of chest x-ray, Hx of echocardiogram, Hyperlipidemia, Idiopathic peripheral neuropathy, Kidney stone, Left facial numbness, Lumbar radiculopathy, Malignant melanoma of skin of face ([facility] [address] MVP (mitral valve prolapse), Myotonia, Peripelvic (lymphatic) cyst, Precordial pain, and Vitamin B12 deficiency. Past surgical history:   has a past surgical history that includes Cardiac catheterization (2/2/12); Prostate surgery (09/26/2011); Rotator cuff repair (Right, 10/2016); Colonoscopy (5/5/14); Colonoscopy (07/10/2017); lumbar discectomy (2959); ureterolithotomy (1992); Tibia fracture surgery (Right); and Lumbar disc surgery (11/2002). Social History:   reports that he has never smoked. He has never used smokeless tobacco. He reports current alcohol use. He reports that he does not use drugs. Family history:  family history includes Cancer in an other family member; Coronary Art Dis in some other family members; Diabetes in an other family member; Heart Disease in his brother, father, and mother; Prostate Cancer in an other family member.     Allergies   Allergen Reactions    Fenofibrate          Objective:   BP (!) 113/58   Pulse 81   Temp 97.9 °F (36.6 °C)   Resp 16   Ht 5' 10\" (1.778 m)   Wt 165 lb (74.8 kg)   SpO2 96%   BMI 23.68 kg/m²   No intake or output data in the 24 hours ending 08/24/22 1822    TELEMETRY:     Physical Exam:  Constitutional:  Well developed, Well nourished, No acute distress, Non-toxic appearance. HENT:  Normocephalic, Atraumatic, Bilateral external ears normal, Oropharynx moist, No oral exudates, Nose normal. Neck- Normal range of motion, No tenderness, Supple, No stridor. Eyes:  PERRL, EOMI, Conjunctiva normal, No discharge. Respiratory:  Normal breath sounds, No respiratory distress, No wheezing, No chest tenderness. ,no use of accessory muscles, diaphragm movement is normal  Cardiovascular: (PMI) apex non displaced,no lifts no thrills, no s3,no s4, Normal heart rate, Normal rhythm, No murmurs, No rubs, No gallops. Carotid arteries pulse and amplitude are normal no bruit, no abdominal bruit noted ( normal abdominal aorta ausculation), femoral arteries pulse and amplitude are normal no bruit, pedal pulses are normal  GI:  Bowel sounds normal, Soft, No tenderness, No masses, No pulsatile masses. : External genitalia appear normal, No masses or lesions. No discharge. No CVA tenderness. Musculoskeletal:  Intact distal pulses, No edema, No tenderness, No cyanosis, No clubbing. Good range of motion in all major joints. No tenderness to palpation or major deformities noted. Back- No tenderness. Integument:  Warm, Dry, No erythema, No rash. Lymphatic:  No lymphadenopathy noted. Neurologic:  Alert & oriented x 3, Normal motor function, Normal sensory function, No focal deficits noted.    Psychiatric:  Affect normal, Judgment normal, Mood normal.     Medications:    cyanocobalamin  1,000 mcg IntraMUSCular Once    vitamin B-12  500 mcg Oral Daily    aspirin  81 mg Oral Daily    [Held by provider] dilTIAZem  240 mg Oral Daily    gabapentin  600 mg Oral QAM    gabapentin  300 mg Oral Nightly    [Held by provider] hydroCHLOROthiazide  25 mg Oral Daily    pantoprazole  40 mg Oral Daily    sodium chloride flush  5-40 mL IntraVENous 2 times per day    atorvastatin  40 mg Oral Nightly    enoxaparin  40 mg SubCUTAneous Daily    polyethylene glycol  17 g Oral Daily    clopidogrel  75 mg Oral Daily      sodium chloride       sodium chloride flush, sodium chloride, acetaminophen **OR** acetaminophen, promethazine **OR** ondansetron, nitroGLYCERIN, labetalol, traZODone    Lab Data:  CBC:   Recent Labs     08/22/22  1135 08/23/22  0113   WBC 3.1* 3.8*   HGB 14.8 14.1   HCT 44.6 42.2   MCV 96.3 96.3    194     BMP:   Recent Labs     08/22/22  1135 08/23/22  0113    135   K 4.0 4.2    100   CO2 29 28   BUN 18 19   CREATININE 1.0 1.2     LIVER PROFILE:   Recent Labs     08/22/22  1135 08/23/22  0113   AST 31 24   ALT 20 17   BILITOT 0.5 0.5   ALKPHOS 40 29*     PT/INR:   Recent Labs     08/22/22  1135   PROTIME 14.0   INR 1.08     APTT: No results for input(s): APTT in the last 72 hours. BNP:  No results for input(s): BNP in the last 72 hours. TROPONIN: @TROPONINI:3@      Assessment:  79 y. o.year old who is admitted for          Plan:  TIA, resolved CTA neck and head noted patient has moderate left-sided stenosis of the vertebral artery origin and severe right-sided origin of the vertebral artery patient has moderate spinal stenosis also in the cervical region seen by neurosurgery already, had a normal echo today, has moderate stenosis of the right internal carotid artery supraclinoid region. Will recommend he continue aspirin and Plavix statinsAnd blood pressure control  Dyslipidemia: continue statins  HTN: stable, continue Cardizem hctz medicatons  All labs, medications and tests reviewed, continue all other medications of all above medical condition listed as is.       Hanane Ge MD, MD 8/24/2022 6:22 PM

## 2022-08-24 NOTE — PROGRESS NOTES
Occupational Therapy      Per therapy triage process, the OT referral has been discharged. Per nursing flowsheets pt is ambulating in room independently and performing ADLs independently and does not present w/ any skilled therapy needs. Please re-order in future if pt requires skilled therapy services.      Maggie Gooden OTR/L [id number]  4:11 PM,8/24/2022

## 2022-08-24 NOTE — PROGRESS NOTES
Hospitalist Progress Note      Name:  Becca Kowalski /Age/Sex: 1940  (79 y.o. male)   MRN & CSN:  [id number] & [id number] Admission Date/Time: 2022 11:26 AM   Location:  [facility] PCP: Raj[address] Day: 3                                               Attending Physician Dr Nury Hemphill and Plan:   Becca Kowalski is a 79 y.o.  male  who presents with Stroke-like symptoms    Strokelike symptoms. Presenting with left-sided paresthesias. R [address] reported by patient as  approximately 0830. MRI ()-nonacute   Neurochecks   Neurology consulted   Continue secondary stroke prevention  Repeat symptoms with RRT ~1200  Neurology recommending EEG     Bilateral vertebral artery stenosis  Right-sided carotid stenosis   Neurosurgery evaluation with recommendation for continued secondary prevention and nonurgent follow-up with neuro surgical intervention outpatient      Chest pain-intermittent pressure episodes   Cardiology consulted   TTE () EF 55 to 60% Trace MR/TR   ASA/DAPT   Statin    HTN   Monitor blood pressure trends   Appears controlled    HLD   Lipid panel: , , HDL 33,    Continue Lipitor    History of lumbar radiculopathy   Continue gabapentin    Diet ADULT DIET; Regular; Low Fat/Low Chol/High Fiber/TURNER   DVT Prophylaxis [x] Lovenox, []  Heparin, [] SCDs, [] Ambulation   GI Prophylaxis [] PPI,  [] H2 Blocker,  [] Carafate,  [x] Diet/Tube Feeds   Code Status Full Code     -Patient assessment and plan discussed with supervising physician-  Subjective 2022     Becca Kowalski is a 79 y.o.  male, who presented with paresthesias ]    Reported feeling back to baseline this morning but approximately 1 AM recurrence of left-sided paresthesia and chest pain. Rapid response approximately 1200 called due to progressive paresthesias left side with some chest pain.   Bedside evaluation no neurologic deficits noted EKG nonacute labs ordered    Wife at bedside and discussed POC. Ten point ROS reviewed negative, unless as noted above    Objective:   No intake or output data in the 24 hours ending 08/24/22 1154   Vitals:   Vitals:    08/24/22 0900 08/24/22 0915 08/24/22 1014 08/24/22 1100   BP:  112/69 120/76 137/71   Pulse:  78 82 91   Resp: 18 16 16    Temp:  98.2 °F (36.8 °C) 98.2 °F (36.8 °C)    TempSrc: Oral Oral Oral    SpO2:  97% 95% 95%   Weight:       Height:         Physical Exam: 08/24/22     GEN -Awake nontoxic appearing male, sitting upright in bed , NAD. Normal body habitus. Appears given age. EYES -Pupils are equally round. No scleral erythema, discharge, or conjunctivitis. exotropia  HENT -MM are moist. Oral pharynx without exudates, no evidence of thrush. NECK -Supple, no apparent thyromegaly or masses. RESP -CTA, no wheezes, rales or rhonchi. Symmetric chest movement while on room air. C/V -S1/S2 auscultated. RRR without appreciable M/R/G. No JVD or carotid bruits. Peripheral pulses equal bilaterally and palpable. No peripheral edema. GI -Abdomen is soft non distended and without significant tenderness. No masses or guarding. + BS Rectal exam deferred.  -No CVA tenderness. Siegel catheter is not present. LYMPH-No palpable cervical lymphadenopathy and no hepatosplenomegaly. No petechiae or ecchymoses. MS -No gross joint deformities. SKIN -Normal coloration, warm, dry. NEURO-Cranial nerves appear grossly intact, normal speech, no lateralizing weakness. NIHSS 0 1200  PSYC-Awake, alert, oriented x 4. Appropriate affect.     Medications:   Medications:    aspirin  81 mg Oral Daily    [Held by provider] dilTIAZem  240 mg Oral Daily    gabapentin  600 mg Oral QAM    gabapentin  300 mg Oral Nightly    [Held by provider] hydroCHLOROthiazide  25 mg Oral Daily    pantoprazole  40 mg Oral Daily    sodium chloride flush  5-40 mL IntraVENous 2 times per day    atorvastatin  40 mg Oral Nightly    enoxaparin  40 mg SubCUTAneous Daily    polyethylene glycol  17 g Oral Daily    clopidogrel  75 mg Oral Daily      Infusions:    sodium chloride       PRN Meds: sodium chloride flush, 5-40 mL, PRN  sodium chloride, , PRN  acetaminophen, 650 mg, Q6H PRN   Or  acetaminophen, 650 mg, Q6H PRN  promethazine, 12.5 mg, Q6H PRN   Or  ondansetron, 4 mg, Q6H PRN  nitroGLYCERIN, 0.4 mg, Q5 Min PRN  labetalol, 10 mg, Q10 Min PRN  traZODone, 50 mg, Nightly PRN      LABS  CBC   Recent Labs     08/22/22  1135 08/23/22  0113   WBC 3.1* 3.8*   HGB 14.8 14.1   HCT 44.6 42.2    194        RENAL  Recent Labs     08/22/22  1135 08/23/22  0113    135   K 4.0 4.2    100   CO2 29 28   BUN 18 19   CREATININE 1.0 1.2       LFT'S  Recent Labs     08/22/22  1135 08/23/22  0113   AST 31 24   ALT 20 17   BILITOT 0.5 0.5   ALKPHOS 40 29*       COAG  Recent Labs     08/22/22  1135   INR 1.08       Radiology this visit:  Reviewed. XR CHEST (2 VW)    Result Date: 8/19/2022  EXAMINATION: TWO XRAY VIEWS OF THE CHEST 8/19/2022 8:18 am COMPARISON: 02/17/2022. HISTORY: ORDERING SYSTEM PROVIDED HISTORY: Hot Flashes TECHNOLOGIST PROVIDED HISTORY: Reason for exam:->Hot Flashes Reason for Exam: Hot Flashes Additional signs and symptoms: Hot Flashes Relevant Medical/Surgical History: Hot Flashes FINDINGS: Stable minimal linear scarring to the right lung base. Lungs are otherwise clear. Cardiac and mediastinal silhouettes are within normal limits. No pneumothoraces. Bony structures appear intact. Stable exam without acute abnormality. CT HEAD WO CONTRAST    Result Date: 8/22/2022  EXAMINATION: CT OF THE HEAD WITHOUT CONTRAST  8/22/2022 11:45 am TECHNIQUE: CT of the head was performed without the administration of intravenous contrast. Automated exposure control, iterative reconstruction, and/or weight based adjustment of the mA/kV was utilized to reduce the radiation dose to as low as reasonably achievable.  COMPARISON: 01/22/2022 CT HISTORY: ORDERING SYSTEM PROVIDED HISTORY: Stroke Symptoms TECHNOLOGIST PROVIDED HISTORY: Has a \"code stroke\" or \"stroke alert\" been called? ->Yes Reason for exam:->Stroke Symptoms Decision Support Exception - unselect if not a suspected or confirmed emergency medical condition->Emergency Medical Condition (MA) Reason for Exam: stroke symptoms Additional signs and symptoms: none Relevant Medical/Surgical History: none FINDINGS: BRAIN/VENTRICLES: The ventricles and sulci are prominent. Pattern is consistent with age-related atrophy. No extra-axial fluid collections, and no sign of recent intracranial hemorrhage. Decreased attenuation is noted within the periventricular white matter. Pattern is consistent with chronic small vessel ischemic change. No acute edema or mass effect. No mass lesions are detected. ORBITS: The visualized portion of the orbits demonstrate no acute abnormality. SINUSES: The visualized paranasal sinuses and mastoid air cells demonstrate no acute abnormality. SOFT TISSUES/SKULL:  No acute abnormality of the visualized skull or soft tissues. No acute intracranial abnormality. Findings were discussed with PRAVEENA DOSHI at 12:11 pm on 8/22/2022. XR CHEST PORTABLE    Result Date: 8/22/2022  EXAMINATION: ONE XRAY VIEW OF THE CHEST 8/22/2022 11:43 am COMPARISON: Chest x-ray dated 08/19/2022. HISTORY: ORDERING SYSTEM PROVIDED HISTORY: stroke symptoms TECHNOLOGIST PROVIDED HISTORY: Reason for exam:->stroke symptoms Reason for Exam: stroke symptoms FINDINGS: HEART/MEDIASTINUM: The cardiomediastinal silhouette is within normal limits. PLEURA/LUNGS: There are no focal consolidations or pleural effusions. There is no appreciable pneumothorax. BONES/SOFT TISSUE: No acute abnormality. No radiographic evidence of acute pulmonary disease.      CTA HEAD NECK W CONTRAST    Result Date: 8/22/2022  EXAMINATION: CTA OF THE HEAD AND NECK WITH CONTRAST 8/22/2022 11:48 am: TECHNIQUE: CTA of the head and neck was canal stenosis. CTA HEAD: Evaluation is limited due to venous contamination as the venous structures are enhancing to a greater degree of the arterial structures. ANTERIOR CIRCULATION: Atherosclerosis seen of the internal carotid arteries bilaterally. There is moderate stenosis involving the right supraclinoid internal carotid artery. No significant stenosis seen of the left internal carotid artery. There is moderate diffuse irregularity involving the right anterior cerebral artery. No gross evidence of a significant stenosis otherwise seen of the anterior cerebral or middle cerebral arteries. No large vessel occlusion is seen. POSTERIOR CIRCULATION: No significant stenosis of the vertebral, basilar, or posterior cerebral arteries. No aneurysm. OTHER: The major dural venous sinuses appear patent without evidence of a thrombus. BRAIN: There is no evidence of mass effect or midline shift. 1. Stenosis seen at the origin of both vertebral arteries, severe on the right and moderate on the left. 2. Moderate stenosis involving the right vertebral artery at the C7 level. 3. No flow limiting stenosis seen of the cervical carotid/vertebral arteries. 4. Evaluation of the intracranial vasculature is partially limited due to the predominantly venous nature of the exam. 5. There appears to be moderate stenosis involving is the right supraclinoid internal carotid artery. 6. There is moderate diffuse irregularity involving the right anterior cerebral artery. 7. No significant stenosis or large vessel occlusion otherwise seen of the [id number]. 8. 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      MRI brain without contrast    Result Date: 8/23/2022  EXAMINATION: MRI OF THE BRAIN WITHOUT CONTRAST  8/23/2022 7:53 am TECHNIQUE: Multiplanar multisequence MRI of the brain was performed without the administration of intravenous contrast. COMPARISON: 04/10/2021. HISTORY: ORDERING SYSTEM PROVIDED HISTORY: left sided facial numbness, stroke like symptoms TECHNOLOGIST PROVIDED HISTORY: Reason for exam:->left sided facial numbness, stroke like symptoms Reason for Exam: left sided facial numbness, stroke like symptoms Additional signs and symptoms: NONE Relevant Medical/Surgical History: NONE Initial evaluation. FINDINGS: INTRACRANIAL STRUCTURES/VENTRICLES: There is no acute infarct. No mass effect or midline shift. No evidence of an acute intracranial hemorrhage. Areas of T2 FLAIR hyperintensity are seen in the periventricular and subcortical white matter, which are nonspecific, but may represent chronic microvascular ischemic change. There is minimal global parenchymal volume loss. Otherwise, the ventricles and sulci are normal in size and configuration. The sellar/suprasellar regions appear unremarkable. The normal signal voids within the major intracranial vessels appear maintained. ORBITS: The visualized portion of the orbits demonstrate no acute abnormality. SINUSES: Moderate mucosal thickening of the left maxillary sinus. There appears to be a trace right mastoid effusion. BONES/SOFT TISSUES: The bone marrow signal intensity appears normal. The soft tissues demonstrate no acute abnormality. 1. No acute intracranial abnormality. No acute infarct. 2. Minimal global parenchymal volume loss with moderate chronic microvascular ischemic changes.          Electronically signed by Jadine Bence, APRN - CNP on 8/24/2022 at 11:54 AM

## 2022-08-25 VITALS
HEART RATE: 87 BPM | HEIGHT: 70 IN | OXYGEN SATURATION: 97 % | DIASTOLIC BLOOD PRESSURE: 73 MMHG | WEIGHT: 165 LBS | RESPIRATION RATE: 16 BRPM | BODY MASS INDEX: 23.62 KG/M2 | SYSTOLIC BLOOD PRESSURE: 107 MMHG | TEMPERATURE: 98.8 F

## 2022-08-25 PROBLEM — I67.9 INTRACRANIAL VASCULAR STENOSIS: Status: ACTIVE | Noted: 2022-08-25

## 2022-08-25 LAB
ALBUMIN SERPL-MCNC: 3.8 GM/DL (ref 3.4–5)
ALP BLD-CCNC: 30 IU/L (ref 40–128)
ALT SERPL-CCNC: 15 U/L (ref 10–40)
ANION GAP SERPL CALCULATED.3IONS-SCNC: 4 MMOL/L (ref 4–16)
AST SERPL-CCNC: 20 IU/L (ref 15–37)
BILIRUB SERPL-MCNC: 0.4 MG/DL (ref 0–1)
BUN BLDV-MCNC: 24 MG/DL (ref 6–23)
CALCIUM SERPL-MCNC: 9.5 MG/DL (ref 8.3–10.6)
CHLORIDE BLD-SCNC: 103 MMOL/L (ref 99–110)
CO2: 30 MMOL/L (ref 21–32)
CREAT SERPL-MCNC: 1 MG/DL (ref 0.9–1.3)
EKG ATRIAL RATE: 93 BPM
EKG DIAGNOSIS: NORMAL
EKG P AXIS: 67 DEGREES
EKG P-R INTERVAL: 196 MS
EKG Q-T INTERVAL: 366 MS
EKG QRS DURATION: 92 MS
EKG QTC CALCULATION (BAZETT): 455 MS
EKG R AXIS: 2 DEGREES
EKG T AXIS: 68 DEGREES
EKG VENTRICULAR RATE: 93 BPM
GFR AFRICAN AMERICAN: >60 ML/MIN/1.73M2
GFR NON-AFRICAN AMERICAN: >60 ML/MIN/1.73M2
GLUCOSE BLD-MCNC: 99 MG/DL (ref 70–99)
HCT VFR BLD CALC: 41.2 % (ref 42–52)
HEMOGLOBIN: 13.7 GM/DL (ref 13.5–18)
MCH RBC QN AUTO: 32.4 PG (ref 27–31)
MCHC RBC AUTO-ENTMCNC: 33.3 % (ref 32–36)
MCV RBC AUTO: 97.4 FL (ref 78–100)
PDW BLD-RTO: 12.4 % (ref 11.7–14.9)
PLATELET # BLD: 172 K/CU MM (ref 140–440)
PMV BLD AUTO: 9.4 FL (ref 7.5–11.1)
POTASSIUM SERPL-SCNC: 4.8 MMOL/L (ref 3.5–5.1)
RBC # BLD: 4.23 M/CU MM (ref 4.6–6.2)
SODIUM BLD-SCNC: 137 MMOL/L (ref 135–145)
TOTAL PROTEIN: 5.7 GM/DL (ref 6.4–8.2)
WBC # BLD: 3.6 K/CU MM (ref 4–10.5)

## 2022-08-25 PROCEDURE — 94761 N-INVAS EAR/PLS OXIMETRY MLT: CPT

## 2022-08-25 PROCEDURE — 85027 COMPLETE CBC AUTOMATED: CPT

## 2022-08-25 PROCEDURE — 80053 COMPREHEN METABOLIC PANEL: CPT

## 2022-08-25 PROCEDURE — G0378 HOSPITAL OBSERVATION PER HR: HCPCS

## 2022-08-25 PROCEDURE — 93010 ELECTROCARDIOGRAM REPORT: CPT | Performed by: INTERNAL MEDICINE

## 2022-08-25 PROCEDURE — 99225 PR SBSQ OBSERVATION CARE/DAY 25 MINUTES: CPT | Performed by: CLINICAL NURSE SPECIALIST

## 2022-08-25 PROCEDURE — 6370000000 HC RX 637 (ALT 250 FOR IP): Performed by: STUDENT IN AN ORGANIZED HEALTH CARE EDUCATION/TRAINING PROGRAM

## 2022-08-25 PROCEDURE — 36415 COLL VENOUS BLD VENIPUNCTURE: CPT

## 2022-08-25 PROCEDURE — 6370000000 HC RX 637 (ALT 250 FOR IP): Performed by: NURSE PRACTITIONER

## 2022-08-25 RX ORDER — NITROGLYCERIN 0.4 MG/1
TABLET SUBLINGUAL
Qty: 25 TABLET | Refills: 3 | Status: SHIPPED | OUTPATIENT
Start: 2022-08-25

## 2022-08-25 RX ORDER — ATORVASTATIN CALCIUM 40 MG/1
40 TABLET, FILM COATED ORAL NIGHTLY
Qty: 30 TABLET | Refills: 3 | Status: SHIPPED | OUTPATIENT
Start: 2022-08-25 | End: 2022-09-23 | Stop reason: SDUPTHER

## 2022-08-25 RX ORDER — TRAZODONE HYDROCHLORIDE 50 MG/1
50 TABLET ORAL NIGHTLY PRN
Qty: 30 TABLET | Refills: 0 | Status: SHIPPED | OUTPATIENT
Start: 2022-08-25 | End: 2022-09-23

## 2022-08-25 RX ORDER — HYDROCHLOROTHIAZIDE 25 MG/1
25 TABLET ORAL DAILY
Qty: 30 TABLET | Refills: 0 | Status: SHIPPED
Start: 2022-08-25 | End: 2022-09-13 | Stop reason: ALTCHOICE

## 2022-08-25 RX ADMIN — CYANOCOBALAMIN TAB 1000 MCG 500 MCG: 1000 TAB at 11:15

## 2022-08-25 RX ADMIN — GABAPENTIN 600 MG: 300 CAPSULE ORAL at 11:16

## 2022-08-25 RX ADMIN — ASPIRIN 81 MG: 81 TABLET, COATED ORAL at 11:16

## 2022-08-25 RX ADMIN — PANTOPRAZOLE SODIUM 40 MG: 40 TABLET, DELAYED RELEASE ORAL at 11:16

## 2022-08-25 RX ADMIN — CLOPIDOGREL BISULFATE 75 MG: 75 TABLET ORAL at 11:16

## 2022-08-25 NOTE — PROGRESS NOTES
Outpatient Pharmacy Progress Note for Meds-to-Beds    Total number of Prescriptions Filled: 4  The following medications were dispensed to the patient during the discharge process:  Nitroglycerin sublingual tabs  Trazodone  Atorvastatin  Vitamin B-12    Additional Documentation:  Patient picked-up the medication(s) in the OP Pharmacy      Thank you for letting us serve your patients.   [address] [place]    [address], [address] [facility]    Phone: [phone]    Fax: [phone]

## 2022-08-25 NOTE — PROGRESS NOTES
Neurology Progress Note  [facility]  Patient Name: Kostas Simpson     : 1940      Subjective:   C C: stroke like symptoms. The patient was seen and examined. Chart reviewed in detail. Patient sitting up on side of bed with visitor. He denies any further episodes of facial paresthesia. He denies headache. Discussed EEG results and recommendations for follow up. Objective:   Scheduled Meds:   vitamin B-12  500 mcg Oral Daily    aspirin  81 mg Oral Daily    [Held by provider] dilTIAZem  240 mg Oral Daily    gabapentin  600 mg Oral QAM    gabapentin  300 mg Oral Nightly    [Held by provider] hydroCHLOROthiazide  25 mg Oral Daily    pantoprazole  40 mg Oral Daily    sodium chloride flush  5-40 mL IntraVENous 2 times per day    atorvastatin  40 mg Oral Nightly    enoxaparin  40 mg SubCUTAneous Daily    polyethylene glycol  17 g Oral Daily    clopidogrel  75 mg Oral Daily     Continuous Infusions:   sodium chloride       PRN Meds:.sodium chloride flush, sodium chloride, acetaminophen **OR** acetaminophen, promethazine **OR** ondansetron, nitroGLYCERIN, labetalol, traZODone    Vital Signs:  Vitals:    22 1941 22 0103 22 0411 22 1100   BP: 106/81 122/78 105/80 107/73   Pulse: 78 88 87    Resp: 14 16 16 16   Temp: 97.7 °F (36.5 °C) 97.7 °F (36.5 °C) 98.8 °F (37.1 °C)    TempSrc: Oral      SpO2: 95% 95% 96% 97%   Weight:       Height:              General: A&O x 4, NAD, cooperative  HEENT: NC/AT, EOMI, PERRL, mmm, neck supple  Extremities: no edema, no calf tenderness b/l    Neurological Exam:         Mental Status:  A&O to self, location, and year.  NAD, speech clear, language fluent, repetition and naming intact, follows commands appropriately     Cranial Nerves:  CN II-XII intact     Sensation: intact LT/temp UE/LE's b/l     Motor: 5/5 UE/LE's b/l, tone and bulk normal, no pronator drift     Reflexes: 2/4 biceps, triceps, brachioradialis, patellar, and achilles bilaterally; flexor plantar responses bilaterally     Coordination:       Tremors-- None          Gait/Station: Deferred    Labs:   Recent Labs     08/23/22  0113 08/25/22  0524   WBC 3.8* 3.6*    137   K 4.2 4.8    103   CO2 28 30   BUN 19 24*   CREATININE 1.2 1.0   GLUCOSE 91 99   ,Last [email or web address]  Last Liver Function Results:  @LABRCNTIP(ALT:3,AST:3,BILITOTAL:3,BILIDIR:3,ALKPHOS:3)@     Imaging Studies:     CT head w/o contrast:  Impression   No acute intracranial abnormality. CTA head and neck:  Impression   1. Stenosis seen at the origin of both vertebral arteries, severe on the   right and moderate on the left. 2. Moderate stenosis involving the right vertebral artery at the C7 level. 3. No flow limiting stenosis seen of the cervical carotid/vertebral arteries. 4. Evaluation of the intracranial vasculature is partially limited due to the   predominantly venous nature of the exam.   5. There appears to be moderate stenosis involving is the right supraclinoid   internal carotid artery. 6. There is moderate diffuse irregularity involving the right anterior   cerebral artery. 7. No significant stenosis or large vessel occlusion otherwise seen of the   [id number]. 8. There is suggestion of ossification of the posterior longitudinal ligament   at the C6-C7 level, which contributes to least moderate spinal canal stenosis. MRI brain w/o contrast:  Impression   1. No acute intracranial abnormality. No acute infarct. 2. Minimal global parenchymal volume loss with moderate chronic microvascular   ischemic changes. Above imaging personally reviewed. EEG Interpretation: This EEG was within normal limits for a patient of this age in the awake and drowsy states.  No focal, lateralizing, or epileptiform features were seen during the recording  Assessment/Plan:   79year old gentleman presents with left facial sensory changes that are transient in nature. No further episodes. Patient is sitting up in bed, alert and oriented x 4. He denies headache. 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 Face is symmetrical, sensation is intact. Speech is clear, language is fluent. Strength and sensation are intact in upper and lower extremities. There are no new focal or lateralizing findings on exam.    Intermittent episodes of left facial sensory change proceeded by chills or flushing and whole body numbness. Consideration for complex headache given prior history or focal seizure. TIA can not fully be excluded however symptoms are very focal not consistent with TIA. CT head as above - non acute  MRI brain - non acute  CTA head and neck - notes stenosis of the vertebral arteries and right supraclinoid ICA. These findings are similar to prior CTA study completed 4/9/21  Recommend outpatient follow up with Dr. Luis Orantes, neuro intervention for consideration of diagnostic cerebral angiogram.   Do not feel that this stenosis is a contributing factor to patients symptoms. Continue Plavix, aspirin and atorvastatin for secondary stroke prevention  Cardiology following  EEG as above. Recommend he follow up with us as an outpatient and we can get him set up for ambulatory EEG. B12 low at 213. Vitamin replacement has been started. Follow up with Dr. Casandra Barcenas. Nothing further from our standpoint. We will sign off.     > 20 minutes of time spent included chart review, obtaining history, patient examination, developing plan of care, and documentation. Patient was discussed with Attending Physician    Paz Keane.  Becka CAO-CNS  Neurology

## 2022-08-25 NOTE — PROGRESS NOTES
Patient's IV removed without difficulty. Education provided per this RN about medication changes, follow-up appointments, and Nitro use. Patient taken out per wheelchair with family.

## 2022-08-25 NOTE — PLAN OF CARE
Problem: Safety - Adult  Goal: Free from fall injury  Outcome: Progressing     Problem: Skin/Tissue Integrity  Goal: Absence of new skin breakdown  Description: 1. Monitor for areas of redness and/or skin breakdown  2. Assess vascular access sites hourly  3. Every 4-6 hours minimum:  Change oxygen saturation probe site  4. Every 4-6 hours:  If on nasal continuous positive airway pressure, respiratory therapy assess nares and determine need for appliance change or resting period.   Outcome: Progressing     Problem: Discharge Planning  Goal: Discharge to home or other facility with appropriate resources  Outcome: Progressing  Flowsheets (Taken 8/24/2022 0900 by Thierno Colon RN)  Discharge to home or other facility with appropriate resources:   Identify barriers to discharge with patient and caregiver   Arrange for needed discharge resources and transportation as appropriate   Identify discharge learning needs (meds, wound care, etc)   Arrange for interpreters to assist at discharge as needed   Refer to discharge planning if patient needs post-hospital services based on physician order or complex needs related to functional status, cognitive ability or social support system     Problem: Pain  Goal: Verbalizes/displays adequate comfort level or baseline comfort level  Outcome: Progressing

## 2022-08-26 ENCOUNTER — CARE COORDINATION (OUTPATIENT)
Dept: CASE MANAGEMENT | Age: 82
End: 2022-08-26

## 2022-08-26 ENCOUNTER — TELEPHONE (OUTPATIENT)
Dept: FAMILY MEDICINE CLINIC | Age: 82
End: 2022-08-26

## 2022-08-26 ENCOUNTER — HOSPITAL ENCOUNTER (EMERGENCY)
Age: 82
Discharge: HOME | End: 2022-08-26
Attending: EMERGENCY MEDICINE
Payer: MEDICARE

## 2022-08-26 ENCOUNTER — APPOINTMENT (OUTPATIENT)
Dept: GENERAL RADIOLOGY | Age: 82
End: 2022-08-26
Payer: MEDICARE

## 2022-08-26 VITALS
RESPIRATION RATE: 14 BRPM | OXYGEN SATURATION: 97 % | TEMPERATURE: 98 F | WEIGHT: 165 LBS | BODY MASS INDEX: 23.62 KG/M2 | SYSTOLIC BLOOD PRESSURE: 133 MMHG | HEART RATE: 70 BPM | HEIGHT: 70 IN | DIASTOLIC BLOOD PRESSURE: 89 MMHG

## 2022-08-26 DIAGNOSIS — I65.21 STENOSIS OF RIGHT CAROTID ARTERY: Primary | ICD-10-CM

## 2022-08-26 DIAGNOSIS — R07.9 CHEST PAIN, UNSPECIFIED TYPE: Primary | ICD-10-CM

## 2022-08-26 LAB
ALBUMIN SERPL-MCNC: 4.1 GM/DL (ref 3.4–5)
ALP BLD-CCNC: 41 IU/L (ref 40–129)
ALT SERPL-CCNC: 30 U/L (ref 10–40)
ANION GAP SERPL CALCULATED.3IONS-SCNC: 5 MMOL/L (ref 4–16)
AST SERPL-CCNC: 34 IU/L (ref 15–37)
BASOPHILS ABSOLUTE: 0 K/CU MM
BASOPHILS RELATIVE PERCENT: 0.2 % (ref 0–1)
BILIRUB SERPL-MCNC: 0.4 MG/DL (ref 0–1)
BUN BLDV-MCNC: 25 MG/DL (ref 6–23)
CALCIUM SERPL-MCNC: 9.5 MG/DL (ref 8.3–10.6)
CHLORIDE BLD-SCNC: 97 MMOL/L (ref 99–110)
CO2: 26 MMOL/L (ref 21–32)
CREAT SERPL-MCNC: 1.1 MG/DL (ref 0.9–1.3)
DIFFERENTIAL TYPE: ABNORMAL
EOSINOPHILS ABSOLUTE: 0.1 K/CU MM
EOSINOPHILS RELATIVE PERCENT: 1.5 % (ref 0–3)
GFR AFRICAN AMERICAN: >60 ML/MIN/1.73M2
GFR NON-AFRICAN AMERICAN: >60 ML/MIN/1.73M2
GLUCOSE BLD-MCNC: 140 MG/DL (ref 70–99)
HCT VFR BLD CALC: 40.5 % (ref 42–52)
HEMOGLOBIN: 13.6 GM/DL (ref 13.5–18)
IMMATURE NEUTROPHIL %: 0.2 % (ref 0–0.43)
LYMPHOCYTES ABSOLUTE: 1 K/CU MM
LYMPHOCYTES RELATIVE PERCENT: 24.2 % (ref 24–44)
MCH RBC QN AUTO: 32.5 PG (ref 27–31)
MCHC RBC AUTO-ENTMCNC: 33.6 % (ref 32–36)
MCV RBC AUTO: 96.7 FL (ref 78–100)
MONOCYTES ABSOLUTE: 0.3 K/CU MM
MONOCYTES RELATIVE PERCENT: 7.6 % (ref 0–4)
NUCLEATED RBC %: 0 %
PDW BLD-RTO: 12.5 % (ref 11.7–14.9)
PLATELET # BLD: 196 K/CU MM (ref 140–440)
PMV BLD AUTO: 9.5 FL (ref 7.5–11.1)
POTASSIUM SERPL-SCNC: 4 MMOL/L (ref 3.5–5.1)
PRO-BNP: 34.1 PG/ML
RBC # BLD: 4.19 M/CU MM (ref 4.6–6.2)
SEGMENTED NEUTROPHILS ABSOLUTE COUNT: 2.7 K/CU MM
SEGMENTED NEUTROPHILS RELATIVE PERCENT: 66.3 % (ref 36–66)
SODIUM BLD-SCNC: 128 MMOL/L (ref 135–145)
TOTAL IMMATURE NEUTOROPHIL: 0.01 K/CU MM
TOTAL NUCLEATED RBC: 0 K/CU MM
TOTAL PROTEIN: 6.3 GM/DL (ref 6.4–8.2)
TROPONIN T: <0.01 NG/ML
TROPONIN T: <0.01 NG/ML
WBC # BLD: 4.1 K/CU MM (ref 4–10.5)

## 2022-08-26 PROCEDURE — 83880 ASSAY OF NATRIURETIC PEPTIDE: CPT

## 2022-08-26 PROCEDURE — 71045 X-RAY EXAM CHEST 1 VIEW: CPT

## 2022-08-26 PROCEDURE — 1111F DSCHRG MED/CURRENT MED MERGE: CPT | Performed by: STUDENT IN AN ORGANIZED HEALTH CARE EDUCATION/TRAINING PROGRAM

## 2022-08-26 PROCEDURE — 93005 ELECTROCARDIOGRAM TRACING: CPT | Performed by: EMERGENCY MEDICINE

## 2022-08-26 PROCEDURE — 80053 COMPREHEN METABOLIC PANEL: CPT

## 2022-08-26 PROCEDURE — 84484 ASSAY OF TROPONIN QUANT: CPT

## 2022-08-26 PROCEDURE — 6370000000 HC RX 637 (ALT 250 FOR IP): Performed by: EMERGENCY MEDICINE

## 2022-08-26 PROCEDURE — 85025 COMPLETE CBC W/AUTO DIFF WBC: CPT

## 2022-08-26 PROCEDURE — 99285 EMERGENCY DEPT VISIT HI MDM: CPT

## 2022-08-26 RX ORDER — NITROGLYCERIN 0.4 MG/1
0.4 TABLET SUBLINGUAL EVERY 5 MIN PRN
Status: DISCONTINUED | OUTPATIENT
Start: 2022-08-26 | End: 2022-08-26 | Stop reason: HOSPADM

## 2022-08-26 RX ORDER — ASPIRIN 81 MG/1
324 TABLET, CHEWABLE ORAL ONCE
Status: COMPLETED | OUTPATIENT
Start: 2022-08-26 | End: 2022-08-26

## 2022-08-26 RX ORDER — MAGNESIUM HYDROXIDE/ALUMINUM HYDROXICE/SIMETHICONE 120; 1200; 1200 MG/30ML; MG/30ML; MG/30ML
30 SUSPENSION ORAL ONCE
Status: COMPLETED | OUTPATIENT
Start: 2022-08-26 | End: 2022-08-26

## 2022-08-26 RX ADMIN — ASPIRIN 81 MG CHEWABLE TABLET 324 MG: 81 TABLET CHEWABLE at 17:38

## 2022-08-26 RX ADMIN — NITROGLYCERIN 0.4 MG: 0.4 TABLET, ORALLY DISINTEGRATING SUBLINGUAL at 18:26

## 2022-08-26 RX ADMIN — ALUMINUM HYDROXIDE, MAGNESIUM HYDROXIDE, AND SIMETHICONE 30 ML: 200; 200; 20 SUSPENSION ORAL at 20:47

## 2022-08-26 ASSESSMENT — PAIN SCALES - GENERAL
PAINLEVEL_OUTOF10: 3
PAINLEVEL_OUTOF10: 7

## 2022-08-26 ASSESSMENT — PAIN DESCRIPTION - DESCRIPTORS: DESCRIPTORS: DULL;ACHING

## 2022-08-26 ASSESSMENT — HEART SCORE: ECG: 0

## 2022-08-26 ASSESSMENT — PAIN DESCRIPTION - ORIENTATION: ORIENTATION: LEFT

## 2022-08-26 ASSESSMENT — PAIN - FUNCTIONAL ASSESSMENT: PAIN_FUNCTIONAL_ASSESSMENT: NONE - DENIES PAIN

## 2022-08-26 ASSESSMENT — PAIN DESCRIPTION - FREQUENCY: FREQUENCY: CONTINUOUS

## 2022-08-26 ASSESSMENT — PAIN DESCRIPTION - PAIN TYPE: TYPE: ACUTE PAIN

## 2022-08-26 ASSESSMENT — PAIN DESCRIPTION - LOCATION: LOCATION: CHEST

## 2022-08-26 NOTE — ED PROVIDER NOTES
Emergency Department Encounter    Patient: Mary Pandya  MRN: [id number]  : 1940  Date of Evaluation: 2022  ED Provider:  Tahmina Sewell MD    Triage Chief Complaint:   Chest Pain    [place]:  Mary Pandya is a 79 y.o. male that presents with complaint of chest pain, over the left side of his chest, the entire chest.  Reports it started this morning, gradually is worsened a little bit, pressure. Does not change with exertion. No shortness of breath. No nausea or vomiting. No sweating. He has had similar previously. He has had cardiac cath in the past that was normal.  He sees Dr. Arriaga Has at the [facility]. He was actually just admitted to the hospital for stroke work-up and had these pains while in the hospital and had echocardiogram while here and was seen by his cardiologist.  Pain is a 3 out of 10. No numbness or weakness in his extremities. Denies relieving or exacerbating factors. ROS - see HPI, below listed is current ROS at time of my eval:  10 systems reviewed and negative except as above    Past Medical History:   Diagnosis Date    Anesthesia complication     Patient reports in  following a Back Surgery he was kept in the hospital due to high temperatures in the evenings. He stated they kept him for observation and doesn't remember receiving any treatment for the high temperatures. Patient reports the anesthesia name started with flur? ?.    BPH with obstruction/lower urinary tract symptoms     Chest pain 2018    Chronic low back pain     GERD (gastroesophageal reflux disease)     H/O 24 hour EKG monitoring 3/12  12/11    3/12/12  essentially a normal event monitor without significant arrhythmias noted    H/O cardiac catheterization 2/2/12 4/02 10/98    2/22/12  LM, LAD and RCA all without significant disease    H/O cardiac catheterization 2/2/12 4/02 10/98    Done R/T false pos on stress test.    H/O cardiac catheterization 2018    normal study    H/O cardiovascular stress test 07/02/2018    normal study    H/O echocardiogram 12/10      3/14 EF55-60% normal LV function 12/10/10  complete 2 dim transthoracic echj. LVSF normal  EF >55%, transmitral spectral Doppler flow pattern is suggestive of  impaired LV relaxation    History of cardiovascular stress test 2/12 11/09 7/08 9/06 2/02 4/00 8/99 2/8/2012  evidence of mild ischemia  in the RCA. abnormal study , restingEF is 70%, global LVSF is  normal    History of Holter monitoring 9/17/14    14 day EVENT - no RVR    History of nuclear stress test 08/03/2017    cardiolite-normal,EF60%    Hx of chest x-ray 2/20/12    chest is considereed non acute. COPD    Hx of echocardiogram 08/03/2017    QD44-86%,GNYBI 2 diastolic dysfunction    Hyperlipidemia     Idiopathic peripheral neuropathy     Kidney stone     Left facial numbness 8/30/2017    Lumbar radiculopathy     Malignant melanoma of skin of face (HCC)     MVP (mitral valve prolapse)     Myotonia     chronic increased CPK's    Peripelvic (lymphatic) cyst     per CT    Precordial pain 8/3/2018    Vitamin B12 deficiency      Past Surgical History:   Procedure Laterality Date    CARDIAC CATHETERIZATION  2/2/12    EF 55% No sugnificant disease.     COLONOSCOPY  5/5/14    diverticulosis, 1 polyp, hemorrhoids    COLONOSCOPY  07/10/2017    single 5 mm polyp found in sigmoid colon, moderate diverticulosis found in sigmoid colon    LUMBAR DISC SURGERY  11/2002    L4-5 hemilaminectomy and disk    LUMBAR DISCECTOMY  [address] [employer] [place] [employer] [place]  09/26/2011    s/p robotic radical prostatectomy    ROTATOR CUFF REPAIR Right 10/2016    TIBIA FRACTURE SURGERY Right     childhood    URETEROLITHOTOMY  1992     Family History   Problem Relation Age of Onset    Heart Disease Mother         ASCAD    Heart Disease Father     Heart Disease Brother     Diabetes Other     Cancer Other         unknown type    Prostate Cancer Other     Coronary Art Dis Other     Coronary Art Dis Other Social History     Socioeconomic History    Marital status:      Spouse name: Not on file    Number of children: Not on file    Years of education: Not on file    Highest education level: Not on file   Occupational History    Not on file   Tobacco Use    Smoking status: Never    Smokeless tobacco: Never   Vaping Use    Vaping Use: Never used   Substance and Sexual Activity    Alcohol use: Yes     Comment: Occassional    Drug use: No    Sexual activity: Not Currently     Partners: Female     Comment:    Other Topics Concern    Not on file   Social History Narrative    Not on file     Social Determinants of Health     Financial Resource Strain: Not on file   Food Insecurity: Not on file   Transportation Needs: No Transportation Needs    Lack of Transportation (Medical): No    Lack of Transportation (Non-Medical): No   Physical Activity: Sufficiently Active    Days of Exercise per Week: 7 days    Minutes of Exercise per Session: 40 min   Stress: No Stress Concern Present    Feeling of Stress : Not at all   Social Connections: Socially Isolated    Frequency of Communication with Friends and Family: Twice a week    Frequency of Social Gatherings with Friends and Family: Once a week    Attends Jew Services: Never    Active Member of Clubs or Organizations: No    Attends Club or Organization Meetings: Never    Marital Status:     Intimate Partner Violence: Not on file   Housing Stability: Low Risk     Unable to Pay for Housing in the Last Year: No    Number of Places Lived in the Last Year: 1    Unstable Housing in the Last Year: No     Current Facility-Administered Medications   Medication Dose Route Frequency Provider Last Rate Last Admin    nitroGLYCERIN (NITROSTAT) SL tablet 0.4 mg  0.4 mg SubLINGual Q5 Min PRN Marcell Stoner MD   0.4 mg at 08/26/22 7760     Current Outpatient Medications   Medication Sig Dispense Refill    nitroGLYCERIN (NITROSTAT) 0.4 MG SL tablet up to max of 3 total doses. If no relief after 1 dose, call 911. 25 tablet 3    traZODone (DESYREL) 50 MG tablet Take 1 tablet by mouth nightly as needed for Sleep 30 tablet 0    atorvastatin (LIPITOR) 40 MG tablet Take 1 tablet by mouth nightly 30 tablet 3    hydroCHLOROthiazide (HYDRODIURIL) 25 MG tablet Take 1 tablet by mouth daily 08/22/22 Patient states he only takes 1/2 tablet 30 tablet 0    vitamin B-12 500 MCG tablet Take 1 tablet by mouth daily 30 tablet 3    gabapentin (NEURONTIN) 300 MG capsule Take 300 mg by mouth nightly. dilTIAZem (DILACOR XR) 240 MG extended release capsule Take 240 mg by mouth daily      levothyroxine (SYNTHROID) 50 MCG tablet TAKE 1 TABLET BY MOUTH IN  THE MORNING 90 tablet 0    pantoprazole (PROTONIX) 40 MG tablet TAKE 1 TABLET BY MOUTH  DAILY 90 tablet 1    gabapentin (NEURONTIN) 300 MG capsule TAKE 1 CAPSULE BY MOUTH 3  TIMES DAILY (Patient taking differently: Take 300 mg by mouth 2 times daily.) 270 capsule 1    clopidogrel (PLAVIX) 75 MG tablet Take 1 tablet by mouth daily 90 tablet 3    aspirin 81 MG tablet Take 81 mg by mouth daily       Allergies   Allergen Reactions    Fenofibrate        Nursing Notes Reviewed    Physical Exam:  Triage VS:    ED Triage Vitals [08/26/22 1602]   Enc Vitals Group      BP (!) 121/54      Heart Rate (!) 120      Resp 18      Temp 98 °F (36.7 °C)      Temp Source Oral      SpO2 94 %      Weight 165 lb (74.8 kg)      Height 5' 10\" (1.778 m)      Head Circumference       Peak Flow       Pain Score       Pain Loc       Pain Edu? Excl. in [address]? My pulse ox interpretation is - normal    General appearance:  No acute distress. Skin:  Warm. Dry. Eye:  Extraocular movements intact. Ears, nose, mouth and throat:  Oral mucosa moist   Neck:  Trachea midline. Extremity:  No swelling. Normal ROM     Heart:  Regular rate and rhythm, normal S1 & S2, no extra heart sounds. Perfusion:  intact  Respiratory:  Lungs clear to auscultation bilaterally. Respirations nonlabored. Abdominal:  Soft. Nontender. Non distended.   Neurological:  Alert and oriented          Psychiatric:  Appropriate    I have reviewed and interpreted all of the currently available lab results from this visit (if applicable):  Results for orders placed or performed during the hospital encounter of 08/26/22   CBC with Auto Differential   Result Value Ref Range    WBC 4.1 4.0 - 10.5 K/CU MM    RBC 4.19 (L) 4.6 - 6.2 M/CU MM    Hemoglobin 13.6 13.5 - 18.0 GM/DL    Hematocrit 40.5 (L) 42 - 52 %    MCV 96.7 78 - 100 FL    MCH 32.5 (H) 27 - 31 PG    MCHC 33.6 32.0 - 36.0 %    RDW 12.5 11.7 - 14.9 %    Platelets 037 197 - 157 K/CU MM    MPV 9.5 7.5 - 11.1 FL    Differential Type AUTOMATED DIFFERENTIAL     Segs Relative 66.3 (H) 36 - 66 %    Lymphocytes % 24.2 24 - 44 %    Monocytes % 7.6 (H) 0 - 4 %    Eosinophils % 1.5 0 - 3 %    Basophils % 0.2 0 - 1 %    Segs Absolute 2.7 K/CU MM    Lymphocytes Absolute 1.0 K/CU MM    Monocytes Absolute 0.3 K/CU MM    Eosinophils Absolute 0.1 K/CU MM    Basophils Absolute 0.0 K/CU MM    Nucleated RBC % 0.0 %    Total Nucleated RBC 0.0 K/CU MM    Total Immature Neutrophil 0.01 K/CU MM    Immature Neutrophil % 0.2 0 - 0.43 %   Comprehensive Metabolic Panel   Result Value Ref Range    Sodium 128 (L) 135 - 145 MMOL/L    Potassium 4.0 3.5 - 5.1 MMOL/L    Chloride 97 (L) 99 - 110 mMol/L    CO2 26 21 - 32 MMOL/L    BUN 25 (H) 6 - 23 MG/DL    Creatinine 1.1 0.9 - 1.3 MG/DL    Glucose 140 (H) 70 - 99 MG/DL    Calcium 9.5 8.3 - 10.6 MG/DL    Albumin 4.1 3.4 - 5.0 GM/DL    Total Protein 6.3 (L) 6.4 - 8.2 GM/DL    Total Bilirubin 0.4 0.0 - 1.0 MG/DL    ALT 30 10 - 40 U/L    AST 34 15 - 37 IU/L    Alkaline Phosphatase 41 40 - 129 IU/L    GFR Non-African American >60 >60 mL/min/1.73m2    GFR African American >60 >60 mL/min/1.73m2    Anion Gap 5 4 - 16   Troponin   Result Value Ref Range    Troponin T <0.010 <0.01 NG/ML   Brain Natriuretic Peptide   Result Value Ref Range recognition software and may contain errors related to that system including errors in grammar, punctuation, and spelling, as well as words and phrases that may be inappropriate. Efforts were made to edit the dictations.        Padmini Flores MD  08/26/22 1097

## 2022-08-26 NOTE — ED NOTES
Umpire, Lactic and Pink top drawn     [facility]  08/26/22 1611

## 2022-08-26 NOTE — CARE COORDINATION
Request from [address] [place] [address] Drive, RN.,  Please schedule patient for following:    [facility]- called office unable to speak to anyone, on hold 30mins. will call Monday    Dr. Zak Shabazz [facility]--NP  Sept 13th 3pm--  Called patient with time and date of this appt.       Mehreen Valadez, [address] [facility] Coordination Transition

## 2022-08-26 NOTE — TELEPHONE ENCOUNTER
[facility] Transitions Initial Follow Up Call    Outreach made within 2 business days of discharge: Yes    Patient: Angela Durant Patient : 1940   MRN: [id number]  Reason for Admission: There are no discharge diagnoses documented for the most recent discharge. Discharge Date: 22       Spoke with: Patient    Discharge department/facility: [facility]    TCM Interactive Patient Contact:  Was patient able to fill all prescriptions: Yes  Was patient instructed to bring all medications to the follow-up visit: Yes  Is patient taking all medications as directed in the discharge summary?  Yes  Does patient understand their discharge instructions: Yes  Does patient have questions or concerns that need addressed prior to 7-14 day follow up office visit: no    Scheduled appointment with PCP within 7-14 days    Follow Up  Future Appointments   Date Time Provider Devan Haque   2022  9:15 AM Wili Díaz [facility] FPS [facility]   10/7/2022  9:45 AM NELLIE Polanco - CNP [facility] NEURO [facility]   12/15/2022 10:00 AM Martínez MARIA/ Pool [facility] [address] [facility]       Linda Meraz MA

## 2022-08-26 NOTE — CARE COORDINATION
Patient possible readmission. Patient was just admitted 8/22/2022 - 8/25/2022 for recurrent episode of diffuse paresthesias. Patient presents to the ED today 8/26/2022 with complaint of Chest Pain. No Acute Findings. Patient Discharged Home.

## 2022-08-26 NOTE — CARE COORDINATION
[facility] Transitions Initial Follow Up Call    Call within 2 business days of discharge: Yes    Patient: Quoc Fuentes Patient : 1940   MRN: [id number]  Reason for Admission: Glenn Vertebral Artery Stenosis, Rt Carotid Stenosis, Low B12 level (possibly causing paresthesia)  Discharge Date: 22 RARS: No data recorded    Last Discharge [facility]       Date Complaint Diagnosis Description Type Department Provider    22 left side facial numbness Left facial numbness . .. ED to Hosp-Admission (Discharged) (ADMITTED) Graham Payne MD; Dedra Hall. .. Non-face-to-face services provided:  Obtained and reviewed discharge summary and/or continuity of care documents    Care Transitions 24 Hour Call    Do you have all of your prescriptions and are they filled?: Yes  Do you have support at home?: Alone  Care Transitions Interventions       Future Appointments   Date Time Provider Devan Haque   2022 11:00 AM [facility]   2022  9:15 AM Kishore Thompson DO [facility]   10/7/2022  9:45 AM NELLIE Gan CNP [address] [place] [address]   12/15/2022 10:00 AM NELLIE Zapata CNP [facility]     Challenges to be reviewed by the provider   Additional needs identified to be addressed with provider: denies       Method of communication with provider : none    [address] [facility]: [facility]    Was this a readmission? No  Patient stated reason for admission: \"hot spells, cp, weakness\"  Patients top risk factors for readmission: Glenn Vertebral Artery Stenosis, Rt Carotid Stenosis, Low B12 level (possibly causing paresthesia), Htn, Lumbar Radiculopathy, COPD, Prostate Cancer     Care Transition Nurse (CTN) contacted the patient by telephone to perform post hospital discharge assessment. Verified name and  with patient as identifiers. Provided introduction to self, and explanation of the CTN role. Phone Assessment: Patient reports \"doing pretty good today\". Denies hot spells, muscle weakness/numbness, facial numbness, neuro deficits or other stroke like sx. Reviewed stroke education, sx which require immediate medical attention. Patient verbalizes understanding. Noted to be a&o, speech clear, answering questions appropriately. Reports \"episodes having been coming and going last few weeks\". Does report mild chest pain which comes and goes, describes as \" an annoyance\". Denies need for nitro, squad, medical attention. Reports cp chronic. Denies n/v, cp radiating to arm/jaw, back pain, diaphoresis, sob or acute distress. Again reviewed sx which require immediate medical attention. Encouraged nitro prn and calling squad if no relief. Reports appetite at baseline w/ b&b wnl. AVS/Meds: Confirmed copy of AVS received, reviewed with Patient. Confirmed Patient obtained and is taking all new and routine rx as directed. Med education provided, questions answered, verbalizes understanding. Stressed importance of med compliance. 1111F medication review completed with Patient . Advised obtaining 90 day supply of routine, prn meds. Advised contacting pharmacy/md for refill needs. Resource Need Assessment:   Living Arrangements: lives alone; local family and friend support  ADLS:independent, drives   DME:none, denies need   Transportation: self  HHC:none, denies need   Community Assistance:none, denies need   Referrals Made per CTN with Patient/Family Approval: [phone] [facility] Follow Up Appointments: Discussed importance of 7 day hospital follow up appointment for continuity of care. Transportation confirmed for the above appts. Agreeable for CT staff to schedule appts w/ Dr Sylvain Gustafson [phone] and [address] [facility] [phone]. Note routed to Julia Siegel,  for appt scheduling.      [id number] Education:   Educated patient about risk for severe COVID-19 due to risk factors according to [facility] guidelines. Reviewed red flag symptoms with patient who verbalized understanding. Discussed COVID vaccination status: Yes--Moderna x 4. Education provided on COVID-19 vaccination as appropriate, advised to speak w/ PCP re: vaccine, booster. Discussed exposure protocols and quarantine with CDC Guidelines. Patient was given an opportunity to verbalize any questions and concerns and agrees to contact CTN or health care provider for questions related to their healthcare. Reviewed, advised Covid19 preventative measures including mask covering nose/mouth, social distancing, hand washing. Advance Care Planning Reports Advanced Directives up to date, reflecting current wishes . Encouraged to place on file w/ PCP, [facility]. Healthcare Decision Maker:    Primary Decision Maker: Crys Candelaria - Child - [phone]    Primary Decision Maker: Jeff Gray - Child - [phone]    Advised to contact PCP / Cardiologist/Neurologist  24/7 re: any health concerns for early outpatient intervention in an effort to avoid hospitalization. Discussed benefits of WIC, Urgent Care. Advised 911 if noting acute distress. CTN provided contact information. Plan for follow-up call in 5-7 days based on severity of symptoms and risk factors.   Plan for next call:  confirm cardiology/neuro appts, f/u on pcp appt, sx, copd ed    [address] [facility] [phone]

## 2022-08-27 LAB
EKG ATRIAL RATE: 68 BPM
EKG DIAGNOSIS: NORMAL
EKG P AXIS: 69 DEGREES
EKG P-R INTERVAL: 196 MS
EKG Q-T INTERVAL: 384 MS
EKG QRS DURATION: 94 MS
EKG QTC CALCULATION (BAZETT): 408 MS
EKG R AXIS: 33 DEGREES
EKG T AXIS: 68 DEGREES
EKG VENTRICULAR RATE: 68 BPM

## 2022-08-27 PROCEDURE — 93010 ELECTROCARDIOGRAM REPORT: CPT | Performed by: INTERNAL MEDICINE

## 2022-08-28 ENCOUNTER — HOSPITAL ENCOUNTER (OUTPATIENT)
Age: 82
Setting detail: OBSERVATION
Discharge: HOSPICE | End: 2022-08-29
Attending: STUDENT IN AN ORGANIZED HEALTH CARE EDUCATION/TRAINING PROGRAM | Admitting: STUDENT IN AN ORGANIZED HEALTH CARE EDUCATION/TRAINING PROGRAM
Payer: MEDICARE

## 2022-08-28 ENCOUNTER — APPOINTMENT (OUTPATIENT)
Dept: GENERAL RADIOLOGY | Age: 82
End: 2022-08-28
Payer: MEDICARE

## 2022-08-28 DIAGNOSIS — N28.9 RENAL INSUFFICIENCY: ICD-10-CM

## 2022-08-28 DIAGNOSIS — R07.9 CHEST PAIN WITH MODERATE RISK FOR CARDIAC ETIOLOGY: Primary | ICD-10-CM

## 2022-08-28 PROBLEM — I20.9 ANGINA PECTORIS (HCC): Status: ACTIVE | Noted: 2018-07-01

## 2022-08-28 LAB
ALBUMIN SERPL-MCNC: 4.3 GM/DL (ref 3.4–5)
ALP BLD-CCNC: 65 IU/L (ref 40–129)
ALT SERPL-CCNC: 27 U/L (ref 10–40)
ANION GAP SERPL CALCULATED.3IONS-SCNC: 11 MMOL/L (ref 4–16)
AST SERPL-CCNC: 27 IU/L (ref 15–37)
BASOPHILS ABSOLUTE: 0 K/CU MM
BASOPHILS RELATIVE PERCENT: 0.4 % (ref 0–1)
BILIRUB SERPL-MCNC: 0.5 MG/DL (ref 0–1)
BUN BLDV-MCNC: 25 MG/DL (ref 6–23)
CALCIUM SERPL-MCNC: 9.2 MG/DL (ref 8.3–10.6)
CHLORIDE BLD-SCNC: 99 MMOL/L (ref 99–110)
CO2: 26 MMOL/L (ref 21–32)
CREAT SERPL-MCNC: 1.4 MG/DL (ref 0.9–1.3)
DIFFERENTIAL TYPE: ABNORMAL
EOSINOPHILS ABSOLUTE: 0.1 K/CU MM
EOSINOPHILS RELATIVE PERCENT: 1.7 % (ref 0–3)
GFR AFRICAN AMERICAN: 59 ML/MIN/1.73M2
GFR NON-AFRICAN AMERICAN: 48 ML/MIN/1.73M2
GLUCOSE BLD-MCNC: 110 MG/DL (ref 70–99)
HCT VFR BLD CALC: 41 % (ref 42–52)
HEMOGLOBIN: 13.7 GM/DL (ref 13.5–18)
IMMATURE NEUTROPHIL %: 0.2 % (ref 0–0.43)
LIPASE: 18 IU/L (ref 13–60)
LYMPHOCYTES ABSOLUTE: 1.1 K/CU MM
LYMPHOCYTES RELATIVE PERCENT: 23.8 % (ref 24–44)
MCH RBC QN AUTO: 32.3 PG (ref 27–31)
MCHC RBC AUTO-ENTMCNC: 33.4 % (ref 32–36)
MCV RBC AUTO: 96.7 FL (ref 78–100)
MONOCYTES ABSOLUTE: 0.4 K/CU MM
MONOCYTES RELATIVE PERCENT: 8.6 % (ref 0–4)
NUCLEATED RBC %: 0 %
PDW BLD-RTO: 12.3 % (ref 11.7–14.9)
PLATELET # BLD: 221 K/CU MM (ref 140–440)
PMV BLD AUTO: 9.4 FL (ref 7.5–11.1)
POTASSIUM SERPL-SCNC: 4.3 MMOL/L (ref 3.5–5.1)
RBC # BLD: 4.24 M/CU MM (ref 4.6–6.2)
SEGMENTED NEUTROPHILS ABSOLUTE COUNT: 3.1 K/CU MM
SEGMENTED NEUTROPHILS RELATIVE PERCENT: 65.3 % (ref 36–66)
SODIUM BLD-SCNC: 136 MMOL/L (ref 135–145)
TOTAL IMMATURE NEUTOROPHIL: 0.01 K/CU MM
TOTAL NUCLEATED RBC: 0 K/CU MM
TOTAL PROTEIN: 6.5 GM/DL (ref 6.4–8.2)
TROPONIN T: 0.01 NG/ML
TROPONIN T: <0.01 NG/ML
WBC # BLD: 4.8 K/CU MM (ref 4–10.5)

## 2022-08-28 PROCEDURE — 96360 HYDRATION IV INFUSION INIT: CPT

## 2022-08-28 PROCEDURE — 2580000003 HC RX 258: Performed by: STUDENT IN AN ORGANIZED HEALTH CARE EDUCATION/TRAINING PROGRAM

## 2022-08-28 PROCEDURE — 96361 HYDRATE IV INFUSION ADD-ON: CPT

## 2022-08-28 PROCEDURE — 99285 EMERGENCY DEPT VISIT HI MDM: CPT

## 2022-08-28 PROCEDURE — 6370000000 HC RX 637 (ALT 250 FOR IP): Performed by: NURSE PRACTITIONER

## 2022-08-28 PROCEDURE — 6360000002 HC RX W HCPCS: Performed by: NURSE PRACTITIONER

## 2022-08-28 PROCEDURE — G0378 HOSPITAL OBSERVATION PER HR: HCPCS

## 2022-08-28 PROCEDURE — 83690 ASSAY OF LIPASE: CPT

## 2022-08-28 PROCEDURE — 71045 X-RAY EXAM CHEST 1 VIEW: CPT

## 2022-08-28 PROCEDURE — 96372 THER/PROPH/DIAG INJ SC/IM: CPT

## 2022-08-28 PROCEDURE — 84484 ASSAY OF TROPONIN QUANT: CPT

## 2022-08-28 PROCEDURE — 85025 COMPLETE CBC W/AUTO DIFF WBC: CPT

## 2022-08-28 PROCEDURE — 2580000003 HC RX 258: Performed by: NURSE PRACTITIONER

## 2022-08-28 PROCEDURE — 6370000000 HC RX 637 (ALT 250 FOR IP): Performed by: STUDENT IN AN ORGANIZED HEALTH CARE EDUCATION/TRAINING PROGRAM

## 2022-08-28 PROCEDURE — 80053 COMPREHEN METABOLIC PANEL: CPT

## 2022-08-28 PROCEDURE — 99213 OFFICE O/P EST LOW 20 MIN: CPT | Performed by: INTERNAL MEDICINE

## 2022-08-28 PROCEDURE — 93005 ELECTROCARDIOGRAM TRACING: CPT | Performed by: STUDENT IN AN ORGANIZED HEALTH CARE EDUCATION/TRAINING PROGRAM

## 2022-08-28 RX ORDER — ENOXAPARIN SODIUM 100 MG/ML
40 INJECTION SUBCUTANEOUS DAILY
Status: CANCELLED | OUTPATIENT
Start: 2022-08-29

## 2022-08-28 RX ORDER — LIDOCAINE HYDROCHLORIDE 20 MG/ML
10 SOLUTION OROPHARYNGEAL ONCE
Status: COMPLETED | OUTPATIENT
Start: 2022-08-28 | End: 2022-08-28

## 2022-08-28 RX ORDER — LEVOTHYROXINE SODIUM 0.05 MG/1
50 TABLET ORAL DAILY
Status: DISCONTINUED | OUTPATIENT
Start: 2022-08-29 | End: 2022-08-29 | Stop reason: HOSPADM

## 2022-08-28 RX ORDER — PANTOPRAZOLE SODIUM 40 MG/1
40 TABLET, DELAYED RELEASE ORAL
Status: DISCONTINUED | OUTPATIENT
Start: 2022-08-28 | End: 2022-08-29 | Stop reason: HOSPADM

## 2022-08-28 RX ORDER — ATORVASTATIN CALCIUM 40 MG/1
40 TABLET, FILM COATED ORAL NIGHTLY
Status: DISCONTINUED | OUTPATIENT
Start: 2022-08-29 | End: 2022-08-29 | Stop reason: HOSPADM

## 2022-08-28 RX ORDER — ASPIRIN 81 MG/1
324 TABLET, CHEWABLE ORAL ONCE
Status: COMPLETED | OUTPATIENT
Start: 2022-08-28 | End: 2022-08-28

## 2022-08-28 RX ORDER — DILTIAZEM HYDROCHLORIDE 120 MG/1
240 CAPSULE, COATED, EXTENDED RELEASE ORAL NIGHTLY
Status: DISCONTINUED | OUTPATIENT
Start: 2022-08-29 | End: 2022-08-29 | Stop reason: HOSPADM

## 2022-08-28 RX ORDER — TRAZODONE HYDROCHLORIDE 50 MG/1
50 TABLET ORAL NIGHTLY PRN
Status: DISCONTINUED | OUTPATIENT
Start: 2022-08-28 | End: 2022-08-29 | Stop reason: HOSPADM

## 2022-08-28 RX ORDER — SODIUM CHLORIDE 0.9 % (FLUSH) 0.9 %
5-40 SYRINGE (ML) INJECTION 2 TIMES DAILY
Status: DISCONTINUED | OUTPATIENT
Start: 2022-08-28 | End: 2022-08-29 | Stop reason: HOSPADM

## 2022-08-28 RX ORDER — MAGNESIUM HYDROXIDE/ALUMINUM HYDROXICE/SIMETHICONE 120; 1200; 1200 MG/30ML; MG/30ML; MG/30ML
30 SUSPENSION ORAL ONCE
Status: COMPLETED | OUTPATIENT
Start: 2022-08-28 | End: 2022-08-28

## 2022-08-28 RX ORDER — GABAPENTIN 300 MG/1
300 CAPSULE ORAL 2 TIMES DAILY
Status: DISCONTINUED | OUTPATIENT
Start: 2022-08-29 | End: 2022-08-29 | Stop reason: HOSPADM

## 2022-08-28 RX ORDER — POLYETHYLENE GLYCOL 3350 17 G/17G
17 POWDER, FOR SOLUTION ORAL DAILY PRN
Status: DISCONTINUED | OUTPATIENT
Start: 2022-08-28 | End: 2022-08-29 | Stop reason: HOSPADM

## 2022-08-28 RX ORDER — ONDANSETRON 2 MG/ML
4 INJECTION INTRAMUSCULAR; INTRAVENOUS EVERY 6 HOURS PRN
Status: DISCONTINUED | OUTPATIENT
Start: 2022-08-28 | End: 2022-08-29 | Stop reason: HOSPADM

## 2022-08-28 RX ORDER — SODIUM CHLORIDE 9 MG/ML
INJECTION, SOLUTION INTRAVENOUS PRN
Status: DISCONTINUED | OUTPATIENT
Start: 2022-08-28 | End: 2022-08-29 | Stop reason: HOSPADM

## 2022-08-28 RX ORDER — HEPARIN SODIUM 5000 [USP'U]/ML
5000 INJECTION, SOLUTION INTRAVENOUS; SUBCUTANEOUS EVERY 8 HOURS SCHEDULED
Status: DISCONTINUED | OUTPATIENT
Start: 2022-08-28 | End: 2022-08-29 | Stop reason: HOSPADM

## 2022-08-28 RX ORDER — SODIUM CHLORIDE 0.9 % (FLUSH) 0.9 %
5-40 SYRINGE (ML) INJECTION PRN
Status: DISCONTINUED | OUTPATIENT
Start: 2022-08-28 | End: 2022-08-29 | Stop reason: HOSPADM

## 2022-08-28 RX ORDER — LANOLIN ALCOHOL/MO/W.PET/CERES
500 CREAM (GRAM) TOPICAL DAILY
Status: DISCONTINUED | OUTPATIENT
Start: 2022-08-29 | End: 2022-08-29 | Stop reason: HOSPADM

## 2022-08-28 RX ORDER — CLOPIDOGREL BISULFATE 75 MG/1
75 TABLET ORAL NIGHTLY
Status: DISCONTINUED | OUTPATIENT
Start: 2022-08-29 | End: 2022-08-29 | Stop reason: HOSPADM

## 2022-08-28 RX ORDER — 0.9 % SODIUM CHLORIDE 0.9 %
500 INTRAVENOUS SOLUTION INTRAVENOUS ONCE
Status: COMPLETED | OUTPATIENT
Start: 2022-08-28 | End: 2022-08-28

## 2022-08-28 RX ORDER — ASPIRIN 81 MG/1
81 TABLET, CHEWABLE ORAL DAILY
Status: DISCONTINUED | OUTPATIENT
Start: 2022-08-29 | End: 2022-08-29 | Stop reason: HOSPADM

## 2022-08-28 RX ORDER — MAGNESIUM HYDROXIDE/ALUMINUM HYDROXICE/SIMETHICONE 120; 1200; 1200 MG/30ML; MG/30ML; MG/30ML
30 SUSPENSION ORAL EVERY 6 HOURS PRN
Status: DISCONTINUED | OUTPATIENT
Start: 2022-08-28 | End: 2022-08-29 | Stop reason: HOSPADM

## 2022-08-28 RX ORDER — ONDANSETRON 4 MG/1
4 TABLET, ORALLY DISINTEGRATING ORAL EVERY 8 HOURS PRN
Status: DISCONTINUED | OUTPATIENT
Start: 2022-08-28 | End: 2022-08-29 | Stop reason: HOSPADM

## 2022-08-28 RX ORDER — ACETAMINOPHEN 325 MG/1
650 TABLET ORAL EVERY 6 HOURS PRN
Status: DISCONTINUED | OUTPATIENT
Start: 2022-08-28 | End: 2022-08-29 | Stop reason: HOSPADM

## 2022-08-28 RX ORDER — SODIUM CHLORIDE 9 MG/ML
INJECTION, SOLUTION INTRAVENOUS CONTINUOUS
Status: ACTIVE | OUTPATIENT
Start: 2022-08-28 | End: 2022-08-29

## 2022-08-28 RX ADMIN — SODIUM CHLORIDE 500 ML: 9 INJECTION, SOLUTION INTRAVENOUS at 15:49

## 2022-08-28 RX ADMIN — LIDOCAINE HYDROCHLORIDE 10 ML: 20 SOLUTION ORAL; TOPICAL at 19:33

## 2022-08-28 RX ADMIN — ALUMINUM HYDROXIDE, MAGNESIUM HYDROXIDE, AND SIMETHICONE 30 ML: 200; 200; 20 SUSPENSION ORAL at 19:33

## 2022-08-28 RX ADMIN — TRAZODONE HYDROCHLORIDE 50 MG: 50 TABLET ORAL at 23:46

## 2022-08-28 RX ADMIN — SODIUM CHLORIDE, PRESERVATIVE FREE 10 ML: 5 INJECTION INTRAVENOUS at 23:11

## 2022-08-28 RX ADMIN — ATORVASTATIN CALCIUM 40 MG: 40 TABLET, FILM COATED ORAL at 23:46

## 2022-08-28 RX ADMIN — ACETAMINOPHEN 650 MG: 325 TABLET ORAL at 23:46

## 2022-08-28 RX ADMIN — GABAPENTIN 300 MG: 300 CAPSULE ORAL at 23:46

## 2022-08-28 RX ADMIN — HEPARIN SODIUM 5000 UNITS: 5000 INJECTION INTRAVENOUS; SUBCUTANEOUS at 21:49

## 2022-08-28 RX ADMIN — PANTOPRAZOLE SODIUM 40 MG: 40 TABLET, DELAYED RELEASE ORAL at 21:08

## 2022-08-28 RX ADMIN — SODIUM CHLORIDE: 9 INJECTION, SOLUTION INTRAVENOUS at 21:07

## 2022-08-28 RX ADMIN — ASPIRIN 324 MG: 81 TABLET, CHEWABLE ORAL at 18:53

## 2022-08-28 ASSESSMENT — PAIN DESCRIPTION - ORIENTATION: ORIENTATION: RIGHT;LEFT

## 2022-08-28 ASSESSMENT — PAIN DESCRIPTION - PAIN TYPE
TYPE: ACUTE PAIN
TYPE: ACUTE PAIN

## 2022-08-28 ASSESSMENT — PAIN DESCRIPTION - LOCATION
LOCATION: FOOT
LOCATION: CHEST

## 2022-08-28 ASSESSMENT — PAIN SCALES - GENERAL
PAINLEVEL_OUTOF10: 0
PAINLEVEL_OUTOF10: 7
PAINLEVEL_OUTOF10: 6
PAINLEVEL_OUTOF10: 7

## 2022-08-28 ASSESSMENT — ENCOUNTER SYMPTOMS
COUGH: 0
SHORTNESS OF BREATH: 0
ABDOMINAL PAIN: 1

## 2022-08-28 ASSESSMENT — PAIN DESCRIPTION - FREQUENCY: FREQUENCY: CONTINUOUS

## 2022-08-28 ASSESSMENT — PAIN DESCRIPTION - DESCRIPTORS
DESCRIPTORS: DISCOMFORT;CRAMPING
DESCRIPTORS: SHARP;DULL

## 2022-08-28 NOTE — H&P
History and Physical      Name:  Sandy Marshall /Age/Sex: 1940  (79 y.o. male)   MRN & CSN:  [id number] & [id number] Admission Date/Time: 2022  3:35 PM   Location:  [facility] PCP: Kate Carney Day: 1     Attending physician: Dr. Luis Brennan and Plan:   Sandy Marshall is a 79 y.o.  male  who presents with Chest pain    Assessment and plan:     Chest pain-left-sided right now worse abdomen-ACS versus GI component. Patient has relief of pain with Maalox. Patient's third encounter for similar complaint. Seen for chest pain while inpatient for TIA evaluation last week returned to ER 1 day after discharge for complaint of chest pain and today. Heart score 4.  last stress test-() perfusion impression small size defect of mild severity which is persistent above inferior wall of myocardium. Last echo-() EF 55 to 60%, last heart cath-  -Observation  -Cardiology consult  -Initial troponin 0.011 trended down to 0.010  -Telemetry monitoring  -Plan for stress in morning by cardiology  -N.p.o. after midnight  -Heparin for DVT prophylaxis in the setting of ARMAAN    Acute kidney injury: BUN 25, creatinine 1.4. Baseline creatinine around 1 could be contributing to minimal bump in troponin as above  -BMP daily  -avoid nephrotoxic agents  -Hold HCTZ  -Gentle IV fluids overnight    Hypotension history of hypertension: Managed on Cardizem and HCTZ  -Continue Cardizem hold diuretic in setting of ARMAAN  -Monitor blood pressure trends    GERD: On PPI as outpatient  -Protonix twice daily  -Maalox as needed for indigestion    History of TIA-presented to hospital  with left-sided paresthesias. MRI on -nonacute  -Continue statin and aspirin therapy    Chronic Conditions: Continue all home medications except as stated above or contraindicated. BMI 22.96: kg/m2 Life style modifications    Disposition: Observation.   Patient was accepted for continue evaluation blood pressure was hypertensive of 89/53. At the ED, vital signs;T 98.3,HR 71, RR 16, BP 89/53 trended up to 151/76 mm/hg,SPO2 95% RA/ Significant laboratories were the following: BUN 25, creatinine 1.4, GFR 48, glucose 110, troponin 0.011 trended down to 0.010 otherwise labs unremarkable. Reviewed  EKG-sinus rhythm heart rate 75, QTc 417. No ischemic changes or ectopy is noted. Chest x-ray-no active pulmonary disease. The patient was brought to the ED and was subsequently admitted for  further evaluation. and management          Review of Systems   Constitutional:  Negative for diaphoresis, fatigue and fever. HENT:  Negative for congestion. Respiratory:  Negative for cough and shortness of breath. Cardiovascular:  Positive for chest pain. Left sided radiating down to left side of abdomen   Gastrointestinal:  Positive for abdominal pain. Epigastric   Genitourinary:  Negative for dysuria. Musculoskeletal: Negative. Skin: Negative. Neurological:  Negative for dizziness and light-headedness. Hematological: Negative. Psychiatric/Behavioral: Negative. Objective:   No intake or output data in the 24 hours ending 08/28/22 1918   Vitals:   Vitals:    08/28/22 1546   BP: (!) 94/53   Pulse: 64   Resp: 14   Temp:    SpO2: 93%     Physical Exam:   GEN- Awake male, NAD, sitting up in bed, appears to be stated age. EYES- Pupils are equally round. HENT- Mucous membranes are moist.  NECK- Supple, no apparent thyromegaly or masses. RESP-Clear to auscultation, no wheezes, rales or rhonchi. Symmetric chest movement while on room air. CARDIO/VASC-   Regular rate and rhythm, Peripheral pulses equal bilaterally and palpable. GI- Abdomen is soft, no tenderness, masses, or guarding. Bowel sounds present. MSK- No gross joint deformities. EXTREMITIES- pulses intact, no swelling, no calf tenderness  SKIN- Normal coloration, warm, dry.   NEURO-Cranial nerves appear grossly intact, normal Infusions:   PRN Meds:      Recent Labs     08/26/22  1613 08/28/22  1510   WBC 4.1 4.8   HGB 13.6 13.7   HCT 40.5* 41.0*    221      Recent Labs     08/26/22  1613 08/28/22  1510   * 136   K 4.0 4.3   CL 97* 99   CO2 26 26   BUN 25* 25*   CREATININE 1.1 1.4*     Recent Labs     08/26/22  1613 08/28/22  1510   AST 34 27   ALT 30 27   BILITOT 0.4 0.5   ALKPHOS 41 65     No results for input(s): INR in the last 72 hours. Recent Labs     08/26/22  1928 08/28/22  1510 08/28/22  1723   TROPONINT <0.010 0.011* <0.010        Imaging reviewed  Echocardiogram complete 2D with doppler with color    Result Date: 8/23/2022  Transthoracic Echocardiography Report (TTE)  Demographics   Patient Name       Jyothi HDEZ    Date of Study       08/23/2022   Date of Birth      1940         Gender              Male   Age                79 year(s)         Race                   Patient Number     [id number]         Room Number         9824   Visit Number       [id number]   Corporate ID       [id number]   Accession Number   [id number]         Madi Herrera [facility]   Ordering Physician Elora Severs Interpreting        Kenny Tovar                     CNP                Physician           Parnell Line MD  Procedure Type of Study   TTE procedure:ECHOCARDIOGRAM COMPLETE 2D W DOPPLER W COLOR. Procedure Date Date: 08/23/2022 Start: 11:05 AM Study Location: Portable Technical Quality: Technically difficult study Indications:CVA. Height: 70 inches Weight: 165 pounds BSA: 1.92 m2 BMI: 23.68 kg/m2 HR: 69 bpm BP: 90/50 mmHg  Conclusions   Summary  Left ventricular systolic function is normal.  Left ventricle size is normal.  Ejection fraction is visually estimated at 55-60%. No regional wall motion abnormalites. Indeterminate diastolic function; E/A flow reversal is noted. Structurally normal mitral valve. Trace mitral regurgitation. Trace tricuspid regurgitation; RVSP: 23 mmHg.   No evidence of any pericardial effusion. IVC and abdominal aorta are not well visualized   Signature   ------------------------------------------------------------------  Electronically signed by Linda KeithInterpreting physician) on 08/23/2022 at 06:29 PM  ------------------------------------------------------------------   Findings   Left Ventricle  Left ventricular systolic function is normal.  Left ventricle size is normal.  Ejection fraction is visually estimated at 55-60%. No regional wall motion abnormalites. Indeterminate diastolic function; E/A flow reversal is noted. Left Atrium  Essentially normal left atrium. Right Atrium  Essentially normal right atrium. Right Ventricle  Essentially normal right ventricle. Aortic Valve  Structurally normal aortic valve. Aortic valve appears tricuspid. Mitral Valve  Structurally normal mitral valve. Trace mitral regurgitation. Tricuspid Valve  Trace tricuspid regurgitation; RVSP: 23 mmHg. Pulmonic Valve  Pulmonic valve is structurally normal.   Pericardial Effusion  No evidence of any pericardial effusion.    Miscellaneous  IVC and abdominal aorta are not well visualized  M-Mode/2D Measurements & Calculations   LV Diastolic Dimension:  LV Systolic Dimension:  LA Dimension: 2.7 cmAO Root  3.78 cm                  2.65 cm                 Dimension: 3.5 cmLA Area:  LV FS:29.9 %             LV Volume Diastolic: 38 09.1 cm2  LV PW Diastolic: 5.54 cm ml  LV PW Systolic: 7.45 cm  LV Volume Systolic: 14  Septum Diastolic: 1.96   ml  cm                       LV EDV/LV EDV Index: 38  Septum Systolic: 1.08 cm GQ/81 [id number] ESV/LV ESV   LA/Aorta: 0.77  CO: 5.98 l/min           Index: 14 ml/7 m2       Ascending Aorta: 3.8 cm  CI: 3.11 l/m*m2          EF Calculated (A4C):    LA volume/Index: 25 ml                           63.2 %                  /44J2  LV Area Diastolic: 52.4  EF Calculated (2D):  cm2                      57.8 %  LV Area Systolic: 8.47  cm2                      LV Length: 6.2 cm                            LVOT: 2 cm  Doppler Measurements & Calculations   MV Peak E-Wave: 41.5 AV Peak Velocity: 137 cm/s    LVOT Peak Velocity: 150  cm/s                 AV Peak Gradient: 7.51 mmHg   cm/s  MV Peak A-Wave: 73.5 AV Mean Velocity: 95.6 cm/s   LVOT Mean Velocity: 98.3  cm/s                 AV Mean Gradient: 4 mmHg      cm/s  MV E/A Ratio: 0.56   AV VTI: 26.2 cm               LVOT Peak Gradient: 9  MV Peak Gradient:    AV Area (Continuity):3.31 cm2 mmHgLVOT Mean Gradient: 5  0.69 mmHg                                          mmHg                       LVOT VTI: 27.6 cm             Estimated RVSP: 23 mmHg  MV P1/2t: 76 msec                                  Estimated RAP:3 mmHg  MVA by PHT:2.89 cm2  Estimated PASP: 22.54 mmHg   MV E' Septal                                       TR Velocity:221 cm/s  Velocity: 4.14 cm/s                                TR Gradient:19.54 mmHg  MV E' Lateral                                      PV Peak Velocity: 82.8  Velocity: 6.91 cm/s                                cm/s  MV E/E' septal:                                    PV Peak Gradient: 2.74  10.02                                              mmHg  MV E/E' lateral:  6.01      XR CHEST (2 VW)    Result Date: 8/19/2022  EXAMINATION: TWO XRAY VIEWS OF THE CHEST 8/19/2022 8:18 am COMPARISON: 02/17/2022. HISTORY: ORDERING SYSTEM PROVIDED HISTORY: Hot Flashes TECHNOLOGIST PROVIDED HISTORY: Reason for exam:->Hot Flashes Reason for Exam: Hot Flashes Additional signs and symptoms: Hot Flashes Relevant Medical/Surgical History: Hot Flashes FINDINGS: Stable minimal linear scarring to the right lung base. Lungs are otherwise clear. Cardiac and mediastinal silhouettes are within normal limits. No pneumothoraces. Bony structures appear intact. Stable exam without acute abnormality.      CT HEAD WO CONTRAST    Result Date: 8/22/2022  EXAMINATION: CT OF THE HEAD WITHOUT CONTRAST 8/22/2022 11:45 am TECHNIQUE: CT of the head was performed without the administration of intravenous contrast. Automated exposure control, iterative reconstruction, and/or weight based adjustment of the mA/kV was utilized to reduce the radiation dose to as low as reasonably achievable. COMPARISON: 01/22/2022 CT HISTORY: ORDERING SYSTEM PROVIDED HISTORY: Stroke Symptoms TECHNOLOGIST PROVIDED HISTORY: Has a \"code stroke\" or \"stroke alert\" been called? ->Yes Reason for exam:->Stroke Symptoms Decision Support Exception - unselect if not a suspected or confirmed emergency medical condition->Emergency Medical Condition (MA) Reason for Exam: stroke symptoms Additional signs and symptoms: none Relevant Medical/Surgical History: none FINDINGS: BRAIN/VENTRICLES: The ventricles and sulci are prominent. Pattern is consistent with age-related atrophy. No extra-axial fluid collections, and no sign of recent intracranial hemorrhage. Decreased attenuation is noted within the periventricular white matter. Pattern is consistent with chronic small vessel ischemic change. No acute edema or mass effect. No mass lesions are detected. ORBITS: The visualized portion of the orbits demonstrate no acute abnormality. SINUSES: The visualized paranasal sinuses and mastoid air cells demonstrate no acute abnormality. SOFT TISSUES/SKULL:  No acute abnormality of the visualized skull or soft tissues. No acute intracranial abnormality. Findings were discussed with PRAVEENA DOSHI at 12:11 pm on 8/22/2022. XR CHEST PORTABLE    Result Date: 8/28/2022  EXAMINATION: ONE XRAY VIEW OF THE CHEST 8/28/2022 3:37 pm COMPARISON: 08/26/2022. HISTORY: ORDERING SYSTEM PROVIDED HISTORY: chest pain TECHNOLOGIST PROVIDED HISTORY: Reason for exam:->chest pain Reason for Exam: chest pain Additional signs and symptoms: none Relevant Medical/Surgical History: chest pain, GERD FINDINGS: The heart size is within normal limits.  The pulmonary vasculature is also within normal limits. No acute infiltrates are seen. The costophrenic angles are sharp bilaterally. No pneumothoraces are noted. 1. No active pulmonary disease. XR CHEST PORTABLE    Result Date: 8/26/2022  EXAMINATION: ONE XRAY VIEW OF THE CHEST 8/26/2022 5:49 pm COMPARISON: None. HISTORY: ORDERING SYSTEM PROVIDED HISTORY: chest pain FINDINGS: Heart size is normal. No focal consolidation in the lungs. No pleural effusion or pneumothorax. No acute abnormality. XR CHEST PORTABLE    Result Date: 8/22/2022  EXAMINATION: ONE XRAY VIEW OF THE CHEST 8/22/2022 11:43 am COMPARISON: Chest x-ray dated 08/19/2022. HISTORY: ORDERING SYSTEM PROVIDED HISTORY: stroke symptoms TECHNOLOGIST PROVIDED HISTORY: Reason for exam:->stroke symptoms Reason for Exam: stroke symptoms FINDINGS: HEART/MEDIASTINUM: The cardiomediastinal silhouette is within normal limits. PLEURA/LUNGS: There are no focal consolidations or pleural effusions. There is no appreciable pneumothorax. BONES/SOFT TISSUE: No acute abnormality. No radiographic evidence of acute pulmonary disease. CTA HEAD NECK W CONTRAST    Result Date: 8/22/2022  EXAMINATION: CTA OF THE HEAD AND NECK WITH CONTRAST 8/22/2022 11:48 am: TECHNIQUE: CTA of the head and neck was performed with the administration of intravenous contrast. Multiplanar reformatted images are provided for review. MIP images are provided for review. Stenosis of the internal carotid arteries measured using NASCET criteria. Automated exposure control, iterative reconstruction, and/or weight based adjustment of the mA/kV was utilized to reduce the radiation dose to as low as reasonably achievable. COMPARISON: None. HISTORY: ORDERING SYSTEM PROVIDED HISTORY: Stroke Symptoms TECHNOLOGIST PROVIDED HISTORY: Has a \"code stroke\" or \"stroke alert\" been called? ->Yes Reason for exam:->Stroke Symptoms Decision Support Exception - unselect if not a suspected or confirmed emergency medical condition->Emergency Medical Condition (MA) Reason for Exam: stroke alert Additional signs and symptoms: none Relevant Medical/Surgical History: 75ml isovue 370/ gfr>60 8- FINDINGS: CTA NECK: AORTIC ARCH/ARCH VESSELS: Thorough sclerosis of the aortic arch and arch vessels. There is no flow limiting stenosis of the innominate or subclavian arteries. CAROTID ARTERIES: No significant stenosis seen of the common carotid arteries. Atherosclerosis of the carotid bulbs and proximal internal carotid arteries bilaterally. There is no flow limiting stenosis of the internal carotid arteries by NASCET criteria. VERTEBRAL ARTERIES: There is a left dominant vertebral artery. There is severe stenosis at the origin of the right vertebral artery with moderate stenosis at the origin of the left vertebral artery. Moderate stenosis is seen of the right vertebral artery at the C7 level. No focal stenosis otherwise seen of the vertebral arteries. SOFT TISSUES: No focal consolidation within the visualized lung apices. No acute abnormality within the visualized superior mediastinum. BONES: No acute osseous abnormality seen. 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 CTA HEAD: Evaluation is limited due to venous contamination as the venous structures are enhancing to a greater degree of the arterial structures. ANTERIOR CIRCULATION: Atherosclerosis seen of the internal carotid arteries bilaterally. There is moderate stenosis involving the right supraclinoid internal carotid artery. No significant stenosis seen of the left internal carotid artery. There is moderate diffuse irregularity involving the right anterior cerebral artery. No gross evidence of a significant stenosis otherwise seen of the anterior cerebral or middle cerebral arteries. No large vessel occlusion is seen.  POSTERIOR CIRCULATION: No significant stenosis of the vertebral, basilar, or posterior cerebral arteries. No aneurysm. OTHER: The major dural venous sinuses appear patent without evidence of a thrombus. BRAIN: There is no evidence of mass effect or midline shift. 1. Stenosis seen at the origin of both vertebral arteries, severe on the right and moderate on the left. 2. Moderate stenosis involving the right vertebral artery at the C7 level. 3. No flow limiting stenosis seen of the cervical carotid/vertebral arteries. 4. Evaluation of the intracranial vasculature is partially limited due to the predominantly venous nature of the exam. 5. There appears to be moderate stenosis involving is the right supraclinoid internal carotid artery. 6. There is moderate diffuse irregularity involving the right anterior cerebral artery. 7. No significant stenosis or large vessel occlusion otherwise seen of the [id number]-va-[id number]. 8. 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 MRI brain without contrast    Result Date: 8/23/2022  EXAMINATION: MRI OF THE BRAIN WITHOUT CONTRAST  8/23/2022 7:53 am TECHNIQUE: Multiplanar multisequence MRI of the brain was performed without the administration of intravenous contrast. COMPARISON: 04/10/2021. HISTORY: ORDERING SYSTEM PROVIDED HISTORY: left sided facial numbness, stroke like symptoms TECHNOLOGIST PROVIDED HISTORY: Reason for exam:->left sided facial numbness, stroke like symptoms Reason for Exam: left sided facial numbness, stroke like symptoms Additional signs and symptoms: NONE Relevant Medical/Surgical History: NONE Initial evaluation. FINDINGS: INTRACRANIAL STRUCTURES/VENTRICLES: There is no acute infarct. No mass effect or midline shift. No evidence of an acute intracranial hemorrhage.   Areas of T2 FLAIR hyperintensity are seen in the periventricular and subcortical white matter, which are nonspecific, but may represent chronic microvascular ischemic change. There is minimal global parenchymal volume loss. Otherwise, the ventricles and sulci are normal in size and configuration. The sellar/suprasellar regions appear unremarkable. The normal signal voids within the major intracranial vessels appear maintained. ORBITS: The visualized portion of the orbits demonstrate no acute abnormality. SINUSES: Moderate mucosal thickening of the left maxillary sinus. There appears to be a trace right mastoid effusion. BONES/SOFT TISSUES: The bone marrow signal intensity appears normal. The soft tissues demonstrate no acute abnormality. 1. No acute intracranial abnormality. No acute infarct. 2. Minimal global parenchymal volume loss with moderate chronic microvascular ischemic changes.           Electronically signed by NELLIE Dhillon CNP on 8/28/2022 at 7:18 PM

## 2022-08-28 NOTE — ED PROVIDER NOTES
Emergency Department Encounter    Patient: Koko Melton  MRN: [id number]  : 1940  Date of Evaluation: 2022  ED Provider:  Sharon Washington DO    Triage Chief Complaint:   Chest Pain (Started this morning )    [place]:  Koko Melton is a 79 y.o. male with a past medical history of CAD that presents to the ED with a history of left-sided chest pain which started this morning. Patient describes sharp, persistent chest pain of about 7/10 in severity. Pain was initially nonradiating but later, during the encounter starts radiating down to the left upper quadrant. Patient has yet to take any aspirin for his symptoms but says he takes aspirin every night and had a dose of baby aspirin last night. No history of fever, trauma to the chest, shortness of breath, nausea, diaphoresis, palpitations, or cough. ROS - see HPI, below listed is current ROS at time of my eval:  General:  No fevers, no chills, no weakness  Eyes:  No recent vison changes, no discharge  ENT:  No sore throat, no nasal congestion, no hearing changes  Cardiovascular:  + chest pain, no palpitations  Respiratory:  No shortness of breath, no cough, no wheezing  Gastrointestinal:  No pain, no nausea, no vomiting, no diarrhea  Musculoskeletal:  No muscle pain, no joint pain  Skin:  No rash, no pruritis, no easy bruising  Neurologic:  No speech problems, no headache, no extremity numbness, no extremity tingling, no extremity weakness  Psychiatric:  No anxiety  Genitourinary:  No dysuria, no hematuria  Endocrine:  No unexpected weight gain, no unexpected weight loss  Extremities:  no edema, no pain    Past Medical History:   Diagnosis Date    Anesthesia complication     Patient reports in  following a Back Surgery he was kept in the hospital due to high temperatures in the evenings. He stated they kept him for observation and doesn't remember receiving any treatment for the high temperatures.  Patient reports the anesthesia name started with flur? ?.    BPH with obstruction/lower urinary tract symptoms     Chest pain 7/1/2018    Chronic low back pain     GERD (gastroesophageal reflux disease)     H/O 24 hour EKG monitoring 3/12  12/11    3/12/12  essentially a normal event monitor without significant arrhythmias noted    H/O cardiac catheterization 2/2/12 4/02 10/98    2/22/12  LM, LAD and RCA all without significant disease    H/O cardiac catheterization 2/2/12 4/02 10/98    Done R/T false pos on stress test.    H/O cardiac catheterization 08/06/2018    normal study    H/O cardiovascular stress test 07/02/2018    normal study    H/O echocardiogram 12/10      3/14 EF55-60% normal LV function 12/10/10  complete 2 dim transthoracic echj. LVSF normal  EF >55%, transmitral spectral Doppler flow pattern is suggestive of  impaired LV relaxation    History of cardiovascular stress test 2/12 11/09 7/08 9/06 2/02 4/00 8/99 2/8/2012  evidence of mild ischemia  in the RCA. abnormal study , restingEF is 70%, global LVSF is  normal    History of Holter monitoring 9/17/14    14 day EVENT - no RVR    History of nuclear stress test 08/03/2017    cardiolite-normal,EF60%    Hx of chest x-ray 2/20/12    chest is considereed non acute. COPD    Hx of echocardiogram 08/03/2017    ZZ93-06%,MXPZT 2 diastolic dysfunction    Hyperlipidemia     Idiopathic peripheral neuropathy     Kidney stone     Left facial numbness 8/30/2017    Lumbar radiculopathy     Malignant melanoma of skin of face (HCC)     MVP (mitral valve prolapse)     Myotonia     chronic increased CPK's    Peripelvic (lymphatic) cyst     per CT    Precordial pain 8/3/2018    Vitamin B12 deficiency      Past Surgical History:   Procedure Laterality Date    CARDIAC CATHETERIZATION  2/2/12    EF 55% No sugnificant disease.     COLONOSCOPY  5/5/14    diverticulosis, 1 polyp, hemorrhoids    COLONOSCOPY  07/10/2017    single 5 mm polyp found in sigmoid colon, moderate diverticulosis found in sigmoid colon LUMBAR DISC SURGERY  11/2002    L4-5 hemilaminectomy and disk    LUMBAR DISCECTOMY  1973    PROSTATE SURGERY  09/26/2011    s/p robotic radical prostatectomy    ROTATOR CUFF REPAIR Right 10/2016    TIBIA FRACTURE SURGERY Right     childhood    URETEROLITHOTOMY  1992     Family History   Problem Relation Age of Onset    Heart Disease Mother         ASCAD    Heart Disease Father     Heart Disease Brother     Diabetes Other     Cancer Other         unknown type    Prostate Cancer Other     Coronary Art Dis Other     Coronary Art Dis Other      Social History     Socioeconomic History    Marital status:      Spouse name: Not on file    Number of children: Not on file    Years of education: Not on file    Highest education level: Not on file   Occupational History    Not on file   Tobacco Use    Smoking status: Never    Smokeless tobacco: Never   Vaping Use    Vaping Use: Never used   Substance and Sexual Activity    Alcohol use: Yes     Comment: Occassional    Drug use: No    Sexual activity: Not Currently     Partners: Female     Comment:    Other Topics Concern    Not on file   Social History Narrative    Not on file     Social Determinants of Health     Financial Resource Strain: Not on file   Food Insecurity: Not on file   Transportation Needs: No Transportation Needs    Lack of Transportation (Medical): No    Lack of Transportation (Non-Medical): No   Physical Activity: Sufficiently Active    Days of Exercise per Week: 7 days    Minutes of Exercise per Session: 40 min   Stress: No Stress Concern Present    Feeling of Stress : Not at all   Social Connections: Socially Isolated    Frequency of Communication with Friends and Family: Twice a week    Frequency of Social Gatherings with Friends and Family: Once a week    Attends Anabaptism Services: Never    Active Member of Clubs or Organizations: No    Attends Club or Organization Meetings: Never    Marital Status:     Intimate Partner Violence: Not on file   Housing Stability: Low Risk     Unable to Pay for Housing in the Last Year: No    Number of Places Lived in the Last Year: 1    Unstable Housing in the Last Year: No     Current Facility-Administered Medications   Medication Dose Route Frequency Provider Last Rate Last Admin    atorvastatin (LIPITOR) tablet 40 mg  40 mg Oral Nightly Javi Newness, APRN - CNP   40 mg at 08/28/22 2346    clopidogrel (PLAVIX) tablet 75 mg  75 mg Oral Nightly [facility], APR[facility] - CNP        dilTIAZem (CARDIZEM CD) extended release capsule 240 mg  240 mg Oral Nightly [place] Newness, APRN - CNP        gabapentin (NEURONTIN) capsule 300 mg  300 mg Oral BID [place] Newness, APRN - CNP   300 mg at 08/28/22 2346    levothyroxine (SYNTHROID) tablet 50 mcg  50 mcg Oral Daily [place] [facility], APRN - CNP   50 mcg at 08/29/22 0546    pantoprazole (PROTONIX) tablet 40 mg  40 mg Oral BID AC [place] [facility], APRN - CNP   40 mg at 08/29/22 0546    vitamin B-12 (CYANOCOBALAMIN) tablet 500 mcg  500 mcg Oral Daily [place] [facility], APRN - CNP        traZODone (DESYREL) tablet 50 mg  50 mg Oral Nightly PRN [place] [facility], APRN - CNP   50 mg at 08/28/22 2346    sodium chloride flush 0.9 % injection 5-40 mL  5-40 mL IntraVENous BID [place] [facility], APRN - CNP   10 mL at 08/28/22 2311    sodium chloride flush 0.9 % injection 5-40 mL  5-40 mL IntraVENous PRN [place] [facility], APRN - CNP        0.9 % sodium chloride infusion   IntraVENous PRN [place] [facility], APRN - CNP        ondansetron (ZOFRAN-ODT) disintegrating tablet 4 mg  4 mg Oral Q8H PRN Javi Newness, APRN - CNP        Or    ondansetron (ZOFRAN) injection 4 mg  4 mg IntraVENous Q6H PRN [place] Newness, APRN - CNP        acetaminophen (TYLENOL) tablet 650 mg  650 mg Oral Q6H PRN [place] Newness, APRN - CNP   650 mg at 08/28/22 2346    Or    acetaminophen (TYLENOL) suppository 650 mg  650 mg Rectal Q6H PRN Javi Newness, APRN - CNP        polyethylene glycol (GLYCOLAX) packet 17 g  17 g Oral Daily PRN Shade Masoods, APRN - CNP        aspirin chewable tablet 81 mg  81 mg Oral Daily Shade Masoods, APRN - CNP        aluminum & magnesium hydroxide-simethicone (MAALOX) 200-200-20 MG/5ML suspension 30 mL  30 mL Oral Q6H PRN Shade Masoods, APRN - CNP        heparin (porcine) injection 5,000 Units  5,000 Units SubCUTAneous 3 times per day Shade Nims, APRN - CNP   5,000 Units at 08/29/22 0546     Allergies   Allergen Reactions    Fenofibrate        Nursing Notes Reviewed    Physical Exam:  Triage VS:    ED Triage Vitals [08/28/22 1503]   Enc Vitals Group      BP (!) 89/53      Heart Rate 71      Resp 16      Temp 98.3 °F (36.8 °C)      Temp Source Oral      SpO2 95 %      Weight 160 lb (72.6 kg)      Height 5' 10\" (1.778 m)      Head Circumference       Peak Flow       Pain Score       Pain Loc       Pain Edu? Excl. in [address]? My pulse ox interpretation is - normal    General appearance:  No acute distress. Skin:  Warm. Dry. Eye:  Extraocular movements intact. Ears, nose, mouth and throat:  Oral mucosa moist   Neck:  Trachea midline. Extremity:  No swelling. Normal ROM     Heart:  Regular rate and rhythm, normal S1 & S2, no extra heart sounds. Perfusion:  intact  Respiratory:  Lungs clear to auscultation bilaterally. Respirations nonlabored. Abdominal:  Normal bowel sounds. Soft. Nontender. Non distended. Back:  No CVA tenderness to palpation     Neurological:  Alert and oriented times 3. No focal neuro deficits.              Psychiatric:  Appropriate    I have reviewed and interpreted all of the currently available lab results from this visit (if applicable):  Results for orders placed or performed during the hospital encounter of 08/28/22   CBC with Auto Differential   Result Value Ref Range    WBC 4.8 4.0 - 10.5 K/CU MM    RBC 4.24 (L) 4.6 - 6.2 M/CU MM    Hemoglobin 13.7 13.5 - 18.0 GM/DL    Hematocrit 41.0 (L) 42 - 52 %    MCV 96.7 78 - 100 FL    MCH 32.3 (H) 27 - 31 PG    MCHC 33.4 32.0 - 36.0 %    RDW 12.3 11.7 - 14.9 %    Platelets 641 333 - 816 K/CU MM    MPV 9.4 7.5 - 11.1 FL    Differential Type AUTOMATED DIFFERENTIAL     Segs Relative 65.3 36 - 66 %    Lymphocytes % 23.8 (L) 24 - 44 %    Monocytes % 8.6 (H) 0 - 4 %    Eosinophils % 1.7 0 - 3 %    Basophils % 0.4 0 - 1 %    Segs Absolute 3.1 K/CU MM    Lymphocytes Absolute 1.1 K/CU MM    Monocytes Absolute 0.4 K/CU MM    Eosinophils Absolute 0.1 K/CU MM    Basophils Absolute 0.0 K/CU MM    Nucleated RBC % 0.0 %    Total Nucleated RBC 0.0 K/CU MM    Total Immature Neutrophil 0.01 K/CU MM    Immature Neutrophil % 0.2 0 - 0.43 %   CMP   Result Value Ref Range    Sodium 136 135 - 145 MMOL/L    Potassium 4.3 3.5 - 5.1 MMOL/L    Chloride 99 99 - 110 mMol/L    CO2 26 21 - 32 MMOL/L    BUN 25 (H) 6 - 23 MG/DL    Creatinine 1.4 (H) 0.9 - 1.3 MG/DL    Glucose 110 (H) 70 - 99 MG/DL    Calcium 9.2 8.3 - 10.6 MG/DL    Albumin 4.3 3.4 - 5.0 GM/DL    Total Protein 6.5 6.4 - 8.2 GM/DL    Total Bilirubin 0.5 0.0 - 1.0 MG/DL    ALT 27 10 - 40 U/L    AST 27 15 - 37 IU/L    Alkaline Phosphatase 65 40 - 129 IU/L    GFR Non- 48 (L) >60 mL/min/1.73m2    GFR  59 (L) >60 mL/min/1.73m2    Anion Gap 11 4 - 16   Troponin   Result Value Ref Range    Troponin T 0.011 (H) <0.01 NG/ML   Lipase   Result Value Ref Range    Lipase 18 13 - 60 IU/L   Troponin   Result Value Ref Range    Troponin T <0.010 <0.01 NG/ML   EKG 12 Lead   Result Value Ref Range    Ventricular Rate 75 BPM    Atrial Rate 75 BPM    P-R Interval 176 ms    QRS Duration 86 ms    Q-T Interval 374 ms    QTc Calculation (Bazett) 417 ms    P Axis 72 degrees    R Axis 33 degrees    T Axis 69 degrees    Diagnosis       Normal sinus rhythm  Cannot rule out Anterior infarct , age undetermined  Abnormal ECG  When compared with ECG of 26-AUG-2022 16:08,  No significant change was found        Radiographs (if obtained):  Radiologist's Report Reviewed:  No results found.  XR CHEST PORTABLE   Final Result   1. No active pulmonary disease. NM MYOCARDIAL SPECT REST EXERCISE OR RX    (Results Pending)       EKG (if obtained): (All EKG's are interpreted by myself in the absence of a cardiologist)  Normal sinus rhythm  Ventricular rate 75  VT interval 176  QRS duration 86  QT/QTc 374/417  PRT axis 72 33 69  Heart Score    History-: 1    - slightly suspicious : 0  - moderately suspicious:1  - highly suspicious: 2    EKG-: 0  -normal: 0  - non specific repolarization disturbance, LBBB, LVH but no ST deviation: 1  - significant ST deviation not due to LBBB, LVH, digoxin: 2    Age-: 2  -less than 45: :  - 45-64: 1  - greater than 64 : 2    Risk factors-: 2  HTN, HLD, DM,BMI 30 and above, smoking (current, or smoking cessation ? 3 mo), positive family history (parent or sibling with CVD before age 72); atherosclerotic disease: prior MI, PCI/CABG, CVA/TIA, or peripheral arterial disease  -  none:0  - 1-2 RF: 1  - 3 or more RF : 3    Troponin: 0  - normal or less: 0  - 1-3x normal: 1  - greater than 3 x normal : 2    Total Score: 5    Score Interpretation:     Scores 0-3: 0.9-1.7% risk of adverse cardiac event. In the HEART Score study, these patients were discharged (0.99% in the retrospective study, 1.7% in the prospective study)    Scores 4-6: 12-16.6% risk of adverse cardiac event. In the HEART Score study, these patients were admitted to the hospital. (11.6% retrospective, 16.6% prospective)    Scores ? 7: 50-65% risk of adverse cardiac event. In the HEART Score study, these patients were candidates for early invasive measures. (65.2% retrospective, 50.1% prospective)    A MACE (Major Adverse Cardiac Event) was defined as all-cause mortality, myocardial infarction, or coronary revascularization.       MDM:  59-year-old male with a past medical history of CAD status post stent placement, hyperlipidemia, hypothyroidism, hypertension, presenting to the ED with a history of left-sided chest pain which started today. Physical examination is unremarkable. EKG significant for normal sinus rhythm. Laboratory evaluation was significant for minimally elevated troponin at 0.011, and the second troponin was unremarkable. Patient received aspirin and IV fluid. On reevaluation patient states his left-sided chest pain has improved but it seems that it is radiating towards the left upper quadrant. Patient was given a GI cocktail with some improvement in symptoms. Patient is scheduled for admission for observation and further cardiac work-up. Clinical Impression:  1. Chest pain with moderate risk for cardiac etiology      Disposition referral (if applicable):  [place]  [id number] [facility]    Follow up      Raymon Joseph 25  [phone]        Disposition medications (if applicable):  Current Discharge Medication List        ED Provider Disposition Time  DISPOSITION Admitted 08/28/2022 07:07:53 PM      Comment: Please note this report has been produced using speech recognition software and may contain errors related to that system including errors in grammar, punctuation, and spelling, as well as words and phrases that may be inappropriate. Efforts were made to edit the dictations.         [address] [facility], DO  08/29/22 1120

## 2022-08-28 NOTE — ED NOTES
Report called to Michaelle Andrade at this time, all questions answered.       Carlie Zhang RN  08/28/22 0321

## 2022-08-28 NOTE — CONSULTS
Chart reviewed  Full note to follow                      Name:  Albertina Hammans /Age/Sex: 1940  (79 y.o. male)   MRN & CSN:  [id number] & [id number] Admission Date/Time: 2022  3:35 PM   Location:  [facility] PCP: [address] Day: 1          Referring physician:  Albino Sanchez MD         Reason for consultation: Chest pain        Thanks for referral.    Information source: Patient    CC; chest pain      HPI:   Thank you for involving me in taking  care of Albertina Hammans who  is a 79 y. o.year  Old male  Presents with history of arrhythmias, history of carotid catheterization in 2018 which showed patent coronaries has been in and out of the hospital has history of hyperlipidemia and hypothyroidism now back again with midsternal chest pain again the troponins are mildly elevated however the EKG is unremarkable          Patient has had to cardiac catheterization done in the past which were unremarkable the last 1 was done in 2018            Past medical history:    has a past medical history of Anesthesia complication, BPH with obstruction/lower urinary tract symptoms, Chest pain, Chronic low back pain, GERD (gastroesophageal reflux disease), H/O 24 hour EKG monitoring, H/O cardiac catheterization, H/O cardiac catheterization, H/O cardiac catheterization, H/O cardiovascular stress test, H/O echocardiogram, History of cardiovascular stress test, History of Holter monitoring, History of nuclear stress test, Hx of chest x-ray, Hx of echocardiogram, Hyperlipidemia, Idiopathic peripheral neuropathy, Kidney stone, Left facial numbness, Lumbar radiculopathy, Malignant melanoma of skin of face ([address] MVP (mitral valve prolapse), Myotonia, Peripelvic (lymphatic) cyst, Precordial pain, and Vitamin B12 deficiency. Past surgical history:   has a past surgical history that includes Cardiac catheterization (12); Prostate surgery (2011); Rotator cuff repair (Right, 10/2016);  Colonoscopy (5/5/14); Colonoscopy (07/10/2017); lumbar discectomy (2933); ureterolithotomy (1992); Tibia fracture surgery (Right); and Lumbar disc surgery (11/2002). Social History:   reports that he has never smoked. He has never used smokeless tobacco. He reports current alcohol use. He reports that he does not use drugs. Family history:  family history includes Cancer in an other family member; Coronary Art Dis in some other family members; Diabetes in an other family member; Heart Disease in his brother, father, and mother; Prostate Cancer in an other family member. Allergies   Allergen Reactions    Fenofibrate        aluminum & magnesium hydroxide-simethicone (MAALOX) 200-200-20 MG/5ML suspension 30 mL, Once  lidocaine viscous hcl (XYLOCAINE) 2 % solution 10 mL, Once  [START ON 8/29/2022] clopidogrel (PLAVIX) tablet 75 mg, Nightly    Current Facility-Administered Medications   Medication Dose Route Frequency Provider Last Rate Last Admin    aluminum & magnesium hydroxide-simethicone (MAALOX) 200-200-20 MG/5ML suspension 30 mL  30 mL Oral Once Fiona Curtis, DO        lidocaine viscous hcl (XYLOCAINE) 2 % solution 10 mL  10 mL Mouth/Throat Once Fiona Curtis, DO        [START ON 8/29/2022] clopidogrel (PLAVIX) tablet 75 mg  75 mg Oral Nightly NELLIE Markham - CNP         Current Outpatient Medications   Medication Sig Dispense Refill    nitroGLYCERIN (NITROSTAT) 0.4 MG SL tablet up to max of 3 total doses.  If no relief after 1 dose, call 911. 25 tablet 3    traZODone (DESYREL) 50 MG tablet Take 1 tablet by mouth nightly as needed for Sleep 30 tablet 0    atorvastatin (LIPITOR) 40 MG tablet Take 1 tablet by mouth nightly 30 tablet 3    hydroCHLOROthiazide (HYDRODIURIL) 25 MG tablet Take 1 tablet by mouth daily 08/22/22 Patient states he only takes 1/2 tablet 30 tablet 0    vitamin B-12 500 MCG tablet Take 1 tablet by mouth daily 30 tablet 3    gabapentin (NEURONTIN) 300 MG capsule Take 300 mg by mouth nightly. dilTIAZem (DILACOR XR) 240 MG extended release capsule Take 240 mg by mouth daily      levothyroxine (SYNTHROID) 50 MCG tablet TAKE 1 TABLET BY MOUTH IN  THE MORNING 90 tablet 0    pantoprazole (PROTONIX) 40 MG tablet TAKE 1 TABLET BY MOUTH  DAILY 90 tablet 1    gabapentin (NEURONTIN) 300 MG capsule TAKE 1 CAPSULE BY MOUTH 3  TIMES DAILY (Patient taking differently: Take 300 mg by mouth 2 times daily.) 270 capsule 1    clopidogrel (PLAVIX) 75 MG tablet Take 1 tablet by mouth daily 90 tablet 3    aspirin 81 MG tablet Take 81 mg by mouth daily       Review of Systems:  All 14 systems reviewed, all negative except for chest pain    Physical Examination:    BP (!) 94/53   Pulse 64   Temp 98.3 °F (36.8 °C) (Oral)   Resp 14   Ht 5' 10\" (1.778 m)   Wt 160 lb (72.6 kg)   SpO2 93%   BMI 22.96 kg/m²      Wt Readings from Last 3 Encounters:   08/28/22 160 lb (72.6 kg)   08/26/22 165 lb (74.8 kg)   08/22/22 165 lb (74.8 kg)     Body mass index is 22.96 kg/m². General Appearance: Fair  Head: normocephalic     Eyes: normal, noninjected conjunctiva    ENT: normal mucosa, noninjected throat, normal     NECK: No JVP  No thyromegaly        Cardiovascular: No thrills palpated   Auscultation: Normal S1 and S2, no murmur   carotid bruit no   Abdominal Aorta no bruit    Respiratory:    Breath sounds diminished bilaterally    Extremities: Trace edema clubbing ,   no cyanosis    SKIN: Warm and well perfused, no pallor or cyanosis    Vascular exam:  Pedal Pulses: Palp bilaterally        Abdomen:  No masses or tenderness. No organomegaly noted. Neurological:  Oriented to time, place, and person   No focal neurological deficit noted.   Psychiatric:normal mood, no anxiety    Lab Review   Recent Results (from the past 24 hour(s))   EKG 12 Lead    Collection Time: 08/28/22  3:04 PM   Result Value Ref Range    Ventricular Rate 75 BPM    Atrial Rate 75 BPM    P-R Interval 176 ms    QRS Duration 86 ms    Q-T Interval 374 ms    QTc Calculation (Bazett) 417 ms    P Axis 72 degrees    R Axis 33 degrees    T Axis 69 degrees    Diagnosis       Normal sinus rhythm  Cannot rule out Anterior infarct , age undetermined  Abnormal ECG  When compared with ECG of 26-AUG-2022 16:08,  No significant change was found     CBC with Auto Differential    Collection Time: 08/28/22  3:10 PM   Result Value Ref Range    WBC 4.8 4.0 - 10.5 K/CU MM    RBC 4.24 (L) 4.6 - 6.2 M/CU MM    Hemoglobin 13.7 13.5 - 18.0 GM/DL    Hematocrit 41.0 (L) 42 - 52 %    MCV 96.7 78 - 100 FL    MCH 32.3 (H) 27 - 31 PG    MCHC 33.4 32.0 - 36.0 %    RDW 12.3 11.7 - 14.9 %    Platelets 815 836 - 817 K/CU MM    MPV 9.4 7.5 - 11.1 FL    Differential Type AUTOMATED DIFFERENTIAL     Segs Relative 65.3 36 - 66 %    Lymphocytes % 23.8 (L) 24 - 44 %    Monocytes % 8.6 (H) 0 - 4 %    Eosinophils % 1.7 0 - 3 %    Basophils % 0.4 0 - 1 %    Segs Absolute 3.1 K/CU MM    Lymphocytes Absolute 1.1 K/CU MM    Monocytes Absolute 0.4 K/CU MM    Eosinophils Absolute 0.1 K/CU MM    Basophils Absolute 0.0 K/CU MM    Nucleated RBC % 0.0 %    Total Nucleated RBC 0.0 K/CU MM    Total Immature Neutrophil 0.01 K/CU MM    Immature Neutrophil % 0.2 0 - 0.43 %   CMP    Collection Time: 08/28/22  3:10 PM   Result Value Ref Range    Sodium 136 135 - 145 MMOL/L    Potassium 4.3 3.5 - 5.1 MMOL/L    Chloride 99 99 - 110 mMol/L    CO2 26 21 - 32 MMOL/L    BUN 25 (H) 6 - 23 MG/DL    Creatinine 1.4 (H) 0.9 - 1.3 MG/DL    Glucose 110 (H) 70 - 99 MG/DL    Calcium 9.2 8.3 - 10.6 MG/DL    Albumin 4.3 3.4 - 5.0 GM/DL    Total Protein 6.5 6.4 - 8.2 GM/DL    Total Bilirubin 0.5 0.0 - 1.0 MG/DL    ALT 27 10 - 40 U/L    AST 27 15 - 37 IU/L    Alkaline Phosphatase 65 40 - 129 IU/L    GFR Non- 48 (L) >60 mL/min/1.73m2    GFR  59 (L) >60 mL/min/1.73m2    Anion Gap 11 4 - 16   Troponin    Collection Time: 08/28/22  3:10 PM   Result Value Ref Range    Troponin T 0.011 (H) <0.01 NG/ML   Lipase Collection Time: 08/28/22  3:10 PM   Result Value Ref Range    Lipase 18 13 - 60 IU/L   Troponin    Collection Time: 08/28/22  5:23 PM   Result Value Ref Range    Troponin T <0.010 <0.01 NG/ML           Assessment/Recommendations:     -Chest pain we will check the serial troponins and EKGs we will probably proceed with a Cardiolite stress test in the morning for further assessment we will try with treadmill Cardiolite stress test for further assessment and if it is positive we will recapped I discussed this in detail with the patient  -hyperlipidemia patient is statin which will be continued  -Hypertension patient is on hydrochlorothiazide which will be continued  -Hypothyroidism on Synthroid 50 mcg p.o. daily which will be continued           Shilpa Lawrence MD, 8/28/2022 7:24 PM       Please note this report has been partially produced using speech recognition software and may contain errors related to that system including errors in grammar, punctuation, and spelling, as well as words and phrases that may be inappropriate. If there are any questions or concerns please feel free to contact the dictating provider for clarification.

## 2022-08-28 NOTE — CARE COORDINATION
Pt noted for possible readmission. Pt was recently admitted 8/22-8/25 for stroke like symptoms. Pt follows with Cardiology, Dr. Wanda Aranda. Pt was noted to be independent in ADL's, lives at home with wife, has insurance and PCP. No DME listed. Pt was seen 8/26 for chest pain also. He was treated and d/c home. Pt returns today with chest pain. No CM needs noted but CM will follow as needed.

## 2022-08-29 ENCOUNTER — APPOINTMENT (OUTPATIENT)
Dept: NUCLEAR MEDICINE | Age: 82
End: 2022-08-29
Payer: MEDICARE

## 2022-08-29 ENCOUNTER — CARE COORDINATION (OUTPATIENT)
Dept: CASE MANAGEMENT | Age: 82
End: 2022-08-29

## 2022-08-29 VITALS
WEIGHT: 166.67 LBS | HEART RATE: 88 BPM | TEMPERATURE: 98.2 F | DIASTOLIC BLOOD PRESSURE: 63 MMHG | RESPIRATION RATE: 20 BRPM | SYSTOLIC BLOOD PRESSURE: 124 MMHG | HEIGHT: 70 IN | OXYGEN SATURATION: 91 % | BODY MASS INDEX: 23.86 KG/M2

## 2022-08-29 DIAGNOSIS — E03.9 ACQUIRED HYPOTHYROIDISM: Chronic | ICD-10-CM

## 2022-08-29 LAB
EKG ATRIAL RATE: 75 BPM
EKG DIAGNOSIS: NORMAL
EKG P AXIS: 72 DEGREES
EKG P-R INTERVAL: 176 MS
EKG Q-T INTERVAL: 374 MS
EKG QRS DURATION: 86 MS
EKG QTC CALCULATION (BAZETT): 417 MS
EKG R AXIS: 33 DEGREES
EKG T AXIS: 69 DEGREES
EKG VENTRICULAR RATE: 75 BPM
HCT VFR BLD CALC: 45.5 % (ref 42–52)
HEMOGLOBIN: 14.2 GM/DL (ref 13.5–18)
LV EF: 60 %
LVEF MODALITY: NORMAL
MCH RBC QN AUTO: 32 PG (ref 27–31)
MCHC RBC AUTO-ENTMCNC: 31.2 % (ref 32–36)
MCV RBC AUTO: 102.5 FL (ref 78–100)
PDW BLD-RTO: 12.3 % (ref 11.7–14.9)
PLATELET # BLD: 180 K/CU MM (ref 140–440)
PMV BLD AUTO: 9.3 FL (ref 7.5–11.1)
RBC # BLD: 4.44 M/CU MM (ref 4.6–6.2)
WBC # BLD: 4.2 K/CU MM (ref 4–10.5)

## 2022-08-29 PROCEDURE — 85027 COMPLETE CBC AUTOMATED: CPT

## 2022-08-29 PROCEDURE — 6360000002 HC RX W HCPCS: Performed by: NURSE PRACTITIONER

## 2022-08-29 PROCEDURE — 99213 OFFICE O/P EST LOW 20 MIN: CPT | Performed by: INTERNAL MEDICINE

## 2022-08-29 PROCEDURE — 6370000000 HC RX 637 (ALT 250 FOR IP): Performed by: NURSE PRACTITIONER

## 2022-08-29 PROCEDURE — 96372 THER/PROPH/DIAG INJ SC/IM: CPT

## 2022-08-29 PROCEDURE — 78452 HT MUSCLE IMAGE SPECT MULT: CPT

## 2022-08-29 PROCEDURE — 2580000003 HC RX 258: Performed by: NURSE PRACTITIONER

## 2022-08-29 PROCEDURE — APPSS45 APP SPLIT SHARED TIME 31-45 MINUTES: Performed by: NURSE PRACTITIONER

## 2022-08-29 PROCEDURE — 96361 HYDRATE IV INFUSION ADD-ON: CPT

## 2022-08-29 PROCEDURE — G0378 HOSPITAL OBSERVATION PER HR: HCPCS

## 2022-08-29 PROCEDURE — 93017 CV STRESS TEST TRACING ONLY: CPT

## 2022-08-29 PROCEDURE — 94761 N-INVAS EAR/PLS OXIMETRY MLT: CPT

## 2022-08-29 PROCEDURE — 36415 COLL VENOUS BLD VENIPUNCTURE: CPT

## 2022-08-29 PROCEDURE — 3430000000 HC RX DIAGNOSTIC RADIOPHARMACEUTICAL: Performed by: INTERNAL MEDICINE

## 2022-08-29 PROCEDURE — A9500 TC99M SESTAMIBI: HCPCS | Performed by: INTERNAL MEDICINE

## 2022-08-29 PROCEDURE — 93010 ELECTROCARDIOGRAM REPORT: CPT | Performed by: INTERNAL MEDICINE

## 2022-08-29 RX ADMIN — KIT FOR THE PREPARATION OF TECHNETIUM TC99M SESTAMIBI 10 MILLICURIE: 1 INJECTION, POWDER, LYOPHILIZED, FOR SOLUTION PARENTERAL at 08:25

## 2022-08-29 RX ADMIN — KIT FOR THE PREPARATION OF TECHNETIUM TC99M SESTAMIBI 30 MILLICURIE: 1 INJECTION, POWDER, LYOPHILIZED, FOR SOLUTION PARENTERAL at 10:00

## 2022-08-29 RX ADMIN — HEPARIN SODIUM 5000 UNITS: 5000 INJECTION INTRAVENOUS; SUBCUTANEOUS at 05:46

## 2022-08-29 RX ADMIN — ASPIRIN 81 MG CHEWABLE TABLET 81 MG: 81 TABLET CHEWABLE at 08:04

## 2022-08-29 RX ADMIN — SODIUM CHLORIDE, PRESERVATIVE FREE 10 ML: 5 INJECTION INTRAVENOUS at 08:07

## 2022-08-29 RX ADMIN — LEVOTHYROXINE SODIUM 50 MCG: 0.05 TABLET ORAL at 05:46

## 2022-08-29 RX ADMIN — HEPARIN SODIUM 5000 UNITS: 5000 INJECTION INTRAVENOUS; SUBCUTANEOUS at 13:35

## 2022-08-29 RX ADMIN — CYANOCOBALAMIN TAB 1000 MCG 500 MCG: 1000 TAB at 08:04

## 2022-08-29 RX ADMIN — PANTOPRAZOLE SODIUM 40 MG: 40 TABLET, DELAYED RELEASE ORAL at 05:46

## 2022-08-29 RX ADMIN — GABAPENTIN 300 MG: 300 CAPSULE ORAL at 08:04

## 2022-08-29 ASSESSMENT — PAIN SCALES - GENERAL: PAINLEVEL_OUTOF10: 0

## 2022-08-29 NOTE — PROGRESS NOTES
Cardiolite done today is normal no ischemia seen normal EF suggestive no significant CAD as far as cardio is concerned patient can be discharged

## 2022-08-29 NOTE — CARE COORDINATION
Upon attempt at Care Transition follow up call Patient noted to have been admitted to [facility] for :  Chest pain-left-sided right now worse abdomen-ACS versus GI component and ARMAAN    Current episode closed per protocol. CT staff to follow if criteria met upon discharge.   [address] [place] [address], [facility], [place] [phone]

## 2022-08-29 NOTE — DISCHARGE INSTRUCTIONS
Keep previous recommended follow up appointments     Repeat blood work x 1 week     Hold HCTZ until follow-up appointment/repeat labs with PCP

## 2022-08-29 NOTE — DISCHARGE SUMMARY
Discharge Summary    Name:  Yvette Ornelas /Age/Sex: 1940  (79 y.o. male)   MRN & CSN:  [id number] & [id number] Admission Date/Time: 2022  3:35 PM   Attending:  Andres Blanco MD Discharging Provider NELLIE Cervantes [facility] Course:   Yvette Ornelas is a 79 y.o.  male  who presents with Angina pectoris [facility]    Hospital Course: Patient has a recent admission - for evaluation of diffuse paresthesias intermittent for 3 days. He reported intermittent chest pain at that time also. He was evaluated by neurology and cardiology at that time and cleared for discharge. Due to recurrent episodes of chest pain he returned to the emergency room and admitted for further evaluation. Chest pain is currently resolved. Cardiology consulted and recommended stress test which was normal.  The patient has pending referral to neuro intervention-Dr. Santiago and follow-up with neurology. Patient was discharged home in stable condition. His remaining clinical course is outlined below    Chest pain-intermittent pressure episodes now resolved. Likely noncardiac              Cardiology consulted and cleared for discharge  Stress test normal              JHOAN ()EF 55 to 60% trace MR/TR              ASA/DAPT              Statin    Bilateral vertebral artery stenosis  Right-sided carotid stenosis              Neurosurgery evaluation with recommendation for continued secondary prevention and nonurgent follow-up with neuro surgical intervention outpatient-referral pending with appointment tentatively in October     Renal insufficiency sCr 1.4   Patient elected to discharge home with repeat labs this week versus staying to monitor.  Has appt with PCP this week    Repeat BMP this week and follow up with PCP    Recommended holding HCTZ until follow-up/repeat labs      HTN-continue home antihypertensive regimen              Monitor blood pressure trends              Appears controlled HLD              Lipid panel: , , HDL 33,               Continue Lipitor     History of lumbar radiculopathy              Continue gabapentin     Hypothyroidism-continue Synthroid    Hypothyroid- continue synthroid. Last TSH 3.950 (8/19/2022)      The patient expressed appropriate understanding of and agreement with the discharge recommendations, medications, and plan. Consults this admission:  IP CONSULT TO HOSPITALIST  IP CONSULT TO CARDIOLOGY    Discharge Instruction:   Follow up appointments: Cardiology/neuro intervention/neurology  Primary care physician:  within 2 weeks    Diet:  cardiac diet   Activity: activity as tolerated  Disposition: Discharged to:   [x]Home, [][facility], []SNF, []Acute Rehab, []Hospice   Condition on discharge: Stable    Discharge Medications:        Medication List        ASK your doctor about these medications      aspirin 81 MG tablet     atorvastatin 40 MG tablet  Commonly known as: LIPITOR  Take 1 tablet by mouth nightly     clopidogrel 75 MG tablet  Commonly known as: PLAVIX  Take 1 tablet by mouth daily     cyanocobalamin 500 MCG tablet  Take 1 tablet by mouth daily     dilTIAZem 240 MG extended release capsule  Commonly known as: DILACOR XR     * gabapentin 300 MG capsule  Commonly known as: NEURONTIN     * gabapentin 300 MG capsule  Commonly known as: NEURONTIN  TAKE 1 CAPSULE BY MOUTH 3  TIMES DAILY     hydroCHLOROthiazide 25 MG tablet  Commonly known as: HYDRODIURIL  Take 1 tablet by mouth daily 08/22/22 Patient states he only takes 1/2 tablet     levothyroxine 50 MCG tablet  Commonly known as: SYNTHROID  TAKE 1 TABLET BY MOUTH IN  THE MORNING     nitroGLYCERIN 0.4 MG SL tablet  Commonly known as: NITROSTAT  up to max of 3 total doses. If no relief after 1 dose, call 911.      pantoprazole 40 MG tablet  Commonly known as: PROTONIX  TAKE 1 TABLET BY MOUTH  DAILY     traZODone 50 MG tablet  Commonly known as: DESYREL  Take 1 tablet by mouth nightly as needed for Sleep           * This list has 2 medication(s) that are the same as other medications prescribed for you. Read the directions carefully, and ask your doctor or other care provider to review them with you. Objective Findings at Discharge:     Vitals:    08/29/22 0145 08/29/22 0549 08/29/22 0700 08/29/22 0804   BP: 121/72   124/63   Pulse: 82  72 88   Resp: 16   20   Temp: 97.4 °F (36.3 °C)   98.2 °F (36.8 °C)   TempSrc: Axillary   Oral   SpO2: 94%   91%   Weight:  166 lb 10.7 oz (75.6 kg)     Height:                  Physical Exam: 08/29/22     Gen:  awake, alert, cooperative, no apparent distress normal body habitus  Head/Eyes:  Normocephalic atraumatic, EOMI   NECK:   symmetrical, trachea midline  LUNGS: Normal Effort/ symmetry movement   CARDIOVASCULAR:  Normal rate Tele sinus rhythm  ABDOMEN: Non tender, non distended, no HSM noted. MUSCULOSKELETAL: no gross deformities  NEUROLOGIC: Alert and Oriented,  Cranial nerves II-XII are grossly intact. SKIN:  no bruising or bleeding, normal skin color,  no redness    Data:     Laboratory this visit:  Reviewed  Recent Labs     08/26/22  1613 08/28/22  1510   WBC 4.1 4.8   HGB 13.6 13.7   HCT 40.5* 41.0*    221      Recent Labs     08/26/22  1613 08/28/22  1510   * 136   K 4.0 4.3   CL 97* 99   CO2 26 26   BUN 25* 25*   CREATININE 1.1 1.4*     Recent Labs     08/26/22  1613 08/28/22  1510   AST 34 27   ALT 30 27   BILITOT 0.4 0.5   ALKPHOS 41 72       Radiology this visit:  Reviewed.     Echocardiogram complete 2D with doppler with color    Result Date: 8/23/2022  Transthoracic Echocardiography Report (TTE)  Demographics   Patient Name       Jyothi HDEZ    Date of Study       08/23/2022   Date of Birth      1940         Gender              Male   Age                79 year(s)         Race                   Patient Number     [id number]         Room Number         4149   Visit Number       [id number]   Corporate ID [id number]   Accession Number   [id number]         [facility]   Ordering Physician Marianna Quispe                     CNP                Physician           Rosalina Richard MD  Procedure Type of Study   TTE procedure:ECHOCARDIOGRAM COMPLETE 2D W DOPPLER W COLOR. Procedure Date Date: 08/23/2022 Start: 11:05 AM Study Location: Portable Technical Quality: Technically difficult study Indications:CVA. Height: 70 inches Weight: 165 pounds BSA: 1.92 m2 BMI: 23.68 kg/m2 HR: 69 bpm BP: 90/50 mmHg  Conclusions   Summary  Left ventricular systolic function is normal.  Left ventricle size is normal.  Ejection fraction is visually estimated at 55-60%. No regional wall motion abnormalites. Indeterminate diastolic function; E/A flow reversal is noted. Structurally normal mitral valve. Trace mitral regurgitation. Trace tricuspid regurgitation; RVSP: 23 mmHg. No evidence of any pericardial effusion. IVC and abdominal aorta are not well visualized   Signature   ------------------------------------------------------------------  Electronically signed by Erin Mars MD  (Interpreting physician) on 08/23/2022 at 06:29 PM  ------------------------------------------------------------------   Findings   Left Ventricle  Left ventricular systolic function is normal.  Left ventricle size is normal.  Ejection fraction is visually estimated at 55-60%. No regional wall motion abnormalites. Indeterminate diastolic function; E/A flow reversal is noted. Left Atrium  Essentially normal left atrium. Right Atrium  Essentially normal right atrium. Right Ventricle  Essentially normal right ventricle. Aortic Valve  Structurally normal aortic valve. Aortic valve appears tricuspid. Mitral Valve  Structurally normal mitral valve. Trace mitral regurgitation. Tricuspid Valve  Trace tricuspid regurgitation; RVSP: 23 mmHg.    Pulmonic Valve  Pulmonic valve is structurally normal.   Pericardial Effusion  No evidence of any pericardial effusion.    Miscellaneous  IVC and abdominal aorta are not well visualized  M-Mode/2D Measurements & Calculations   LV Diastolic Dimension:  LV Systolic Dimension:  LA Dimension: 2.7 cmAO Root  3.78 cm                  2.65 cm                 Dimension: 3.5 cmLA Area:  LV FS:29.9 %             LV Volume Diastolic: 38 34.8 cm2  LV PW Diastolic: 4.70 cm ml  LV PW Systolic: 3.79 cm  LV Volume Systolic: 14  Septum Diastolic: 9.20   ml  cm                       LV EDV/LV EDV Index: 38  Septum Systolic: 7.91 cm FY/02 Y3DO ESV/LV ESV   LA/Aorta: 0.77  CO: 5.98 l/min           Index: 14 ml/7 m2       Ascending Aorta: 3.8 cm  CI: 3.11 l/m*m2          EF Calculated (A4C):    LA volume/Index: 25 ml                           63.2 %                  /29X8  LV Area Diastolic: 30.6  EF Calculated (2D):  cm2                      57.8 %  LV Area Systolic: 2.03  cm2                      LV Length: 6.2 cm                            LVOT: 2 cm  Doppler Measurements & Calculations   MV Peak E-Wave: 41.5 AV Peak Velocity: 137 cm/s    LVOT Peak Velocity: 150  cm/s                 AV Peak Gradient: 7.51 mmHg   cm/s  MV Peak A-Wave: 73.5 AV Mean Velocity: 95.6 cm/s   LVOT Mean Velocity: 98.3  cm/s                 AV Mean Gradient: 4 mmHg      cm/s  MV E/A Ratio: 0.56   AV VTI: 26.2 cm               LVOT Peak Gradient: 9  MV Peak Gradient:    AV Area (Continuity):3.31 cm2 mmHgLVOT Mean Gradient: 5  0.69 mmHg                                          mmHg                       LVOT VTI: 27.6 cm             Estimated RVSP: 23 mmHg  MV P1/2t: 76 msec                                  Estimated RAP:3 mmHg  MVA by PHT:2.89 cm2  Estimated PASP: 22.54 mmHg   MV E' Septal                                       TR Velocity:221 cm/s  Velocity: 4.14 cm/s                                TR Gradient:19.54 mmHg  MV E' Lateral                                      PV Peak Velocity: 82.8 Velocity: 6.91 cm/s                                cm/s  MV E/E' septal:                                    PV Peak Gradient: 2.74  10.02                                              mmHg  MV E/E' lateral:  6.01      XR CHEST (2 VW)    Result Date: 8/19/2022  EXAMINATION: TWO XRAY VIEWS OF THE CHEST 8/19/2022 8:18 am COMPARISON: 02/17/2022. HISTORY: ORDERING SYSTEM PROVIDED HISTORY: Hot Flashes TECHNOLOGIST PROVIDED HISTORY: Reason for exam:->Hot Flashes Reason for Exam: Hot Flashes Additional signs and symptoms: Hot Flashes Relevant Medical/Surgical History: Hot Flashes FINDINGS: Stable minimal linear scarring to the right lung base. Lungs are otherwise clear. Cardiac and mediastinal silhouettes are within normal limits. No pneumothoraces. Bony structures appear intact. Stable exam without acute abnormality. CT HEAD WO CONTRAST    Result Date: 8/22/2022  EXAMINATION: CT OF THE HEAD WITHOUT CONTRAST  8/22/2022 11:45 am TECHNIQUE: CT of the head was performed without the administration of intravenous contrast. Automated exposure control, iterative reconstruction, and/or weight based adjustment of the mA/kV was utilized to reduce the radiation dose to as low as reasonably achievable. COMPARISON: 01/22/2022 CT HISTORY: ORDERING SYSTEM PROVIDED HISTORY: Stroke Symptoms TECHNOLOGIST PROVIDED HISTORY: Has a \"code stroke\" or \"stroke alert\" been called? ->Yes Reason for exam:->Stroke Symptoms Decision Support Exception - unselect if not a suspected or confirmed emergency medical condition->Emergency Medical Condition (MA) Reason for Exam: stroke symptoms Additional signs and symptoms: none Relevant Medical/Surgical History: none FINDINGS: BRAIN/VENTRICLES: The ventricles and sulci are prominent. Pattern is consistent with age-related atrophy. No extra-axial fluid collections, and no sign of recent intracranial hemorrhage. Decreased attenuation is noted within the periventricular white matter.  Pattern is consistent with chronic small vessel ischemic change. No acute edema or mass effect. No mass lesions are detected. ORBITS: The visualized portion of the orbits demonstrate no acute abnormality. SINUSES: The visualized paranasal sinuses and mastoid air cells demonstrate no acute abnormality. SOFT TISSUES/SKULL:  No acute abnormality of the visualized skull or soft tissues. No acute intracranial abnormality. Findings were discussed with PRAVEENA DOSHI at 12:11 pm on 8/22/2022. XR CHEST PORTABLE    Result Date: 8/28/2022  EXAMINATION: ONE XRAY VIEW OF THE CHEST 8/28/2022 3:37 pm COMPARISON: 08/26/2022. HISTORY: ORDERING SYSTEM PROVIDED HISTORY: chest pain TECHNOLOGIST PROVIDED HISTORY: Reason for exam:->chest pain Reason for Exam: chest pain Additional signs and symptoms: none Relevant Medical/Surgical History: chest pain, GERD FINDINGS: The heart size is within normal limits. The pulmonary vasculature is also within normal limits. No acute infiltrates are seen. The costophrenic angles are sharp bilaterally. No pneumothoraces are noted. 1. No active pulmonary disease. XR CHEST PORTABLE    Result Date: 8/26/2022  EXAMINATION: ONE XRAY VIEW OF THE CHEST 8/26/2022 5:49 pm COMPARISON: None. HISTORY: ORDERING SYSTEM PROVIDED HISTORY: chest pain FINDINGS: Heart size is normal. No focal consolidation in the lungs. No pleural effusion or pneumothorax. No acute abnormality. XR CHEST PORTABLE    Result Date: 8/22/2022  EXAMINATION: ONE XRAY VIEW OF THE CHEST 8/22/2022 11:43 am COMPARISON: Chest x-ray dated 08/19/2022. HISTORY: ORDERING SYSTEM PROVIDED HISTORY: stroke symptoms TECHNOLOGIST PROVIDED HISTORY: Reason for exam:->stroke symptoms Reason for Exam: stroke symptoms FINDINGS: HEART/MEDIASTINUM: The cardiomediastinal silhouette is within normal limits. PLEURA/LUNGS: There are no focal consolidations or pleural effusions. There is no appreciable pneumothorax. BONES/SOFT TISSUE: No acute abnormality. abnormality within the visualized superior mediastinum. BONES: No acute osseous abnormality seen. 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 CTA HEAD: Evaluation is limited due to venous contamination as the venous structures are enhancing to a greater degree of the arterial structures. ANTERIOR CIRCULATION: Atherosclerosis seen of the internal carotid arteries bilaterally. There is moderate stenosis involving the right supraclinoid internal carotid artery. No significant stenosis seen of the left internal carotid artery. There is moderate diffuse irregularity involving the right anterior cerebral artery. No gross evidence of a significant stenosis otherwise seen of the anterior cerebral or middle cerebral arteries. No large vessel occlusion is seen. POSTERIOR CIRCULATION: No significant stenosis of the vertebral, basilar, or posterior cerebral arteries. No aneurysm. OTHER: The major dural venous sinuses appear patent without evidence of a thrombus. BRAIN: There is no evidence of mass effect or midline shift. 1. Stenosis seen at the origin of both vertebral arteries, severe on the right and moderate on the left. 2. Moderate stenosis involving the right vertebral artery at the C7 level. 3. No flow limiting stenosis seen of the cervical carotid/vertebral arteries. 4. Evaluation of the intracranial vasculature is partially limited due to the predominantly venous nature of the exam. 5. There appears to be moderate stenosis involving is the right supraclinoid internal carotid artery. 6. There is moderate diffuse irregularity involving the right anterior cerebral artery. 7. No significant stenosis or large vessel occlusion otherwise seen of the [id number].  8. 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 MRI brain without contrast    Result Date: 8/23/2022  EXAMINATION: MRI OF THE BRAIN WITHOUT CONTRAST  8/23/2022 7:53 am TECHNIQUE: Multiplanar multisequence MRI of the brain was performed without the administration of intravenous contrast. COMPARISON: 04/10/2021. HISTORY: ORDERING SYSTEM PROVIDED HISTORY: left sided facial numbness, stroke like symptoms TECHNOLOGIST PROVIDED HISTORY: Reason for exam:->left sided facial numbness, stroke like symptoms Reason for Exam: left sided facial numbness, stroke like symptoms Additional signs and symptoms: NONE Relevant Medical/Surgical History: NONE Initial evaluation. FINDINGS: INTRACRANIAL STRUCTURES/VENTRICLES: There is no acute infarct. No mass effect or midline shift. No evidence of an acute intracranial hemorrhage. Areas of T2 FLAIR hyperintensity are seen in the periventricular and subcortical white matter, which are nonspecific, but may represent chronic microvascular ischemic change. There is minimal global parenchymal volume loss. Otherwise, the ventricles and sulci are normal in size and configuration. The sellar/suprasellar regions appear unremarkable. The normal signal voids within the major intracranial vessels appear maintained. ORBITS: The visualized portion of the orbits demonstrate no acute abnormality. SINUSES: Moderate mucosal thickening of the left maxillary sinus. There appears to be a trace right mastoid effusion. BONES/SOFT TISSUES: The bone marrow signal intensity appears normal. The soft tissues demonstrate no acute abnormality. 1. No acute intracranial abnormality. No acute infarct. 2. Minimal global parenchymal volume loss with moderate chronic microvascular ischemic changes.      NM MYOCARDIAL SPECT REST EXERCISE OR RX    Result Date: 8/29/2022  Cardiac Perfusion Imaging   Demographics   Patient Name      Vidhya HDEZ    Date of study        08/29/2022   Date of Birth     1940         Gender               Male   Age 79 year(s)         Race                    Patient Number    [id number]         Room Number          4251   Visit Number      [id number]          Height               70 inches   Corporate ID      [id number]           Weight               166 pounds   Accession Number  [id number]                                        NM Technologist      Aleyda Linton MD        Cardiologist         Julia SOLITARIO   Conclusions   Summary  Small sized defect of mild severity which is persistent involving inferior  wall of myocardium. Normal Stress nuclear scintigraphic study suggestive of  normal myocardial perfusion. Gated images demonstrate normal left  ventricular systolic function with EF of 60 %. A fixed perfusion defect of  inferior wall of left ventricle with normal wall motion noted suggestive of  diaphragmatic artifact. Signatures   ------------------------------------------------------------------  Electronically signed by Kimberly Willingham MD  (Interpreting cardiologist) on 08/29/2022 at 11:14  ------------------------------------------------------------------  Procedure Procedure Type:   Nuclear Stress Test:Exercise, Myocardial Perfusion Imaging with Exercise, NM  MYOCARDIAL SPECT REST EXERCISE OR RX  Indications: Chest pain. Risk Factors   The patient risk factors include:hypercholesterolemia and family history of  premature CAD. Stress Protocols   Resting ECG  Normal sinus rhythm. Resting HR:87 bpm  Resting BP:130/59 mmHg  Stress Protocol:Exercise - Kvng  Peak HR:120 bpm              HR/BP product:[id number]  Peak BP:160/65 mmHg          Max exrecise: 7.6 METS  Predicted HR: 137 bpm  % of predicted HR: 88   Exercise duration: 07:04 min   Symptoms  No cardiovascular symptoms with maximal exercise.    Imaging Protocols   Rest                             Stress   Isotope:Sestamibi 99mTc Isotope: Sestamibi 99mTc  Isotope dose:10.2 mCi            Isotope dose:31.1 mCi  Administration route: I.V. Administration route: I.V. Injection Date:08/29/2022 08:25  Injection Date:08/29/2022 10:00  Scan Date:08/29/2022 09:10       Scan Date:08/29/2022 10:30   Technique:        SPECT          Technique:        Gated                                                     SPECT   Procedure Description   Upon patient arrival, the patient is identified using two identifiers and  the physician order is verified. An IV is established and 8-11mCi of 99mTc  Sestamibi is intravenously injected and followed with 10mL 0.9% Normal  Saline flush. A circulation period of 45 minutes occurs prior to resting  SPECT imaging. After imaging is complete the patient is escorted to the  stress lab. The patient is connected to the ECG and blood pressure is  measured. The patient is assisted onto the treadmill and the appropriate  exercise protocol (Kvng or Modified Kvng) is selected and the patient  begins to exercise on the treadmill. Once the patient has reached 85% of the  target heart rate then the Nuclear Technologist intravenously injects  22-33mCi of 99mTc Sestamibi and 10mL 0.9% Normal Saline flush. After  completion, recovery, and removal of the IV, the patient rests during the  second circulation period of 45 minutes. Final stress SPECT gated imaging is  performed. The patient may return home or to their room after stress  imaging. The images are processed and final charting is completed and sent  to the appropriate cardiologist for interpretation and reporting. Perfusion Interpretation   Small sized defect of mild severity which is persistent involving inferior  wall of myocardium.   Imaging Results    Summed scores     - Summed stress score: 21     - Summed rest score: 20     - Summed difference score:    0   Rest ejection  Ejection fraction:76 %  EDV :49 ml  ESV :12 ml  Stroke volume :37 ml  Medical History Accession#:  [id number]  Admission Data Admission date: 08/28/2022 Admission Time: 15:35 Hospital Status: Inpatient.       Discharge Time of < 30 minutes    Electronically signed by NELLIE Scales CNP on 8/29/2022 at 12:39 PM

## 2022-08-29 NOTE — PROGRESS NOTES
Cardiology Progress Note     Today's Plan sign off - will follow as outpatient    Admit Date:  8/28/2022    Consult reason/ Seen today for: chest pain     Subjective and  Overnight Events:  reports he had chest pain during stress test while his arms were above his head; pain was relieved with lowering his arms: non cardiac pain    Assessment / Plan:   Chest pain : mild bump in troponin : 0.011: non cardiac pain-occurs with movement /placement of arms  Stress test :no ischemia:normal EF  hypertension: bp soft on admit: on HCTZ at home-recommend to stop at discharge- bp stable  Hyperlipidemia-on statin-HDL 32   Arrhythmias- stable-on Cardizem           History of Presenting Illness:    Chief complain on admission : 79 y. o.year old who is admitted for  Chief Complaint   Patient presents with    Chest Pain     Started this morning         Past medical history:    has a past medical history of Anesthesia complication, BPH with obstruction/lower urinary tract symptoms, Chest pain, Chronic low back pain, GERD (gastroesophageal reflux disease), H/O 24 hour EKG monitoring, H/O cardiac catheterization, H/O cardiac catheterization, H/O cardiac catheterization, H/O cardiovascular stress test, H/O echocardiogram, History of cardiovascular stress test, History of Holter monitoring, History of nuclear stress test, Hx of chest x-ray, Hx of echocardiogram, Hyperlipidemia, Idiopathic peripheral neuropathy, Kidney stone, Left facial numbness, Lumbar radiculopathy, Malignant melanoma of skin of face ([address] MVP (mitral valve prolapse), Myotonia, Peripelvic (lymphatic) cyst, Precordial pain, and Vitamin B12 deficiency. Past surgical history:   has a past surgical history that includes Cardiac catheterization (2/2/12); Prostate surgery (09/26/2011); Rotator cuff repair (Right, 10/2016); Colonoscopy (5/5/14);  Colonoscopy (07/10/2017); lumbar discectomy (1973); ureterolithotomy (1992); Tibia fracture surgery (Right); and Lumbar disc surgery (11/2002). Social History:   reports that he has never smoked. He has never used smokeless tobacco. He reports current alcohol use. He reports that he does not use drugs. Family history:  family history includes Cancer in an other family member; Coronary Art Dis in some other family members; Diabetes in an other family member; Heart Disease in his brother, father, and mother; Prostate Cancer in an other family member. Allergies   Allergen Reactions    Fenofibrate        Review of Systems:   All 14 systems were reviewed and are negative  Except for the positive findings which are documented     /63   Pulse 88   Temp 98.2 °F (36.8 °C) (Oral)   Resp 20   Ht 5' 10\" (1.778 m)   Wt 166 lb 10.7 oz (75.6 kg)   SpO2 91%   BMI 23.91 kg/m²   No intake or output data in the 24 hours ending 08/29/22 1215    Physical Exam:  Physical Exam  Vitals reviewed. HENT:      Head: Normocephalic and atraumatic. Mouth/Throat:      Mouth: Mucous membranes are moist.   Eyes:      Extraocular Movements: Extraocular movements intact. Cardiovascular:      Rate and Rhythm: Normal rate and regular rhythm. Pulses: Normal pulses. Heart sounds: Normal heart sounds. Pulmonary:      Effort: Pulmonary effort is normal.      Breath sounds: Normal breath sounds. Abdominal:      Tenderness: There is no abdominal tenderness. Musculoskeletal:      Right lower leg: No edema. Left lower leg: No edema. Skin:     General: Skin is warm and dry. Capillary Refill: Capillary refill takes less than 2 seconds. Neurological:      General: No focal deficit present. Mental Status: He is alert.         Medications:    atorvastatin  40 mg Oral Nightly    clopidogrel  75 mg Oral Nightly    dilTIAZem  240 mg Oral Nightly    gabapentin  300 mg Oral BID    levothyroxine  50 mcg Oral Daily    pantoprazole  40 mg Oral BID AC cyanocobalamin  500 mcg Oral Daily    sodium chloride flush  5-40 mL IntraVENous BID    aspirin  81 mg Oral Daily    heparin (porcine)  5,000 Units SubCUTAneous 3 times per day      sodium chloride       traZODone, sodium chloride flush, sodium chloride, ondansetron **OR** ondansetron, acetaminophen **OR** acetaminophen, polyethylene glycol, aluminum & magnesium hydroxide-simethicone    Lab Data:  CBC:   Recent Labs     22  1613 22  1510   WBC 4.1 4.8   HGB 13.6 13.7   HCT 40.5* 41.0*   MCV 96.7 96.7    221     BMP:   Recent Labs     22  1613 22  1510   * 136   K 4.0 4.3   CL 97* 99   CO2 26 26   BUN 25* 25*   CREATININE 1.1 1.4*     PT/INR: No results for input(s): PROTIME, INR in the last 72 hours. BNP:    Recent Labs     22  1613   PROBNP 34.10     TROPONIN:   Recent Labs     22  1928 22  1510 22  1723   TROPONINT <0.010 0.011* <0.010              Impression:  Principal Problem:    Angina pectoris ([address]  Resolved Problems:    * No resolved hospital problems. *       All labs, medications and tests reviewed by myself, continue all other medications of all above medical condition listed as is except for changes mentioned above. Thank you   Please call with questions. Electronically signed by NELLIE Conner CNP on 2022 at 12:15 PM  /I have seen ,spoken to  and examined this patient personally, independently of the [facility]. I have reviewed the hospital care given to date and reviewed all pertinent labs and imaging. I have spoken with patient, nursing staff and provided written and verbal instructions . The above note has been reviewed     CARDIOLOGY ATTENDING ADDENDUM    HPI:  I have reviewed the above HPI  And agree with above     Pulse Range: Pulse  Av  Min: 64  Max: 88    Blood Presuure Range:  Systolic (57TQG), FND:835 , Min:89 , KVY:129   ; Diastolic (30PIC), UCO:18, Min:53, Max:76      Pulse ox Range: SpO2  Av.7 %  Min: 91 %

## 2022-08-29 NOTE — PROGRESS NOTES
Patient admitted to unit, alert and oriented x4, skin assessment completed with no skin issue noted, verified with Maritza. Patient made comfortable in bed, bed in low position, call light and items within reach.

## 2022-08-30 ENCOUNTER — HOSPITAL ENCOUNTER (EMERGENCY)
Age: 82
Discharge: HOME | End: 2022-08-30
Attending: EMERGENCY MEDICINE
Payer: MEDICARE

## 2022-08-30 ENCOUNTER — CARE COORDINATION (OUTPATIENT)
Dept: CASE MANAGEMENT | Age: 82
End: 2022-08-30

## 2022-08-30 ENCOUNTER — APPOINTMENT (OUTPATIENT)
Dept: GENERAL RADIOLOGY | Age: 82
End: 2022-08-30
Payer: MEDICARE

## 2022-08-30 VITALS
RESPIRATION RATE: 18 BRPM | SYSTOLIC BLOOD PRESSURE: 133 MMHG | BODY MASS INDEX: 22.9 KG/M2 | HEIGHT: 70 IN | TEMPERATURE: 97.7 F | WEIGHT: 160 LBS | DIASTOLIC BLOOD PRESSURE: 80 MMHG | HEART RATE: 70 BPM | OXYGEN SATURATION: 100 %

## 2022-08-30 DIAGNOSIS — R23.2 HOT FLASHES: Primary | ICD-10-CM

## 2022-08-30 DIAGNOSIS — R29.90 STROKE-LIKE SYMPTOMS: Primary | ICD-10-CM

## 2022-08-30 LAB
ALBUMIN SERPL-MCNC: 4.2 GM/DL (ref 3.4–5)
ALP BLD-CCNC: 57 IU/L (ref 40–128)
ALT SERPL-CCNC: 21 U/L (ref 10–40)
ANION GAP SERPL CALCULATED.3IONS-SCNC: 9 MMOL/L (ref 4–16)
AST SERPL-CCNC: 24 IU/L (ref 15–37)
BASOPHILS ABSOLUTE: 0 K/CU MM
BASOPHILS RELATIVE PERCENT: 0.3 % (ref 0–1)
BILIRUB SERPL-MCNC: 0.4 MG/DL (ref 0–1)
BUN BLDV-MCNC: 22 MG/DL (ref 6–23)
CALCIUM SERPL-MCNC: 9.6 MG/DL (ref 8.3–10.6)
CHLORIDE BLD-SCNC: 102 MMOL/L (ref 99–110)
CO2: 27 MMOL/L (ref 21–32)
CREAT SERPL-MCNC: 1.1 MG/DL (ref 0.9–1.3)
DIFFERENTIAL TYPE: ABNORMAL
EKG ATRIAL RATE: 60 BPM
EKG DIAGNOSIS: NORMAL
EKG P AXIS: 43 DEGREES
EKG P-R INTERVAL: 166 MS
EKG Q-T INTERVAL: 396 MS
EKG QRS DURATION: 82 MS
EKG QTC CALCULATION (BAZETT): 396 MS
EKG R AXIS: 13 DEGREES
EKG T AXIS: 39 DEGREES
EKG VENTRICULAR RATE: 60 BPM
EOSINOPHILS ABSOLUTE: 0.1 K/CU MM
EOSINOPHILS RELATIVE PERCENT: 1.4 % (ref 0–3)
GFR AFRICAN AMERICAN: >60 ML/MIN/1.73M2
GFR NON-AFRICAN AMERICAN: >60 ML/MIN/1.73M2
GLUCOSE BLD-MCNC: 96 MG/DL (ref 70–99)
HCT VFR BLD CALC: 41.9 % (ref 42–52)
HEMOGLOBIN: 13.8 GM/DL (ref 13.5–18)
IMMATURE NEUTROPHIL %: 0.3 % (ref 0–0.43)
LYMPHOCYTES ABSOLUTE: 1 K/CU MM
LYMPHOCYTES RELATIVE PERCENT: 27.2 % (ref 24–44)
MCH RBC QN AUTO: 31.7 PG (ref 27–31)
MCHC RBC AUTO-ENTMCNC: 32.9 % (ref 32–36)
MCV RBC AUTO: 96.3 FL (ref 78–100)
MONOCYTES ABSOLUTE: 0.4 K/CU MM
MONOCYTES RELATIVE PERCENT: 9.9 % (ref 0–4)
NUCLEATED RBC %: 0 %
PDW BLD-RTO: 12.2 % (ref 11.7–14.9)
PLATELET # BLD: 191 K/CU MM (ref 140–440)
PMV BLD AUTO: 9.3 FL (ref 7.5–11.1)
POTASSIUM SERPL-SCNC: 4.2 MMOL/L (ref 3.5–5.1)
RBC # BLD: 4.35 M/CU MM (ref 4.6–6.2)
SARS-COV-2, NAAT: NOT DETECTED
SEGMENTED NEUTROPHILS ABSOLUTE COUNT: 2.2 K/CU MM
SEGMENTED NEUTROPHILS RELATIVE PERCENT: 60.9 % (ref 36–66)
SODIUM BLD-SCNC: 138 MMOL/L (ref 135–145)
SOURCE: NORMAL
TOTAL IMMATURE NEUTOROPHIL: 0.01 K/CU MM
TOTAL NUCLEATED RBC: 0 K/CU MM
TOTAL PROTEIN: 6.4 GM/DL (ref 6.4–8.2)
TROPONIN T: <0.01 NG/ML
TSH HIGH SENSITIVITY: 5.72 UIU/ML (ref 0.27–4.2)
WBC # BLD: 3.6 K/CU MM (ref 4–10.5)

## 2022-08-30 PROCEDURE — 84443 ASSAY THYROID STIM HORMONE: CPT

## 2022-08-30 PROCEDURE — 87635 SARS-COV-2 COVID-19 AMP PRB: CPT

## 2022-08-30 PROCEDURE — 93005 ELECTROCARDIOGRAM TRACING: CPT | Performed by: EMERGENCY MEDICINE

## 2022-08-30 PROCEDURE — 85025 COMPLETE CBC W/AUTO DIFF WBC: CPT

## 2022-08-30 PROCEDURE — 84484 ASSAY OF TROPONIN QUANT: CPT

## 2022-08-30 PROCEDURE — 93010 ELECTROCARDIOGRAM REPORT: CPT | Performed by: INTERNAL MEDICINE

## 2022-08-30 PROCEDURE — 71045 X-RAY EXAM CHEST 1 VIEW: CPT

## 2022-08-30 PROCEDURE — 1111F DSCHRG MED/CURRENT MED MERGE: CPT | Performed by: STUDENT IN AN ORGANIZED HEALTH CARE EDUCATION/TRAINING PROGRAM

## 2022-08-30 PROCEDURE — 99285 EMERGENCY DEPT VISIT HI MDM: CPT

## 2022-08-30 PROCEDURE — 80053 COMPREHEN METABOLIC PANEL: CPT

## 2022-08-30 RX ORDER — LEVOTHYROXINE SODIUM 0.05 MG/1
TABLET ORAL
Qty: 90 TABLET | Refills: 3 | Status: SHIPPED | OUTPATIENT
Start: 2022-08-30

## 2022-08-30 ASSESSMENT — ENCOUNTER SYMPTOMS
DIARRHEA: 0
WHEEZING: 0
SORE THROAT: 0
COUGH: 0
NAUSEA: 0
CONSTIPATION: 0
SHORTNESS OF BREATH: 0
RHINORRHEA: 0
SINUS PRESSURE: 0
ABDOMINAL PAIN: 0
VOMITING: 0

## 2022-08-30 NOTE — CARE COORDINATION
[facility] Initial Follow Up Call    Call within 2 business days of discharge: Yes    Patient: Matt Patten Patient : 1940   MRN: [id number]  Reason for Admission: chest pain  Discharge Date: 22 RARS: No data recorded    Last Discharge [facility]       Date Complaint Diagnosis Description Type Department Provider    22 Fever Hot flashes ED (DISCHARGE) [facility] ED Dominic Huston DO             Spoke with: Cordell Kearns today and he said he is feeling better today. He was in the ED last night for hot flashes. Denies chest pain, hot flashes, n/v/d, dizziness, sob, weakness,numbness, tingling. He said the hot flashes went away when he returned from the ED but he has a dull pressure headache on the top of his head. Took Tylenol w/relief. He's been up and active today cleaning up the house. PCP HFU 22- he has transportation. HFU w/ neurology to be scheduled in 2 weeks but they told him he can wait until 10/7/22 first available. Meds reviewed and are correct. BP last night when he was having hot flashes was 134/72 110/58 today. We discussed lab results from ED- high TSH. He will discuss w/ PCP tomorrow. Eating and drinking ok. Denies problems w/ urination/bowels. No other concerns voiced at this time. CTN # given to ptLIYA Mendoza [facility] [phone]        Non-face-to-face services provided:  Scheduled appointment with PCP-22  Scheduled appointment with Specialist-10/7/22  Obtained and reviewed discharge summary and/or continuity of care documents  Reviewed and followed up on pending diagnostic tests and treatments-labs     Care Transitions 24 Hour Call    Schedule Follow Up Appointment with PCP: Completed  Do you have a copy of your discharge instructions?: Yes  Do you have all of your prescriptions and are they filled?: Yes  Have you been contacted by a [facility] Pharmacist?: No  Have you scheduled your follow up appointment?: Yes  How are you going to get to your appointment?: Car - family or friend to transport  Do you have support at home?: Alone  Do you feel like you have everything you need to keep you well at home?: Yes  Are you an active caregiver in your home?: No  Care Transitions Interventions   Home Care Waiver: Declined        Transportation Support: Declined    Meals on Wheels: Declined  DME Assistance: Declined     Senior Services: Declined    Disease Specific Clinic: Completed      Disease Association: Completed           Transitions of Care Initial Call      Challenges to be reviewed by the provider   Additional needs identified to be addressed with provider: No  none             Method of communication with provider : none    Advance Care Planning:   Does patient have an Advance Directive: not on file and patient declined education. Care Transition Nurse contacted the patient by telephone to perform post hospital discharge assessment. Verified name and  with patient as identifiers. Provided introduction to self, and explanation of the CTN role. CTN reviewed discharge instructions, medical action plan and red flags with patient who verbalized understanding. Patient given an opportunity to ask questions and does not have any further questions or concerns at this time. Were discharge instructions available to patient? Yes. Reviewed appropriate site of care based on symptoms and resources available to patient including: PCP  Specialist  When to call 911. The patient agrees to contact the PCP office for questions related to their healthcare. Medication reconciliation was performed with patient, who verbalizes understanding of administration of home medications. Advised obtaining a 90-day supply of all daily and as-needed medications. Was patient discharged with a pulse oximeter? no    CTN provided contact information. Plan for follow-up call in 5-7 days based on severity of symptoms and risk factors.   Plan for next call: symptom

## 2022-08-30 NOTE — ED TRIAGE NOTES
Patient presents to the ED with complaint of fever.  Patient states he has been feeling hot since approximately 5pm.

## 2022-08-30 NOTE — ED PROVIDER NOTES
Emergency Department Encounter    Patient: Lauren Barnes  MRN: [id number]  : 1940  Date of Evaluation: 2022  ED Provider:  Dougie Braxton DO    Triage Chief Complaint:   Hot flashes     HPI:  Lauren Barnes is a 79 y.o. male with past medical history as listed below who presents with feeling hot. Patient was seen here with similar symptoms on 2022. He also was recently admitted from  to  for numbness and tingling to his left face. He is recently admitted for chest pain  to . Patient states that from his numbness of his face standpoint and chest pain, he is feeling better. However he began feeling hot at about 5 PM tonight, and he states he has had intermittent hot flashes ever since. He states this is similar to when he was seen on . Patient denies any URI symptoms, he is vaccinated for COVID. He denies any elevated temperature, just feels hot. Denies any chills, chest pain, shortness of breath, palpitations, nausea, vomiting, abdominal pain, dysuria, diarrhea. ROS:  Review of Systems   Constitutional:  Negative for chills and fever. HENT:  Negative for congestion, rhinorrhea, sinus pressure and sore throat. Eyes:  Negative for visual disturbance. Respiratory:  Negative for cough, shortness of breath and wheezing. Cardiovascular:  Negative for chest pain and palpitations. Gastrointestinal:  Negative for abdominal pain, constipation, diarrhea, nausea and vomiting. Genitourinary:  Negative for dysuria, frequency and urgency. Musculoskeletal:  Negative for arthralgias and myalgias. Skin:  Negative for rash. Neurological:  Negative for dizziness and syncope. Psychiatric/Behavioral:  Negative for agitation, behavioral problems and confusion.         Past Medical History:   Diagnosis Date    Anesthesia complication     Patient reports in  following a Back Surgery he was kept in the hospital due to high temperatures in the evenings. He stated they kept him for observation and doesn't remember receiving any treatment for the high temperatures. Patient reports the anesthesia name started with flur? ?.    BPH with obstruction/lower urinary tract symptoms     Chest pain 7/1/2018    Chronic low back pain     GERD (gastroesophageal reflux disease)     H/O 24 hour EKG monitoring 3/12  12/11    3/12/12  essentially a normal event monitor without significant arrhythmias noted    H/O cardiac catheterization 2/2/12 4/02 10/98    2/22/12  LM, LAD and RCA all without significant disease    H/O cardiac catheterization 2/2/12 4/02 10/98    Done R/T false pos on stress test.    H/O cardiac catheterization 08/06/2018    normal study    H/O cardiovascular stress test 07/02/2018    normal study    H/O echocardiogram 12/10      3/14 EF55-60% normal LV function 12/10/10  complete 2 dim transthoracic echj. LVSF normal  EF >55%, transmitral spectral Doppler flow pattern is suggestive of  impaired LV relaxation    History of cardiovascular stress test 2/12 11/09 7/08 9/06 2/02 4/00 8/99 2/8/2012  evidence of mild ischemia  in the RCA. abnormal study , restingEF is 70%, global LVSF is  normal    History of Holter monitoring 9/17/14    14 day EVENT - no RVR    History of nuclear stress test 08/03/2017    cardiolite-normal,EF60%    Hx of chest x-ray 2/20/12    chest is considereed non acute. COPD    Hx of echocardiogram 08/03/2017    JK00-78%,TYMNN 2 diastolic dysfunction    Hyperlipidemia     Idiopathic peripheral neuropathy     Kidney stone     Left facial numbness 8/30/2017    Lumbar radiculopathy     Malignant melanoma of skin of face (HCC)     MVP (mitral valve prolapse)     Myotonia     chronic increased CPK's    Peripelvic (lymphatic) cyst     per CT    Precordial pain 8/3/2018    Vitamin B12 deficiency      Past Surgical History:   Procedure Laterality Date    CARDIAC CATHETERIZATION  2/2/12    EF 55% No sugnificant disease. COLONOSCOPY  5/5/14    diverticulosis, 1 polyp, hemorrhoids    COLONOSCOPY  07/10/2017    single 5 mm polyp found in sigmoid colon, moderate diverticulosis found in sigmoid colon    LUMBAR DISC SURGERY  11/2002    L4-5 hemilaminectomy and disk    LUMBAR DISCECTOMY  [address] [place]  09/26/2011    s/p robotic radical prostatectomy    ROTATOR CUFF REPAIR Right 10/2016    TIBIA FRACTURE SURGERY Right     childhood    URETEROLITHOTOMY  1992     Family History   Problem Relation Age of Onset    Heart Disease Mother         ASCAD    Heart Disease Father     Heart Disease Brother     Diabetes Other     Cancer Other         unknown type    Prostate Cancer Other     Coronary Art Dis Other     Coronary Art Dis Other      Social History     Socioeconomic History    Marital status:      Spouse name: Not on file    Number of children: Not on file    Years of education: Not on file    Highest education level: Not on file   Occupational History    Not on file   Tobacco Use    Smoking status: Never    Smokeless tobacco: Never   Vaping Use    Vaping Use: Never used   Substance and Sexual Activity    Alcohol use: Not Currently     Comment: Occassional    Drug use: No    Sexual activity: Not Currently     Partners: Female     Comment:    Other Topics Concern    Not on file   Social History Narrative    Not on file     Social Determinants of Health     Financial Resource Strain: Not on file   Food Insecurity: Not on file   Transportation Needs: No Transportation Needs    Lack of Transportation (Medical): No    Lack of Transportation (Non-Medical):  No   Physical Activity: Sufficiently Active    Days of Exercise per Week: 7 days    Minutes of Exercise per Session: 40 min   Stress: No Stress Concern Present    Feeling of Stress : Not at all   Social Connections: Socially Isolated    Frequency of Communication with Friends and Family: Twice a week    Frequency of Social Gatherings with Friends and Family: Once a week Attends Baptism Services: Never    Active Member of Clubs or Organizations: No    Attends Club or Organization Meetings: Never    Marital Status:    Intimate Partner Violence: Not on file   Housing Stability: Low Risk     Unable to Pay for Housing in the Last Year: No    Number of Places Lived in the Last Year: 1    Unstable Housing in the Last Year: No     No current facility-administered medications for this encounter. Current Outpatient Medications   Medication Sig Dispense Refill    nitroGLYCERIN (NITROSTAT) 0.4 MG SL tablet up to max of 3 total doses. If no relief after 1 dose, call 911. 25 tablet 3    traZODone (DESYREL) 50 MG tablet Take 1 tablet by mouth nightly as needed for Sleep 30 tablet 0    atorvastatin (LIPITOR) 40 MG tablet Take 1 tablet by mouth nightly 30 tablet 3    hydroCHLOROthiazide (HYDRODIURIL) 25 MG tablet Take 1 tablet by mouth daily 08/22/22 Patient states he only takes 1/2 tablet 30 tablet 0    vitamin B-12 500 MCG tablet Take 1 tablet by mouth daily 30 tablet 3    gabapentin (NEURONTIN) 300 MG capsule Take 300 mg by mouth nightly.       dilTIAZem (DILACOR XR) 240 MG extended release capsule Take 240 mg by mouth daily      levothyroxine (SYNTHROID) 50 MCG tablet TAKE 1 TABLET BY MOUTH IN  THE MORNING 90 tablet 0    pantoprazole (PROTONIX) 40 MG tablet TAKE 1 TABLET BY MOUTH  DAILY 90 tablet 1    gabapentin (NEURONTIN) 300 MG capsule TAKE 1 CAPSULE BY MOUTH 3  TIMES DAILY 270 capsule 1    clopidogrel (PLAVIX) 75 MG tablet Take 1 tablet by mouth daily 90 tablet 3    aspirin 81 MG tablet Take 81 mg by mouth daily       Allergies   Allergen Reactions    Fenofibrate        Nursing Notes Reviewed    Physical Exam:  Triage VS:    ED Triage Vitals [08/30/22 0158]   Enc Vitals Group      /80      Heart Rate 70      Resp 18      Temp 97.7 °F (36.5 °C)      Temp Source Oral      SpO2 100 %      Weight 160 lb (72.6 kg)      Height 5' 10\" (1.778 m)      Head Circumference Peak Flow       Pain Score       Pain Loc       Pain Edu? Excl. in [address]? Physical Exam  Vitals and nursing note reviewed. Constitutional:       Appearance: Normal appearance. HENT:      Head: Normocephalic and atraumatic. Nose: Nose normal.      Mouth/Throat:      Mouth: Mucous membranes are moist.   Eyes:      Conjunctiva/sclera: Conjunctivae normal.   Cardiovascular:      Rate and Rhythm: Normal rate and regular rhythm. Pulses: Normal pulses. Pulmonary:      Effort: Pulmonary effort is normal. No respiratory distress. Breath sounds: Normal breath sounds. No wheezing, rhonchi or rales. Abdominal:      General: Abdomen is flat. Bowel sounds are normal. There is no distension. Palpations: Abdomen is soft. Tenderness: There is no abdominal tenderness. There is no guarding or rebound. Musculoskeletal:         General: Normal range of motion. Skin:     General: Skin is warm. Capillary Refill: Capillary refill takes less than 2 seconds. Neurological:      Mental Status: He is alert and oriented to person, place, and time. Mental status is at baseline.    Psychiatric:         Mood and Affect: Mood normal.            I have reviewed and interpreted all of the currently available lab results from this visit (if applicable):  Results for orders placed or performed during the hospital encounter of 08/30/22   COVID-19, Rapid    Specimen: Nasopharyngeal   Result Value Ref Range    Source UNKNOWN     SARS-CoV-2, NAAT NOT DETECTED NOT DETECTED   CBC with Auto Differential   Result Value Ref Range    WBC 3.6 (L) 4.0 - 10.5 K/CU MM    RBC 4.35 (L) 4.6 - 6.2 M/CU MM    Hemoglobin 13.8 13.5 - 18.0 GM/DL    Hematocrit 41.9 (L) 42 - 52 %    MCV 96.3 78 - 100 FL    MCH 31.7 (H) 27 - 31 PG    MCHC 32.9 32.0 - 36.0 %    RDW 12.2 11.7 - 14.9 %    Platelets 146 853 - 637 K/CU MM    MPV 9.3 7.5 - 11.1 FL    Differential Type AUTOMATED DIFFERENTIAL     Segs Relative 60.9 36 - 66 % Lymphocytes % 27.2 24 - 44 %    Monocytes % 9.9 (H) 0 - 4 %    Eosinophils % 1.4 0 - 3 %    Basophils % 0.3 0 - 1 %    Segs Absolute 2.2 K/CU MM    Lymphocytes Absolute 1.0 K/CU MM    Monocytes Absolute 0.4 K/CU MM    Eosinophils Absolute 0.1 K/CU MM    Basophils Absolute 0.0 K/CU MM    Nucleated RBC % 0.0 %    Total Nucleated RBC 0.0 K/CU MM    Total Immature Neutrophil 0.01 K/CU MM    Immature Neutrophil % 0.3 0 - 0.43 %   Comprehensive Metabolic Panel   Result Value Ref Range    Sodium 138 135 - 145 MMOL/L    Potassium 4.2 3.5 - 5.1 MMOL/L    Chloride 102 99 - 110 mMol/L    CO2 27 21 - 32 MMOL/L    BUN 22 6 - 23 MG/DL    Creatinine 1.1 0.9 - 1.3 MG/DL    Glucose 96 70 - 99 MG/DL    Calcium 9.6 8.3 - 10.6 MG/DL    Albumin 4.2 3.4 - 5.0 GM/DL    Total Protein 6.4 6.4 - 8.2 GM/DL    Total Bilirubin 0.4 0.0 - 1.0 MG/DL    ALT 21 10 - 40 U/L    AST 24 15 - 37 IU/L    Alkaline Phosphatase 57 40 - 128 IU/L    GFR Non-African American >60 >60 mL/min/1.73m2    GFR African American >60 >60 mL/min/1.73m2    Anion Gap 9 4 - 16   Troponin   Result Value Ref Range    Troponin T <0.010 <0.01 NG/ML   TSH   Result Value Ref Range    TSH, High Sensitivity 5.720 (H) 0.270 - 4.20 uIu/ml   EKG 12 Lead   Result Value Ref Range    Ventricular Rate 60 BPM    Atrial Rate 60 BPM    P-R Interval 166 ms    QRS Duration 82 ms    Q-T Interval 396 ms    QTc Calculation (Bazett) 396 ms    P Axis 43 degrees    R Axis 13 degrees    T Axis 39 degrees    Diagnosis       Normal sinus rhythm  Normal ECG  When compared with ECG of 28-AUG-2022 15:04,  No significant change was found        Radiographs (if obtained):    [] Radiologist's Report Reviewed:  XR CHEST PORTABLE   Final Result   Clear chest without acute cardiopulmonary process. EKG (if obtained): (All EKG's are interpreted by myself in the absence of a cardiologist)  Normal sinus rhythm, rate 60, , QRS 82, QTc 396, no acute ST elevation.   Similar to previous EKG on regional wall motion abnormalites. Indeterminate diastolic function; E/A flow reversal is noted. Left Atrium  Essentially normal left atrium. Right Atrium  Essentially normal right atrium. Right Ventricle  Essentially normal right ventricle. Aortic Valve  Structurally normal aortic valve. Aortic valve appears tricuspid. Mitral Valve  Structurally normal mitral valve. Trace mitral regurgitation. Tricuspid Valve  Trace tricuspid regurgitation; RVSP: 23 mmHg. Pulmonic Valve  Pulmonic valve is structurally normal.   Pericardial Effusion  No evidence of any pericardial effusion.    Miscellaneous  IVC and abdominal aorta are not well visualized  M-Mode/2D Measurements & Calculations   LV Diastolic Dimension:  LV Systolic Dimension:  LA Dimension: 2.7 cmAO Root  3.78 cm                  2.65 cm                 Dimension: 3.5 cmLA Area:  LV FS:29.9 %             LV Volume Diastolic: 38 94.9 cm2  LV PW Diastolic: 6.02 cm ml  LV PW Systolic: 5.42 cm  LV Volume Systolic: 14  Septum Diastolic: 1.13   ml  cm                       LV EDV/LV EDV Index: 38  Septum Systolic: 2.18 cm DF/10 C5XJ ESV/LV ESV   LA/Aorta: 0.77  CO: 5.98 l/min           Index: 14 ml/7 m2       Ascending Aorta: 3.8 cm  CI: 3.11 l/m*m2          EF Calculated (A4C):    LA volume/Index: 25 ml                           63.2 %                  /99W4  LV Area Diastolic: 07.3  EF Calculated (2D):  cm2                      57.8 %  LV Area Systolic: 0.12  cm2                      LV Length: 6.2 cm                            LVOT: 2 cm  Doppler Measurements & Calculations   MV Peak E-Wave: 41.5 AV Peak Velocity: 137 cm/s    LVOT Peak Velocity: 150  cm/s                 AV Peak Gradient: 7.51 mmHg   cm/s  MV Peak A-Wave: 73.5 AV Mean Velocity: 95.6 cm/s   LVOT Mean Velocity: 98.3  cm/s                 AV Mean Gradient: 4 mmHg      cm/s  MV E/A Ratio: 0.56   AV VTI: 26.2 cm               LVOT Peak Gradient: 9  MV Peak Gradient:    AV Area (Continuity):3.31 cm2 mmHgLVOT Mean Gradient: 5  0.69 mmHg                                          mmHg                       LVOT VTI: 27.6 cm             Estimated RVSP: 23 mmHg  MV P1/2t: 76 msec                                  Estimated RAP:3 mmHg  MVA by PHT:2.89 cm2  Estimated PASP: 22.54 mmHg   MV E' Septal                                       TR Velocity:221 cm/s  Velocity: 4.14 cm/s                                TR Gradient:19.54 mmHg  MV E' Lateral                                      PV Peak Velocity: 82.8  Velocity: 6.91 cm/s                                cm/s  MV E/E' septal:                                    PV Peak Gradient: 2.74  10.02                                              mmHg  MV E/E' lateral:  6.01      XR CHEST (2 VW)    Result Date: 8/19/2022  EXAMINATION: TWO XRAY VIEWS OF THE CHEST 8/19/2022 8:18 am COMPARISON: 02/17/2022. HISTORY: ORDERING SYSTEM PROVIDED HISTORY: Hot Flashes TECHNOLOGIST PROVIDED HISTORY: Reason for exam:->Hot Flashes Reason for Exam: Hot Flashes Additional signs and symptoms: Hot Flashes Relevant Medical/Surgical History: Hot Flashes FINDINGS: Stable minimal linear scarring to the right lung base. Lungs are otherwise clear. Cardiac and mediastinal silhouettes are within normal limits. No pneumothoraces. Bony structures appear intact. Stable exam without acute abnormality. CT HEAD WO CONTRAST    Result Date: 8/22/2022  EXAMINATION: CT OF THE HEAD WITHOUT CONTRAST  8/22/2022 11:45 am TECHNIQUE: CT of the head was performed without the administration of intravenous contrast. Automated exposure control, iterative reconstruction, and/or weight based adjustment of the mA/kV was utilized to reduce the radiation dose to as low as reasonably achievable. COMPARISON: 01/22/2022 CT HISTORY: ORDERING SYSTEM PROVIDED HISTORY: Stroke Symptoms TECHNOLOGIST PROVIDED HISTORY: Has a \"code stroke\" or \"stroke alert\" been called? ->Yes Reason for exam:->Stroke Symptoms PORTABLE    Result Date: 8/22/2022  EXAMINATION: ONE XRAY VIEW OF THE CHEST 8/22/2022 11:43 am COMPARISON: Chest x-ray dated 08/19/2022. HISTORY: ORDERING SYSTEM PROVIDED HISTORY: stroke symptoms TECHNOLOGIST PROVIDED HISTORY: Reason for exam:->stroke symptoms Reason for Exam: stroke symptoms FINDINGS: HEART/MEDIASTINUM: The cardiomediastinal silhouette is within normal limits. PLEURA/LUNGS: There are no focal consolidations or pleural effusions. There is no appreciable pneumothorax. BONES/SOFT TISSUE: No acute abnormality. No radiographic evidence of acute pulmonary disease. CTA HEAD NECK W CONTRAST    Result Date: 8/22/2022  EXAMINATION: CTA OF THE HEAD AND NECK WITH CONTRAST 8/22/2022 11:48 am: TECHNIQUE: CTA of the head and neck was performed with the administration of intravenous contrast. Multiplanar reformatted images are provided for review. MIP images are provided for review. Stenosis of the internal carotid arteries measured using NASCET criteria. Automated exposure control, iterative reconstruction, and/or weight based adjustment of the mA/kV was utilized to reduce the radiation dose to as low as reasonably achievable. COMPARISON: None. HISTORY: ORDERING SYSTEM PROVIDED HISTORY: Stroke Symptoms TECHNOLOGIST PROVIDED HISTORY: Has a \"code stroke\" or \"stroke alert\" been called? ->Yes Reason for exam:->Stroke Symptoms Decision Support Exception - unselect if not a suspected or confirmed emergency medical condition->Emergency Medical Condition (MA) Reason for Exam: stroke alert Additional signs and symptoms: none Relevant Medical/Surgical History: 75ml isovue 370/ gfr>60 8- FINDINGS: CTA NECK: AORTIC ARCH/ARCH VESSELS: Thorough sclerosis of the aortic arch and arch vessels. There is no flow limiting stenosis of the innominate or subclavian arteries. CAROTID ARTERIES: No significant stenosis seen of the common carotid arteries.   Atherosclerosis of the carotid bulbs and proximal internal carotid arteries bilaterally. There is no flow limiting stenosis of the internal carotid arteries by NASCET criteria. VERTEBRAL ARTERIES: There is a left dominant vertebral artery. There is severe stenosis at the origin of the right vertebral artery with moderate stenosis at the origin of the left vertebral artery. Moderate stenosis is seen of the right vertebral artery at the C7 level. No focal stenosis otherwise seen of the vertebral arteries. SOFT TISSUES: No focal consolidation within the visualized lung apices. No acute abnormality within the visualized superior mediastinum. BONES: No acute osseous abnormality seen. 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 CTA HEAD: Evaluation is limited due to venous contamination as the venous structures are enhancing to a greater degree of the arterial structures. ANTERIOR CIRCULATION: Atherosclerosis seen of the internal carotid arteries bilaterally. There is moderate stenosis involving the right supraclinoid internal carotid artery. No significant stenosis seen of the left internal carotid artery. There is moderate diffuse irregularity involving the right anterior cerebral artery. No gross evidence of a significant stenosis otherwise seen of the anterior cerebral or middle cerebral arteries. No large vessel occlusion is seen. POSTERIOR CIRCULATION: No significant stenosis of the vertebral, basilar, or posterior cerebral arteries. No aneurysm. OTHER: The major dural venous sinuses appear patent without evidence of a thrombus. BRAIN: There is no evidence of mass effect or midline shift. 1. Stenosis seen at the origin of both vertebral arteries, severe on the right and moderate on the left. 2. Moderate stenosis involving the right vertebral artery at the C7 level. 3. No flow limiting stenosis seen of the cervical carotid/vertebral arteries.  4. Evaluation of the intracranial vasculature is partially limited due to the predominantly venous nature of the exam. 5. There appears to be moderate stenosis involving is the right supraclinoid internal carotid artery. 6. There is moderate diffuse irregularity involving the right anterior cerebral artery. 7. No significant stenosis or large vessel occlusion otherwise seen of the [id number]. 8. 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 MRI brain without contrast    Result Date: 8/23/2022  EXAMINATION: MRI OF THE BRAIN WITHOUT CONTRAST  8/23/2022 7:53 am TECHNIQUE: Multiplanar multisequence MRI of the brain was performed without the administration of intravenous contrast. COMPARISON: 04/10/2021. HISTORY: ORDERING SYSTEM PROVIDED HISTORY: left sided facial numbness, stroke like symptoms TECHNOLOGIST PROVIDED HISTORY: Reason for exam:->left sided facial numbness, stroke like symptoms Reason for Exam: left sided facial numbness, stroke like symptoms Additional signs and symptoms: NONE Relevant Medical/Surgical History: NONE Initial evaluation. FINDINGS: INTRACRANIAL STRUCTURES/VENTRICLES: There is no acute infarct. No mass effect or midline shift. No evidence of an acute intracranial hemorrhage. Areas of T2 FLAIR hyperintensity are seen in the periventricular and subcortical white matter, which are nonspecific, but may represent chronic microvascular ischemic change. There is minimal global parenchymal volume loss. Otherwise, the ventricles and sulci are normal in size and configuration. The sellar/suprasellar regions appear unremarkable. The normal signal voids within the major intracranial vessels appear maintained. ORBITS: The visualized portion of the orbits demonstrate no acute abnormality. SINUSES: Moderate mucosal thickening of the left maxillary sinus. There appears to be a trace right mastoid effusion. BONES/SOFT TISSUES: The bone marrow signal intensity appears normal. The soft tissues demonstrate no acute abnormality. 1. No acute intracranial abnormality. No acute infarct. 2. Minimal global parenchymal volume loss with moderate chronic microvascular ischemic changes. NM MYOCARDIAL SPECT REST EXERCISE OR RX    Result Date: 8/29/2022  Cardiac Perfusion Imaging   Demographics   Patient Name      Patrizia HDEZ    Date of study        08/29/2022   Date of Birth     1940         Gender               Male   Age               79 year(s)         Race                    Patient Number    [id number]         Room Number          7055   Visit Number      [id number]          Height               70 inches   Corporate ID      [id number]           Weight               166 pounds   Accession Number  [id number]                                        NM Technologist      Yuly Rothman, Gavino De La Vega, Roslyn Rutherford MD        Cardiologist         Julia SOLITARIO   Conclusions   Summary  Small sized defect of mild severity which is persistent involving inferior  wall of myocardium. Normal Stress nuclear scintigraphic study suggestive of  normal myocardial perfusion. Gated images demonstrate normal left  ventricular systolic function with EF of 60 %. A fixed perfusion defect of  inferior wall of left ventricle with normal wall motion noted suggestive of  diaphragmatic artifact. Signatures   ------------------------------------------------------------------  Electronically signed by Lissy Ewing MD  (Interpreting cardiologist) on 08/29/2022 at 11:14  ------------------------------------------------------------------  Procedure Procedure Type:   Nuclear Stress Test:Exercise, Myocardial Perfusion Imaging with Exercise, NM  MYOCARDIAL SPECT REST EXERCISE OR RX  Indications: Chest pain.   Risk Factors   The patient risk factors include:hypercholesterolemia and family history of  premature CAD. Stress Protocols   Resting ECG  Normal sinus rhythm. Resting HR:87 bpm  Resting BP:130/59 mmHg  Stress Protocol:Exercise - Kvng  Peak HR:120 bpm              HR/BP product:90490  Peak BP:160/65 mmHg          Max exrecise: 7.6 METS  Predicted HR: 137 bpm  % of predicted HR: 88   Exercise duration: 07:04 min   Symptoms  No cardiovascular symptoms with maximal exercise. Imaging Protocols   Rest                             Stress   Isotope:Sestamibi 99mTc          Isotope: Sestamibi 99mTc  Isotope dose:10.2 mCi            Isotope dose:31.1 mCi  Administration route: I.V. Administration route: I.V. Injection Date:08/29/2022 08:25  Injection Date:08/29/2022 10:00  Scan Date:08/29/2022 09:10       Scan Date:08/29/2022 10:30   Technique:        SPECT          Technique:        Gated                                                     SPECT   Procedure Description   Upon patient arrival, the patient is identified using two identifiers and  the physician order is verified. An IV is established and 8-11mCi of 99mTc  Sestamibi is intravenously injected and followed with 10mL 0.9% Normal  Saline flush. A circulation period of 45 minutes occurs prior to resting  SPECT imaging. After imaging is complete the patient is escorted to the  stress lab. The patient is connected to the ECG and blood pressure is  measured. The patient is assisted onto the treadmill and the appropriate  exercise protocol (Kvng or Modified Kvng) is selected and the patient  begins to exercise on the treadmill. Once the patient has reached 85% of the  target heart rate then the Nuclear Technologist intravenously injects  22-33mCi of 99mTc Sestamibi and 10mL 0.9% Normal Saline flush. After  completion, recovery, and removal of the IV, the patient rests during the  second circulation period of 45 minutes. Final stress SPECT gated imaging is  performed.  The patient may return home or to their room after stress  imaging. The images are processed and final charting is completed and sent  to the appropriate cardiologist for interpretation and reporting. Perfusion Interpretation   Small sized defect of mild severity which is persistent involving inferior  wall of myocardium. Imaging Results    Summed scores     - Summed stress score: 21     - Summed rest score: 20     - Summed difference score:    0   Rest ejection  Ejection fraction:76 %  EDV :49 ml  ESV :12 ml  Stroke volume :37 ml  Medical History   Accession#:  [id number]  Admission Data Admission date: 08/28/2022 Admission Time: 15:35 Hospital Status: Inpatient. MDM:  Patient seen and examined. Labs obtained. Patient with mild leukopenia, hemoglobin and platelets stable. Electrolytes within acceptable limits, BUN and creatinine normal.  COVID not detected. Troponin not detected. TSH mildly elevated. EKG without acute changes. Epic documentation reviewed from previous hospital stays, patient recently had a JHOAN, that showed EF of 55 to 60%. MRI on August 23 showed nonacute changes. EEG was nonacute. Recent stress test on 8/28 which was normal.  At this time I do think patient is appropriate for outpatient follow-up. He has an appointment with his primary care provider later today, he does agree to keep this appointment. Return precautions are discussed, patient does verbalize understanding and agreement of these. All questions are answered and he is agreeable with plan of care. Clinical Impression:  1.  Hot flashes      Disposition referral (if applicable):  Rhett Johns DO  [address]    Schedule an appointment as soon as possible for a visit in 1 day  Recheck of thyroid hormone    [facility]  [place] [facility] [phone]  Go to   If symptoms worsen, As needed  Disposition medications (if applicable):  New Prescriptions    No medications on file       Comment: Please note this report has been produced using speech recognition software and may contain errors related to that system including errors in grammar, punctuation, and spelling, as well as words and phrases that may be inappropriate. Efforts were made to edit the dictations.        [id number] [facility],   08/30/22 [id number]

## 2022-08-31 ENCOUNTER — OFFICE VISIT (OUTPATIENT)
Dept: FAMILY MEDICINE CLINIC | Age: 82
End: 2022-08-31
Payer: MEDICARE

## 2022-08-31 VITALS
SYSTOLIC BLOOD PRESSURE: 128 MMHG | HEART RATE: 53 BPM | DIASTOLIC BLOOD PRESSURE: 62 MMHG | WEIGHT: 166.4 LBS | OXYGEN SATURATION: 98 % | BODY MASS INDEX: 23.82 KG/M2 | HEIGHT: 70 IN

## 2022-08-31 DIAGNOSIS — K92.1 SYMPTOM OF BLOOD IN STOOL: ICD-10-CM

## 2022-08-31 DIAGNOSIS — I65.21 STENOSIS OF RIGHT CAROTID ARTERY: ICD-10-CM

## 2022-08-31 DIAGNOSIS — I10 PRIMARY HYPERTENSION: ICD-10-CM

## 2022-08-31 DIAGNOSIS — R07.9 CHEST PAIN, UNSPECIFIED TYPE: Primary | ICD-10-CM

## 2022-08-31 DIAGNOSIS — Z09 HOSPITAL DISCHARGE FOLLOW-UP: ICD-10-CM

## 2022-08-31 DIAGNOSIS — R23.2 HOT FLASHES: ICD-10-CM

## 2022-08-31 PROCEDURE — 99495 TRANSJ CARE MGMT MOD F2F 14D: CPT | Performed by: STUDENT IN AN ORGANIZED HEALTH CARE EDUCATION/TRAINING PROGRAM

## 2022-08-31 PROCEDURE — 1111F DSCHRG MED/CURRENT MED MERGE: CPT | Performed by: STUDENT IN AN ORGANIZED HEALTH CARE EDUCATION/TRAINING PROGRAM

## 2022-08-31 NOTE — PROGRESS NOTES
Admit 8/22 to 8/25 numbness face    Chest pain: Echo preserved EF. CP: noncardiac stress test nl   Carotid stenosis: Nsx. Recs outpt f/u referral in Oct  CKD: plan to repeat BMP hold HCTZ     Post-Discharge Transitional Care  Follow Up      Jassi Deluna   YOB: 1940    Date of Office Visit:  8/31/2022  Date of Hospital Admission: 8/22/22  Date of Hospital Discharge: 8/25/22  Risk of hospital readmission (high >=14%. Medium >=10%) :No data recorded    Care management risk score Rising risk (score 2-5) and Complex Care (Scores >=6): No Risk Score On File     Non face to face  following discharge, date last encounter closed (first attempt may have been earlier): 08/30/2022    Call initiated 2 business days of discharge: Yes    ASSESSMENT/PLAN:   Chest pain, unspecified type  Symptom of blood in stool  -     POCT Fecal Immunochemical Test (FIT); Future  Stenosis of right carotid artery  -     External Referral To Cardiothoracic Surgery  Primary hypertension  -     Microalbumin / Creatinine Urine Ratio; Future  Hot flashes    Medical Decision Making: moderate complexity  Return for as scheduled. On this date 8/31/2022 I have spent 55 minutes reviewing previous notes, test results and face to face with the patient discussing the diagnosis and importance of compliance with the treatment plan as well as documenting on the day of the visit. Stress test/echo reviewed plan to see cardiology    Plan for cardiothoracic surgeon follow up STA Head Neck w contrast severe vertebral a.  Stenosis Rt>Lt patient preference [facility]   Cardiothoracic Surgeon  -Stressed importance stay on statin, DAPT   -patient unsure dark stools (?) we will monitor cbc which has been stable and obtain fit test, declines rectal exam today  -Hot flash episodes consider SSRI/SNRI if becomes more frequent    Repeat BMP Cr stable, resolved    Subjective:   HPI:  Follow up of Hospital problems/diagnosis(es): Chest pain, intermittent, Bilateral vertebral a. Stenosis, CKD    78 yo M presents for follow up recent hospital admission,    Chest pain resolved since hospital admission,   Plan to see Nsx. 10/7/22. Carotid a. Stenosis plan to follow with [facility] Neurosurgery    Continues to have hot flashes, bad enough he went to ED for. Occurs in episodes 2 in August an additional one last night. This is new onset has not had in the past    Concerned of falls    Mild CP since going to hospital but goes away on own    Inpatient course: Discharge summary reviewed- see chart. Interval history/Current status: stable    Patient Active Problem List   Diagnosis    Moderate mixed hyperlipidemia not requiring statin therapy    MVP (mitral valve prolapse)    Frequent PVCs    Left facial numbness    Headache    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% 8/29/17     Angina pectoris ([address]    Kidney stone    Myotonia    Vitamin B12 deficiency    Peripelvic (lymphatic) cyst    Chronic low back pain    Idiopathic peripheral neuropathy    GERD (gastroesophageal reflux disease)    Lumbar radiculopathy    Malignant melanoma of skin of face (HCC)    Peripheral polyneuropathy    H/O prostate cancer    COPD (chronic obstructive pulmonary disease) ([address]    Acquired hypothyroidism    History of Helicobacter pylori infection    Diverticulosis    Chronic migraine without aura without status migrainosus, not intractable    Prostate CA ([address]    TIA (transient ischemic attack)    Primary hypertension    Post laminectomy syndrome    Stroke-like symptoms    Intracranial vascular stenosis       Medications listed as ordered at the time of discharge from hospital     Medication List            Accurate as of August 31, 2022 11:43 AM. If you have any questions, ask your nurse or doctor.                 CONTINUE taking these medications      aspirin 81 MG tablet     atorvastatin 40 MG tablet  Commonly known as: LIPITOR  Take 1 tablet by mouth nightly     clopidogrel 75 MG tablet  Commonly known as: PLAVIX  Take 1 tablet by mouth daily     cyanocobalamin 500 MCG tablet  Take 1 tablet by mouth daily     dilTIAZem 240 MG extended release capsule  Commonly known as: DILACOR XR     * gabapentin 300 MG capsule  Commonly known as: NEURONTIN     * gabapentin 300 MG capsule  Commonly known as: NEURONTIN  TAKE 1 CAPSULE BY MOUTH 3  TIMES DAILY     hydroCHLOROthiazide 25 MG tablet  Commonly known as: HYDRODIURIL  Take 1 tablet by mouth daily 08/22/22 Patient states he only takes 1/2 tablet     levothyroxine 50 MCG tablet  Commonly known as: SYNTHROID  TAKE 1 TABLET BY MOUTH IN  THE MORNING     nitroGLYCERIN 0.4 MG SL tablet  Commonly known as: NITROSTAT  up to max of 3 total doses. If no relief after 1 dose, call 911. pantoprazole 40 MG tablet  Commonly known as: PROTONIX  TAKE 1 TABLET BY MOUTH  DAILY     traZODone 50 MG tablet  Commonly known as: DESYREL  Take 1 tablet by mouth nightly as needed for Sleep           * This list has 2 medication(s) that are the same as other medications prescribed for you. Read the directions carefully, and ask your doctor or other care provider to review them with you. Medications marked \"taking\" at this time  Outpatient Medications Marked as Taking for the 8/31/22 encounter (Office Visit) with Kishore Thompson, DO   Medication Sig Dispense Refill    levothyroxine (SYNTHROID) 50 MCG tablet TAKE 1 TABLET BY MOUTH IN  THE MORNING 90 tablet 3    nitroGLYCERIN (NITROSTAT) 0.4 MG SL tablet up to max of 3 total doses.  If no relief after 1 dose, call 911. 25 tablet 3    traZODone (DESYREL) 50 MG tablet Take 1 tablet by mouth nightly as needed for Sleep 30 tablet 0    atorvastatin (LIPITOR) 40 MG tablet Take 1 tablet by mouth nightly 30 tablet 3    hydroCHLOROthiazide (HYDRODIURIL) 25 MG tablet Take 1 tablet by mouth daily 08/22/22 Patient states he only takes 1/2 tablet 30 tablet 0    vitamin B-12 500 MCG tablet Take 1 tablet by mouth daily 30 tablet 3    gabapentin (NEURONTIN) 300 MG capsule Take 300 mg by mouth nightly. dilTIAZem (DILACOR XR) 240 MG extended release capsule Take 240 mg by mouth daily      pantoprazole (PROTONIX) 40 MG tablet TAKE 1 TABLET BY MOUTH  DAILY 90 tablet 1    gabapentin (NEURONTIN) 300 MG capsule TAKE 1 CAPSULE BY MOUTH 3  TIMES DAILY 270 capsule 1    clopidogrel (PLAVIX) 75 MG tablet Take 1 tablet by mouth daily 90 tablet 3    aspirin 81 MG tablet Take 81 mg by mouth daily          Medications patient taking as of now reconciled against medications ordered at time of hospital discharge: Yes    A comprehensive review of systems was negative except for what was noted in the HPI. Objective:    /62   Pulse 53   Ht 5' 10\" (1.778 m)   Wt 166 lb 6.4 oz (75.5 kg)   SpO2 98%   BMI 23.88 kg/m²   Skin: warm and dry, no rash or erythema  Head: normocephalic and atraumatic  Pulmonary/Chest: clear to auscultation bilaterally- no wheezes, rales or rhonchi, normal air movement, no respiratory distress  Cardiovascular: normal rate, normal S1 and S2, and no gallops      An electronic signature was used to authenticate this note.   --Blake Segovia, DO

## 2022-09-01 LAB
CREATININE URINE: 166.8 MG/DL (ref 39–259)
MICROALBUMIN UR-MCNC: <1.2 MG/DL
MICROALBUMIN/CREAT UR-RTO: NORMAL MG/G (ref 0–30)

## 2022-09-02 ENCOUNTER — HOSPITAL ENCOUNTER (OUTPATIENT)
Age: 82
Discharge: HOME | End: 2022-09-02
Payer: MEDICARE

## 2022-09-02 LAB
ANION GAP SERPL CALCULATED.3IONS-SCNC: 6 MMOL/L (ref 4–16)
BUN BLDV-MCNC: 22 MG/DL (ref 6–23)
CALCIUM SERPL-MCNC: 9.9 MG/DL (ref 8.3–10.6)
CHLORIDE BLD-SCNC: 101 MMOL/L (ref 99–110)
CO2: 31 MMOL/L (ref 21–32)
CREAT SERPL-MCNC: 1.5 MG/DL (ref 0.9–1.3)
GFR AFRICAN AMERICAN: 54 ML/MIN/1.73M2
GFR NON-AFRICAN AMERICAN: 45 ML/MIN/1.73M2
GLUCOSE BLD-MCNC: 129 MG/DL (ref 70–99)
POTASSIUM SERPL-SCNC: 4.3 MMOL/L (ref 3.5–5.1)
SODIUM BLD-SCNC: 138 MMOL/L (ref 135–145)

## 2022-09-02 PROCEDURE — 80048 BASIC METABOLIC PNL TOTAL CA: CPT

## 2022-09-02 PROCEDURE — 36415 COLL VENOUS BLD VENIPUNCTURE: CPT

## 2022-09-06 ENCOUNTER — CARE COORDINATION (OUTPATIENT)
Dept: CASE MANAGEMENT | Age: 82
End: 2022-09-06

## 2022-09-06 NOTE — CARE COORDINATION
[facility] Transitions Follow Up Call    2022    Patient: Odalys Jones  Patient : 1940   MRN: [id number]  Reason for Admission: chest pain  Discharge Date: 22 RARS: No data recorded       Spoke with: Aurora Parul today and he says he is feeling ok. He had 1 bout of hot flashes and a head pressure feeling on . Tylenol & rest relieved this. Denies numbness, tingling of face, chest pain, weakness in extremities, n/v/d, sob. PZ=368/58 PCP visit 22 reviewed-> no changes; FIT test to be completed. [place] appt. 22 for carotid stenosis. Eating, drinking & sleeping ok. Denies problems w/ urination/bowels. No other concerns voiced at this time. Care Transitions Subsequent and Final Call    Schedule Follow Up Appointment with PCP: Completed  Subsequent and Final Calls  Do you have any ongoing symptoms?: Yes  Onset of Patient-reported symptoms: Other  Interventions for patient-reported symptoms: Other  Have your medications changed?: No  Do you have any questions related to your medications?: No  Do you currently have any active services?: No  Do you have any needs or concerns that I can assist you with?: No  Identified Barriers: Lack of Education  Care Transitions Interventions   Home Care Waiver: Declined        Transportation Support: Declined    Meals on Wheels: Declined  DME Assistance: Declined     Senior Services: Declined    Disease Specific Clinic: Completed      Disease Association: Completed      Other Interventions:           Care Transitions Follow Up Call    Needs to be reviewed by the provider   Additional needs identified to be addressed with provider: No  none             Method of communication with provider : none      Care Transition Nurse contacted the patient by telephone to follow up after admission on 22. Verified name and  with patient as identifiers. Addressed changes since last contact: none  Discussed follow-up appointments.  If no appointment was previously scheduled, appointment scheduling offered: Yes. Is follow up appointment scheduled within 7 days of discharge? Yes. Advance Care Planning:   Does patient have an Advance Directive: not on file and patient declined education. CTN reviewed discharge instructions, medical action plan and red flags with patient and discussed any barriers to care and/or understanding of plan of care after discharge. Discussed appropriate site of care based on symptoms and resources available to patient including: PCP  Specialist  When to call 911. The patient agrees to contact the PCP office for questions related to their healthcare. Patients top risk factors for readmission: lack of knowledge about disease  medical condition-paraesthesias, HTN, COPD, GERD, MVP, TIA, BPH, carotid stenosis  multiple health system providers  polypharmacy  Interventions to address risk factors: Scheduled appointment with PCP-9/23/22 and Scheduled appointment with Cat [facility] follow up appointment(s):     CTN provided contact information for future needs. Plan for follow-up call in 7-10 days based on severity of symptoms and risk factors.   Plan for next call: symptom management-parasthesias< hot flashes, headache/pressure, sob, chest pain, BP  follow up [address]       Follow Up  Future Appointments   Date Time Provider Devan Haque   9/13/2022  3:00 PM Ritta Felty, APRN - CNP [facility]   9/23/2022  9:15 AM Virginia Chan DO [address] [facility] Coats FPS [facility]   10/7/2022  9:45 AM NELLIE Zarate CNP [address] [facility] Layo NEURO [facility]   12/15/2022 10:00 AM NELLIE Armijo CNP [facility]       Berenice Harvey RN

## 2022-09-07 RX ORDER — DILTIAZEM HYDROCHLORIDE 240 MG/1
240 CAPSULE, EXTENDED RELEASE ORAL DAILY
Qty: 90 CAPSULE | Refills: 3 | Status: ON HOLD | OUTPATIENT
Start: 2022-09-07 | End: 2022-10-17 | Stop reason: HOSPADM

## 2022-09-13 ENCOUNTER — OFFICE VISIT (OUTPATIENT)
Dept: CARDIOLOGY CLINIC | Age: 82
End: 2022-09-13
Payer: MEDICARE

## 2022-09-13 ENCOUNTER — CARE COORDINATION (OUTPATIENT)
Dept: CASE MANAGEMENT | Age: 82
End: 2022-09-13

## 2022-09-13 VITALS
OXYGEN SATURATION: 98 % | HEART RATE: 68 BPM | WEIGHT: 165 LBS | HEIGHT: 70 IN | DIASTOLIC BLOOD PRESSURE: 60 MMHG | BODY MASS INDEX: 23.62 KG/M2 | SYSTOLIC BLOOD PRESSURE: 96 MMHG

## 2022-09-13 DIAGNOSIS — I10 PRIMARY HYPERTENSION: Primary | ICD-10-CM

## 2022-09-13 DIAGNOSIS — I49.9 CARDIAC ARRHYTHMIA, UNSPECIFIED CARDIAC ARRHYTHMIA TYPE: ICD-10-CM

## 2022-09-13 PROCEDURE — G8427 DOCREV CUR MEDS BY ELIG CLIN: HCPCS | Performed by: NURSE PRACTITIONER

## 2022-09-13 PROCEDURE — 1036F TOBACCO NON-USER: CPT | Performed by: NURSE PRACTITIONER

## 2022-09-13 PROCEDURE — 1123F ACP DISCUSS/DSCN MKR DOCD: CPT | Performed by: NURSE PRACTITIONER

## 2022-09-13 PROCEDURE — G8420 CALC BMI NORM PARAMETERS: HCPCS | Performed by: NURSE PRACTITIONER

## 2022-09-13 PROCEDURE — 99214 OFFICE O/P EST MOD 30 MIN: CPT | Performed by: NURSE PRACTITIONER

## 2022-09-13 ASSESSMENT — ENCOUNTER SYMPTOMS
BACK PAIN: 1
SHORTNESS OF BREATH: 0
ORTHOPNEA: 0

## 2022-09-13 NOTE — CARE COORDINATION
[facility] Transitions Follow Up Call    2022    Patient: Yvette Ornelas  Patient : 1940   MRN: [id number]  Reason for Admission: chest pain  Discharge Date: 22 RARS: No data recorded       Spoke with: Maribell Izaiah today and he says he is feeling pretty good. He has had a couple of episodes of chest pain that only last seconds and are relieved w/ rest. He has not had to take NTG for this. BP =123/62 Cardio HFU today. Denies numbness, tingling, visual disturbances, facial droop, hot flashes, sob, n/v/d. Had 2 bouts last week of feeling pressure on the top of his head. This goes away w/ Tylenol Danae Oneil Dr. For carotid stenosis-> arteriogram scheduled for 22. Stopped Lipitor d/t loose bowels- eating bananas. Sees PCP next week. Eating, drinking & sleeping ok. Denies problems w/ urination. No other concerns voiced at this time. Care Transitions Subsequent and Final Call    Schedule Follow Up Appointment with PCP: Completed  Subsequent and Final Calls  Do you have any ongoing symptoms?: Yes  Onset of Patient-reported symptoms:  In the past 7 days  Patient-reported symptoms: Chest Pain  Interventions for patient-reported symptoms: Notified PCP/Physician  Have your medications changed?: Yes  Patient Reports: stopped Lipitor d/t loose bowels  Do you have any questions related to your medications?: No  Do you currently have any active services?: No  Do you have any needs or concerns that I can assist you with?: No  Identified Barriers: Lack of Education  Care Transitions Interventions   Home Care Waiver: Declined        Transportation Support: Declined    Meals on Wheels: Declined  DME Assistance: Declined     Senior Services: Declined    Disease Specific Clinic: Completed      Disease Association: Completed      Other Interventions:           Care Transitions Follow Up Call    Needs to be reviewed by the provider   Additional needs identified to be addressed with provider: No  none             Method of communication with provider : none      Care Transition Nurse contacted the patient by telephone to follow up after admission on 22. Verified name and  with patient as identifiers. Addressed changes since last contact: none  Discussed follow-up appointments. If no appointment was previously scheduled, appointment scheduling offered: Yes. Is follow up appointment scheduled within 7 days of discharge? Yes. Advance Care Planning:   Does patient have an Advance Directive: not on file and patient declined education. CTN reviewed discharge instructions, medical action plan and red flags with patient and discussed any barriers to care and/or understanding of plan of care after discharge. Discussed appropriate site of care based on symptoms and resources available to patient including: PCP  Specialist  When to call 911. The patient agrees to contact the PCP office for questions related to their healthcare. Patients top risk factors for readmission: lack of knowledge about disease  medical condition-HTN, COPD, MVP, TIA, BPH, paresthesias, chest pain  multiple health system providers  polypharmacy  Interventions to address risk factors: Scheduled appointment with PCP-22 and Scheduled appointment with Specialist-22 cardio      Non-[facility] follow up appointment(s):     CTN provided contact information for future needs. Plan for follow-up call in 7-10 days based on severity of symptoms and risk factors. Plan for next call: symptom management-chest pain, pressure in head, BP, paresthesias?, loose bowels  referrals-ACP? ?        Follow Up  Future Appointments   Date Time Provider Devan Haque   2022  3:00 PM NELLIE Turpin - CNP [facility]   2022  9:15 AM Wili Díaz [address] [facility] Whitman FPS [facility]   10/7/2022  9:45 AM Unknown NELLIE Bates CNP [facility] NEURO [facility]   12/15/2022 10:00 AM Martínez MARIA C/ Pool Boschacillos- Melvin Camarao APRN - CNP [facility]       James Ashley RN

## 2022-09-21 ENCOUNTER — CARE COORDINATION (OUTPATIENT)
Dept: CASE MANAGEMENT | Age: 82
End: 2022-09-21

## 2022-09-21 NOTE — CARE COORDINATION
[facility] Transitions Follow Up Call    2022    Patient: Lauren Barnes  Patient : 1940   MRN: [id number]  Reason for Admission: chest pain  Discharge Date: 22 RARS: No data recorded       Spoke with: [employer] today and he says he is doing pretty good. Denies chest pain, sob, dizziness, n/v/d. HAd arteriogram done yesterday at [facility] and was told no intervention was necessary. He has had 3 episodes in which he feels pressure on the top of his head along with hot flashes & tingling in face. It happened when he got out of bed this morning. MT=191/58 Denies feeling dizzy or lightheaded, weakness, n/v, diaphoresis. He laid back down and it went away on it's own. PCP f/u F . He will voice his concern and ask about his thyroid function. Neuro f/u 10/7/22. Saw cardio  -> stopped HCTZ, Lipitor d/t diarrhea; 6 month f/u  Eating, drinking & sleeping ok. Denies problems w/ urination/bowels. No other concerns voiced at this time.      Care Transitions Subsequent and Final Call    Schedule Follow Up Appointment with PCP: Completed  Subsequent and Final Calls  Do you have any ongoing symptoms?: No  Have your medications changed?: Yes  Patient Reports: Lipitor & HCTZ stopped  Do you have any questions related to your medications?: No  Do you currently have any active services?: No  Do you have any needs or concerns that I can assist you with?: No  Identified Barriers: Lack of Education  Care Transitions Interventions   Home Care Waiver: Declined        Transportation Support: Declined    Meals on Wheels: Declined  DME Assistance: Declined     Senior Services: Declined    Disease Specific Clinic: Completed      Disease Association: Completed      Other Interventions:         Care Transitions Follow Up Call    Needs to be reviewed by the provider   Additional needs identified to be addressed with provider: No  none             Method of communication with provider : none      Care Transition Nurse

## 2022-09-23 ENCOUNTER — OFFICE VISIT (OUTPATIENT)
Dept: FAMILY MEDICINE CLINIC | Age: 82
End: 2022-09-23
Payer: MEDICARE

## 2022-09-23 ENCOUNTER — TELEPHONE (OUTPATIENT)
Dept: FAMILY MEDICINE CLINIC | Age: 82
End: 2022-09-23

## 2022-09-23 VITALS
HEART RATE: 59 BPM | BODY MASS INDEX: 23.77 KG/M2 | OXYGEN SATURATION: 97 % | SYSTOLIC BLOOD PRESSURE: 132 MMHG | WEIGHT: 166 LBS | DIASTOLIC BLOOD PRESSURE: 64 MMHG | HEIGHT: 70 IN

## 2022-09-23 DIAGNOSIS — E53.8 VITAMIN B12 DEFICIENCY: Chronic | ICD-10-CM

## 2022-09-23 DIAGNOSIS — Z12.5 SCREENING PSA (PROSTATE SPECIFIC ANTIGEN): ICD-10-CM

## 2022-09-23 DIAGNOSIS — C61 PROSTATE CA (HCC): ICD-10-CM

## 2022-09-23 DIAGNOSIS — E03.9 ACQUIRED HYPOTHYROIDISM: Chronic | ICD-10-CM

## 2022-09-23 DIAGNOSIS — E78.2 MIXED HYPERLIPIDEMIA: ICD-10-CM

## 2022-09-23 DIAGNOSIS — I65.21 STENOSIS OF RIGHT CAROTID ARTERY: ICD-10-CM

## 2022-09-23 DIAGNOSIS — I10 PRIMARY HYPERTENSION: ICD-10-CM

## 2022-09-23 DIAGNOSIS — G43.709 CHRONIC MIGRAINE WITHOUT AURA WITHOUT STATUS MIGRAINOSUS, NOT INTRACTABLE: Primary | ICD-10-CM

## 2022-09-23 PROCEDURE — 1036F TOBACCO NON-USER: CPT | Performed by: STUDENT IN AN ORGANIZED HEALTH CARE EDUCATION/TRAINING PROGRAM

## 2022-09-23 PROCEDURE — 1123F ACP DISCUSS/DSCN MKR DOCD: CPT | Performed by: STUDENT IN AN ORGANIZED HEALTH CARE EDUCATION/TRAINING PROGRAM

## 2022-09-23 PROCEDURE — 99214 OFFICE O/P EST MOD 30 MIN: CPT | Performed by: STUDENT IN AN ORGANIZED HEALTH CARE EDUCATION/TRAINING PROGRAM

## 2022-09-23 PROCEDURE — G8427 DOCREV CUR MEDS BY ELIG CLIN: HCPCS | Performed by: STUDENT IN AN ORGANIZED HEALTH CARE EDUCATION/TRAINING PROGRAM

## 2022-09-23 PROCEDURE — G8420 CALC BMI NORM PARAMETERS: HCPCS | Performed by: STUDENT IN AN ORGANIZED HEALTH CARE EDUCATION/TRAINING PROGRAM

## 2022-09-23 RX ORDER — ATORVASTATIN CALCIUM 40 MG/1
40 TABLET, FILM COATED ORAL NIGHTLY
Qty: 90 TABLET | Refills: 1 | Status: SHIPPED | OUTPATIENT
Start: 2022-09-23 | End: 2023-03-22

## 2022-09-23 RX ORDER — NORTRIPTYLINE HYDROCHLORIDE 25 MG/1
25 CAPSULE ORAL NIGHTLY
Qty: 30 CAPSULE | Refills: 2 | Status: SHIPPED | OUTPATIENT
Start: 2022-09-23 | End: 2022-09-23 | Stop reason: ALTCHOICE

## 2022-09-23 ASSESSMENT — ENCOUNTER SYMPTOMS
WHEEZING: 0
SORE THROAT: 0
NAUSEA: 0
ABDOMINAL PAIN: 0
SHORTNESS OF BREATH: 0

## 2022-09-23 NOTE — PROGRESS NOTES
9/23/2022    Daxa Santos    Chief Complaint   Patient presents with    3 Month Follow-Up     - frequent headaches, occurring daily. Has an appt with Neurology 10/07/22 9:45 am         HPI  History was obtained from patient. Bessy Washington is a 79 y.o. male with a PMHx as listed below who presents today for     Reent angiography carotid only showing mild stenosis b/l carotids following with Dr. Bushra Medina    Today complainging of headaches worse since angiography last week. Constant, generalized, described as pressure. Patient is also noting dfficulty sleeping. No dark stools, blood in stools today        1. Chronic migraine without aura without status migrainosus, not intractable    2. Primary hypertension    3. 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 Acquired hypothyroidism    5. Screening PSA (prostate specific antigen)    6. Prostate CA ([facility] [address]    7. Vitamin B12 deficiency    8. Mixed hyperlipidemia             REVIEW OF SYMPTOMS    Review of Systems   Constitutional:  Negative for chills and fatigue. HENT:  Negative for congestion and sore throat. Respiratory:  Negative for shortness of breath and wheezing. Cardiovascular:  Negative for chest pain and palpitations. Gastrointestinal:  Negative for abdominal pain and nausea. Genitourinary:  Negative for frequency and urgency. Neurological:  Negative for light-headedness. PAST MEDICAL HISTORY  Past Medical History:   Diagnosis Date    Anesthesia complication     Patient reports in 1973 following a Back Surgery he was kept in the hospital due to high temperatures in the evenings. He stated they kept him for observation and doesn't remember receiving any treatment for the high temperatures. Patient reports the anesthesia name started with flur? ?.    BPH with obstruction/lower urinary tract symptoms     Chest pain 7/1/2018    Chronic low back pain     GERD (gastroesophageal reflux disease)     H/O 24 hour EKG monitoring 3/12  12/11 3/12/12  essentially a normal event monitor without significant arrhythmias noted    H/O cardiac catheterization 2/2/12 4/02 10/98    2/22/12  LM, LAD and RCA all without significant disease    H/O cardiac catheterization 2/2/12 4/02 10/98    Done R/T false pos on stress test.    H/O cardiac catheterization 08/06/2018    normal study    H/O cardiovascular stress test 07/02/2018    normal study    H/O echocardiogram 12/10      3/14 EF55-60% normal LV function 12/10/10  complete 2 dim transthoracic echj. LVSF normal  EF >55%, transmitral spectral Doppler flow pattern is suggestive of  impaired LV relaxation    History of cardiovascular stress test 2/12 11/09 7/08 9/06 2/02 4/00 8/99 2/8/2012  evidence of mild ischemia  in the RCA. abnormal study , restingEF is 70%, global LVSF is  normal    History of Holter monitoring 9/17/14    14 day EVENT - no RVR    History of nuclear stress test 08/03/2017    cardiolite-normal,EF60%    Hx of chest x-ray 2/20/12    chest is considereed non acute. COPD    Hx of echocardiogram 08/03/2017    LK17-81%,ADONH 2 diastolic dysfunction    Hyperlipidemia     Idiopathic peripheral neuropathy     Kidney stone     Left facial numbness 8/30/2017    Lumbar radiculopathy     Malignant melanoma of skin of face (HCC)     MVP (mitral valve prolapse)     Myotonia     chronic increased CPK's    Peripelvic (lymphatic) cyst     per CT    Precordial pain 8/3/2018    Vitamin B12 deficiency        FAMILY HISTORY  Family History   Problem Relation Age of Onset    Heart Disease Mother         ASCAD    Heart Disease Father     Heart Disease Brother     Diabetes Other     Cancer Other         unknown type    Prostate Cancer Other     Coronary Art Dis Other     Coronary Art Dis Other        SOCIAL HISTORY  Social History     Socioeconomic History    Marital status:     Tobacco Use    Smoking status: Never    Smokeless tobacco: Never   Vaping Use    Vaping Use: Never used   Substance and Sexual Activity    Alcohol use: Not Currently     Comment: Occassional    Drug use: No    Sexual activity: Not Currently     Partners: Female     Comment:      Social Determinants of Health     Transportation Needs: No Transportation Needs    Lack of Transportation (Medical): No    Lack of Transportation (Non-Medical): No   Physical Activity: Sufficiently Active    Days of Exercise per Week: 7 days    Minutes of Exercise per Session: 40 min   Stress: No Stress Concern Present    Feeling of Stress : Not at all   Social Connections: Socially Isolated    Frequency of Communication with Friends and Family: Twice a week    Frequency of Social Gatherings with Friends and Family: Once a week    Attends Jehovah's witness Services: Never    Active Member of Clubs or Organizations: No    Attends Club or Organization Meetings: Never    Marital Status:    Housing Stability: Low Risk     Unable to Pay for Housing in the Last Year: No    Number of Places Lived in the Last Year: 1    Unstable Housing in the Last Year: No        SURGICAL HISTORY  Past Surgical History:   Procedure Laterality Date    CARDIAC CATHETERIZATION  2/2/12    EF 55% No sugnificant disease.     COLONOSCOPY  5/5/14    diverticulosis, 1 polyp, hemorrhoids    COLONOSCOPY  07/10/2017    single 5 mm polyp found in sigmoid colon, moderate diverticulosis found in sigmoid colon    LUMBAR DISC SURGERY  11/2002    L4-5 hemilaminectomy and disk    LUMBAR DISCECTOMY  [address]  09/26/2011    s/p robotic radical prostatectomy    ROTATOR CUFF REPAIR Right 10/2016    TIBIA FRACTURE SURGERY Right     childhood    URETEROLITHOTOMY  1992                 CURRENT MEDICATIONS  Current Outpatient Medications   Medication Sig Dispense Refill    atorvastatin (LIPITOR) 40 MG tablet Take 1 tablet by mouth nightly 90 tablet 1    dilTIAZem (DILACOR XR) 240 MG extended release capsule Take 1 capsule by mouth daily 90 capsule 3    levothyroxine (SYNTHROID) 50 MCG tablet TAKE 1 TABLET BY MOUTH IN  THE MORNING 90 tablet 3    nitroGLYCERIN (NITROSTAT) 0.4 MG SL tablet up to max of 3 total doses. If no relief after 1 dose, call 911. 25 tablet 3    vitamin B-12 500 MCG tablet Take 1 tablet by mouth daily 30 tablet 3    pantoprazole (PROTONIX) 40 MG tablet TAKE 1 TABLET BY MOUTH  DAILY 90 tablet 1    gabapentin (NEURONTIN) 300 MG capsule TAKE 1 CAPSULE BY MOUTH 3  TIMES DAILY 270 capsule 1    clopidogrel (PLAVIX) 75 MG tablet Take 1 tablet by mouth daily 90 tablet 3    aspirin 81 MG tablet Take 81 mg by mouth daily       No current facility-administered medications for this visit. ALLERGIES  Allergies   Allergen Reactions    Fenofibrate        PHYSICAL EXAM    /64   Pulse 59   Ht 5' 10\" (1.778 m)   Wt 166 lb (75.3 kg)   SpO2 97%   BMI 23.82 kg/m²     Physical Exam  Constitutional:       Appearance: Normal appearance. HENT:      Head: Normocephalic and atraumatic. Eyes:      Extraocular Movements: Extraocular movements intact. Pupils: Pupils are equal, round, and reactive to light. Cardiovascular:      Rate and Rhythm: Normal rate and regular rhythm. Pulses: Normal pulses. Heart sounds: No murmur heard. No friction rub. No gallop. Pulmonary:      Effort: Pulmonary effort is normal.      Breath sounds: Normal breath sounds. Skin:     General: Skin is warm and dry. Neurological:      General: No focal deficit present. Mental Status: He is alert. Psychiatric:         Mood and Affect: Mood normal.         Behavior: Behavior normal.       ASSESSMENT & PLAN    1. Chronic migraine without aura without status migrainosus, not intractable    - atorvastatin (LIPITOR) 40 MG tablet; Take 1 tablet by mouth nightly  Dispense: 90 tablet; Refill: 1    2. Primary hypertension    - Comprehensive Metabolic Panel; Future  - CBC with Auto Differential; Future    3. 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  Acquired hypothyroidism    - TSH with Reflex to FT4; Future    5. Screening PSA (prostate specific antigen)    - PSA Screening; Future    6. Prostate CA ([facility] [address]      7. Vitamin B12 deficiency    - Vitamin B12 & Folate; Future    8. Mixed hyperlipidemia    - Lipid, Fasting; Future    No follow-ups on file.     Angiogram reviewed continue DAPT, statin  F/u labs prior next appt  Chronic headaches plan to see neurology  Bp well controlled               Electronically signed by Jessica Blanton DO on 9/23/2022

## 2022-09-23 NOTE — TELEPHONE ENCOUNTER
Called pharmacy and canceled nortriptyline per Dr. Chloe Elise, also called to inform the patient to follow up with neurology and that medication was canceled due to not being indicated for patients with heart conditions. Patient agreed and voiced understanding.

## 2022-09-27 DIAGNOSIS — Z12.5 SCREENING PSA (PROSTATE SPECIFIC ANTIGEN): ICD-10-CM

## 2022-09-27 DIAGNOSIS — E78.2 MIXED HYPERLIPIDEMIA: ICD-10-CM

## 2022-09-27 DIAGNOSIS — E53.8 VITAMIN B12 DEFICIENCY: Chronic | ICD-10-CM

## 2022-09-27 DIAGNOSIS — I10 PRIMARY HYPERTENSION: ICD-10-CM

## 2022-09-27 DIAGNOSIS — G44.221 CHRONIC TENSION-TYPE HEADACHE, INTRACTABLE: Primary | ICD-10-CM

## 2022-09-27 DIAGNOSIS — E03.9 ACQUIRED HYPOTHYROIDISM: Chronic | ICD-10-CM

## 2022-09-27 LAB
A/G RATIO: 2 (ref 1.1–2.2)
ALBUMIN SERPL-MCNC: 4.4 G/DL (ref 3.4–5)
ALP BLD-CCNC: 47 U/L (ref 40–129)
ALT SERPL-CCNC: 30 U/L (ref 10–40)
ANION GAP SERPL CALCULATED.3IONS-SCNC: 12 MMOL/L (ref 3–16)
AST SERPL-CCNC: 29 U/L (ref 15–37)
BASOPHILS ABSOLUTE: 0 K/UL (ref 0–0.2)
BASOPHILS RELATIVE PERCENT: 0.3 %
BILIRUB SERPL-MCNC: 0.5 MG/DL (ref 0–1)
BUN BLDV-MCNC: 16 MG/DL (ref 7–20)
CALCIUM SERPL-MCNC: 9.6 MG/DL (ref 8.3–10.6)
CHLORIDE BLD-SCNC: 104 MMOL/L (ref 99–110)
CHOLESTEROL, FASTING: 122 MG/DL (ref 0–199)
CO2: 26 MMOL/L (ref 21–32)
CREAT SERPL-MCNC: 1 MG/DL (ref 0.8–1.3)
EOSINOPHILS ABSOLUTE: 0.1 K/UL (ref 0–0.6)
EOSINOPHILS RELATIVE PERCENT: 2.1 %
FOLATE: 17.12 NG/ML (ref 4.78–24.2)
GFR AFRICAN AMERICAN: >60
GFR NON-AFRICAN AMERICAN: >60
GLUCOSE BLD-MCNC: 93 MG/DL (ref 70–99)
HCT VFR BLD CALC: 41.8 % (ref 40.5–52.5)
HDLC SERPL-MCNC: 36 MG/DL (ref 40–60)
HEMOGLOBIN: 14.2 G/DL (ref 13.5–17.5)
LDL CHOLESTEROL CALCULATED: 67 MG/DL
LYMPHOCYTES ABSOLUTE: 1.1 K/UL (ref 1–5.1)
LYMPHOCYTES RELATIVE PERCENT: 31.4 %
MCH RBC QN AUTO: 32.8 PG (ref 26–34)
MCHC RBC AUTO-ENTMCNC: 33.9 G/DL (ref 31–36)
MCV RBC AUTO: 96.8 FL (ref 80–100)
MONOCYTES ABSOLUTE: 0.3 K/UL (ref 0–1.3)
MONOCYTES RELATIVE PERCENT: 9.4 %
NEUTROPHILS ABSOLUTE: 2 K/UL (ref 1.7–7.7)
NEUTROPHILS RELATIVE PERCENT: 56.8 %
PDW BLD-RTO: 13.5 % (ref 12.4–15.4)
PLATELET # BLD: 224 K/UL (ref 135–450)
PMV BLD AUTO: 7.4 FL (ref 5–10.5)
POTASSIUM SERPL-SCNC: 4.2 MMOL/L (ref 3.5–5.1)
PROSTATE SPECIFIC ANTIGEN: <0.01 NG/ML (ref 0–4)
RBC # BLD: 4.32 M/UL (ref 4.2–5.9)
SODIUM BLD-SCNC: 142 MMOL/L (ref 136–145)
TOTAL PROTEIN: 6.6 G/DL (ref 6.4–8.2)
TRIGLYCERIDE, FASTING: 97 MG/DL (ref 0–150)
TSH REFLEX FT4: 2.34 UIU/ML (ref 0.27–4.2)
VITAMIN B-12: 357 PG/ML (ref 211–911)
VLDLC SERPL CALC-MCNC: 19 MG/DL
WBC # BLD: 3.5 K/UL (ref 4–11)

## 2022-09-27 RX ORDER — TIZANIDINE 2 MG/1
2 TABLET ORAL NIGHTLY PRN
Qty: 30 TABLET | Refills: 0 | Status: SHIPPED | OUTPATIENT
Start: 2022-09-27 | End: 2022-10-07

## 2022-09-28 ENCOUNTER — CARE COORDINATION (OUTPATIENT)
Dept: CASE MANAGEMENT | Age: 82
End: 2022-09-28

## 2022-09-28 NOTE — CARE COORDINATION
Transition Nurse reviewed medical action plan with patient and discussed any barriers to care and/or understanding of plan of care after discharge. Discussed appropriate site of care based on symptoms and resources available to patient including: PCP  Specialist  When to call 911. The patient agrees to contact the PCP office for questions related to their healthcare. Advance Care Planning:   reviewed and current. Patients top risk factors for readmission: lack of knowledge about disease, medical condition-HTN, COPD, GERD, MVP, TIA, BPH,paresthesias , multiple health system providers, and polypharmacy  Interventions to address risk factors: Scheduled appointment with PCP-prn and Scheduled appointment with Specialist-10/7/22 Neuro    Offered patient enrollment in the Remote Patient Monitoring (RPM) program for in-home monitoring: NA.     Care Transitions Subsequent and Final Call    Schedule Follow Up Appointment with PCP: Completed  Subsequent and Final Calls  Do you have any ongoing symptoms?: No  Have your medications changed?: Yes  Patient Reports: Zanaflex prn for migraines  Do you have any questions related to your medications?: No  Do you currently have any active services?: No  Do you have any needs or concerns that I can assist you with?: No  Identified Barriers: Lack of Education  Care Transitions Interventions  Disease Specific Clinic: Completed      Disease Association: Completed      Other Interventions:             Care Transition Nurse provided contact information for future needs. No further follow-up call indicated based on severity of symptoms and risk factors. Plan for next call: referral to ambulatory care manager-Saige EID completed    Phu Bang RN

## 2022-09-30 ENCOUNTER — CARE COORDINATION (OUTPATIENT)
Dept: CARE COORDINATION | Age: 82
End: 2022-09-30

## 2022-09-30 NOTE — CARE COORDINATION
Ambulatory Care Coordination Note  9/30/2022    ACC: Reyna Larson, RN  Call to pt for acm outreach. Pt reports having ongoing hot flashes when he lays flat and had one last night and first of week. Having Headaches, took tylenol and tramadol together last night as advised by previous pcp and worked for headache. Pt advised of walk in clinic availability and voices understanding. Denies scheduling hosp f/u with current pcp or walk in as has hosp f/u with neuro. Denies needs/concerns at this time. PLan: Will continue to follow to assess and provide resources as necessary. Ambulatory Care Coordination Assessment    Care Coordination Protocol  Referral from Primary Care Provider: No  Week 1 - Initial Assessment     Do you have all of your prescriptions and are they filled?: Yes  Barriers to medication adherence: None  Are you able to afford your medications?: Yes  How often do you have trouble taking your medications the way you have been told to take them?: I always take them as prescribed. Do you have Home O2 Therapy?: No      Ability to seek help/take action for Emergent Urgent situations i.e. fire, crime, inclement weather or health crisis. : Independent  Ability to ambulate to restroom: Independent  Ability handle personal hygeine needs (bathing/dressing/grooming): Independent  Ability to manage Medications: Independent  Ability to prepare Food Preparation: Independent  Ability to maintain home (clean home, laundry): Independent  Ability to do shopping: Independent  Ability to manage finances:  Independent  Is patient able to live independently?: Yes     Current Housing: Private Residence           Frequent urination at night?: Yes  Do you use rails/bars?: Yes  Do you have a non-slip tub mat?: No     Are you experiencing loss of meaning?: No  Are you experiencing loss of hope and peace?: No     Suggested Interventions and Community Resources                  Prior to Admission medications    Medication Sig Start Date End Date Taking? Authorizing Provider   tiZANidine (ZANAFLEX) 2 MG tablet Take 1 tablet by mouth nightly as needed (headache) 9/27/22 10/27/22  Blake Segovia, DO   atorvastatin (LIPITOR) 40 MG tablet Take 1 tablet by mouth nightly 9/23/22 3/22/23  Blake Segovia, DO   dilTIAZem (DILACOR XR) 240 MG extended release capsule Take 1 capsule by mouth daily 9/7/22   Lisa Christine MD   levothyroxine (SYNTHROID) 50 MCG tablet TAKE 1 TABLET BY MOUTH IN  THE MORNING 8/30/22   Blake Segovia, DO   nitroGLYCERIN (NITROSTAT) 0.4 MG SL tablet up to max of 3 total doses.  If no relief after 1 dose, call 911. 8/25/22   NELLIE Melissa CNP   vitamin B-12 500 MCG tablet Take 1 tablet by mouth daily 8/25/22   NELLIE Melissa CNP   pantoprazole (PROTONIX) 40 MG tablet TAKE 1 TABLET BY MOUTH  DAILY 5/27/22 9/23/22  Blake Segovia, DO   gabapentin (NEURONTIN) 300 MG capsule TAKE 1 CAPSULE BY MOUTH 3  TIMES DAILY 5/18/22 11/14/22  LIBERTAD Chavez   clopidogrel (PLAVIX) 75 MG tablet Take 1 tablet by mouth daily 2/14/22   Sara Mccrary MD   aspirin 81 MG tablet Take 81 mg by mouth daily    Historical Provider, MD       Future Appointments   Date Time Provider Devan Hqaue   10/7/2022  9:45 AM NELLIE Jeff CNP [address] [place] [address] [facility]   12/15/2022 10:00 AM NELLIE Solano CNP [facility]   1/23/2023 10:30 AM Blake Segovia DO [address] [facility]   3/21/2023 10:00 AM NELLIE Sheikh CNP [facility]      and   General Assessment    Do you have any symptoms that are causing concern?: No

## 2022-10-07 ENCOUNTER — OFFICE VISIT (OUTPATIENT)
Dept: NEUROLOGY | Age: 82
End: 2022-10-07
Payer: MEDICARE

## 2022-10-07 ENCOUNTER — TELEPHONE (OUTPATIENT)
Dept: NEUROLOGY | Age: 82
End: 2022-10-07

## 2022-10-07 ENCOUNTER — TELEPHONE (OUTPATIENT)
Dept: FAMILY MEDICINE CLINIC | Age: 82
End: 2022-10-07

## 2022-10-07 VITALS
WEIGHT: 166 LBS | HEART RATE: 56 BPM | SYSTOLIC BLOOD PRESSURE: 130 MMHG | BODY MASS INDEX: 23.77 KG/M2 | HEIGHT: 70 IN | OXYGEN SATURATION: 97 % | DIASTOLIC BLOOD PRESSURE: 70 MMHG

## 2022-10-07 DIAGNOSIS — G44.209 TENSION HEADACHE: ICD-10-CM

## 2022-10-07 DIAGNOSIS — G45.9 TIA (TRANSIENT ISCHEMIC ATTACK): ICD-10-CM

## 2022-10-07 DIAGNOSIS — G43.009 MIGRAINE WITHOUT AURA AND WITHOUT STATUS MIGRAINOSUS, NOT INTRACTABLE: ICD-10-CM

## 2022-10-07 DIAGNOSIS — R56.9 SEIZURE-LIKE ACTIVITY (HCC): ICD-10-CM

## 2022-10-07 DIAGNOSIS — Z86.73 HISTORY OF LACUNAR CEREBROVASCULAR ACCIDENT: Primary | ICD-10-CM

## 2022-10-07 PROCEDURE — 99214 OFFICE O/P EST MOD 30 MIN: CPT | Performed by: NURSE PRACTITIONER

## 2022-10-07 PROCEDURE — G8420 CALC BMI NORM PARAMETERS: HCPCS | Performed by: NURSE PRACTITIONER

## 2022-10-07 PROCEDURE — 1123F ACP DISCUSS/DSCN MKR DOCD: CPT | Performed by: NURSE PRACTITIONER

## 2022-10-07 PROCEDURE — G8484 FLU IMMUNIZE NO ADMIN: HCPCS | Performed by: NURSE PRACTITIONER

## 2022-10-07 PROCEDURE — 1036F TOBACCO NON-USER: CPT | Performed by: NURSE PRACTITIONER

## 2022-10-07 PROCEDURE — G8427 DOCREV CUR MEDS BY ELIG CLIN: HCPCS | Performed by: NURSE PRACTITIONER

## 2022-10-07 RX ORDER — TIZANIDINE 4 MG/1
4 TABLET ORAL NIGHTLY
Qty: 30 TABLET | Refills: 3 | Status: SHIPPED | OUTPATIENT
Start: 2022-10-07

## 2022-10-07 NOTE — TELEPHONE ENCOUNTER
Called and spoke with Bertha Bang at Dr. Reynold Armando office and asked per Jaycob Robledo request, if the doctor would mind if we took over the tizanidine script he just prescribed and so we can increase the script to help with headaches. Bertha Bang states she will message the provider now. Awaiting response.

## 2022-10-07 NOTE — TELEPHONE ENCOUNTER
To Dr. Gilles Caruso, TERESITA would like to take over prescribing the Tizanidine for patient. (She is with [facility])    Would that be alright with you.   Please advise

## 2022-10-07 NOTE — PROGRESS NOTES
10/7/22    Lennox Saunas D Caudill  1940    Chief Complaint   Patient presents with    Facial Pain     Pt presents for facial numbness ED f/u, pt states he does not have it at this present time and is struggling with headaches off and on        History of Present Illness  Lennox Saunas is a 79 y.o. male presenting today for hospital follow-up of: Left facial sensory changes preceded by chills or flushing and whole body numbness. He has a history of mitral valve prolapse, COPD, hypertension,HCL,  malignant melanoma of the face, prostate cancer. He was thought to have complex headache or focal seizure. Symptoms were not consistent with TIA as they were transient and focal.  CT head was nonacute, MRI brain was nonacute, CTA head and neck showed stenosis of the vertebral arteries and right supraclinoid ICA. It was recommended that Bill follows up with Dr. Cleo Miller, neuro intervention, for consideration of a diagnostic cerebral angiogram.  He will continue Plavix, aspirin and atorvastatin for secondary stroke prevention. In the hospital, he was found to have low B12 and was started on vitamin replacement. Routine EEG was normal in the hospital.    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 His symptoms he describes are not consistent with a trigeminal autonomic cephalgia such as clusters/SUNCT/ASUNCION headache types. He had a recent angiography carotid arteries only showed mild stenosis bilateral carotids follow with Dr. Cleo Miller. He followed up with his PCP Dr. Jossy Gallardo and has been complaining of headaches. His PCP will order follow-up labs at his next appointment. Today Berthold tells me he has not had any new paresthesias in his face.   He has been flushed and happens in the evening he tells me he has been having a pressure feeling on the top of his head that starts the morning and usually goes away after a few hours. He does tell me the pain starts in his neck and travels up to the top of his head. He denies photo/phonophobia, nausea or vomiting. He denies autonomic symptoms with the flushing. This has been going on for about 3 or 4 weeks. He also describes another type of headache that is left-sided and throbbing, last more than 4 hours. He is not sure if it is affected by light or sound and he does not have nausea or vomiting with it. The last time he had this type of migraine he went to the emergency department back in February. Current Outpatient Medications   Medication Sig Dispense Refill    tiZANidine (ZANAFLEX) 4 MG tablet Take 1 tablet by mouth nightly Take 1/2 tab nightly for 8 nights then increase to full tab. 30 tablet 3    atorvastatin (LIPITOR) 40 MG tablet Take 1 tablet by mouth nightly 90 tablet 1    levothyroxine (SYNTHROID) 50 MCG tablet TAKE 1 TABLET BY MOUTH IN  THE MORNING 90 tablet 3    nitroGLYCERIN (NITROSTAT) 0.4 MG SL tablet up to max of 3 total doses. If no relief after 1 dose, call 911. 25 tablet 3    vitamin B-12 500 MCG tablet Take 1 tablet by mouth daily 30 tablet 3    pantoprazole (PROTONIX) 40 MG tablet TAKE 1 TABLET BY MOUTH  DAILY 90 tablet 1    gabapentin (NEURONTIN) 300 MG capsule TAKE 1 CAPSULE BY MOUTH 3  TIMES DAILY 270 capsule 1    clopidogrel (PLAVIX) 75 MG tablet Take 1 tablet by mouth daily 90 tablet 3    aspirin 81 MG tablet Take 81 mg by mouth daily      dilTIAZem (DILACOR XR) 240 MG extended release capsule Take 1 capsule by mouth daily (Patient not taking: Reported on 10/7/2022) 90 capsule 3     No current facility-administered medications for this visit. Physical Exam:  Also present during visit:  daughter, Anat Tee .     Mental Status   Orientation: oriented to person, oriented to place, oriented to problem, and oriented to time    Mood/affectappropriate mood and appropriate affect   Memory/Other: recent memory intact, remote memory intact, fund of knowledge intact, attention span normal, and concentration normal  Language  Language: (normal) language, no dysarthria, (normal) articulation, and no dysphasia/aphasia  Cranial Nerves   Eyes: pupils normal size and reactive to light and visual fields appear full   CN III, IV, VI : extraocular muscle strength normal, normal pursuit, no nystagmus, and no ptosis   Facial Motor: normal facial motor   CN XII: tongue protrudes midline  Motor/Coordination Exam   Power: motor strength appears intact throughout, no arm drift, and normal tone   Coordination: normal finger-to-nose, forearm rotation intact, and rapid alternating movement normal  Gait and Stance   Gait/Posture: station normal, casual gait normal, ambulates independently , tiptoe normal, and steady in Romberg's position with eyes open and closed        /70 (Site: Left Upper Arm, Position: Sitting, Cuff Size: Medium Adult)   Pulse 56   Ht 5' 10\" (1.778 m)   Wt 166 lb (75.3 kg)   SpO2 97%   BMI 23.82 kg/m²     Assessment and Plan     Diagnosis Orders   1. History of lacunar cerebrovascular accident        2. TIA (transient ischemic attack)        3. Migraine without aura and without status migrainosus, not intractable  tiZANidine (ZANAFLEX) 4 MG tablet      4. Tension headache  tiZANidine (ZANAFLEX) 4 MG tablet      5. [facility]  EEG 48 Hour Ambulatory      Aung was seen in neurological follow-up for paresthesias of the face, headache. I am happy to hear that Kadie Uriarte has not had any new paresthesias of the face since his hospitalization. I will order an ambulatory EEG to follow-up on his normal routine EEG. Kadie Uriarte describes today a tension type headache. He also has chronic migraines. I would like to initiate him on a titration of tizanidine up to 4 mg bedtime, Dr. Nazario Briggs recently started Aung on tizanidine 2 mg.   Kadie Uriarte tells me he has not started it yet, I would like to take over the tizanidine if Dr. Nazario Briggs is ok with this and  Luca's office was notified. Bill exam was nonfocal /nonlateralizing, he had recent imaging of his brain that was unremarkable. I do not feel like we need to repeat any imaging today. I am glad he followed up with Dr. Yinka Red in regards to his bilateral carotid stenosis. He will continue aspirin, Plavix, and statin for secondary stroke prevention. We discussed the importance of modification of stroke risk factors. We discussed the importance of the adherence to secondary stroke preventative medications. We discussed the importance of alerting EMS and presenting to the hospital at the onset of any stroke-like symptoms. Medications prescribed for the patient were discussed in detail. This included a discussion of the potential risks vs the potential benefits of the medications. The patient was given time to ask questions and these were answered to the best of my ability. The patient appeared to understand the information provided. We discussed the importance of keeping a detailed headache diary. We discussed trying to identify headache triggers. We discussed the importance of staying well hydrated, eating three regular meals each day, getting plenty of hours of fitful sleep, and reducing stress to help prevent headaches. Return in about 3 months (around 1/7/2023).     Reyna Farooq, NELLIE - CNP

## 2022-10-12 ENCOUNTER — CARE COORDINATION (OUTPATIENT)
Dept: CARE COORDINATION | Age: 82
End: 2022-10-12

## 2022-10-12 NOTE — CARE COORDINATION
Ambulatory Care Coordination Note      ACC: Tina Plummer RN  Call to pt for acm fu. Had hot flashes all last evening. Started again this morning and lasted one hour. Had headache on left side of head. Has now resolved. Reports that neuro in [place] to call [facility] for eeg to be done. Took tylenol pm to sleep last night did not take zanaflex last night, as zanaflex did not help sleep. Pt plans to restart zanaflex tonight as directed by provider. Pt advised to call with any needs denies needs/concerns at this time. Plan will f/u with pt and assist w referral at next encounter if has not heard back. Lab Results       None            Care Coordination Interventions    Referral from Primary Care Provider: No  Suggested Interventions and Community Resources  Zone Management Tools: In Process          Goals Addressed    None         Prior to Admission medications    Medication Sig Start Date End Date Taking? Authorizing Provider   tiZANidine (ZANAFLEX) 4 MG tablet Take 1 tablet by mouth nightly Take 1/2 tab nightly for 8 nights then increase to full tab. 10/7/22   Claudetta Chafe, APRN - CNP   atorvastatin (LIPITOR) 40 MG tablet Take 1 tablet by mouth nightly 9/23/22 3/22/23  Blake Segovia, DO   dilTIAZem (DILACOR XR) 240 MG extended release capsule Take 1 capsule by mouth daily  Patient not taking: Reported on 10/7/2022 9/7/22   Fran West MD   levothyroxine (SYNTHROID) 50 MCG tablet TAKE 1 TABLET BY MOUTH IN  THE MORNING 8/30/22   Blake Segovia DO   nitroGLYCERIN (NITROSTAT) 0.4 MG SL tablet up to max of 3 total doses.  If no relief after 1 dose, call 911. 8/25/22   NELLIE Pittman CNP   vitamin B-12 500 MCG tablet Take 1 tablet by mouth daily 8/25/22   NELLIE Pittman CNP   pantoprazole (PROTONIX) 40 MG tablet TAKE 1 TABLET BY MOUTH  DAILY 5/27/22 10/7/22  Blake Segovia DO   gabapentin (NEURONTIN) 300 MG capsule TAKE 1 CAPSULE BY MOUTH 3  TIMES DAILY 5/18/22 11/14/22  [facility] LIBERTAD Jarquin   clopidogrel (PLAVIX) 75 MG tablet Take 1 tablet by mouth daily 2/14/22   aCrter Donahue MD   aspirin 81 MG tablet Take 81 mg by mouth daily    Historical Provider, MD       Future Appointments   Date Time Provider Devan Shabnam   12/15/2022 10:00 AM Araceliangelic [place]opal, [address] [facility]   1/9/2023 10:15 AM Alissa Cerrato APRN - CNP [facility] NEURO [facility]   1/23/2023 10:30 AM Blake Segovia DO [facility] FPS [facility]   3/21/2023 10:00 AM NELLIE Chan - CNP [facility]   ,     COPD Assessment    Does the patient understand envrionmental exposure?: Yes  Is the patient able to verbalize Rescue vs. Long Acting medications?: No  Does the patient have a nebulizer?: No  Does the patient use a space with inhaled medications?: No     No patient-reported symptoms         Symptoms:          , and   General Assessment    Do you have any symptoms that are causing concern?: No

## 2022-10-13 SDOH — ECONOMIC STABILITY: FOOD INSECURITY: WITHIN THE PAST 12 MONTHS, THE FOOD YOU BOUGHT JUST DIDN'T LAST AND YOU DIDN'T HAVE MONEY TO GET MORE.: NEVER TRUE

## 2022-10-13 SDOH — ECONOMIC STABILITY: FOOD INSECURITY: WITHIN THE PAST 12 MONTHS, YOU WORRIED THAT YOUR FOOD WOULD RUN OUT BEFORE YOU GOT MONEY TO BUY MORE.: NEVER TRUE

## 2022-10-16 PROCEDURE — 99285 EMERGENCY DEPT VISIT HI MDM: CPT

## 2022-10-17 ENCOUNTER — APPOINTMENT (OUTPATIENT)
Dept: MRI IMAGING | Age: 82
End: 2022-10-17
Payer: MEDICARE

## 2022-10-17 ENCOUNTER — APPOINTMENT (OUTPATIENT)
Dept: CT IMAGING | Age: 82
End: 2022-10-17
Payer: MEDICARE

## 2022-10-17 ENCOUNTER — TELEPHONE (OUTPATIENT)
Dept: NEUROLOGY | Age: 82
End: 2022-10-17

## 2022-10-17 ENCOUNTER — HOSPITAL ENCOUNTER (OUTPATIENT)
Age: 82
Setting detail: OBSERVATION
Discharge: HOME | End: 2022-10-17
Attending: EMERGENCY MEDICINE | Admitting: STUDENT IN AN ORGANIZED HEALTH CARE EDUCATION/TRAINING PROGRAM
Payer: MEDICARE

## 2022-10-17 ENCOUNTER — CARE COORDINATION (OUTPATIENT)
Dept: CARE COORDINATION | Age: 82
End: 2022-10-17

## 2022-10-17 VITALS
SYSTOLIC BLOOD PRESSURE: 130 MMHG | OXYGEN SATURATION: 94 % | BODY MASS INDEX: 23.77 KG/M2 | HEART RATE: 61 BPM | RESPIRATION RATE: 13 BRPM | TEMPERATURE: 98 F | HEIGHT: 70 IN | DIASTOLIC BLOOD PRESSURE: 66 MMHG | WEIGHT: 166.01 LBS

## 2022-10-17 DIAGNOSIS — R20.0 FACIAL NUMBNESS: Primary | ICD-10-CM

## 2022-10-17 PROBLEM — R20.2 FACIAL PARESTHESIA: Status: ACTIVE | Noted: 2022-10-17

## 2022-10-17 LAB
ALBUMIN SERPL-MCNC: 4.1 GM/DL (ref 3.4–5)
ALP BLD-CCNC: 52 IU/L (ref 40–129)
ALT SERPL-CCNC: 28 U/L (ref 10–40)
ANION GAP SERPL CALCULATED.3IONS-SCNC: 6 MMOL/L (ref 4–16)
ANION GAP SERPL CALCULATED.3IONS-SCNC: 8 MMOL/L (ref 4–16)
AST SERPL-CCNC: 30 IU/L (ref 15–37)
BASOPHILS ABSOLUTE: 0 K/CU MM
BASOPHILS ABSOLUTE: 0 K/CU MM
BASOPHILS RELATIVE PERCENT: 0.5 % (ref 0–1)
BASOPHILS RELATIVE PERCENT: 0.5 % (ref 0–1)
BILIRUB SERPL-MCNC: 0.4 MG/DL (ref 0–1)
BUN BLDV-MCNC: 19 MG/DL (ref 6–23)
BUN BLDV-MCNC: 23 MG/DL (ref 6–23)
CALCIUM SERPL-MCNC: 9.1 MG/DL (ref 8.3–10.6)
CALCIUM SERPL-MCNC: 9.9 MG/DL (ref 8.3–10.6)
CHLORIDE BLD-SCNC: 100 MMOL/L (ref 99–110)
CHLORIDE BLD-SCNC: 105 MMOL/L (ref 99–110)
CHOLESTEROL: 98 MG/DL
CO2: 26 MMOL/L (ref 21–32)
CO2: 29 MMOL/L (ref 21–32)
CREAT SERPL-MCNC: 1 MG/DL (ref 0.9–1.3)
CREAT SERPL-MCNC: 1.5 MG/DL (ref 0.9–1.3)
DIFFERENTIAL TYPE: ABNORMAL
DIFFERENTIAL TYPE: ABNORMAL
EOSINOPHILS ABSOLUTE: 0.1 K/CU MM
EOSINOPHILS ABSOLUTE: 0.1 K/CU MM
EOSINOPHILS RELATIVE PERCENT: 1.5 % (ref 0–3)
EOSINOPHILS RELATIVE PERCENT: 2.3 % (ref 0–3)
ESTIMATED AVERAGE GLUCOSE: 111 MG/DL
FOLATE: 17.8 NG/ML (ref 3.1–17.5)
GFR AFRICAN AMERICAN: 54 ML/MIN/1.73M2
GFR AFRICAN AMERICAN: >60 ML/MIN/1.73M2
GFR NON-AFRICAN AMERICAN: 45 ML/MIN/1.73M2
GFR NON-AFRICAN AMERICAN: >60 ML/MIN/1.73M2
GLUCOSE BLD-MCNC: 102 MG/DL (ref 70–99)
GLUCOSE BLD-MCNC: 109 MG/DL (ref 70–99)
HBA1C MFR BLD: 5.5 % (ref 4.2–6.3)
HCT VFR BLD CALC: 41.5 % (ref 42–52)
HCT VFR BLD CALC: 43 % (ref 42–52)
HDLC SERPL-MCNC: 34 MG/DL
HEMOGLOBIN: 13.7 GM/DL (ref 13.5–18)
HEMOGLOBIN: 14.1 GM/DL (ref 13.5–18)
IMMATURE NEUTROPHIL %: 0.2 % (ref 0–0.43)
IMMATURE NEUTROPHIL %: 0.9 % (ref 0–0.43)
LDL CHOLESTEROL CALCULATED: 50 MG/DL
LYMPHOCYTES ABSOLUTE: 0.8 K/CU MM
LYMPHOCYTES ABSOLUTE: 1 K/CU MM
LYMPHOCYTES RELATIVE PERCENT: 19.4 % (ref 24–44)
LYMPHOCYTES RELATIVE PERCENT: 21.6 % (ref 24–44)
MCH RBC QN AUTO: 32.3 PG (ref 27–31)
MCH RBC QN AUTO: 32.5 PG (ref 27–31)
MCHC RBC AUTO-ENTMCNC: 32.8 % (ref 32–36)
MCHC RBC AUTO-ENTMCNC: 33 % (ref 32–36)
MCV RBC AUTO: 98.6 FL (ref 78–100)
MCV RBC AUTO: 98.6 FL (ref 78–100)
MONOCYTES ABSOLUTE: 0.4 K/CU MM
MONOCYTES ABSOLUTE: 0.5 K/CU MM
MONOCYTES RELATIVE PERCENT: 10.5 % (ref 0–4)
MONOCYTES RELATIVE PERCENT: 9 % (ref 0–4)
NUCLEATED RBC %: 0 %
NUCLEATED RBC %: 0 %
PDW BLD-RTO: 13 % (ref 11.7–14.9)
PDW BLD-RTO: 13.2 % (ref 11.7–14.9)
PLATELET # BLD: 180 K/CU MM (ref 140–440)
PLATELET # BLD: 200 K/CU MM (ref 140–440)
PMV BLD AUTO: 9.3 FL (ref 7.5–11.1)
PMV BLD AUTO: 9.4 FL (ref 7.5–11.1)
POTASSIUM SERPL-SCNC: 4 MMOL/L (ref 3.5–5.1)
POTASSIUM SERPL-SCNC: 4.8 MMOL/L (ref 3.5–5.1)
RBC # BLD: 4.21 M/CU MM (ref 4.6–6.2)
RBC # BLD: 4.36 M/CU MM (ref 4.6–6.2)
SEGMENTED NEUTROPHILS ABSOLUTE COUNT: 2.8 K/CU MM
SEGMENTED NEUTROPHILS ABSOLUTE COUNT: 2.9 K/CU MM
SEGMENTED NEUTROPHILS RELATIVE PERCENT: 64.2 % (ref 36–66)
SEGMENTED NEUTROPHILS RELATIVE PERCENT: 69.4 % (ref 36–66)
SODIUM BLD-SCNC: 137 MMOL/L (ref 135–145)
SODIUM BLD-SCNC: 137 MMOL/L (ref 135–145)
TOTAL IMMATURE NEUTOROPHIL: 0.01 K/CU MM
TOTAL IMMATURE NEUTOROPHIL: 0.04 K/CU MM
TOTAL NUCLEATED RBC: 0 K/CU MM
TOTAL NUCLEATED RBC: 0 K/CU MM
TOTAL PROTEIN: 6.6 GM/DL (ref 6.4–8.2)
TRIGL SERPL-MCNC: 68 MG/DL
TROPONIN T: <0.01 NG/ML
TSH HIGH SENSITIVITY: 1.89 UIU/ML (ref 0.27–4.2)
VITAMIN B-12: 418.6 PG/ML (ref 211–911)
WBC # BLD: 4.1 K/CU MM (ref 4–10.5)
WBC # BLD: 4.4 K/CU MM (ref 4–10.5)

## 2022-10-17 PROCEDURE — 6370000000 HC RX 637 (ALT 250 FOR IP): Performed by: STUDENT IN AN ORGANIZED HEALTH CARE EDUCATION/TRAINING PROGRAM

## 2022-10-17 PROCEDURE — 83036 HEMOGLOBIN GLYCOSYLATED A1C: CPT

## 2022-10-17 PROCEDURE — 80053 COMPREHEN METABOLIC PANEL: CPT

## 2022-10-17 PROCEDURE — 6360000002 HC RX W HCPCS: Performed by: INTERNAL MEDICINE

## 2022-10-17 PROCEDURE — 82607 VITAMIN B-12: CPT

## 2022-10-17 PROCEDURE — 84443 ASSAY THYROID STIM HORMONE: CPT

## 2022-10-17 PROCEDURE — G0378 HOSPITAL OBSERVATION PER HR: HCPCS

## 2022-10-17 PROCEDURE — 84484 ASSAY OF TROPONIN QUANT: CPT

## 2022-10-17 PROCEDURE — 99205 OFFICE O/P NEW HI 60 MIN: CPT | Performed by: CLINICAL NURSE SPECIALIST

## 2022-10-17 PROCEDURE — 36415 COLL VENOUS BLD VENIPUNCTURE: CPT

## 2022-10-17 PROCEDURE — 96360 HYDRATION IV INFUSION INIT: CPT

## 2022-10-17 PROCEDURE — 80061 LIPID PANEL: CPT

## 2022-10-17 PROCEDURE — 96372 THER/PROPH/DIAG INJ SC/IM: CPT

## 2022-10-17 PROCEDURE — 70450 CT HEAD/BRAIN W/O DYE: CPT

## 2022-10-17 PROCEDURE — 2580000003 HC RX 258: Performed by: PHYSICIAN ASSISTANT

## 2022-10-17 PROCEDURE — 82746 ASSAY OF FOLIC ACID SERUM: CPT

## 2022-10-17 PROCEDURE — 2580000003 HC RX 258: Performed by: STUDENT IN AN ORGANIZED HEALTH CARE EDUCATION/TRAINING PROGRAM

## 2022-10-17 PROCEDURE — 96361 HYDRATE IV INFUSION ADD-ON: CPT

## 2022-10-17 PROCEDURE — 93005 ELECTROCARDIOGRAM TRACING: CPT | Performed by: PHYSICIAN ASSISTANT

## 2022-10-17 PROCEDURE — 70551 MRI BRAIN STEM W/O DYE: CPT

## 2022-10-17 PROCEDURE — 80048 BASIC METABOLIC PNL TOTAL CA: CPT

## 2022-10-17 PROCEDURE — 85025 COMPLETE CBC W/AUTO DIFF WBC: CPT

## 2022-10-17 PROCEDURE — 99215 OFFICE O/P EST HI 40 MIN: CPT | Performed by: CLINICAL NURSE SPECIALIST

## 2022-10-17 PROCEDURE — 6360000002 HC RX W HCPCS: Performed by: STUDENT IN AN ORGANIZED HEALTH CARE EDUCATION/TRAINING PROGRAM

## 2022-10-17 RX ORDER — ONDANSETRON 4 MG/1
4 TABLET, ORALLY DISINTEGRATING ORAL EVERY 8 HOURS PRN
Status: DISCONTINUED | OUTPATIENT
Start: 2022-10-17 | End: 2022-10-17 | Stop reason: HOSPADM

## 2022-10-17 RX ORDER — LEVOTHYROXINE SODIUM 0.05 MG/1
50 TABLET ORAL DAILY
Status: DISCONTINUED | OUTPATIENT
Start: 2022-10-17 | End: 2022-10-17 | Stop reason: HOSPADM

## 2022-10-17 RX ORDER — ASPIRIN 81 MG/1
81 TABLET, CHEWABLE ORAL DAILY
Status: DISCONTINUED | OUTPATIENT
Start: 2022-10-17 | End: 2022-10-17 | Stop reason: HOSPADM

## 2022-10-17 RX ORDER — ACETAMINOPHEN 325 MG/1
650 TABLET ORAL EVERY 4 HOURS PRN
Status: DISCONTINUED | OUTPATIENT
Start: 2022-10-17 | End: 2022-10-17 | Stop reason: HOSPADM

## 2022-10-17 RX ORDER — GABAPENTIN 300 MG/1
300 CAPSULE ORAL 3 TIMES DAILY
Status: DISCONTINUED | OUTPATIENT
Start: 2022-10-17 | End: 2022-10-17 | Stop reason: HOSPADM

## 2022-10-17 RX ORDER — ONDANSETRON 2 MG/ML
4 INJECTION INTRAMUSCULAR; INTRAVENOUS EVERY 6 HOURS PRN
Status: DISCONTINUED | OUTPATIENT
Start: 2022-10-17 | End: 2022-10-17 | Stop reason: HOSPADM

## 2022-10-17 RX ORDER — SODIUM CHLORIDE 9 MG/ML
INJECTION, SOLUTION INTRAVENOUS PRN
Status: DISCONTINUED | OUTPATIENT
Start: 2022-10-17 | End: 2022-10-17 | Stop reason: HOSPADM

## 2022-10-17 RX ORDER — SODIUM CHLORIDE 0.9 % (FLUSH) 0.9 %
5-40 SYRINGE (ML) INJECTION EVERY 12 HOURS SCHEDULED
Status: DISCONTINUED | OUTPATIENT
Start: 2022-10-17 | End: 2022-10-17 | Stop reason: HOSPADM

## 2022-10-17 RX ORDER — ATORVASTATIN CALCIUM 40 MG/1
40 TABLET, FILM COATED ORAL NIGHTLY
Status: DISCONTINUED | OUTPATIENT
Start: 2022-10-17 | End: 2022-10-17 | Stop reason: HOSPADM

## 2022-10-17 RX ORDER — CLOPIDOGREL BISULFATE 75 MG/1
75 TABLET ORAL DAILY
Status: DISCONTINUED | OUTPATIENT
Start: 2022-10-17 | End: 2022-10-17 | Stop reason: HOSPADM

## 2022-10-17 RX ORDER — SODIUM CHLORIDE 0.9 % (FLUSH) 0.9 %
5-40 SYRINGE (ML) INJECTION PRN
Status: DISCONTINUED | OUTPATIENT
Start: 2022-10-17 | End: 2022-10-17 | Stop reason: HOSPADM

## 2022-10-17 RX ORDER — CYANOCOBALAMIN 1000 UG/ML
1000 INJECTION, SOLUTION INTRAMUSCULAR; SUBCUTANEOUS ONCE
Status: COMPLETED | OUTPATIENT
Start: 2022-10-17 | End: 2022-10-17

## 2022-10-17 RX ORDER — 0.9 % SODIUM CHLORIDE 0.9 %
1000 INTRAVENOUS SOLUTION INTRAVENOUS ONCE
Status: COMPLETED | OUTPATIENT
Start: 2022-10-17 | End: 2022-10-17

## 2022-10-17 RX ORDER — ENOXAPARIN SODIUM 100 MG/ML
40 INJECTION SUBCUTANEOUS DAILY
Status: DISCONTINUED | OUTPATIENT
Start: 2022-10-17 | End: 2022-10-17 | Stop reason: HOSPADM

## 2022-10-17 RX ORDER — PANTOPRAZOLE SODIUM 40 MG/1
40 TABLET, DELAYED RELEASE ORAL DAILY
Status: DISCONTINUED | OUTPATIENT
Start: 2022-10-17 | End: 2022-10-17 | Stop reason: HOSPADM

## 2022-10-17 RX ADMIN — GABAPENTIN 300 MG: 300 CAPSULE ORAL at 08:42

## 2022-10-17 RX ADMIN — GABAPENTIN 300 MG: 300 CAPSULE ORAL at 14:08

## 2022-10-17 RX ADMIN — ENOXAPARIN SODIUM 40 MG: 40 INJECTION SUBCUTANEOUS at 08:42

## 2022-10-17 RX ADMIN — ACETAMINOPHEN 650 MG: 325 TABLET ORAL at 12:39

## 2022-10-17 RX ADMIN — LEVOTHYROXINE SODIUM 50 MCG: 0.05 TABLET ORAL at 08:42

## 2022-10-17 RX ADMIN — SODIUM CHLORIDE, PRESERVATIVE FREE 10 ML: 5 INJECTION INTRAVENOUS at 08:53

## 2022-10-17 RX ADMIN — SODIUM CHLORIDE 1000 ML: 9 INJECTION, SOLUTION INTRAVENOUS at 03:02

## 2022-10-17 RX ADMIN — CLOPIDOGREL BISULFATE 75 MG: 75 TABLET ORAL at 08:42

## 2022-10-17 RX ADMIN — CYANOCOBALAMIN 1000 MCG: 1000 INJECTION, SOLUTION INTRAMUSCULAR at 12:20

## 2022-10-17 RX ADMIN — PANTOPRAZOLE SODIUM 40 MG: 40 TABLET, DELAYED RELEASE ORAL at 08:42

## 2022-10-17 RX ADMIN — ASPIRIN 81 MG CHEWABLE TABLET 81 MG: 81 TABLET CHEWABLE at 08:42

## 2022-10-17 ASSESSMENT — PAIN DESCRIPTION - ORIENTATION: ORIENTATION: LEFT

## 2022-10-17 ASSESSMENT — PAIN - FUNCTIONAL ASSESSMENT
PAIN_FUNCTIONAL_ASSESSMENT: 0-10
PAIN_FUNCTIONAL_ASSESSMENT: ACTIVITIES ARE NOT PREVENTED

## 2022-10-17 ASSESSMENT — PAIN DESCRIPTION - DESCRIPTORS
DESCRIPTORS: DISCOMFORT;CRUSHING;HEAVINESS
DESCRIPTORS: DULL

## 2022-10-17 ASSESSMENT — PAIN DESCRIPTION - PAIN TYPE: TYPE: ACUTE PAIN

## 2022-10-17 ASSESSMENT — PAIN SCALES - GENERAL
PAINLEVEL_OUTOF10: 0
PAINLEVEL_OUTOF10: 4
PAINLEVEL_OUTOF10: 5

## 2022-10-17 ASSESSMENT — PAIN DESCRIPTION - FREQUENCY: FREQUENCY: CONTINUOUS

## 2022-10-17 ASSESSMENT — PAIN DESCRIPTION - LOCATION
LOCATION: HEAD
LOCATION: CHEST

## 2022-10-17 NOTE — PLAN OF CARE
Problem: Discharge Planning  Goal: Discharge to home or other facility with appropriate resources  Outcome: Completed  Flowsheets (Taken 10/17/2022 0839)  Discharge to home or other facility with appropriate resources:   Identify barriers to discharge with patient and caregiver   Arrange for needed discharge resources and transportation as appropriate   Identify discharge learning needs (meds, wound care, etc)   Arrange for interpreters to assist at discharge as needed   Refer to discharge planning if patient needs post-hospital services based on physician order or complex needs related to functional status, cognitive ability or social support system     Problem: Pain  Goal: Verbalizes/displays adequate comfort level or baseline comfort level  Outcome: Completed  Flowsheets (Taken 10/17/2022 0838)  Verbalizes/displays adequate comfort level or baseline comfort level:   Encourage patient to monitor pain and request assistance   Assess pain using appropriate pain scale   Administer analgesics based on type and severity of pain and evaluate response   Notify Licensed Independent Practitioner if interventions unsuccessful or patient reports new pain   Implement non-pharmacological measures as appropriate and evaluate response   Consider cultural and social influences on pain and pain management

## 2022-10-17 NOTE — DISCHARGE INSTRUCTIONS
You were admitted for numbness of your face and hot flashes. CT head was normal and MRI did not show any stroke. Please follow up with neurology clinic and your primary care doctor    If your symptoms come back or worsen, please return to the emergency room.

## 2022-10-17 NOTE — ED PROVIDER NOTES
CHIEF COMPLAINT    Chief Complaint   Patient presents with    Numbness     B face/head; \"hot feeling\"; started around 2200 tonight     HPI  Julienne Quinteros is a 79 y.o. male with history of GERD, chronic back pain, myotonia, TIA presents to the ED with complaints of flushing sensation in his forehead and bilateral facial paresthesias that he describes as a tight sensation. Patient has history of TIA type symptoms which have included some paresthesias in the upper extremities as well as flushing throughout the body including the face. He has underwent hospitalizations with MRIs of the brain which were negative for acute ischemia but there was some concern for stenosis findings on CTA and therefore he did have angiography performed in September with Dr. Mikey Rios of neurology. 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 No interventions were performed. The patient has been following with [facility] neurology group following this. However he presents tonight due to a change in symptoms which include bilateral facial numbness. He states the face feels tight and the sensation is decreased on both sides. He tells me that he has never experienced these symptoms during his previous evaluations for his TIA/strokelike symptoms. It seems he has been evaluated once for some unilateral paresthesias of the face but he states that he never has been seen for this numbness. No other symptoms present. Symptoms rated as moderate and constant. Nothing makes it better or worse. Patient states that the symptoms have been present all evening. He denies headache, chest pain, shortness of breath, nausea,      REVIEW OF SYSTEMS  Constitutional: No fever, chills or recent illness. Eye: No visual changes  HENT: No earache or sore throat. Resp: No SOB or productive cough.   Cardio: No chest pain or palpitations. GI: No abdominal pain, nausea, vomiting, constipation or diarrhea. No melena. : No dysuria, urgency or frequency. Endocrine: No heat intolerance, no cold intolerance, no polydipsia   Lymphatics: No adenopathy  Musculoskeletal: No new muscle aches or joint pain. Neuro: Complains of bilateral facial numbness  Psych: No homicidal or suicidal thoughts  Skin: No rash, No itching. ?  ? PAST MEDICAL HISTORY  Past Medical History:   Diagnosis Date    Anesthesia complication     Patient reports in 1973 following a Back Surgery he was kept in the hospital due to high temperatures in the evenings. He stated they kept him for observation and doesn't remember receiving any treatment for the high temperatures. Patient reports the anesthesia name started with flur? ?.    BPH with obstruction/lower urinary tract symptoms     Chest pain 7/1/2018    Chronic low back pain     GERD (gastroesophageal reflux disease)     H/O 24 hour EKG monitoring 3/12  12/11    3/12/12  essentially a normal event monitor without significant arrhythmias noted    H/O cardiac catheterization 2/2/12 4/02 10/98    2/22/12  LM, LAD and RCA all without significant disease    H/O cardiac catheterization 2/2/12 4/02 10/98    Done R/T false pos on stress test.    H/O cardiac catheterization 08/06/2018    normal study    H/O cardiovascular stress test 07/02/2018    normal study    H/O echocardiogram 12/10      3/14 EF55-60% normal LV function 12/10/10  complete 2 dim transthoracic echj. LVSF normal  EF >55%, transmitral spectral Doppler flow pattern is suggestive of  impaired LV relaxation    History of cardiovascular stress test 2/12 11/09 7/08 9/06 2/02 4/00 8/99 2/8/2012  evidence of mild ischemia  in the RCA.   abnormal study , restingEF is 70%, global LVSF is  normal    History of Holter monitoring 9/17/14    14 day EVENT - no RVR    History of nuclear stress test 08/03/2017    cardiolite-normal,EF60%    Hx of chest x-ray 2/20/12 chest is considereed non acute. COPD    Hx of echocardiogram 08/03/2017    BF22-24%,IERQA 2 diastolic dysfunction    Hyperlipidemia     Idiopathic peripheral neuropathy     Kidney stone     Left facial numbness 8/30/2017    Lumbar radiculopathy     Malignant melanoma of skin of face (HCC)     MVP (mitral valve prolapse)     Myotonia     chronic increased CPK's    Peripelvic (lymphatic) cyst     per CT    Precordial pain 8/3/2018    Vitamin B12 deficiency      FAMILY HISTORY  Family History   Problem Relation Age of Onset    Heart Disease Mother         ASCAD    Heart Disease Father     Heart Disease Brother     Diabetes Other     Cancer Other         unknown type    Prostate Cancer Other     Coronary Art Dis Other     Coronary Art Dis Other      SOCIAL HISTORY  Social History     Socioeconomic History    Marital status:      Spouse name: None    Number of children: None    Years of education: None    Highest education level: None   Tobacco Use    Smoking status: Never    Smokeless tobacco: Never   Vaping Use    Vaping Use: Never used   Substance and Sexual Activity    Alcohol use: Not Currently     Comment: Occassional    Drug use: No    Sexual activity: Not Currently     Partners: Female     Comment:      Social Determinants of Health     Food Insecurity: No Food Insecurity    Worried About Running Out of Food in the Last Year: Never true    Ran Out of Food in the Last Year: Never true   Transportation Needs: No Transportation Needs    Lack of Transportation (Medical): No    Lack of Transportation (Non-Medical):  No   Physical Activity: Sufficiently Active    Days of Exercise per Week: 7 days    Minutes of Exercise per Session: 40 min   Stress: No Stress Concern Present    Feeling of Stress : Not at all   Social Connections: Socially Isolated    Frequency of Communication with Friends and Family: Twice a week    Frequency of Social Gatherings with Friends and Family: Once a week    Attends Orthodoxy Services: Never    Active Member of Clubs or Organizations: No    Attends Club or Organization Meetings: Never    Marital Status:    Housing Stability: Low Risk     Unable to Pay for Housing in the Last Year: No    Number of Places Lived in the Last Year: 1    Unstable Housing in the Last Year: No       SURGICAL HISTORY  Past Surgical History:   Procedure Laterality Date    CARDIAC CATHETERIZATION  2/2/12    EF 55% No sugnificant disease. COLONOSCOPY  5/5/14    diverticulosis, 1 polyp, hemorrhoids    COLONOSCOPY  07/10/2017    single 5 mm polyp found in sigmoid colon, moderate diverticulosis found in sigmoid colon    LUMBAR DISC SURGERY  11/2002    L4-5 hemilaminectomy and disk    LUMBAR DISCECTOMY  [address]  09/26/2011    s/p robotic radical prostatectomy    ROTATOR CUFF REPAIR Right 10/2016    TIBIA FRACTURE SURGERY Right     childhood    URETEROLITHOTOMY  1992     CURRENT MEDICATIONS  Previous Medications    ASPIRIN 81 MG TABLET    Take 81 mg by mouth daily    ATORVASTATIN (LIPITOR) 40 MG TABLET    Take 1 tablet by mouth nightly    CLOPIDOGREL (PLAVIX) 75 MG TABLET    Take 1 tablet by mouth daily    DILTIAZEM (DILACOR XR) 240 MG EXTENDED RELEASE CAPSULE    Take 1 capsule by mouth daily    GABAPENTIN (NEURONTIN) 300 MG CAPSULE    TAKE 1 CAPSULE BY MOUTH 3  TIMES DAILY    LEVOTHYROXINE (SYNTHROID) 50 MCG TABLET    TAKE 1 TABLET BY MOUTH IN  THE MORNING    NITROGLYCERIN (NITROSTAT) 0.4 MG SL TABLET    up to max of 3 total doses. If no relief after 1 dose, call 911. PANTOPRAZOLE (PROTONIX) 40 MG TABLET    TAKE 1 TABLET BY MOUTH  DAILY    TIZANIDINE (ZANAFLEX) 4 MG TABLET    Take 1 tablet by mouth nightly Take 1/2 tab nightly for 8 nights then increase to full tab.     VITAMIN B-12 500 MCG TABLET    Take 1 tablet by mouth daily     ALLERGIES  Allergies   Allergen Reactions    Fenofibrate        Nursing notes reviewed by myself for past medical history, family history, social history, surgical history, current medications, and allergies. PHYSICAL EXAM  VITAL SIGNS: Triage VS:    ED Triage Vitals   Enc Vitals Group      BP 10/16/22 2356 (!) 168/87      Heart Rate 10/16/22 2356 78      Resp 10/16/22 2356 20      Temp 10/16/22 2356 97.8 °F (36.6 °C)      Temp Source 10/16/22 2356 Oral      SpO2 10/16/22 2356 99 %      Weight 10/17/22 0011 164 lb (74.4 kg)      Height 10/17/22 0011 5' 10\" (1.778 m)      Head Circumference --       Peak Flow --       Pain Score --       Pain Loc --       Pain Edu? --       Excl. in [address]? --      Constitutional: Well developed, Well nourished, nontoxic appearing  HENT: Normocephalic, Atraumatic, Bilateral external ears normal, Oropharynx moist, No oral exudates, Nose normal.   Eyes: Conjunctiva normal, No discharge. No scleral icterus. Neck: Normal range of motion, No tenderness, Supple. Lymphatic: No lymphadenopathy noted. Cardiovascular: Normal heart rate, Normal rhythm, No murmurs, gallops or rubs. Thorax & Lungs: Normal breath sounds, No respiratory distress, No wheezing. Abdomen: Soft, No tenderness, No masses, No pulsatile masses, No distention, Normal bowel sounds  Skin: Warm, Dry, Pink, No mottling, No erythema, No rash. Back: No tenderness, No CVA tenderness. Extremities: No edema, No tenderness, No cyanosis, Normal perfusion, No clubbing. Musculoskeletal: Good range of motion in all major joints as observed. No major deformities noted. Neurologic: Alert & oriented x 3, Normal motor function, subjective decrease sensation to bilateral face, CN II-XII grossly intact as tested, No focal deficits noted. Psychiatric: Affect normal, Judgment normal, Mood normal.   EKG  Per my interpretation demonstrates normal sinus rhythm at a rate of 76 bpm.  Normal axis. Normal intervals. No acute ST segment changes.   RADIOLOGY  Labs Reviewed   CBC WITH AUTO DIFFERENTIAL - Abnormal; Notable for the following components:       Result Value    RBC 4.21 (*) Hematocrit 41.5 (*)     MCH 32.5 (*)     Lymphocytes % 21.6 (*)     Monocytes % 10.5 (*)     Immature Neutrophil % 0.9 (*)     All other components within normal limits   COMPREHENSIVE METABOLIC PANEL - Abnormal; Notable for the following components:    Creatinine 1.5 (*)     Glucose 102 (*)     GFR Non- 45 (*)     GFR  54 (*)     All other components within normal limits   TROPONIN     I personally reviewed the images. The radiologist's interpretation reveals:  Last Imaging results   CT HEAD WO CONTRAST   Preliminary Result   No acute intracranial abnormality. Age related changes including chronic small vessel ischemic disease and   cerebral atrophy. MEDS GIVEN IN ED:  Medications   0.9 % sodium chloride bolus (1,000 mLs IntraVENous New Bag 10/17/22 0302)     COURSE & MEDICAL DECISION MAKING  This is a 25-year-old male that presents the emergency department complaints of numbness to the bilateral face. Has recent history of stroke work-up with MRIs and angiography performed without intervention needed and no evidence of acute ischemia. He presents this evening with change in symptoms of bilateral facial numbness. This has been present for several hours prior to arrival.  On exam he has subjective decree sensation to bilateral face but no other focal deficits. At this time we discussed possible hospitalization for MRI of the brain given the new symptoms he is experiencing of numbness of bilateral face as these per his report are different than his normal symptoms he has been worked up for. CT of the head here is without acute intracranial pathology.   His case was discussed with hospitalist team who agreed to hospitalize patient for further management        Amount and/or Complexity of Data Reviewed  Clinical lab tests: reviewed  Decide to obtain previous medical records or to obtain history from someone other than the patient: yes       -  Patient seen and evaluated in the emergency department. -  Triage and nursing notes reviewed and incorporated. -  Old chart records reviewed and incorporated. -  Work-up included:  See above  -  Results discussed with patient. Patient seen and evaluated with [facility]  Appropriate PPE utilized as indicated for entire patient encounter? Time of Disposition: See timeline  ? New Prescriptions    No medications on file     FINAL IMPRESSION  1. Facial numbness        Electronically signed by:  [address], DO, 10/17/2022        [address], DO  10/19/22 2012

## 2022-10-17 NOTE — ED PROVIDER NOTES
Triage Chief Complaint:   Numbness (B face/head; \"hot feeling\"; started around 2200 tonight)    [place]:  Today in the ED I had the pleasure of caring for Caroline Hernandez who is a 79 y.o. male that presents today complaining of facial numbness along the entire head. Onset at 10pm. He states that he has been evaluated for this previously as he often has this quality of numbness, is cheduled for EEG this coming Monday. He states that this amount of numbness appears to be much more sever and has lasted much longer, typically sx will be much less severe and only last 3o minutes or less. He also endorses\"feeling hot\" which is typical for when he has these events. No changes in vission, no sob, he endorses intermittent chest pain for years. Peter chest pain now but did have some earlier today. Eating drinnking baselin, urinating baseline. ROS:  REVIEW OF SYSTEMS    At least 10 systems reviewed      All other review of systems are negative  See HPI and nursing notes for additional information       Past Medical History:   Diagnosis Date    Anesthesia complication     Patient reports in 1973 following a Back Surgery he was kept in the hospital due to high temperatures in the evenings. He stated they kept him for observation and doesn't remember receiving any treatment for the high temperatures. Patient reports the anesthesia name started with flur? ?.    BPH with obstruction/lower urinary tract symptoms     Chest pain 7/1/2018    Chronic low back pain     GERD (gastroesophageal reflux disease)     H/O 24 hour EKG monitoring 3/12  12/11    3/12/12  essentially a normal event monitor without significant arrhythmias noted    H/O cardiac catheterization 2/2/12 4/02 10/98    2/22/12  LM, LAD and RCA all without significant disease    H/O cardiac catheterization 2/2/12 4/02 10/98    Done R/T false pos on stress test.    H/O cardiac catheterization 08/06/2018    normal study    H/O cardiovascular stress test 07/02/2018    normal study    H/O echocardiogram 12/10      3/14 EF55-60% normal LV function 12/10/10  complete 2 dim transthoracic echj. LVSF normal  EF >55%, transmitral spectral Doppler flow pattern is suggestive of  impaired LV relaxation    History of cardiovascular stress test 2/12 11/09 7/08 9/06 2/02 4/00 8/99 2/8/2012  evidence of mild ischemia  in the RCA. abnormal study , restingEF is 70%, global LVSF is  normal    History of Holter monitoring 9/17/14    14 day EVENT - no RVR    History of nuclear stress test 08/03/2017    cardiolite-normal,EF60%    Hx of chest x-ray 2/20/12    chest is considereed non acute. COPD    Hx of echocardiogram 08/03/2017    ZU25-30%,TTJHZ 2 diastolic dysfunction    Hyperlipidemia     Idiopathic peripheral neuropathy     Kidney stone     Left facial numbness 8/30/2017    Lumbar radiculopathy     Malignant melanoma of skin of face (HCC)     MVP (mitral valve prolapse)     Myotonia     chronic increased CPK's    Peripelvic (lymphatic) cyst     per CT    Precordial pain 8/3/2018    Vitamin B12 deficiency      Past Surgical History:   Procedure Laterality Date    CARDIAC CATHETERIZATION  2/2/12    EF 55% No sugnificant disease.     COLONOSCOPY  5/5/14    diverticulosis, 1 polyp, hemorrhoids    COLONOSCOPY  07/10/2017    single 5 mm polyp found in sigmoid colon, moderate diverticulosis found in sigmoid colon    LUMBAR DISC SURGERY  11/2002    L4-5 hemilaminectomy and disk    LUMBAR DISCECTOMY  [address]  09/26/2011    s/p robotic radical prostatectomy    ROTATOR CUFF REPAIR Right 10/2016    TIBIA FRACTURE SURGERY Right     childhood    URETEROLITHOTOMY  1992     Family History   Problem Relation Age of Onset    Heart Disease Mother         ASCAD    Heart Disease Father     Heart Disease Brother     Diabetes Other     Cancer Other         unknown type    Prostate Cancer Other     Coronary Art Dis Other     Coronary Art Dis Other      Social History     Socioeconomic History    Marital status:      Spouse name: Not on file    Number of children: Not on file    Years of education: Not on file    Highest education level: Not on file   Occupational History    Not on file   Tobacco Use    Smoking status: Never    Smokeless tobacco: Never   Vaping Use    Vaping Use: Never used   Substance and Sexual Activity    Alcohol use: Not Currently     Comment: Occassional    Drug use: No    Sexual activity: Not Currently     Partners: Female     Comment:    Other Topics Concern    Not on file   Social History Narrative    Not on file     Social Determinants of Health     Financial Resource Strain: Not on file   Food Insecurity: No Food Insecurity    Worried About Running Out of Food in the Last Year: Never true    [address] in the Last Year: Never true   Transportation Needs: No Transportation Needs    Lack of Transportation (Medical): No    Lack of Transportation (Non-Medical): No   Physical Activity: Sufficiently Active    Days of Exercise per Week: 7 days    Minutes of Exercise per Session: 40 min   Stress: No Stress Concern Present    Feeling of Stress : Not at all   Social Connections: Socially Isolated    Frequency of Communication with Friends and Family: Twice a week    Frequency of Social Gatherings with Friends and Family: Once a week    Attends Jain Services: Never    Active Member of Clubs or Organizations: No    Attends Club or Organization Meetings: Never    Marital Status:    Intimate Partner Violence: Not on file   Housing Stability: Low Risk     Unable to Pay for Housing in the Last Year: No    Number of Places Lived in the Last Year: 1    Unstable Housing in the Last Year: No     No current facility-administered medications for this encounter. Current Outpatient Medications   Medication Sig Dispense Refill    tiZANidine (ZANAFLEX) 4 MG tablet Take 1 tablet by mouth nightly Take 1/2 tab nightly for 8 nights then increase to full tab.  30 tablet 3 atorvastatin (LIPITOR) 40 MG tablet Take 1 tablet by mouth nightly 90 tablet 1    dilTIAZem (DILACOR XR) 240 MG extended release capsule Take 1 capsule by mouth daily (Patient not taking: Reported on 10/7/2022) 90 capsule 3    levothyroxine (SYNTHROID) 50 MCG tablet TAKE 1 TABLET BY MOUTH IN  THE MORNING 90 tablet 3    nitroGLYCERIN (NITROSTAT) 0.4 MG SL tablet up to max of 3 total doses. If no relief after 1 dose, call 911. 25 tablet 3    vitamin B-12 500 MCG tablet Take 1 tablet by mouth daily 30 tablet 3    pantoprazole (PROTONIX) 40 MG tablet TAKE 1 TABLET BY MOUTH  DAILY 90 tablet 1    gabapentin (NEURONTIN) 300 MG capsule TAKE 1 CAPSULE BY MOUTH 3  TIMES DAILY 270 capsule 1    clopidogrel (PLAVIX) 75 MG tablet Take 1 tablet by mouth daily 90 tablet 3    aspirin 81 MG tablet Take 81 mg by mouth daily       Allergies   Allergen Reactions    Fenofibrate        Nursing Notes Reviewed    Physical Exam:  ED Triage Vitals   Enc Vitals Group      BP 10/16/22 2356 (!) 168/87      Heart Rate 10/16/22 2356 78      Resp 10/16/22 2356 20      Temp 10/16/22 2356 97.8 °F (36.6 °C)      Temp Source 10/16/22 2356 Oral      SpO2 10/16/22 2356 99 %      Weight 10/17/22 0011 164 lb (74.4 kg)      Height 10/17/22 0011 5' 10\" (1.778 m)      Head Circumference --       Peak Flow --       Pain Score --       Pain Loc --       Pain Edu? --       Excl. in [address]? --      General :Patient is awake alert oriented person place and time no acute distress nontoxic appearing  HEENT: Pupils are equally round and reactive to light extraocular motors are intact conjunctivae clear sclerae white there is no injection no icterus. Nose without any rhinorrhea or epistaxis. Oral mucosa is moist no exudate buccal mucosa shows no ulcerations.  Uvula is midline    Neck: Neck is supple full range of motion trachea midline thyroid nonpalpable  Cardiac: Heart regular rate rhythm no murmurs rubs clicks or gallops  Lungs: Lungs are clear to auscultation there is no wheezing rhonchi or rales. There is no use of accessory muscles no nasal flaring identified. Chest wall: There is no tenderness to palpation over the chest wall or over ribs  Abdomen: Abdomen is soft nontender nondistended. There is no firm or pulsatile masses no rebound rigidity or guarding negative Wills's negative McBurney, no peritoneal signs  Suprapubic:  there is no tenderness to palpation over the external bladder   Musculoskeletal: 5 out of 5 strength in all 4 extremities full flexion extension abduction and adduction supination pronation of all extremities and all digits. No obvious muscle atrophy is noted. No focal muscle deficits are appreciated  Dermatology: Skin is warm and dry there is no obvious abscesses lacerations or lesions noted  Psych: Mentation is grossly normal cognition is grossly normal. Affect is appropriate  Neuro: Motor intact sensory intact cranial nerves II through XII are intact level of consciousness is normal cerebellar function is normal reflexes are grossly normal. No evidence of incontinence or loss of bowel or bladder no saddle anesthesia noted Lymphatic: There is no submandibular or cervical adenopathy appreciated. I have reviewed and interpreted all of the currently available lab results from this visit (if applicable):  No results found for this visit on 10/17/22. Radiographs (if obtained):  [] The following radiograph was interpreted by myself in the absence of a radiologist:   [] Radiologist's Report Reviewed:  No orders to display       EKG (if obtained):   Please See Note of attending physician for EKG interpretation. Chart review shows recent radiograph(s):  No results found. MDM:     Interventions given this visit: No orders of the defined types were placed in this encounter. She presents today to the emergency department. Facial heat. Facial numbness. Which appears to be different subjectively than his previous episodes.   Has had multiple evaluations including CT scans and MRIs. His imaging here in the emergency department including CT scan of the head is negative. MRI pending at this time. EKG negative. CBC metabolic panel TSH essentially unremarkable for emergent processes requiring intervention. I did staffed patient with attending physician Dr. Bird Hagan    PT will be admitted for MRI given his new neuro sx. He is far outside window for thrombolytic, stroke alert not called. I independently managed patient today in the ED    BP (!) 151/76   Pulse 76   Temp 98.6 °F (37 °C) (Oral)   Resp 12   Ht 5' 10\" (1.778 m)   Wt 164 lb (74.4 kg)   SpO2 99%   BMI 23.53 kg/m²       Clinical Impression:  1. Facial numbness          Disposition referral (if applicable):  No follow-up provider specified. Disposition medications (if applicable):  New Prescriptions    No medications on file         Comment: Please note this report has been produced using speech recognition software and may contain errors related to that system including errors in grammar, punctuation, and spelling, as well as words and phrases that may be inappropriate. If there are any questions or concerns please feel free to contact the dictating provider for clarification.       Junior Kee, [facility] [address] [place] [address], Author Repine  11/01/22 2029

## 2022-10-17 NOTE — H&P
V2.0  History and Physical      Name:  Nasim King /Age/Sex: 1940  (79 y.o. male)   MRN & CSN:  [id number] & [id number] Encounter Date/Time: 10/17/2022 4:30 AM EDT   Location:  [place] [address] PCP: [address] Day: 1    Assessment and Plan:   Nasim King is a 79 y.o. male with pmh of hypothyroidism, hyperlipidemia, recurrent symptoms of face paresthesias on secondary prevention for stroke with aspirin Plavix who presents with bilateral facial paresthesias 5-hour duration. Hospital Problems             Last Modified POA    * (Principal) Facial paresthesia 10/17/2022 Yes       Bilateral facial paresthesias   TIA rule out  Patient with complete work-up negative with neurology for recurrent facial numbness and paresthesias, last saw neurology 10/7, at the time they did not feel strongly about repeating any imaging, he did have a negative brain MRI back in August of this year, bilateral carotid Dopplers with less than 50% stenosis in February, patient presenting this time with bilateral facial paresthesia onset of unilateral that he reports new to him, lasting 5 hours. Distribution of paresthesias is not consistent with any vessel occlusion. Last known normal 10 PM.  NIH SS of 0.   -Telemetry, EKG unremarkable on admission  -PT OT  -Brain MRI without contrast  -We will hold on repeating Dopplers  -Continue aspirin and Plavix, continue statin  -Consider neurology consult  -Patient would likely be ready for discharge after brain MRI in the morning  -We will also repeat TSH, lipid panel and hemoglobin A1c  -TTE was just done in August, will hold on repeating    Hypothyroidism-TSH pending, continue Synthyroid  Hyperlipidemia-continue statin as above    Disposition:   Current Living situation: home   Expected Disposition: home  Estimated D/C: 1-2 days     Diet ADULT DIET; Regular;  Low Sodium (2 gm)   DVT Prophylaxis [x] Lovenox, []  Heparin, [] SCDs, [] Ambulation,  [] Eliquis, [] Xarelto, [] Coumadin   Code Status Full Code   Surrogate Decision Maker/ POA      History from:     patient    History of Present Illness:     Chief Complaint: Facial paresthesia  Michelle Hsieh is a 79 y.o. male with pmh of hypothyroidism, hyperlipidemia, recurrent symptoms of face paresthesias on secondary prevention for stroke with aspirin Plavix who presents with bilateral facial paresthesias 5-hour duration. Patient with recurrent symptoms of facial paresthesias, follows with neurology, did have complete work-up including brain MRI that was done 8/22, carotid Dopplers bilateral that showed less than 50% stenosis back in February of this year, worked up by neurology for other causes of facial numbness with negative work-up. Did just see neurology 10/07, with recommendation to continue secondary prevention for stroke with aspirin Plavix and statin and no other work-up or imaging. Presents with bilateral facial paresthesias that he reports is new since its bilateral, patient reports that this has been going on for 5 hours at least, he never had it bilateral before. No fever chills rigors, no chest pain, no shortness of breath no lightheadedness no weakness. Patient presents to the ED with vital signs remarkable for mild hypertension with systolics 012V to 604G otherwise stable, lab work with a creatinine of 1.5 which appears patient did have creatinine up to 1.5 before and he denies any dehydration or any obstruction frequency urgency nothing like that. Patient was given 1 L of normal saline in the ED. Patient reassured after negative CT head that his symptoms are likely nothing and he is probably able to go home although patient was adamant to be admitted because he thought the symptoms are new to him and he is concerned that he might have something going on. Admitted for further imaging and potential neurology consult.       Review of Systems: Need 10 Elements   Review of Systems  10 point review of systems negative except as above. Objective:   No intake or output data in the 24 hours ending 10/17/22 0430   Vitals:   Vitals:    10/17/22 0200 10/17/22 0230 10/17/22 0300 10/17/22 0330   BP: 130/68 (!) 150/73 (!) 141/78 (!) 150/80   Pulse: 68 70 74 79   Resp: 14 10 13 12   Temp:       TempSrc:       SpO2: 95% 97% 99% 100%   Weight:       Height:           Medications Prior to Admission     Prior to Admission medications    Medication Sig Start Date End Date Taking? Authorizing Provider   tiZANidine (ZANAFLEX) 4 MG tablet Take 1 tablet by mouth nightly Take 1/2 tab nightly for 8 nights then increase to full tab. 10/7/22   NELLIE Garcias CNP   atorvastatin (LIPITOR) 40 MG tablet Take 1 tablet by mouth nightly 9/23/22 3/22/23  Blake Segovia DO   dilTIAZem (DILACOR XR) 240 MG extended release capsule Take 1 capsule by mouth daily  Patient not taking: Reported on 10/7/2022 9/7/22   Stephanie Cuba MD   levothyroxine (SYNTHROID) 50 MCG tablet TAKE 1 TABLET BY MOUTH IN  THE MORNING 8/30/22   Blake Segovia DO   nitroGLYCERIN (NITROSTAT) 0.4 MG SL tablet up to max of 3 total doses.  If no relief after 1 dose, call 911. 8/25/22   NELLIE Ash CNP   vitamin B-12 500 MCG tablet Take 1 tablet by mouth daily 8/25/22   NELLIE Ash CNP   pantoprazole (PROTONIX) 40 MG tablet TAKE 1 TABLET BY MOUTH  DAILY 5/27/22 10/7/22  Blake Segovia DO   gabapentin (NEURONTIN) 300 MG capsule TAKE 1 CAPSULE BY MOUTH 3  TIMES DAILY 5/18/22 11/14/22  LIBERTAD Bennett   clopidogrel (PLAVIX) 75 MG tablet Take 1 tablet by mouth daily 2/14/22   Edward Terrell MD   aspirin 81 MG tablet Take 81 mg by mouth daily    Historical Provider, MD       Physical Exam: Need 8 Elements   Physical Exam     General: NAD  Eyes: EOMI, strabismus  ENT: neck supple  Cardiovascular: Normal rate regular rhythm  Respiratory: Clear to auscultation  Gastrointestinal: Soft, non tender  Genitourinary: no suprapubic tenderness  Musculoskeletal: No edema  Skin: warm, dry  Neuro: Alert. Oriented x4, nonfocal, strength intact throughout, cranial nerves intact, reports numbness whole face, no sensory changes on exam though, fine touch intact throughout. Psych: Mood appropriate. Past Medical History:   PMHx   Past Medical History:   Diagnosis Date    Anesthesia complication     Patient reports in 1973 following a Back Surgery he was kept in the hospital due to high temperatures in the evenings. He stated they kept him for observation and doesn't remember receiving any treatment for the high temperatures. Patient reports the anesthesia name started with flur? ?.    BPH with obstruction/lower urinary tract symptoms     Chest pain 7/1/2018    Chronic low back pain     GERD (gastroesophageal reflux disease)     H/O 24 hour EKG monitoring 3/12  12/11    3/12/12  essentially a normal event monitor without significant arrhythmias noted    H/O cardiac catheterization 2/2/12 4/02 10/98    2/22/12  LM, LAD and RCA all without significant disease    H/O cardiac catheterization 2/2/12 4/02 10/98    Done R/T false pos on stress test.    H/O cardiac catheterization 08/06/2018    normal study    H/O cardiovascular stress test 07/02/2018    normal study    H/O echocardiogram 12/10      3/14 EF55-60% normal LV function 12/10/10  complete 2 dim transthoracic echj. LVSF normal  EF >55%, transmitral spectral Doppler flow pattern is suggestive of  impaired LV relaxation    History of cardiovascular stress test 2/12 11/09 7/08 9/06 2/02 4/00 8/99 2/8/2012  evidence of mild ischemia  in the RCA. abnormal study , restingEF is 70%, global LVSF is  normal    History of Holter monitoring 9/17/14    14 day EVENT - no RVR    History of nuclear stress test 08/03/2017    cardiolite-normal,EF60%    Hx of chest x-ray 2/20/12    chest is considereed non acute.   COPD    Hx of echocardiogram 08/03/2017    EO11-44%,TAYUS 2 diastolic dysfunction Hyperlipidemia     Idiopathic peripheral neuropathy     Kidney stone     Left facial numbness 8/30/2017    Lumbar radiculopathy     Malignant melanoma of skin of face (HCC)     MVP (mitral valve prolapse)     Myotonia     chronic increased CPK's    Peripelvic (lymphatic) cyst     per CT    Precordial pain 8/3/2018    Vitamin B12 deficiency      PSHX:  has a past surgical history that includes Cardiac catheterization (2/2/12); Prostate surgery (09/26/2011); Rotator cuff repair (Right, 10/2016); Colonoscopy (5/5/14); Colonoscopy (07/10/2017); lumbar discectomy (8266); ureterolithotomy (1992); Tibia fracture surgery (Right); and Lumbar disc surgery (11/2002). Allergies: Allergies   Allergen Reactions    Fenofibrate      Fam HX:  family history includes Cancer in an other family member; Coronary Art Dis in some other family members; Diabetes in an other family member; Heart Disease in his brother, father, and mother; Prostate Cancer in an other family member. Soc HX:   Social History     Socioeconomic History    Marital status:      Spouse name: None    Number of children: None    Years of education: None    Highest education level: None   Tobacco Use    Smoking status: Never    Smokeless tobacco: Never   Vaping Use    Vaping Use: Never used   Substance and Sexual Activity    Alcohol use: Not Currently     Comment: Occassional    Drug use: No    Sexual activity: Not Currently     Partners: Female     Comment:      Social Determinants of Health     Food Insecurity: No Food Insecurity    Worried About Running Out of Food in the Last Year: Never true    Ran Out of Food in the Last Year: Never true   Transportation Needs: No Transportation Needs    Lack of Transportation (Medical): No    Lack of Transportation (Non-Medical):  No   Physical Activity: Sufficiently Active    Days of Exercise per Week: 7 days    Minutes of Exercise per Session: 40 min   Stress: No Stress Concern Present    Feeling of Stress : Not at all   Social Connections: Socially Isolated    Frequency of Communication with Friends and Family: Twice a week    Frequency of Social Gatherings with Friends and Family: Once a week    Attends Jehovah's witness Services: Never    Active Member of Clubs or Organizations: No    Attends Club or Organization Meetings: Never    Marital Status:    Housing Stability: Low Risk     Unable to Pay for Housing in the Last Year: No    Number of Places Lived in the Last Year: 1    Unstable Housing in the Last Year: No       Medications:   Medications:    aspirin  81 mg Oral Daily    atorvastatin  40 mg Oral Nightly    clopidogrel  75 mg Oral Daily    gabapentin  300 mg Oral TID    levothyroxine  50 mcg Oral Daily    pantoprazole  40 mg Oral Daily    sodium chloride flush  5-40 mL IntraVENous 2 times per day    enoxaparin  40 mg SubCUTAneous Daily      Infusions:    sodium chloride       PRN Meds: sodium chloride flush, 5-40 mL, PRN  sodium chloride, , PRN  acetaminophen, 650 mg, Q4H PRN  ondansetron, 4 mg, Q8H PRN   Or  ondansetron, 4 mg, Q6H PRN        Labs      CBC:   Recent Labs     10/17/22  0022   WBC 4.4   HGB 13.7        BMP:    Recent Labs     10/17/22  0022      K 4.8      CO2 29   BUN 23   CREATININE 1.5*   GLUCOSE 102*     Hepatic:   Recent Labs     10/17/22  0022   AST 30   ALT 28   BILITOT 0.4   ALKPHOS 52     Lipids:   Lab Results   Component Value Date/Time    CHOL 194 08/23/2022 01:13 AM    HDL 36 09/27/2022 08:17 AM    TRIG 193 08/23/2022 01:13 AM     Hemoglobin A1C:   Lab Results   Component Value Date/Time    LABA1C 5.6 08/23/2022 01:13 AM     TSH:   Lab Results   Component Value Date/Time    TSH 0.56 04/18/2019 09:51 AM     Troponin:   Lab Results   Component Value Date/Time    TROPONINT <0.010 10/17/2022 12:22 AM    TROPONINT <0.010 08/30/2022 03:08 AM    TROPONINT <0.010 08/28/2022 05:23 PM     Lactic Acid: No results for input(s): LACTA in the last 72 hours.   BNP: No results for input(s): PROBNP in the last 72 hours. UA:  Lab Results   Component Value Date/Time    NITRU NEGATIVE 08/19/2022 05:26 AM    COLORU YELLOW 08/19/2022 05:26 AM    WBCUA NONE SEEN 08/19/2022 05:26 AM    RBCUA NONE SEEN 08/19/2022 05:26 AM    MUCUS RARE 01/22/2022 09:30 AM    TRICHOMONAS NONE SEEN 08/19/2022 05:26 AM    BACTERIA NEGATIVE 08/19/2022 05:26 AM    CLARITYU CLEAR 08/19/2022 05:26 AM    SPECGRAV <1.005 08/19/2022 05:26 AM    LEUKOCYTESUR NEGATIVE 08/19/2022 05:26 AM    UROBILINOGEN 0.2 08/19/2022 05:26 AM    BILIRUBINUR NEGATIVE 08/19/2022 05:26 AM    BLOODU NEGATIVE 08/19/2022 05:26 AM    GLUCOSEU negative 11/21/2013 07:41 AM    KETUA NEGATIVE 08/19/2022 05:26 AM     Urine Cultures: No results found for: Dipika Ardon  Blood Cultures: No results found for: BC  No results found for: BLOODCULT2  Organism: No results found for: ORG    Imaging/Diagnostics Last 24 Hours   CT HEAD WO CONTRAST    Result Date: 10/17/2022  No acute intracranial abnormality. Age related changes including chronic small vessel ischemic disease and cerebral atrophy. Personally reviewed Lab Studies, Imaging, and EKG.      Electronically signed by Eduardo Archuleta MD on 10/17/2022 at 4:30 AM

## 2022-10-17 NOTE — DISCHARGE SUMMARY
V2.0  Discharge Summary    Name:  Oksana Chand /Age/Sex: 1940 (79 y.o. male)   Admit Date: 10/17/2022  Discharge Date: 10/17/22    MRN & CSN:  [id number] & [id number] Encounter Date and Time 10/17/22 5:43 PM EDT    Attending:  No att. providers found Discharging Provider: Linda Weaver MD       Hospital Course:     Brief HPI: Oksana Chand is a 79 y.o. male who presented with bilateral facial paresthesias that lasted 5 hours. This morning patient was still having paresthesias. Patient follows very closely with neurology and is well-known to the clinic. CT head showed no acute intracranial abnormality with age-related changes including chronic small vessel ischemic disease and cerebral atrophy. MRI brain showed no acute intracranial abnormality and no acute infarct. Minimal global parenchymal volume loss with moderate chronic microvascular ischemic changes. This is similar to prior exam.  Near complete opacification of the right maxillary sinus and trace right mastoid effusion. Patient was seen by neurology who stated that this was likely headache with facial paresthesia and autonomic features of flushing, symptoms were not consistent with stroke or seizure. Patient is scheduled for ambulatory EEG at this week. He should continue his tizanidine that the dose has been increased to 4 mg. He will also follow-up with neurology clinic as well as his primary care physician.     Brief Problem Based Course:     #Bilateral facial paresthesias  -Patient following closely with neurology for this likely headache with autonomic features of flushing  -Last known well was 10 PM on 10/16   -NIH SS is 0 both on presentation and today  -Seen by neurology who recommends EEG monitoring this week and to be followed in clinic as well as increasing tizanidine dose to 4 mg  -Both CT head and MRI brain without any acute stroke or hemorrhage    Plan:  EEG  Tizanidine 4 mg  Follow-up in neurology clinic  Follow-up General: NAD  Eyes: EOMI  ENT: neck supple  Cardiovascular: Regular rate. Respiratory: Clear to auscultation  Gastrointestinal: Soft, non tender  Genitourinary: no suprapubic tenderness  Musculoskeletal: No edema  Skin: warm, dry  Neuro: Cranial nerves II to XII intact, slightly decreased sensation in lower third of face, strength in all 4 extremities are 5/5, sensation in extremities is intact as well as rest of face, NIH SS: 0  Psych: Mood appropriate. Labs and Imaging   CT HEAD WO CONTRAST    Result Date: 10/17/2022  EXAMINATION: CT OF THE HEAD WITHOUT CONTRAST  10/17/2022 1:00 am TECHNIQUE: CT of the head was performed without the administration of intravenous contrast. Automated exposure control, iterative reconstruction, and/or weight based adjustment of the mA/kV was utilized to reduce the radiation dose to as low as reasonably achievable. COMPARISON: August 22, 2022 HISTORY: ORDERING SYSTEM PROVIDED HISTORY: numbness TECHNOLOGIST PROVIDED HISTORY: Has a \"code stroke\" or \"stroke alert\" been called? ->No Reason for exam:->numbness Decision Support Exception - unselect if not a suspected or confirmed emergency medical condition->Emergency Medical Condition (MA) Reason for Exam: numbness FINDINGS: BRAIN/VENTRICLES: There is no acute intracranial hemorrhage, mass effect or midline shift. No abnormal extra-axial fluid collection. The gray-white differentiation is maintained without evidence of an acute infarct. There is no evidence of hydrocephalus. Moderate periventricular and deep subcortical white matter hypodensity is present. Diffuse atrophy is present. ORBITS: The visualized portion of the orbits demonstrate no acute abnormality. SINUSES: The visualized paranasal sinuses and mastoid air cells demonstrate no acute abnormality. SOFT TISSUES/SKULL:  No acute abnormality of the visualized skull or soft tissues. No acute intracranial abnormality.  Age related changes including chronic small vessel ischemic disease and cerebral atrophy. MRI BRAIN WO CONTRAST    Result Date: 10/17/2022  EXAMINATION: MRI OF THE BRAIN WITHOUT CONTRAST  10/17/2022 10:22 am TECHNIQUE: Multiplanar multisequence MRI of the brain was performed without the administration of intravenous contrast. COMPARISON: 08/23/2022. HISTORY: ORDERING SYSTEM PROVIDED HISTORY:  facial numbness TECHNOLOGIST PROVIDED HISTORY: Reason for exam:->BL facial numbness Decision Support Exception - unselect if not a suspected or confirmed emergency medical condition->Emergency Medical Condition (MA) Reason for Exam: BL facial numbness Initial evaluation. FINDINGS: INTRACRANIAL STRUCTURES/VENTRICLES: There is no acute infarct. No mass effect or midline shift. No evidence of an acute intracranial hemorrhage. Areas of T2 FLAIR hyperintensity are seen in the periventricular and subcortical white matter, which are nonspecific, but may represent chronic microvascular ischemic change. There is minimal global parenchymal volume loss. Otherwise, the ventricles and sulci are normal in size and configuration. The sellar/suprasellar regions appear unremarkable. The normal signal voids within the major intracranial vessels appear maintained. ORBITS: The visualized portion of the orbits demonstrate no acute abnormality. SINUSES: There is near complete opacification of the left maxillary sinus. Trace right mastoid effusion. BONES/SOFT TISSUES: The bone marrow signal intensity appears normal. The soft tissues demonstrate no acute abnormality. 1. No acute intracranial abnormality. No acute infarct. 2. Minimal global parenchymal volume loss with moderate chronic microvascular ischemic changes. This is similar to the prior exam. 3. Near complete opacification of the right maxillary sinus. 4. There is a trace right mastoid effusion.        CBC:   Recent Labs     10/17/22  0022 10/17/22  0722   WBC 4.4 4.1   HGB 13.7 14.1    180     BMP:    Recent Labs 10/17/22  0022 10/17/22  0722    137   K 4.8 4.0    105   CO2 29 26   BUN 23 19   CREATININE 1.5* 1.0   GLUCOSE 102* 109*     Hepatic:   Recent Labs     10/17/22  0022   AST 30   ALT 28   BILITOT 0.4   ALKPHOS 52     Lipids:   Lab Results   Component Value Date/Time    CHOL 98 10/17/2022 07:22 AM    HDL 34 10/17/2022 07:22 AM    TRIG 68 10/17/2022 07:22 AM     Hemoglobin A1C:   Lab Results   Component Value Date/Time    LABA1C 5.5 10/17/2022 12:22 AM     TSH:   Lab Results   Component Value Date/Time    TSH 0.56 04/18/2019 09:51 AM     Troponin:   Lab Results   Component Value Date/Time    TROPONINT <0.010 10/17/2022 12:22 AM    TROPONINT <0.010 08/30/2022 03:08 AM    TROPONINT <0.010 08/28/2022 05:23 PM     Lactic Acid: No results for input(s): LACTA in the last 72 hours. BNP: No results for input(s): PROBNP in the last 72 hours.   UA:  Lab Results   Component Value Date/Time    NITRU NEGATIVE 08/19/2022 05:26 AM    COLORU YELLOW 08/19/2022 05:26 AM    WBCUA NONE SEEN 08/19/2022 05:26 AM    RBCUA NONE SEEN 08/19/2022 05:26 AM    MUCUS RARE 01/22/2022 09:30 AM    TRICHOMONAS NONE SEEN 08/19/2022 05:26 AM    BACTERIA NEGATIVE 08/19/2022 05:26 AM    CLARITYU CLEAR 08/19/2022 05:26 AM    SPECGRAV <1.005 08/19/2022 05:26 AM    LEUKOCYTESUR NEGATIVE 08/19/2022 05:26 AM    UROBILINOGEN 0.2 08/19/2022 05:26 AM    BILIRUBINUR NEGATIVE 08/19/2022 05:26 AM    BLOODU NEGATIVE 08/19/2022 05:26 AM    GLUCOSEU negative 11/21/2013 07:41 AM    KETUA NEGATIVE 08/19/2022 05:26 AM     Urine Cultures: No results found for: LABURIN  Blood Cultures: No results found for: BC  No results found for: BLOODCULT2  Organism: No results found for: ORG    Time Spent Discharging patient <30 minutes    Electronically signed by Madi Stuart MD on 10/17/2022 at 5:43 PM

## 2022-10-17 NOTE — ED NOTES
ED TO INPATIENT SBAR HANDOFF    Patient Name: Ben Menendez   :  1940  79 y.o. MRN:  [id number]  Preferred Name  Eduarda Escamilla   ED Room #:  GU57/ZC-13  Family/Caregiver Present no   Restraints no   Sitter no   Sepsis Risk Score Sepsis Risk Score: 1.17    Situation  Code Status: Full Code No additional code details. Allergies: Fenofibrate  Weight: Patient Vitals for the past 96 hrs (Last 3 readings):   Weight   10/17/22 0011 164 lb (74.4 kg)     Arrived from: home  Chief Complaint:   Chief Complaint   Patient presents with    Numbness     B face/head; \"hot feeling\"; started around [facility] Problem/Diagnosis:  Principal Problem:    Facial paresthesia  Resolved Problems:    * No resolved hospital problems. *    Imaging:   CT HEAD WO CONTRAST   Preliminary Result   No acute intracranial abnormality. Age related changes including chronic small vessel ischemic disease and   cerebral atrophy. MRI BRAIN WO CONTRAST    (Results Pending)   VL DUP CAROTID BILATERAL    (Results Pending)     Abnormal labs:   Abnormal Labs Reviewed   CBC WITH AUTO DIFFERENTIAL - Abnormal; Notable for the following components:       Result Value    RBC 4.21 (*)     Hematocrit 41.5 (*)     MCH 32.5 (*)     Lymphocytes % 21.6 (*)     Monocytes % 10.5 (*)     Immature Neutrophil % 0.9 (*)     All other components within normal limits   COMPREHENSIVE METABOLIC PANEL - Abnormal; Notable for the following components:    Creatinine 1.5 (*)     Glucose 102 (*)     GFR Non- 45 (*)     GFR  54 (*)     All other components within normal limits     Critical values: no     Abnormal Assessment Findings: facial numbness    Background  Hospital admissions in last 30 days?  no   History:   Past Medical History:   Diagnosis Date    Anesthesia complication     Patient reports in  following a Back Surgery he was kept in the hospital due to high temperatures in the evenings.  He stated they kept him for observation and doesn't remember receiving any treatment for the high temperatures. Patient reports the anesthesia name started with flur? ?.    BPH with obstruction/lower urinary tract symptoms     Chest pain 7/1/2018    Chronic low back pain     GERD (gastroesophageal reflux disease)     H/O 24 hour EKG monitoring 3/12  12/11    3/12/12  essentially a normal event monitor without significant arrhythmias noted    H/O cardiac catheterization 2/2/12 4/02 10/98    2/22/12  LM, LAD and RCA all without significant disease    H/O cardiac catheterization 2/2/12 4/02 10/98    Done R/T false pos on stress test.    H/O cardiac catheterization 08/06/2018    normal study    H/O cardiovascular stress test 07/02/2018    normal study    H/O echocardiogram 12/10      3/14 EF55-60% normal LV function 12/10/10  complete 2 dim transthoracic echj. LVSF normal  EF >55%, transmitral spectral Doppler flow pattern is suggestive of  impaired LV relaxation    History of cardiovascular stress test 2/12 11/09 7/08 9/06 2/02 4/00 8/99 2/8/2012  evidence of mild ischemia  in the RCA. abnormal study , restingEF is 70%, global LVSF is  normal    History of Holter monitoring 9/17/14    14 day EVENT - no RVR    History of nuclear stress test 08/03/2017    cardiolite-normal,EF60%    Hx of chest x-ray 2/20/12    chest is considereed non acute.   COPD    Hx of echocardiogram 08/03/2017    NY16-02%,SSCAW 2 diastolic dysfunction    Hyperlipidemia     Idiopathic peripheral neuropathy     Kidney stone     Left facial numbness 8/30/2017    Lumbar radiculopathy     Malignant melanoma of skin of face (HCC)     MVP (mitral valve prolapse)     Myotonia     chronic increased CPK's    Peripelvic (lymphatic) cyst     per CT    Precordial pain 8/3/2018    Vitamin B12 deficiency        Assessment    Vitals/MEWS: MEWS Score: 0  Level of Consciousness: Alert (0)   Vitals:    10/17/22 0200 10/17/22 0230 10/17/22 0300 10/17/22 0330   BP: 130/68 (!) 150/73 (!) 141/78 (!) 150/80   Pulse: 68 70 74 79   Resp: 14 10 13 12   Temp:       TempSrc:       SpO2: 95% 97% 99% 100%   Weight:       Height:         FiO2 (%): none  O2 Flow Rate: O2 Device: None (Room air)    Cardiac Rhythm:    Pain Assessment: 0 [] Verbal [] Ezequiel Lasso Scale  Pain Scale: Pain Assessment  Pain Assessment: 0-10  Pain Level: 4  Pain Location: Chest  Pain Orientation: Left  Pain Descriptors: Dull  Pain Type: Acute pain  Pain Frequency: Continuous  Last documented pain score (0-10 scale) Pain Level: 4  Last documented pain medication administered: n/a  Mental Status: oriented and alert  C-SSRS: Risk of Suicide: No Risk  Bedside swallow:    [place] Coma Scale (GCS): [place] Coma Scale  Eye Opening: Spontaneous  Best Verbal Response: Oriented  Best Motor Response: Obeys commands  Danika Coma Scale Score: 15  Active LDA's:   Peripheral IV 10/17/22 Right Antecubital (Active)   Site Assessment Clean, dry & intact 10/17/22 0024   Line Status Blood return noted; Flushed 10/17/22 0024     PO Status: Regular  Pertinent or High Risk Medications/Drips: no   If Yes, please provide details: n/a  Pending Blood Product Administration: no     Blood Cultures: see ED pt care timeline or ED Event log    Recommendation    Pending orders no  Plan for Discharge (if known):    Additional Comments: n/a   If any further questions, please call Sending RN at [phone]    Electronically signed by: Electronically signed by Garfield Rock RN on 10/17/2022 at 4:31 AM      Garfield Rock RN  10/17/22 [id number]

## 2022-10-17 NOTE — PROGRESS NOTES
Physical Therapy  Thank you for the referral.  Following the physical rehabilitation triage process, based upon available information, the PT referral was discontinued. Patient is moving well with nursing staff, with supervision or contact guard assistance -- recommend continued nursing mobility. Patient declines physical therapy service service.   Vaibhav Ornelas [id number]  12:28 PM 10/17/2022

## 2022-10-17 NOTE — CONSULTS
Neurology Service Consult Note  [address]   Patient Name: Gerri Araiza  : 1940        Subjective:   Reason for consult: facial paresthesia and headache.   79 y.o. - male with history of headaches, B12 deficiency, and neuropathy presenting to [place] [address] for bilateral facial paresthesia and headache. He reports he was having a good day yesterday. He states he took a walk and did some yard work. He took a shower late in the evening and a short time after getting out of the shower he reports feeling of facial flushing and numbness to both sides of his face. He then states a short time later he developed a pressure like headache. He states he took some tylenol in hopes it would resolve. After 2 hours with no improvement in his symptoms he came to the hospital.     Patient is known to our service and we have seen him on multiple occasions for similar symptoms dating as far back as . He was seen by Dr. Annmarie Queen at that time and felt his symptoms were related to low volume pressure headache. . He states usually he will only have the paresthesia on the left side of his face. He reports the headache feels like a band around his head and tends to be worse upon sitting or standing. He states the headache is usually preceded with the flushing of the face. He reports he has been having more episodes as of late. He has had multiple brain imaging with CT and MRI of brain all have been unremarkable. He was recently seen by Trell Franklin NP in the office and was started on Tizanidine. He states he was told to start taking 2 mg po for 8 days then increase up to 4 mg daily. He states he has only been taking it for about a week. This afternoon he states his headache remains. Describes it as pressure as if he has something around his head. Severity is 5/10.  Discussed he is scheduled for the ambulatory EEG to be placed on Wednesday and is supposed to follow back up in office at beginning of next year. Discussed I am attempting to get him back in the office sooner. Past Medical History:   Diagnosis Date    Anesthesia complication     Patient reports in 1973 following a Back Surgery he was kept in the hospital due to high temperatures in the evenings. He stated they kept him for observation and doesn't remember receiving any treatment for the high temperatures. Patient reports the anesthesia name started with flur? ?.    BPH with obstruction/lower urinary tract symptoms     Chest pain 7/1/2018    Chronic low back pain     GERD (gastroesophageal reflux disease)     H/O 24 hour EKG monitoring 3/12  12/11    3/12/12  essentially a normal event monitor without significant arrhythmias noted    H/O cardiac catheterization 2/2/12 4/02 10/98    2/22/12  LM, LAD and RCA all without significant disease    H/O cardiac catheterization 2/2/12 4/02 10/98    Done R/T false pos on stress test.    H/O cardiac catheterization 08/06/2018    normal study    H/O cardiovascular stress test 07/02/2018    normal study    H/O echocardiogram 12/10      3/14 EF55-60% normal LV function 12/10/10  complete 2 dim transthoracic echj. LVSF normal  EF >55%, transmitral spectral Doppler flow pattern is suggestive of  impaired LV relaxation    History of cardiovascular stress test 2/12 11/09 7/08 9/06 2/02 4/00 8/99 2/8/2012  evidence of mild ischemia  in the RCA. abnormal study , restingEF is 70%, global LVSF is  normal    History of Holter monitoring 9/17/14    14 day EVENT - no RVR    History of nuclear stress test 08/03/2017    cardiolite-normal,EF60%    Hx of chest x-ray 2/20/12    chest is considereed non acute.   COPD    Hx of echocardiogram 08/03/2017    PJ09-20%,ORVZZ 2 diastolic dysfunction    Hyperlipidemia     Idiopathic peripheral neuropathy     Kidney stone     Left facial numbness 8/30/2017    Lumbar radiculopathy     Malignant melanoma of skin of face (HCC)     MVP (mitral valve prolapse)     Myotonia     chronic increased CPK's    Peripelvic (lymphatic) cyst     per CT    Precordial pain 8/3/2018    Vitamin B12 deficiency     :   Past Surgical History:   Procedure Laterality Date    CARDIAC CATHETERIZATION  2/2/12    EF 55% No sugnificant disease. COLONOSCOPY  5/5/14    diverticulosis, 1 polyp, hemorrhoids    COLONOSCOPY  07/10/2017    single 5 mm polyp found in sigmoid colon, moderate diverticulosis found in sigmoid colon    LUMBAR DISC SURGERY  11/2002    L4-5 hemilaminectomy and disk    LUMBAR DISCECTOMY  1973    PROSTATE SURGERY  09/26/2011    s/p robotic radical prostatectomy    ROTATOR CUFF REPAIR Right 10/2016    TIBIA FRACTURE SURGERY Right     childhood    URETEROLITHOTOMY  1992     Medications:  Scheduled Meds:   aspirin  81 mg Oral Daily    atorvastatin  40 mg Oral Nightly    clopidogrel  75 mg Oral Daily    gabapentin  300 mg Oral TID    levothyroxine  50 mcg Oral Daily    pantoprazole  40 mg Oral Daily    sodium chloride flush  5-40 mL IntraVENous 2 times per day    enoxaparin  40 mg SubCUTAneous Daily     Continuous Infusions:   sodium chloride       PRN Meds:.sodium chloride flush, sodium chloride, acetaminophen, ondansetron **OR** ondansetron    Allergies   Allergen Reactions    Fenofibrate      Social History     Socioeconomic History    Marital status:       Spouse name: Not on file    Number of children: Not on file    Years of education: Not on file    Highest education level: Not on file   Occupational History    Not on file   Tobacco Use    Smoking status: Never    Smokeless tobacco: Never   Vaping Use    Vaping Use: Never used   Substance and Sexual Activity    Alcohol use: Not Currently     Comment: Occassional    Drug use: No    Sexual activity: Not Currently     Partners: Female     Comment:    Other Topics Concern    Not on file   Social History Narrative    Not on file     Social Determinants of Health     Financial Resource Strain: Not on file   Food Insecurity: No Food Insecurity    Worried About Running Out of Food in the Last Year: Never true    [address] in the Last Year: Never true   Transportation Needs: No Transportation Needs    Lack of Transportation (Medical): No    Lack of Transportation (Non-Medical): No   Physical Activity: Sufficiently Active    Days of Exercise per Week: 7 days    Minutes of Exercise per Session: 40 min   Stress: No Stress Concern Present    Feeling of Stress : Not at all   Social Connections: Socially Isolated    Frequency of Communication with Friends and Family: Twice a week    Frequency of Social Gatherings with Friends and Family: Once a week    Attends Yarsani Services: Never    Active Member of Clubs or Organizations: No    Attends Club or Organization Meetings: Never    Marital Status:     Intimate Partner Violence: Not on file   Housing Stability: Low Risk     Unable to Pay for Housing in the Last Year: No    Number of Places Lived in the Last Year: 1    Unstable Housing in the Last Year: No      Family History   Problem Relation Age of Onset    Heart Disease Mother         ASCAD    Heart Disease Father     Heart Disease Brother     Diabetes Other     Cancer Other         unknown type    Prostate Cancer Other     Coronary Art Dis Other     Coronary Art Dis Other          ROS (10 systems)  In addition to that documented in the HPI above, the additional ROS was obtained:  Constitutional: Denies fevers or chills  Eyes: Denies vision changes  ENMT: Denies sore throat  CV: Denies chest pain  Resp: Denies SOB  GI: Denies vomiting or diarrhea  : Denies painful urination  MSK: Denies recent trauma  Skin: Denies new rashes  Neuro: Denies new numbness or tingling or weakness  Endocrine: Denies unexpected weight loss  Heme: Denies bleeding disorders    Physical Exam:         Wt Readings from Last 3 Encounters:   10/17/22 166 lb 0.1 oz (75.3 kg)   10/07/22 166 lb (75.3 kg)   09/23/22 166 lb (75.3 kg)     Temp Readings from Last 3 Encounters:   10/17/22 98 °F (36.7 °C) (Oral)   08/30/22 97.7 °F (36.5 °C) (Oral)   08/29/22 98.2 °F (36.8 °C) (Oral)     BP Readings from Last 3 Encounters:   10/17/22 130/66   10/07/22 130/70   09/23/22 132/64     Pulse Readings from Last 3 Encounters:   10/17/22 61   10/07/22 56   09/23/22 59        Gen: A&O x 4, NAD, cooperative  HEENT: NC/AT, EOMI, PERRL, mmm, no carotid bruits, neck supple, no meningeal signs; Heart: SR on monitor  Lungs: Respirations unlabored  Ext: no edema, no calf tenderness b/l  Psych: normal mood and affect  Skin: no rashes or lesions    NEUROLOGIC EXAM:    Mental Status: A&O to self, location, month and year, NAD, speech clear, language fluent, repetition and naming intact, follows commands appropriately    Cranial Nerve Exam:   CN II-XII: No disc edema appreciated on fundoscopic examination, PERRL, VFF, no nystagmus, right eye ophthalmoplegia at baseline. , sensation V1-V3 intact b/l, muscles of facial expression symmetric; hearing intact to conversational tone, palate elevates symmetrically, shoulder elevation symmetric and tongue protrudes midline with movement side to side.     Motor Exam:       Strength 5/5 UE's/LE's b/l  Tone and bulk normal   No pronator drift    Deep Tendon Reflexes: 2/4 biceps, triceps, brachioradialis, patellar, and achilles b/l; flexor plantar responses b/l    Sensation: Intact light touch/pinprick/vibration UE's/LE's b/l    Coordination/Cerebellum:       Tremors--none      Rapidly alternating movements: no dysdiadochokinesia b/l                Heel-to-Shin: no dysmetria b/l      Finger-to-Nose: no dysmetria b/l    Gait and stance:      Gait: deferred      LABS:     Recent Labs     10/17/22  0022 10/17/22  0722   WBC 4.4 4.1    137   K 4.8 4.0    105   CO2 29 26   BUN 23 19   CREATININE 1.5* 1.0   GLUCOSE 102* 109*   [email or web address]  [email or web address] TSH Results,[unfilled],     Last Liver Function Results:  @LABRCNTIP(ALT:3,AST:3,BILITOTAL:3,BILIDIR:3,ALKPHOS:3)@          IMAGING:    MRI of brain:  1. No acute intracranial abnormality. No acute infarct. 2. Minimal global parenchymal volume loss with moderate chronic microvascular   ischemic changes. This is similar to the prior exam.   3. Near complete opacification of the right maxillary sinus. 4. There is a trace right mastoid effusion. Above imaging personally reviewed. ASSESSMENT/PLAN:   This is an 80 y/o male with history of headaches, B12 deficiency, and neuropathy presenting with bilateral facial paresthesia and headache. The paresthesia has since resolved. Headache continues but improved from yesterday. Band like description, 5/10 severity    Headache with facial paresthesia and autonomic features of flushing . Description has some components of a low pressure headache (worse when sitting or standing) and trigeminal autonomic cephalgia. Could also be be atypical migraine with aura. Symptoms not consistent with stroke or seizure. MRI of brain as above  He is scheduled for ambulatory EEG this week  He should continue with Tizanidine 2 mg po daily and increase to 4 mg as instructed  He should follow up in our office as planned. Will see if we can get him in sooner however  Nothing further from our standpoint. We will sign off.     > 80 minutes of time spent included chart review, obtaining history, patient examination, developing plan of care, and documentation. Thank you for allowing us to participate in the care of your patient. If there are any questions regarding evaluation please feel free to contact us.      Smiley Key, NELLIE - CNS, 10/17/2022

## 2022-10-17 NOTE — ED NOTES
Pt stated he was watching tv and started to feel numbness in his face and feeling hot/flush. Started at 2200 tonight. Patient also stated some chest pain while doing Vital signs.       Mu Hendricks  10/17/22 0005       Mu Hendricks  10/17/22 0017

## 2022-10-18 ENCOUNTER — OFFICE VISIT (OUTPATIENT)
Dept: FAMILY MEDICINE CLINIC | Age: 82
End: 2022-10-18
Payer: MEDICARE

## 2022-10-18 ENCOUNTER — CARE COORDINATION (OUTPATIENT)
Dept: CASE MANAGEMENT | Age: 82
End: 2022-10-18

## 2022-10-18 VITALS
DIASTOLIC BLOOD PRESSURE: 72 MMHG | BODY MASS INDEX: 23.62 KG/M2 | HEIGHT: 70 IN | SYSTOLIC BLOOD PRESSURE: 128 MMHG | WEIGHT: 165 LBS | HEART RATE: 54 BPM | OXYGEN SATURATION: 98 %

## 2022-10-18 DIAGNOSIS — Z92.89 HISTORY OF RECENT HOSPITALIZATION: Primary | ICD-10-CM

## 2022-10-18 DIAGNOSIS — G62.9 NEUROPATHY: ICD-10-CM

## 2022-10-18 DIAGNOSIS — R20.2 FACIAL PARESTHESIA: ICD-10-CM

## 2022-10-18 DIAGNOSIS — R51.9 ACHING HEADACHE: ICD-10-CM

## 2022-10-18 DIAGNOSIS — I10 PRIMARY HYPERTENSION: ICD-10-CM

## 2022-10-18 LAB
EKG ATRIAL RATE: 76 BPM
EKG DIAGNOSIS: NORMAL
EKG P AXIS: 67 DEGREES
EKG P-R INTERVAL: 194 MS
EKG Q-T INTERVAL: 366 MS
EKG QRS DURATION: 86 MS
EKG QTC CALCULATION (BAZETT): 411 MS
EKG R AXIS: 23 DEGREES
EKG T AXIS: 63 DEGREES
EKG VENTRICULAR RATE: 76 BPM

## 2022-10-18 PROCEDURE — 99214 OFFICE O/P EST MOD 30 MIN: CPT | Performed by: FAMILY MEDICINE

## 2022-10-18 PROCEDURE — G8427 DOCREV CUR MEDS BY ELIG CLIN: HCPCS | Performed by: FAMILY MEDICINE

## 2022-10-18 PROCEDURE — 1123F ACP DISCUSS/DSCN MKR DOCD: CPT | Performed by: FAMILY MEDICINE

## 2022-10-18 PROCEDURE — 1036F TOBACCO NON-USER: CPT | Performed by: FAMILY MEDICINE

## 2022-10-18 PROCEDURE — G8420 CALC BMI NORM PARAMETERS: HCPCS | Performed by: FAMILY MEDICINE

## 2022-10-18 PROCEDURE — 93010 ELECTROCARDIOGRAM REPORT: CPT | Performed by: INTERNAL MEDICINE

## 2022-10-18 PROCEDURE — G8484 FLU IMMUNIZE NO ADMIN: HCPCS | Performed by: FAMILY MEDICINE

## 2022-10-18 ASSESSMENT — ENCOUNTER SYMPTOMS
WHEEZING: 0
NAUSEA: 0
CHEST TIGHTNESS: 0
ABDOMINAL PAIN: 0
DIARRHEA: 0
TROUBLE SWALLOWING: 0
SHORTNESS OF BREATH: 0
BLOOD IN STOOL: 0
VOMITING: 0
EYE PAIN: 0

## 2022-10-18 NOTE — CARE COORDINATION
Care Transitions Outreach Attempt    Call within 2 business days of discharge: Yes     1st attempt to reach for Care Transition discharge call unsuccessful. HIPAA compliant message left requesting call back. Patient: Casper Babinski Patient : 1940 MRN: [id number]    Last Discharge [address]       Date Complaint Diagnosis Description Type Department Provider    10/17/22 Numbness Facial numbness ED to Hosp-Admission (Discharged) (ADMITTED) CHYNA Hernandez MD; Lola Mendez. .. PMH: COPD, Prostate CA, GERD, chronic LBP, Lumbar Radiculopathy, Malignant melanoma of skin/face, MVP. Was this an external facility discharge?  No Discharge Facility: [place]    Noted following upcoming appointments from discharge chart review:   [facility] follow up appointment(s):   Future Appointments   Date Time Provider Devan Haque   12/15/2022 10:00 AM Angelic Neal, [address] [facility]   2023 10:15 AM NELLIE Reinoso CNP [address] [place] [address]   2023 10:30 AM Bobby Genao DO [facility]   3/21/2023 10:00 AM NELLIE Bragg CNP [facility]     Non-[facility] follow up appointment(s): OZ Vargas RN [phone]

## 2022-10-18 NOTE — PROGRESS NOTES
10/18/2022    Arnol Avery    Chief Complaint   Patient presents with    Follow-Up from Hospital     Headaches and hot flashes. For last couple months. Seen neurology. Did cardiac cath, some blockage but shouldn't cause issues. Doing 48hour EEG tomorrow. Other     Facial numbness on Sunday. Mostly when laying flat. HPI  History was obtained from the patient and patient records. Quita Howell is a 79 y.o. male who presents today with history of recent hospitalization on 10/17/2020 due for a facial paresthesia been off and on for about 2 months seems to be possibly associated with some headache. He has EEG pending for this paresthesia work-up was seen in the hospital by neurology as well as hospitalist.  Exhibited no focal logic changes. Scanning was fairly unremarkable he is now on tizanidine and gabapentin which has been on for some time. Patient has a past history of B12 deficiency, neuropathy, hypothyroidism, migraine headaches, prostate CA, hypertension, and remote TIA. Since he has been home patient has been active actually mowed his grass has a little bit of headache and paresthesia in his face but not bad no other sensory or motor symptoms been reported. Some of his symptoms seem to be positional but as not 100% finding. Mood remains positive. Computer hospital records reviewed and med list noted. REVIEW OF SYMPTOMS    Review of Systems   Constitutional:  Negative for activity change and fatigue. HENT:  Negative for congestion, hearing loss, mouth sores and trouble swallowing. Eyes:  Negative for pain and visual disturbance. Respiratory:  Negative for chest tightness, shortness of breath and wheezing. Cardiovascular:  Negative for chest pain and palpitations. Gastrointestinal:  Negative for abdominal pain, blood in stool, diarrhea, nausea and vomiting. Endocrine: Negative for polydipsia and polyuria. Genitourinary:  Negative for dysuria, frequency and urgency. Musculoskeletal:  Negative for arthralgias, gait problem and neck stiffness. Skin:  Negative for rash. Allergic/Immunologic: Negative for environmental allergies. Neurological:  Positive for numbness. Negative for dizziness, seizures and speech difficulty. Hematological:  Does not bruise/bleed easily. Psychiatric/Behavioral:  Negative for agitation, confusion, hallucinations, self-injury and suicidal ideas. The patient is nervous/anxious. PAST MEDICAL HISTORY  Past Medical History:   Diagnosis Date    Anesthesia complication     Patient reports in 1973 following a Back Surgery he was kept in the hospital due to high temperatures in the evenings. He stated they kept him for observation and doesn't remember receiving any treatment for the high temperatures. Patient reports the anesthesia name started with flur? ?.    BPH with obstruction/lower urinary tract symptoms     Chest pain 7/1/2018    Chronic low back pain     GERD (gastroesophageal reflux disease)     H/O 24 hour EKG monitoring 3/12  12/11    3/12/12  essentially a normal event monitor without significant arrhythmias noted    H/O cardiac catheterization 2/2/12 4/02 10/98    2/22/12  LM, LAD and RCA all without significant disease    H/O cardiac catheterization 2/2/12 4/02 10/98    Done R/T false pos on stress test.    H/O cardiac catheterization 08/06/2018    normal study    H/O cardiovascular stress test 07/02/2018    normal study    H/O echocardiogram 12/10      3/14 EF55-60% normal LV function 12/10/10  complete 2 dim transthoracic echj. LVSF normal  EF >55%, transmitral spectral Doppler flow pattern is suggestive of  impaired LV relaxation    History of cardiovascular stress test 2/12 11/09 7/08 9/06 2/02 4/00 8/99 2/8/2012  evidence of mild ischemia  in the RCA.   abnormal study , restingEF is 70%, global LVSF is  normal    History of Holter monitoring 9/17/14    14 day EVENT - no RVR    History of nuclear stress test 08/03/2017 cardiolite-normal,EF60%    Hx of chest x-ray 2/20/12    chest is considereed non acute. COPD    Hx of echocardiogram 08/03/2017    NQ35-03%,TOXUW 2 diastolic dysfunction    Hyperlipidemia     Idiopathic peripheral neuropathy     Kidney stone     Left facial numbness 8/30/2017    Lumbar radiculopathy     Malignant melanoma of skin of face (HCC)     MVP (mitral valve prolapse)     Myotonia     chronic increased CPK's    Peripelvic (lymphatic) cyst     per CT    Precordial pain 8/3/2018    Vitamin B12 deficiency        FAMILY HISTORY  Family History   Problem Relation Age of Onset    Heart Disease Mother         ASCAD    Heart Disease Father     Heart Disease Brother     Diabetes Other     Cancer Other         unknown type    Prostate Cancer Other     Coronary Art Dis Other     Coronary Art Dis Other        SOCIAL HISTORY  Social History     Socioeconomic History    Marital status:      Spouse name: None    Number of children: None    Years of education: None    Highest education level: None   Tobacco Use    Smoking status: Never    Smokeless tobacco: Never   Vaping Use    Vaping Use: Never used   Substance and Sexual Activity    Alcohol use: Not Currently     Comment: Occassional    Drug use: No    Sexual activity: Not Currently     Partners: Female     Comment:      Social Determinants of Health     Food Insecurity: No Food Insecurity    Worried About Running Out of Food in the Last Year: Never true    Ran Out of Food in the Last Year: Never true   Transportation Needs: No Transportation Needs    Lack of Transportation (Medical): No    Lack of Transportation (Non-Medical):  No   Physical Activity: Sufficiently Active    Days of Exercise per Week: 7 days    Minutes of Exercise per Session: 40 min   Stress: No Stress Concern Present    Feeling of Stress : Not at all   Social Connections: Socially Isolated    Frequency of Communication with Friends and Family: Twice a week    Frequency of Social Gatherings with Friends and Family: Once a week    Attends Latter day Services: Never    Active Member of Clubs or Organizations: No    Attends Club or Organization Meetings: Never    Marital Status:    Housing Stability: Low Risk     Unable to Pay for Housing in the Last Year: No    Number of Places Lived in the Last Year: 1    Unstable Housing in the Last Year: No        SURGICAL HISTORY  Past Surgical History:   Procedure Laterality Date    CARDIAC CATHETERIZATION  2/2/12    EF 55% No sugnificant disease. COLONOSCOPY  5/5/14    diverticulosis, 1 polyp, hemorrhoids    COLONOSCOPY  07/10/2017    single 5 mm polyp found in sigmoid colon, moderate diverticulosis found in sigmoid colon    LUMBAR DISC SURGERY  11/2002    L4-5 hemilaminectomy and disk    LUMBAR DISCECTOMY  [address]  09/26/2011    s/p robotic radical prostatectomy    ROTATOR CUFF REPAIR Right 10/2016    TIBIA FRACTURE SURGERY Right     childhood    URETEROLITHOTOMY  1992                 CURRENT MEDICATIONS  Current Outpatient Medications   Medication Sig Dispense Refill    tiZANidine (ZANAFLEX) 4 MG tablet Take 1 tablet by mouth nightly Take 1/2 tab nightly for 8 nights then increase to full tab. 30 tablet 3    atorvastatin (LIPITOR) 40 MG tablet Take 1 tablet by mouth nightly 90 tablet 1    levothyroxine (SYNTHROID) 50 MCG tablet TAKE 1 TABLET BY MOUTH IN  THE MORNING 90 tablet 3    nitroGLYCERIN (NITROSTAT) 0.4 MG SL tablet up to max of 3 total doses. If no relief after 1 dose, call 911. 25 tablet 3    vitamin B-12 500 MCG tablet Take 1 tablet by mouth daily 30 tablet 3    pantoprazole (PROTONIX) 40 MG tablet TAKE 1 TABLET BY MOUTH  DAILY 90 tablet 1    gabapentin (NEURONTIN) 300 MG capsule TAKE 1 CAPSULE BY MOUTH 3  TIMES DAILY 270 capsule 1    clopidogrel (PLAVIX) 75 MG tablet Take 1 tablet by mouth daily 90 tablet 3    aspirin 81 MG tablet Take 81 mg by mouth daily       No current facility-administered medications for this visit. ALLERGIES  Allergies   Allergen Reactions    Fenofibrate        PHYSICAL EXAM    /72 (Site: Right Upper Arm, Position: Sitting, Cuff Size: Medium Adult)   Pulse 54   Ht 5' 10\" (1.778 m)   Wt 165 lb (74.8 kg)   SpO2 98%   BMI 23.68 kg/m²     Physical Exam  Vitals and nursing note reviewed. Constitutional:       General: He is not in acute distress. Appearance: Normal appearance. He is well-developed and normal weight. He is not ill-appearing, toxic-appearing or diaphoretic. HENT:      Head: Normocephalic and atraumatic. Nose: Nose normal.      Mouth/Throat:      Mouth: Mucous membranes are moist.      Pharynx: Oropharynx is clear. No oropharyngeal exudate. Eyes:      Pupils: Pupils are equal, round, and reactive to light. Cardiovascular:      Rate and Rhythm: Normal rate and regular rhythm. Heart sounds: Normal heart sounds. No murmur heard. No gallop. Pulmonary:      Effort: Pulmonary effort is normal. No respiratory distress. Breath sounds: Normal breath sounds. No wheezing, rhonchi or rales. Abdominal:      Palpations: Abdomen is soft. Musculoskeletal:         General: No swelling or deformity. Normal range of motion. Cervical back: Normal range of motion and neck supple. No rigidity. Lymphadenopathy:      Cervical: No cervical adenopathy. Skin:     General: Skin is warm and dry. Coloration: Skin is not jaundiced. Findings: No bruising. Neurological:      General: No focal deficit present. Mental Status: He is alert and oriented to person, place, and time. Mental status is at baseline. Cranial Nerves: No cranial nerve deficit. Motor: No weakness. Gait: Gait normal.      Comments: Patient is alert and pleasant no focalizing neurologic deficits seen speech, swallowing, and ambulation appear normal.  No motor tic type movements.   No evidence of rash   Psychiatric:         Mood and Affect: Mood normal.         Thought Content: Thought content normal.       ASSESSMENT & PLAN     Diagnosis Orders   1. History of recent hospitalization        2. Facial paresthesia        3. Aching headache        4. Neuropathy        5. Primary hypertension          He will continue on same regimen. He is hesitant to change his cardiac medicine will continue on his calcium blocker until he discusses this with his cardiologist Dr. Cal Mccollum. Patient to continue with healthy lifestyle and activities -call with changes or problems. He will keep appointment for EEG and with neurology. Return in about 3 months (around 1/18/2023).          Electronically signed by Cyndi Serna MD on 10/18/2022

## 2022-10-19 ENCOUNTER — CARE COORDINATION (OUTPATIENT)
Dept: CASE MANAGEMENT | Age: 82
End: 2022-10-19

## 2022-10-19 NOTE — CARE COORDINATION
Care Transitions Outreach Attempt    Call within 2 business days of discharge: Yes     2nd unsuccessful attempt to reach for Care Transition discharge call. HIPAA compliant message left requesting call back. Episode closed per protocol, no further outreach scheduled. Unable to reach letter will be sent via My Chart    Patient: Dariusz Garcia Patient : 1940 MRN: [id number]    Last Discharge [address]       Date Complaint Diagnosis Description Type Department Provider    10/17/22 Numbness Facial numbness ED to Hosp-Admission (Discharged) (ADMITTED) CHYNA Dennison MD; [facility]. ..           Noted following upcoming appointments from discharge chart review:   [facility] follow up appointment(s):   Future Appointments   Date Time Provider Devan Haque   12/15/2022 10:00 AM Holly Batres, [address] [facility]   2023 10:15 AM NELLIE Garcia CNP [address] [place] [address]   2023 10:30 AM Shana Segovia, DO [facility]   3/21/2023 10:00 AM NELLIE Ferrell CNP [facility]     Non-[facility] follow up appointment(s): OZ Shetty RN -[phone]

## 2022-10-25 ENCOUNTER — TELEPHONE (OUTPATIENT)
Dept: CARDIOLOGY CLINIC | Age: 82
End: 2022-10-25

## 2022-10-25 ENCOUNTER — CARE COORDINATION (OUTPATIENT)
Dept: CARE COORDINATION | Age: 82
End: 2022-10-25

## 2022-10-25 NOTE — TELEPHONE ENCOUNTER
Patient unaware of why diltiazem stopped, just saw it on discharge paperwork. He is waiting for our advise before stopping.

## 2022-10-25 NOTE — TELEPHONE ENCOUNTER
Patient called he was just discharged [facility]   And was told to discontinue his Diltiazem 240 mg , he wanted to make sure that's ok with his

## 2022-10-27 DIAGNOSIS — G62.9 PERIPHERAL POLYNEUROPATHY: ICD-10-CM

## 2022-10-27 DIAGNOSIS — M54.42 CHRONIC BILATERAL LOW BACK PAIN WITH BILATERAL SCIATICA: Chronic | ICD-10-CM

## 2022-10-27 DIAGNOSIS — G89.29 CHRONIC BILATERAL LOW BACK PAIN WITH BILATERAL SCIATICA: Chronic | ICD-10-CM

## 2022-10-27 DIAGNOSIS — M54.41 CHRONIC BILATERAL LOW BACK PAIN WITH BILATERAL SCIATICA: Chronic | ICD-10-CM

## 2022-10-27 RX ORDER — GABAPENTIN 300 MG/1
CAPSULE ORAL
Qty: 270 CAPSULE | Refills: 3 | OUTPATIENT
Start: 2022-10-27 | End: 2023-04-25

## 2022-11-02 ENCOUNTER — CARE COORDINATION (OUTPATIENT)
Dept: CARE COORDINATION | Age: 82
End: 2022-11-02

## 2022-11-02 NOTE — CARE COORDINATION
Ambulatory Care Coordination Note      ACC: Jay Mccann RN      Care Coordination Interventions    Referral from Primary Care Provider: No  Suggested Interventions and Community Resources  Zone Management Tools: In Process          Goals Addressed    None         Prior to Admission medications    Medication Sig Start Date End Date Taking? Authorizing Provider   pantoprazole (PROTONIX) 40 MG tablet TAKE 1 TABLET BY MOUTH  DAILY 11/7/22 2/5/23  Blake Segovia, DO   tiZANidine (ZANAFLEX) 4 MG tablet Take 1 tablet by mouth nightly Take 1/2 tab nightly for 8 nights then increase to full tab. 10/7/22   NELLIE Gan CNP   atorvastatin (LIPITOR) 40 MG tablet Take 1 tablet by mouth nightly 9/23/22 3/22/23  Blake Segovia, DO   levothyroxine (SYNTHROID) 50 MCG tablet TAKE 1 TABLET BY MOUTH IN  THE MORNING 8/30/22   Blake Segovia, DO   nitroGLYCERIN (NITROSTAT) 0.4 MG SL tablet up to max of 3 total doses. If no relief after 1 dose, call 911. 8/25/22   NELLIE Medrano CNP   vitamin B-12 500 MCG tablet Take 1 tablet by mouth daily 8/25/22   NELLIE Medrano CNP   gabapentin (NEURONTIN) 300 MG capsule TAKE 1 CAPSULE BY MOUTH 3  TIMES DAILY 5/18/22 11/14/22  LIBERTAD Mcnamara   clopidogrel (PLAVIX) 75 MG tablet Take 1 tablet by mouth daily 2/14/22   Jenn Ardon MD   aspirin 81 MG tablet Take 81 mg by mouth daily    Historical Provider, MD       Future Appointments   Date Time Provider Devan Haque   12/15/2022 10:00 AM [place] Alicia, [address] [facility]   1/9/2023 10:15 AM NELLIE Gan CNP [address] [place] [address] Neurology -   1/23/2023 10:30 AM Blake Segovia DO [facility]   3/21/2023 10:00 AM NELLIE Huitron CNP [facility]    and   General Assessment           Call to pt for acm f/u. Reports he Had headache and \"hot feeling\" last night and took cold shower and took pain med. Got up during the night. Had one of the worst nights he's had in a while. Symptoms have resolved at this time. Still waiting on EEG results and reports he was told it should be another weeks. Has f/u with cardiology and neurology scheduled. Saw Dr. Missy Hanley after last hospital discharge and would like to continue to see. Advised to keep headache diary and to stay hydrated. Keeping bp log and in morning bp was 142/71. Was working in yard yesterday and felt tired and checked bp and was 95/53. WIll be out in yard and get very tired at times and will come in and check bp and bp running 81-19L systolic during the day at times. Does not check bp when he has symptoms of hot flashes/headache and acm encouraged to check when occurs if possible. Has had no more facial numbness just gets a hot feeling. Advised pt to call if would have any worsening or new symptoms. Plan: forward to provider and continue to follow to support.   COPD Assessment    Does the patient understand envrionmental exposure?: Yes  Is the patient able to verbalize Rescue vs. Long Acting medications?: No  Does the patient have a nebulizer?: No  Does the patient use a space with inhaled medications?: No     No patient-reported symptoms         Symptoms:

## 2022-11-05 DIAGNOSIS — K21.9 GASTROESOPHAGEAL REFLUX DISEASE WITHOUT ESOPHAGITIS: ICD-10-CM

## 2022-11-07 RX ORDER — PANTOPRAZOLE SODIUM 40 MG/1
40 TABLET, DELAYED RELEASE ORAL DAILY
Qty: 90 TABLET | Refills: 1 | Status: SHIPPED | OUTPATIENT
Start: 2022-11-07 | End: 2023-02-05

## 2022-11-09 DIAGNOSIS — E03.9 ACQUIRED HYPOTHYROIDISM: Chronic | ICD-10-CM

## 2022-11-09 RX ORDER — LEVOTHYROXINE SODIUM 0.05 MG/1
TABLET ORAL
Qty: 90 TABLET | Refills: 3 | OUTPATIENT
Start: 2022-11-09

## 2022-11-15 ENCOUNTER — TELEPHONE (OUTPATIENT)
Dept: NEUROLOGY | Age: 82
End: 2022-11-15

## 2022-11-15 NOTE — TELEPHONE ENCOUNTER
Pt called with complaints of hot flashes and would like to know if this could be due to his medications and if not how should he proceed. Please advise

## 2022-11-17 ENCOUNTER — HOSPITAL ENCOUNTER (OUTPATIENT)
Age: 82
Discharge: HOME | End: 2022-11-17
Payer: MEDICARE

## 2022-11-17 ENCOUNTER — HOSPITAL ENCOUNTER (OUTPATIENT)
Dept: GENERAL RADIOLOGY | Age: 82
Discharge: HOME | End: 2022-11-17
Payer: MEDICARE

## 2022-11-17 DIAGNOSIS — M54.2 CERVICALGIA: ICD-10-CM

## 2022-11-17 PROCEDURE — 72050 X-RAY EXAM NECK SPINE 4/5VWS: CPT

## 2022-11-22 ENCOUNTER — CARE COORDINATION (OUTPATIENT)
Dept: CARE COORDINATION | Age: 82
End: 2022-11-22

## 2022-11-22 NOTE — CARE COORDINATION
Ambulatory Care Coordination Note      ACC: Thomas Jean RN      Lab Results       None            Care Coordination Interventions    Referral from Primary Care Provider: No  Suggested Interventions and Community Resources  Zone Management Tools: In Process          Goals Addressed                   This Visit's Progress     Conditions and Symptoms   On track     I will notify my provider of any barriers to my plan of care. I will notify my provider of any symptoms that indicate a worsening of my condition. Barriers: lack of support and overwhelmed by complexity of regimen  Plan for overcoming my barriers: acm support  Confidence: 8/10  Anticipated Goal Completion Date: 1/23/23                Prior to Admission medications    Medication Sig Start Date End Date Taking? Authorizing Provider   pantoprazole (PROTONIX) 40 MG tablet TAKE 1 TABLET BY MOUTH  DAILY 11/7/22 2/5/23  Blake Segovia, DO   tiZANidine (ZANAFLEX) 4 MG tablet Take 1 tablet by mouth nightly Take 1/2 tab nightly for 8 nights then increase to full tab. 10/7/22   Enolia BrandNELLIE CNP   atorvastatin (LIPITOR) 40 MG tablet Take 1 tablet by mouth nightly 9/23/22 3/22/23  Blake Segovia, DO   levothyroxine (SYNTHROID) 50 MCG tablet TAKE 1 TABLET BY MOUTH IN  THE MORNING 8/30/22   Blake Segovia, DO   nitroGLYCERIN (NITROSTAT) 0.4 MG SL tablet up to max of 3 total doses.  If no relief after 1 dose, call 911. 8/25/22   NELLIE Melissa CNP   vitamin B-12 500 MCG tablet Take 1 tablet by mouth daily 8/25/22   NELLIE Melissa CNP   gabapentin (NEURONTIN) 300 MG capsule TAKE 1 CAPSULE BY MOUTH 3  TIMES DAILY 5/18/22 11/14/22  LIBERTAD Chavez   clopidogrel (PLAVIX) 75 MG tablet Take 1 tablet by mouth daily 2/14/22   Sara Mccrary MD   aspirin 81 MG tablet Take 81 mg by mouth daily    Historical Provider, MD       Future Appointments   Date Time Provider Devan Haque   12/1/2022  2:15 PM Blake Segovia DO [facility] 12/15/2022 10:00 AM Love Peter, [address] [facility]   1/9/2023 10:45 AM NELLIE Fitch CNP [address] [place] [address] Neurology -   1/23/2023 10:30 AM Robert Muse,  [address] [facility] [place] [facility]   3/21/2023 10:00 AM NELLIE Solano CNP [facility]    and   General Assessment           Call to pt for acm f/u. Reports he is Still with hot flashes and spoke with neurology as he Was unable to sleep due to hot flashes. Was then referred back to pcp. Is to see Dr. Maikel Smiley. Reports still w hot flashes at night and has been sleeping upright in a recliner for past  2 weeks. Has no issues during the day. Reports it comes on during evening. Started again yesterday evening. Lasted til midnight. Started again this morning. Lasted about 45 minutes. Has been going on since august. Has not checed bp or sugar when this happens. Reports he is Physically active during day with no worries. Has various things for dinner nd eats regular meals. Does not skip meals. Gets hot flashes before he takes evening meds. Reports stopped lipitor as heard that it can cause facial flusing with no improvement. Also Stopped b12. Most recent bp 151/76 was this morning. Reports when goes out to do yard work gets very tired at times and has significant drop in bp sometimes as low as 87/51. Has not been out in yard to work recently . Encouraged pt to keep log, check bp in evening or when symptoms occur. Also of note pt has hx of prostate ca with prostatectomy . Last saw onc 5 yrs ago and no longer following. Last tsh 10/2. HAs f/u with pcp on 12/1. Will forward to pcp as info and continue to follow.

## 2022-11-30 NOTE — TELEPHONE ENCOUNTER
Called patient and asked him the following questions per Meghana Erickson and the patient states that his bilat facial paraesthesias lasted from 10pm to this afternoon so about 12 hours. Patient states he will be at 4 mg at bedtime of the tizanidine tomorrow. Patient is also continuing on the gabapentin medication as well. Patient will be going next Wednesday for his ambulatory EEG. I explained that we need to give the medication at least 4-6 weeks to show effectiveness and that the patient should keep hi s appointment for 01/09/23 at this time and update us in 4-6 weeks on how he is doing until then. Me  to [facility] Clinical Staff    AM    2:28 PM  Can you please call and check on Bill? He went to the ER for bilateral facial parasthesias. Can you find out how long the parasthesias last? Does he get a headache following? Has he tried Tizanidine? He should be up to 4 mg at bedtime for migraine prevention. I would like for him to give this some time to work as he has not been on it long before we decide if is efficacious or not. I also would like him to get his ambulatory EEG completed.      thanks

## 2022-12-01 ENCOUNTER — OFFICE VISIT (OUTPATIENT)
Dept: FAMILY MEDICINE CLINIC | Age: 82
End: 2022-12-01
Payer: MEDICARE

## 2022-12-01 VITALS
WEIGHT: 167 LBS | DIASTOLIC BLOOD PRESSURE: 80 MMHG | SYSTOLIC BLOOD PRESSURE: 128 MMHG | HEART RATE: 71 BPM | HEIGHT: 70 IN | OXYGEN SATURATION: 99 % | BODY MASS INDEX: 23.91 KG/M2

## 2022-12-01 DIAGNOSIS — G71.12 MYOTONIA CONGENITA: ICD-10-CM

## 2022-12-01 DIAGNOSIS — R11.0 NAUSEA: Primary | ICD-10-CM

## 2022-12-01 DIAGNOSIS — G89.29 CHRONIC BILATERAL LOW BACK PAIN WITH BILATERAL SCIATICA: Chronic | ICD-10-CM

## 2022-12-01 DIAGNOSIS — G62.9 PERIPHERAL POLYNEUROPATHY: ICD-10-CM

## 2022-12-01 DIAGNOSIS — G44.221 CHRONIC TENSION-TYPE HEADACHE, INTRACTABLE: ICD-10-CM

## 2022-12-01 DIAGNOSIS — M54.42 CHRONIC BILATERAL LOW BACK PAIN WITH BILATERAL SCIATICA: Chronic | ICD-10-CM

## 2022-12-01 DIAGNOSIS — M54.41 CHRONIC BILATERAL LOW BACK PAIN WITH BILATERAL SCIATICA: Chronic | ICD-10-CM

## 2022-12-01 DIAGNOSIS — M89.9 BONE LESION: Primary | ICD-10-CM

## 2022-12-01 PROCEDURE — 3074F SYST BP LT 130 MM HG: CPT | Performed by: STUDENT IN AN ORGANIZED HEALTH CARE EDUCATION/TRAINING PROGRAM

## 2022-12-01 PROCEDURE — 3078F DIAST BP <80 MM HG: CPT | Performed by: STUDENT IN AN ORGANIZED HEALTH CARE EDUCATION/TRAINING PROGRAM

## 2022-12-01 PROCEDURE — G8484 FLU IMMUNIZE NO ADMIN: HCPCS | Performed by: STUDENT IN AN ORGANIZED HEALTH CARE EDUCATION/TRAINING PROGRAM

## 2022-12-01 PROCEDURE — G8427 DOCREV CUR MEDS BY ELIG CLIN: HCPCS | Performed by: STUDENT IN AN ORGANIZED HEALTH CARE EDUCATION/TRAINING PROGRAM

## 2022-12-01 PROCEDURE — 1036F TOBACCO NON-USER: CPT | Performed by: STUDENT IN AN ORGANIZED HEALTH CARE EDUCATION/TRAINING PROGRAM

## 2022-12-01 PROCEDURE — G8420 CALC BMI NORM PARAMETERS: HCPCS | Performed by: STUDENT IN AN ORGANIZED HEALTH CARE EDUCATION/TRAINING PROGRAM

## 2022-12-01 PROCEDURE — 99213 OFFICE O/P EST LOW 20 MIN: CPT | Performed by: STUDENT IN AN ORGANIZED HEALTH CARE EDUCATION/TRAINING PROGRAM

## 2022-12-01 PROCEDURE — 1123F ACP DISCUSS/DSCN MKR DOCD: CPT | Performed by: STUDENT IN AN ORGANIZED HEALTH CARE EDUCATION/TRAINING PROGRAM

## 2022-12-01 RX ORDER — SUMATRIPTAN 50 MG/1
50 TABLET, FILM COATED ORAL
Qty: 9 TABLET | Refills: 0 | Status: SHIPPED | OUTPATIENT
Start: 2022-12-01 | End: 2022-12-01

## 2022-12-01 RX ORDER — ONDANSETRON 4 MG/1
4 TABLET, FILM COATED ORAL DAILY PRN
Qty: 30 TABLET | Refills: 0 | Status: SHIPPED | OUTPATIENT
Start: 2022-12-01

## 2022-12-01 RX ORDER — GABAPENTIN 300 MG/1
CAPSULE ORAL
Qty: 270 CAPSULE | Refills: 1 | Status: SHIPPED | OUTPATIENT
Start: 2022-12-01 | End: 2023-02-01

## 2022-12-01 ASSESSMENT — ENCOUNTER SYMPTOMS
NAUSEA: 0
SHORTNESS OF BREATH: 0
ABDOMINAL PAIN: 0
WHEEZING: 0
SORE THROAT: 0

## 2022-12-01 NOTE — PROGRESS NOTES
12/1/2022    Glenn Gilbert    Chief Complaint   Patient presents with    Other     Pt reports hot flashes in the evening, lasting until morning, patient reports difficulty sleeping, patient has been sleeping in a recliner to prevent hot flashes    Follow-Up from Hospital     Patient went to [facility] [place] ER over the weekend for elevated BP and hot flashes, see olookt message from patients daughter       HPI  History was obtained from patient. Cachorro Hernández is a 79 y.o. male with a PMHx as listed below who presents today for ED follow up. Patient went to [facility] ED 11/27/22 for bp spike, having daily hot flashes. He notes hot flashes daily, some days worse than others and starting at night. Ongoing since August.     Denies fever, +chills, +hot flashes  No family hx. Of cancer. Vazquez has Hx. Of prostate cancer recent PSA's normal     On imaging concerns of lucent lesions. He is currently following with Neurology in [facility] for Migraines, Seizure like activity recent EEG noromal   1. Bone lesion    2. Peripheral polyneuropathy    3. Chronic bilateral low back pain with bilateral sciatica             REVIEW OF SYMPTOMS    Review of Systems   Constitutional:  Negative for chills and fatigue. HENT:  Negative for congestion and sore throat. Respiratory:  Negative for shortness of breath and wheezing. Cardiovascular:  Negative for chest pain and palpitations. Gastrointestinal:  Negative for abdominal pain and nausea. Genitourinary:  Negative for frequency and urgency. Neurological:  Negative for light-headedness. PAST MEDICAL HISTORY  Past Medical History:   Diagnosis Date    Anesthesia complication     Patient reports in 1973 following a Back Surgery he was kept in the hospital due to high temperatures in the evenings. He stated they kept him for observation and doesn't remember receiving any treatment for the high temperatures. Patient reports the anesthesia name started with flur? ?.    BPH with obstruction/lower urinary tract symptoms     Chest pain 7/1/2018    Chronic low back pain     GERD (gastroesophageal reflux disease)     H/O 24 hour EKG monitoring 3/12  12/11    3/12/12  essentially a normal event monitor without significant arrhythmias noted    H/O cardiac catheterization 2/2/12 4/02 10/98    2/22/12  LM, LAD and RCA all without significant disease    H/O cardiac catheterization 2/2/12 4/02 10/98    Done R/T false pos on stress test.    H/O cardiac catheterization 08/06/2018    normal study    H/O cardiovascular stress test 07/02/2018    normal study    H/O echocardiogram 12/10      3/14 EF55-60% normal LV function 12/10/10  complete 2 dim transthoracic echj. LVSF normal  EF >55%, transmitral spectral Doppler flow pattern is suggestive of  impaired LV relaxation    History of cardiovascular stress test 2/12 11/09 7/08 9/06 2/02 4/00 8/99 2/8/2012  evidence of mild ischemia  in the RCA. abnormal study , restingEF is 70%, global LVSF is  normal    History of Holter monitoring 9/17/14    14 day EVENT - no RVR    History of nuclear stress test 08/03/2017    cardiolite-normal,EF60%    Hx of chest x-ray 2/20/12    chest is considereed non acute.   COPD    Hx of echocardiogram 08/03/2017    MT96-06%,TELTD 2 diastolic dysfunction    Hyperlipidemia     Idiopathic peripheral neuropathy     Kidney stone     Left facial numbness 8/30/2017    Lumbar radiculopathy     Malignant melanoma of skin of face (HCC)     MVP (mitral valve prolapse)     Myotonia     chronic increased CPK's    Peripelvic (lymphatic) cyst     per CT    Precordial pain 8/3/2018    Vitamin B12 deficiency        FAMILY HISTORY  Family History   Problem Relation Age of Onset    Heart Disease Mother         ASCAD    Heart Disease Father     Heart Disease Brother     Diabetes Other     Cancer Other         unknown type    Prostate Cancer Other     Coronary Art Dis Other     Coronary Art Dis Other        SOCIAL HISTORY  Social History Socioeconomic History    Marital status:    Tobacco Use    Smoking status: Never    Smokeless tobacco: Never   Vaping Use    Vaping Use: Never used   Substance and Sexual Activity    Alcohol use: Not Currently     Comment: Occassional    Drug use: No    Sexual activity: Not Currently     Partners: Female     Comment:      Social Determinants of Health     Food Insecurity: No Food Insecurity    Worried About Running Out of Food in the Last Year: Never true    Ran Out of Food in the Last Year: Never true   Transportation Needs: No Transportation Needs    Lack of Transportation (Medical): No    Lack of Transportation (Non-Medical): No   Physical Activity: Sufficiently Active    Days of Exercise per Week: 7 days    Minutes of Exercise per Session: 40 min   Stress: No Stress Concern Present    Feeling of Stress : Not at all   Social Connections: Socially Isolated    Frequency of Communication with Friends and Family: Twice a week    Frequency of Social Gatherings with Friends and Family: Once a week    Attends Islam Services: Never    Active Member of Clubs or Organizations: No    Attends Club or Organization Meetings: Never    Marital Status:    Housing Stability: Low Risk     Unable to Pay for Housing in the Last Year: No    Number of Places Lived in the Last Year: 1    Unstable Housing in the Last Year: No        SURGICAL HISTORY  Past Surgical History:   Procedure Laterality Date    CARDIAC CATHETERIZATION  2/2/12    EF 55% No sugnificant disease.     COLONOSCOPY  5/5/14    diverticulosis, 1 polyp, hemorrhoids    COLONOSCOPY  07/10/2017    single 5 mm polyp found in sigmoid colon, moderate diverticulosis found in sigmoid colon    LUMBAR DISC SURGERY  11/2002    L4-5 hemilaminectomy and disk    LUMBAR DISCECTOMY  [address] [place]  09/26/2011    s/p robotic radical prostatectomy    ROTATOR CUFF REPAIR Right 10/2016    TIBIA FRACTURE SURGERY Right     childhood    URETEROLITHOTOMY 1992                 CURRENT MEDICATIONS  Current Outpatient Medications   Medication Sig Dispense Refill    gabapentin (NEURONTIN) 300 MG capsule TAKE 1 CAPSULE BY MOUTH 3  TIMES DAILY 270 capsule 1    pantoprazole (PROTONIX) 40 MG tablet TAKE 1 TABLET BY MOUTH  DAILY 90 tablet 1    tiZANidine (ZANAFLEX) 4 MG tablet Take 1 tablet by mouth nightly Take 1/2 tab nightly for 8 nights then increase to full tab. 30 tablet 3    levothyroxine (SYNTHROID) 50 MCG tablet TAKE 1 TABLET BY MOUTH IN  THE MORNING 90 tablet 3    nitroGLYCERIN (NITROSTAT) 0.4 MG SL tablet up to max of 3 total doses. If no relief after 1 dose, call 911. 25 tablet 3    vitamin B-12 500 MCG tablet Take 1 tablet by mouth daily 30 tablet 3    clopidogrel (PLAVIX) 75 MG tablet Take 1 tablet by mouth daily 90 tablet 3    aspirin 81 MG tablet Take 81 mg by mouth daily      atorvastatin (LIPITOR) 40 MG tablet Take 1 tablet by mouth nightly (Patient not taking: Reported on 12/1/2022) 90 tablet 1     No current facility-administered medications for this visit. ALLERGIES  Allergies   Allergen Reactions    Fenofibrate        PHYSICAL EXAM    /80 (Site: Left Upper Arm, Position: Sitting, Cuff Size: Medium Adult)   Pulse 71   Ht 5' 10\" (1.778 m)   Wt 167 lb (75.8 kg)   SpO2 99%   BMI 23.96 kg/m²     Physical Exam  Constitutional:       Appearance: Normal appearance. HENT:      Head: Normocephalic and atraumatic. Eyes:      Extraocular Movements: Extraocular movements intact. Pupils: Pupils are equal, round, and reactive to light. Cardiovascular:      Rate and Rhythm: Normal rate and regular rhythm. Pulses: Normal pulses. Heart sounds: No murmur heard. No friction rub. No gallop. Pulmonary:      Effort: Pulmonary effort is normal.      Breath sounds: Normal breath sounds. Skin:     General: Skin is warm and dry. Neurological:      General: No focal deficit present.       Mental Status: He is alert and oriented to person, place, and time. Mental status is at baseline. Psychiatric:         Mood and Affect: Mood normal.         Behavior: Behavior normal.       ASSESSMENT & PLAN    1. Peripheral polyneuropathy    - gabapentin (NEURONTIN) 300 MG capsule; TAKE 1 CAPSULE BY MOUTH 3  TIMES DAILY  Dispense: 270 capsule; Refill: 1    2. Chronic bilateral low back pain with bilateral sciatica    - gabapentin (NEURONTIN) 300 MG capsule; TAKE 1 CAPSULE BY MOUTH 3  TIMES DAILY  Dispense: 270 capsule; Refill: 1    3. Bone lesion  - MRI PELVIS W CONTRAST; Future  - MRI LUMBAR SPINE W CONTRAST; Future    Headaches we will restart imitrex PRN, zofran PRN    We will obtain imaging for further eval lesion    Return in about 4 weeks (around 12/29/2022).          Electronically signed by Shayy DO on 12/1/2022

## 2022-12-08 ENCOUNTER — CARE COORDINATION (OUTPATIENT)
Dept: CARE COORDINATION | Age: 82
End: 2022-12-08

## 2022-12-08 NOTE — CARE COORDINATION
Ambulatory Care Coordination Note      ACC: Zara Valencia, RN    Call to pt for acm f/u. Believes he goes on the 19th for imaging. Last couple days been feeling pretty good. Went to eye doctor yesterday, something going on w L eye. Sees optometrist in [place] on [address] [facility]. Has transpo and his son going to take him. No worsening of migraine or nausea at this time. Reviewed new meds. Soin prescribed Dicyclomine 1 tab before meals and qhs for 10 days. Also has another bottle of ondansetron q8h prn. Has only been taking bentyl. ABout half done. Feeling better and denies needs at this time. ACM will plan to f/u with pt after imaging completed to assess needs, advised to [facility] pcp office if any needs/symptoms arise. Encouraged hydration and keeping diary if/when symptoms occur. Lab Results       None            Care Coordination Interventions    Referral from Primary Care Provider: No  Suggested Interventions and Community Resources  Specialty Services Referral: Completed  Zone Management Tools: Completed          Goals Addressed                   This Visit's Progress     Conditions and Symptoms   On track     I will notify my provider of any barriers to my plan of care. I will notify my provider of any symptoms that indicate a worsening of my condition. Barriers: lack of support and overwhelmed by complexity of regimen  Plan for overcoming my barriers: acm support  Confidence: 8/10  Anticipated Goal Completion Date: 1/23/23                Prior to Admission medications    Medication Sig Start Date End Date Taking?  Authorizing Provider   gabapentin (NEURONTIN) 300 MG capsule TAKE 1 CAPSULE BY MOUTH 3  TIMES DAILY 12/1/22 2/1/23  Blake Segovia, DO   ondansetron (ZOFRAN) 4 MG tablet Take 1 tablet by mouth daily as needed for Nausea or Vomiting 12/1/22   Blake Segovia, DO   SUMAtriptan (IMITREX) 50 MG tablet Take 1 tablet by mouth once as needed for Migraine 12/1/22 12/1/22  Blake Segovia, DO   pantoprazole (PROTONIX) 40 MG tablet TAKE 1 TABLET BY MOUTH  DAILY 11/7/22 2/5/23  Blake Segovia, DO   tiZANidine (ZANAFLEX) 4 MG tablet Take 1 tablet by mouth nightly Take 1/2 tab nightly for 8 nights then increase to full tab. 10/7/22   NELLIE Rahman CNP   atorvastatin (LIPITOR) 40 MG tablet Take 1 tablet by mouth nightly  Patient not taking: Reported on 12/1/2022 9/23/22 3/22/23  Blake Segovia, DO   levothyroxine (SYNTHROID) 50 MCG tablet TAKE 1 TABLET BY MOUTH IN  THE MORNING 8/30/22   Blake Segovia, DO   nitroGLYCERIN (NITROSTAT) 0.4 MG SL tablet up to max of 3 total doses.  If no relief after 1 dose, call 911. 8/25/22   NELLIE Villalba CNP   vitamin B-12 500 MCG tablet Take 1 tablet by mouth daily 8/25/22   NELLIE Villalba CNP   clopidogrel (PLAVIX) 75 MG tablet Take 1 tablet by mouth daily 2/14/22   Aletha Gottron, MD   aspirin 81 MG tablet Take 81 mg by mouth daily    Historical Provider, MD       Future Appointments   Date Time Provider Devan Haque   12/15/2022 10:00 AM NELLIE Araujo CNP [facility]   12/19/2022 10:00 AM [facility] MRI ROOM 1 [facility]   12/19/2022 10:45 AM [facility] MRI ROOM 1 [facility]   12/29/2022 11:15 AM Blake Segovia DO [address] [place] [facility] MMA   1/9/2023 10:45 AM NELLIE Rahman CNP [address] [place] [address] Neurology -   1/23/2023 10:30 AM Blake Segovia DO [address] [place] [facility] MMA   3/21/2023 10:00 AM NELLIE Herrera CNP [facility]    and   General Assessment    Do you have any symptoms that are causing concern?: No       COPD Assessment    Does the patient understand envrionmental exposure?: Yes  Is the patient able to verbalize Rescue vs. Long Acting medications?: No  Does the patient have a nebulizer?: No  Does the patient use a space with inhaled medications?: No     No patient-reported symptoms         Symptoms:

## 2022-12-15 RX ORDER — DILTIAZEM HYDROCHLORIDE 240 MG/1
240 CAPSULE, EXTENDED RELEASE ORAL DAILY
Qty: 90 CAPSULE | Refills: 3 | Status: SHIPPED | OUTPATIENT
Start: 2022-12-15

## 2022-12-15 NOTE — TELEPHONE ENCOUNTER
Juan José-txr 240 mg 1 per day (don't see on med list, was told to stop it then told to restart it)   [facility] [address]

## 2022-12-19 ENCOUNTER — HOSPITAL ENCOUNTER (OUTPATIENT)
Dept: MRI IMAGING | Age: 82
Discharge: HOME | End: 2022-12-19
Payer: MEDICARE

## 2022-12-19 DIAGNOSIS — M89.9 BONE LESION: ICD-10-CM

## 2022-12-19 LAB
EGFR, POC: >60 ML/MIN/1.73M2
POC CREATININE: 1 MG/DL (ref 0.9–1.3)

## 2022-12-19 PROCEDURE — G1010 CDSM STANSON: HCPCS

## 2022-12-19 PROCEDURE — A9579 GAD-BASE MR CONTRAST NOS,1ML: HCPCS | Performed by: STUDENT IN AN ORGANIZED HEALTH CARE EDUCATION/TRAINING PROGRAM

## 2022-12-19 PROCEDURE — 6360000004 HC RX CONTRAST MEDICATION: Performed by: STUDENT IN AN ORGANIZED HEALTH CARE EDUCATION/TRAINING PROGRAM

## 2022-12-19 PROCEDURE — 72197 MRI PELVIS W/O & W/DYE: CPT

## 2022-12-19 RX ADMIN — GADOTERIDOL 15 ML: 279.3 INJECTION, SOLUTION INTRAVENOUS at 10:52

## 2022-12-20 NOTE — RESULT ENCOUNTER NOTE
Please discuss with patient imaging was normal does show DDD, foraminal/spinal stenosis, arthritis of hips, if in pain we can send to pain management.  No lesion seen

## 2022-12-29 ENCOUNTER — OFFICE VISIT (OUTPATIENT)
Dept: FAMILY MEDICINE CLINIC | Age: 82
End: 2022-12-29
Payer: MEDICARE

## 2022-12-29 VITALS
BODY MASS INDEX: 24.21 KG/M2 | HEART RATE: 77 BPM | SYSTOLIC BLOOD PRESSURE: 97 MMHG | DIASTOLIC BLOOD PRESSURE: 62 MMHG | OXYGEN SATURATION: 95 % | RESPIRATION RATE: 16 BRPM | WEIGHT: 169.1 LBS | HEIGHT: 70 IN

## 2022-12-29 DIAGNOSIS — R23.2 HOT FLASH IN MALE: Primary | ICD-10-CM

## 2022-12-29 DIAGNOSIS — R79.89 LOW TESTOSTERONE: ICD-10-CM

## 2022-12-29 DIAGNOSIS — R23.2 HOT FLASH IN MALE: ICD-10-CM

## 2022-12-29 DIAGNOSIS — C61 PROSTATE CA (HCC): ICD-10-CM

## 2022-12-29 PROCEDURE — G8420 CALC BMI NORM PARAMETERS: HCPCS | Performed by: STUDENT IN AN ORGANIZED HEALTH CARE EDUCATION/TRAINING PROGRAM

## 2022-12-29 PROCEDURE — 1123F ACP DISCUSS/DSCN MKR DOCD: CPT | Performed by: STUDENT IN AN ORGANIZED HEALTH CARE EDUCATION/TRAINING PROGRAM

## 2022-12-29 PROCEDURE — G8484 FLU IMMUNIZE NO ADMIN: HCPCS | Performed by: STUDENT IN AN ORGANIZED HEALTH CARE EDUCATION/TRAINING PROGRAM

## 2022-12-29 PROCEDURE — 3074F SYST BP LT 130 MM HG: CPT | Performed by: STUDENT IN AN ORGANIZED HEALTH CARE EDUCATION/TRAINING PROGRAM

## 2022-12-29 PROCEDURE — G8427 DOCREV CUR MEDS BY ELIG CLIN: HCPCS | Performed by: STUDENT IN AN ORGANIZED HEALTH CARE EDUCATION/TRAINING PROGRAM

## 2022-12-29 PROCEDURE — 99213 OFFICE O/P EST LOW 20 MIN: CPT | Performed by: STUDENT IN AN ORGANIZED HEALTH CARE EDUCATION/TRAINING PROGRAM

## 2022-12-29 PROCEDURE — 1036F TOBACCO NON-USER: CPT | Performed by: STUDENT IN AN ORGANIZED HEALTH CARE EDUCATION/TRAINING PROGRAM

## 2022-12-29 PROCEDURE — 3078F DIAST BP <80 MM HG: CPT | Performed by: STUDENT IN AN ORGANIZED HEALTH CARE EDUCATION/TRAINING PROGRAM

## 2022-12-29 RX ORDER — VENLAFAXINE HYDROCHLORIDE 37.5 MG/1
37.5 CAPSULE, EXTENDED RELEASE ORAL DAILY
Qty: 30 CAPSULE | Refills: 2 | Status: SHIPPED | OUTPATIENT
Start: 2022-12-29 | End: 2023-03-29

## 2022-12-29 ASSESSMENT — ENCOUNTER SYMPTOMS
SORE THROAT: 0
ABDOMINAL PAIN: 0
SHORTNESS OF BREATH: 0
WHEEZING: 0
NAUSEA: 0

## 2022-12-29 NOTE — PROGRESS NOTES
12/29/2022    [facility]    Chief Complaint   Patient presents with    1 Month Follow-Up     Having hot flashes since August during the night. HPI  History was obtained from patient. Shahla Courtney is a 79 y.o. male with a PMHx as listed below who presents today for follow up hot flashes, bp. MRI normal  Started on imitrex, zofran prn medications are helping  Headaches have been stable    BP has been controlled he checks at home    1. Hot flash in male    2. Prostate CA ([address]    3. Low testosterone             REVIEW OF SYMPTOMS    Review of Systems   Constitutional:  Negative for chills and fatigue. HENT:  Negative for congestion and sore throat. Respiratory:  Negative for shortness of breath and wheezing. Cardiovascular:  Negative for chest pain and palpitations. Gastrointestinal:  Negative for abdominal pain and nausea. Genitourinary:  Negative for frequency and urgency. Neurological:  Negative for light-headedness. Hot flashes     PAST MEDICAL HISTORY  Past Medical History:   Diagnosis Date    Anesthesia complication     Patient reports in 1973 following a Back Surgery he was kept in the hospital due to high temperatures in the evenings. He stated they kept him for observation and doesn't remember receiving any treatment for the high temperatures. Patient reports the anesthesia name started with flur? ?.    BPH with obstruction/lower urinary tract symptoms     Chest pain 7/1/2018    Chronic low back pain     GERD (gastroesophageal reflux disease)     H/O 24 hour EKG monitoring 3/12  12/11    3/12/12  essentially a normal event monitor without significant arrhythmias noted    H/O cardiac catheterization 2/2/12 4/02 10/98    2/22/12  LM, LAD and RCA all without significant disease    H/O cardiac catheterization 2/2/12 4/02 10/98    Done R/T false pos on stress test.    H/O cardiac catheterization 08/06/2018    normal study    H/O cardiovascular stress test 07/02/2018    normal study H/O echocardiogram 12/10      3/14 EF55-60% normal LV function 12/10/10  complete 2 dim transthoracic echj. LVSF normal  EF >55%, transmitral spectral Doppler flow pattern is suggestive of  impaired LV relaxation    History of cardiovascular stress test 2/12 11/09 7/08 9/06 2/02 4/00 8/99 2/8/2012  evidence of mild ischemia  in the RCA. abnormal study , restingEF is 70%, global LVSF is  normal    History of Holter monitoring 9/17/14    14 day EVENT - no RVR    History of nuclear stress test 08/03/2017    cardiolite-normal,EF60%    Hx of chest x-ray 2/20/12    chest is considereed non acute. COPD    Hx of echocardiogram 08/03/2017    ME45-23%,OZNDM 2 diastolic dysfunction    Hyperlipidemia     Idiopathic peripheral neuropathy     Kidney stone     Left facial numbness 8/30/2017    Lumbar radiculopathy     Malignant melanoma of skin of face (HCC)     MVP (mitral valve prolapse)     Myotonia     chronic increased CPK's    Peripelvic (lymphatic) cyst     per CT    Precordial pain 8/3/2018    Vitamin B12 deficiency        FAMILY HISTORY  Family History   Problem Relation Age of Onset    Heart Disease Mother         ASCAD    Heart Disease Father     Heart Disease Brother     Diabetes Other     Cancer Other         unknown type    Prostate Cancer Other     Coronary Art Dis Other     Coronary Art Dis Other        SOCIAL HISTORY  Social History     Socioeconomic History    Marital status:       Spouse name: None    Number of children: None    Years of education: None    Highest education level: None   Tobacco Use    Smoking status: Never    Smokeless tobacco: Never   Vaping Use    Vaping Use: Never used   Substance and Sexual Activity    Alcohol use: Not Currently     Comment: Occassional    Drug use: No    Sexual activity: Not Currently     Partners: Female     Comment:      Social Determinants of Health     Food Insecurity: No Food Insecurity    Worried About Running Out of Food in the Last Year: Never true    Ran Out of Food in the Last Year: Never true   Transportation Needs: No Transportation Needs    Lack of Transportation (Medical): No    Lack of Transportation (Non-Medical): No   Physical Activity: Sufficiently Active    Days of Exercise per Week: 7 days    Minutes of Exercise per Session: 40 min   Stress: No Stress Concern Present    Feeling of Stress : Not at all   Social Connections: Socially Isolated    Frequency of Communication with Friends and Family: Twice a week    Frequency of Social Gatherings with Friends and Family: Once a week    Attends Taoist Services: Never    Active Member of Clubs or Organizations: No    Attends Club or Organization Meetings: Never    Marital Status:    Housing Stability: Low Risk     Unable to Pay for Housing in the Last Year: No    Number of Places Lived in the Last Year: 1    Unstable Housing in the Last Year: No        SURGICAL HISTORY  Past Surgical History:   Procedure Laterality Date    CARDIAC CATHETERIZATION  2/2/12    EF 55% No sugnificant disease.     COLONOSCOPY  5/5/14    diverticulosis, 1 polyp, hemorrhoids    COLONOSCOPY  07/10/2017    single 5 mm polyp found in sigmoid colon, moderate diverticulosis found in sigmoid colon    LUMBAR DISC SURGERY  11/2002    L4-5 hemilaminectomy and disk    LUMBAR DISCECTOMY  [address] [place]  09/26/2011    s/p robotic radical prostatectomy    ROTATOR CUFF REPAIR Right 10/2016    TIBIA FRACTURE SURGERY Right     childhood    URETEROLITHOTOMY  1992                 CURRENT MEDICATIONS  Current Outpatient Medications   Medication Sig Dispense Refill    venlafaxine (EFFEXOR XR) 37.5 MG extended release capsule Take 1 capsule by mouth daily 30 capsule 2    dilTIAZem (DILACOR XR) 240 MG extended release capsule Take 1 capsule by mouth daily 90 capsule 3    gabapentin (NEURONTIN) 300 MG capsule TAKE 1 CAPSULE BY MOUTH 3  TIMES DAILY 270 capsule 1    ondansetron (ZOFRAN) 4 MG tablet Take 1 tablet by mouth daily as needed for Nausea or Vomiting 30 tablet 0    SUMAtriptan (IMITREX) 50 MG tablet Take 1 tablet by mouth once as needed for Migraine 9 tablet 0    pantoprazole (PROTONIX) 40 MG tablet TAKE 1 TABLET BY MOUTH  DAILY 90 tablet 1    tiZANidine (ZANAFLEX) 4 MG tablet Take 1 tablet by mouth nightly Take 1/2 tab nightly for 8 nights then increase to full tab. 30 tablet 3    levothyroxine (SYNTHROID) 50 MCG tablet TAKE 1 TABLET BY MOUTH IN  THE MORNING 90 tablet 3    nitroGLYCERIN (NITROSTAT) 0.4 MG SL tablet up to max of 3 total doses. If no relief after 1 dose, call 911. 25 tablet 3    vitamin B-12 500 MCG tablet Take 1 tablet by mouth daily 30 tablet 3    clopidogrel (PLAVIX) 75 MG tablet Take 1 tablet by mouth daily 90 tablet 3    aspirin 81 MG tablet Take 81 mg by mouth daily       No current facility-administered medications for this visit. ALLERGIES  Allergies   Allergen Reactions    Fenofibrate        PHYSICAL EXAM    BP 97/62   Pulse 77   Resp 16   Ht 5' 10\" (1.778 m)   Wt 169 lb 1.6 oz (76.7 kg)   SpO2 95%   BMI 24.26 kg/m²     Physical Exam  Constitutional:       Appearance: Normal appearance. HENT:      Head: Normocephalic and atraumatic. Eyes:      Extraocular Movements: Extraocular movements intact. Pupils: Pupils are equal, round, and reactive to light. Cardiovascular:      Rate and Rhythm: Normal rate and regular rhythm. Pulses: Normal pulses. Heart sounds: No murmur heard. No friction rub. No gallop. Skin:     General: Skin is warm and dry. Neurological:      General: No focal deficit present. Mental Status: He is alert. Psychiatric:         Mood and Affect: Mood normal.         Behavior: Behavior normal.       ASSESSMENT & PLAN    1. Hot flash in male    - Testosterone, free, total; Future  - venlafaxine (EFFEXOR XR) 37.5 MG extended release capsule; Take 1 capsule by mouth daily  Dispense: 30 capsule; Refill: 2    2. Prostate CA ([address]      3.  Low testosterone    - Testosterone, free, total; Future    Start on effexor for hot flashes, hx. Prostate ca has ffollowed with urology in the past   Hot flashes possibly 2/2 low T (?) we will obtain labs however may have to refrain from HRT given hx. Return in about 2 months (around 2/28/2023) for hot flashes.          Electronically signed by Bobby Genao,  on 12/29/2022

## 2023-01-04 LAB
SEX HORMONE BINDING GLOBULIN: 58 NMOL/L (ref 11–80)
TESTOSTERONE FREE-NONMALE: 64.8 PG/ML (ref 47–244)
TESTOSTERONE TOTAL: 459 NG/DL (ref 220–1000)

## 2023-01-09 ENCOUNTER — OFFICE VISIT (OUTPATIENT)
Dept: NEUROLOGY | Age: 83
End: 2023-01-09
Payer: MEDICARE

## 2023-01-09 VITALS
DIASTOLIC BLOOD PRESSURE: 62 MMHG | HEIGHT: 70 IN | OXYGEN SATURATION: 94 % | SYSTOLIC BLOOD PRESSURE: 130 MMHG | WEIGHT: 169.1 LBS | HEART RATE: 83 BPM | BODY MASS INDEX: 24.21 KG/M2

## 2023-01-09 DIAGNOSIS — Z86.73 HISTORY OF LACUNAR CEREBROVASCULAR ACCIDENT: Primary | ICD-10-CM

## 2023-01-09 DIAGNOSIS — G44.209 TENSION HEADACHE: ICD-10-CM

## 2023-01-09 PROCEDURE — G8484 FLU IMMUNIZE NO ADMIN: HCPCS | Performed by: NURSE PRACTITIONER

## 2023-01-09 PROCEDURE — 3078F DIAST BP <80 MM HG: CPT | Performed by: NURSE PRACTITIONER

## 2023-01-09 PROCEDURE — 99214 OFFICE O/P EST MOD 30 MIN: CPT | Performed by: NURSE PRACTITIONER

## 2023-01-09 PROCEDURE — G8420 CALC BMI NORM PARAMETERS: HCPCS | Performed by: NURSE PRACTITIONER

## 2023-01-09 PROCEDURE — 1036F TOBACCO NON-USER: CPT | Performed by: NURSE PRACTITIONER

## 2023-01-09 PROCEDURE — 1123F ACP DISCUSS/DSCN MKR DOCD: CPT | Performed by: NURSE PRACTITIONER

## 2023-01-09 PROCEDURE — 3075F SYST BP GE 130 - 139MM HG: CPT | Performed by: NURSE PRACTITIONER

## 2023-01-09 PROCEDURE — G8427 DOCREV CUR MEDS BY ELIG CLIN: HCPCS | Performed by: NURSE PRACTITIONER

## 2023-01-09 RX ORDER — TIZANIDINE 4 MG/1
4 TABLET ORAL NIGHTLY
Qty: 30 TABLET | Refills: 5 | Status: SHIPPED | OUTPATIENT
Start: 2023-01-09

## 2023-01-09 RX ORDER — TIZANIDINE 4 MG/1
4 TABLET ORAL NIGHTLY
Qty: 30 TABLET | Refills: 3 | Status: CANCELLED | OUTPATIENT
Start: 2023-01-09

## 2023-01-09 NOTE — PROGRESS NOTES
1/9/23    Ellie Florez  1940    Chief Complaint   Patient presents with    Cerebrovascular Accident     Pt presents for f/u of CVA, pt states no improvement and still having hot flashes        History of Present Illness  Ellie Crandall is a 79 y.o. male presenting today for follow-up of: Headache with facial paresthesia and autonomic features of flushing. MRI brain was unremarkable on 10/17/2022. He was initiated on tizanidine 4 mg at bedtime for headache prevention    He remains on Plavix and aspirin for secondary stroke prevention and management of his bilateral carotid stenosis. He has an intolerance to statins. He follows with cardiology. Ambulatory EEG was ordered to evaluate his hot flashes, he had multiple episodes of hot flashes during his EEG and his symptoms are not epileptic. His symptoms are not consistent with TIA. His PCP recently started Effexor for his hot flashes and workup including testosterone. He has a history of prostate cancer and states he is going to see a urologist in regards to his hot flashes as they happen every night (only at night) after he gets up to use the bathroom to urinate. His hot flashes are not always associated with a headache. He describes his headaches as neck tension, the intensity is 3/10. He gets mild headache almost daily, they are not associated with autonomic features. He denies dizziness with standing to suggest dysautonomia. 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      Migraine prevention: Tizanidine, Gabapentin, Effexor  Abortive: Imitrex (contraindicated)    Current Outpatient Medications   Medication Sig Dispense Refill    tiZANidine (ZANAFLEX) 4 MG tablet Take 1 tablet by mouth at bedtime 30 tablet 5    venlafaxine (EFFEXOR XR) 37.5 MG extended release capsule Take 1 capsule by mouth daily 30 capsule 2    dilTIAZem (DILACOR XR) 240 MG extended release capsule Take 1 capsule by mouth daily 90 capsule 3    gabapentin (NEURONTIN) 300 MG capsule TAKE 1 CAPSULE BY MOUTH 3  TIMES DAILY 270 capsule 1    ondansetron (ZOFRAN) 4 MG tablet Take 1 tablet by mouth daily as needed for Nausea or Vomiting 30 tablet 0    SUMAtriptan (IMITREX) 50 MG tablet Take 1 tablet by mouth once as needed for Migraine 9 tablet 0    pantoprazole (PROTONIX) 40 MG tablet TAKE 1 TABLET BY MOUTH  DAILY 90 tablet 1    tiZANidine (ZANAFLEX) 4 MG tablet Take 1 tablet by mouth nightly Take 1/2 tab nightly for 8 nights then increase to full tab. 30 tablet 3    levothyroxine (SYNTHROID) 50 MCG tablet TAKE 1 TABLET BY MOUTH IN  THE MORNING 90 tablet 3    nitroGLYCERIN (NITROSTAT) 0.4 MG SL tablet up to max of 3 total doses. If no relief after 1 dose, call 911. 25 tablet 3    vitamin B-12 500 MCG tablet Take 1 tablet by mouth daily 30 tablet 3    clopidogrel (PLAVIX) 75 MG tablet Take 1 tablet by mouth daily 90 tablet 3    aspirin 81 MG tablet Take 81 mg by mouth daily       No current facility-administered medications for this visit.        Physical Exam:  Mental Status              Orientation: oriented to person, oriented to place, oriented to problem, and oriented to time                        Mood/affectappropriate mood and appropriate affect              Memory/Other: recent memory intact, remote memory intact, fund of knowledge intact, attention span normal, and concentration normal  Language  Language: (normal) language, no dysarthria, (normal) articulation, and no dysphasia/aphasia  Cranial Nerves              Eyes: pupils normal size and reactive to light and visual fields appear full              CN III, IV, VI : extraocular muscle strength normal, normal pursuit, no nystagmus, and no ptosis              Facial Motor: normal facial motor              CN XII: tongue protrudes midline  Motor/Coordination Exam              Power: motor strength appears intact throughout, no arm drift, and normal tone              Coordination: normal finger-to-nose, forearm rotation intact, and rapid alternating movement normal  Gait and Stance              Gait/Posture: station normal, casual gait normal, ambulates independently , tiptoe normal, and steady in Romberg's position with eyes open and closed    /62 (Site: Left Upper Arm, Position: Sitting, Cuff Size: Large Adult)   Pulse 83   Ht 5' 10\" (1.778 m)   Wt 169 lb 1.6 oz (76.7 kg)   SpO2 94%   BMI 24.26 kg/m²     Assessment and Plan     Diagnosis Orders   1. History of lacunar cerebrovascular accident        2. Tension headache          Amna Stacy was seen in neurological follow up in regards to headache, stroke, carotid stenosis. Kristy Vaz will remain on Plavix and ASA for stroke prevention and management of his carotid stenosis. He has an intolerance to statins. In regards to his headaches, he tells me he is still having nearly daily headaches that start in his neck. I offered him bilateral ONB's however he declined. He tells  me that they are mild. He remains on Tizanidine, Gabapentin for migraine prevention. Effexor can also help with migraine prevention which was just initiated by his PCP. I will have Bill discontinue Imitrex due to stroke history. In regards to his hot flashes, seizure has been ruled out. His hot flashes do not have features consistent with trigeminal autonomic cephalgia. I appreciate PCP's help with evaluating his hot flashes as we do no not have a neurologic etiology for his symptoms. Return in about 3 months (around 4/9/2023).     Ayah Sullivan, APRN - CNP

## 2023-01-10 ENCOUNTER — OFFICE VISIT (OUTPATIENT)
Dept: CARDIOLOGY CLINIC | Age: 83
End: 2023-01-10
Payer: MEDICARE

## 2023-01-10 VITALS
OXYGEN SATURATION: 95 % | HEART RATE: 80 BPM | DIASTOLIC BLOOD PRESSURE: 76 MMHG | BODY MASS INDEX: 23.71 KG/M2 | HEIGHT: 70 IN | WEIGHT: 165.6 LBS | SYSTOLIC BLOOD PRESSURE: 120 MMHG

## 2023-01-10 DIAGNOSIS — I10 PRIMARY HYPERTENSION: ICD-10-CM

## 2023-01-10 DIAGNOSIS — R07.9 CHEST PAIN, UNSPECIFIED TYPE: ICD-10-CM

## 2023-01-10 DIAGNOSIS — E78.2 MIXED HYPERLIPIDEMIA: ICD-10-CM

## 2023-01-10 DIAGNOSIS — I34.1 MVP (MITRAL VALVE PROLAPSE): Primary | ICD-10-CM

## 2023-01-10 DIAGNOSIS — I65.21 STENOSIS OF RIGHT CAROTID ARTERY: ICD-10-CM

## 2023-01-10 DIAGNOSIS — I20.9 ANGINA PECTORIS, UNSPECIFIED (HCC): ICD-10-CM

## 2023-01-10 PROCEDURE — 99214 OFFICE O/P EST MOD 30 MIN: CPT | Performed by: NURSE PRACTITIONER

## 2023-01-10 PROCEDURE — 3078F DIAST BP <80 MM HG: CPT | Performed by: NURSE PRACTITIONER

## 2023-01-10 PROCEDURE — G8420 CALC BMI NORM PARAMETERS: HCPCS | Performed by: NURSE PRACTITIONER

## 2023-01-10 PROCEDURE — 3074F SYST BP LT 130 MM HG: CPT | Performed by: NURSE PRACTITIONER

## 2023-01-10 PROCEDURE — 1036F TOBACCO NON-USER: CPT | Performed by: NURSE PRACTITIONER

## 2023-01-10 PROCEDURE — G8484 FLU IMMUNIZE NO ADMIN: HCPCS | Performed by: NURSE PRACTITIONER

## 2023-01-10 PROCEDURE — 93000 ELECTROCARDIOGRAM COMPLETE: CPT | Performed by: NURSE PRACTITIONER

## 2023-01-10 PROCEDURE — G8427 DOCREV CUR MEDS BY ELIG CLIN: HCPCS | Performed by: NURSE PRACTITIONER

## 2023-01-10 PROCEDURE — 1123F ACP DISCUSS/DSCN MKR DOCD: CPT | Performed by: NURSE PRACTITIONER

## 2023-01-10 ASSESSMENT — ENCOUNTER SYMPTOMS
SHORTNESS OF BREATH: 0
ORTHOPNEA: 0
BACK PAIN: 1

## 2023-01-10 NOTE — PROGRESS NOTES
1/10/2023  Primary cardiologist: Dr. Kahlil Cox:   Valente Bourne  is an established 79 y.o.  male here for follow up on hospital for chest pain       SUBJECTIVE/OBJECTIVE:  Valente Bourne is a 79 y.o. male with a history of hypertension, arrythmias, COPD, GERD, prostate cancer malignant melanoma of face and severe right sided vertebral artery stenosis and moderate left vertebral artery stenosis  Valente Bourne had a cath done in August 2018 which did not show any significant CAD           HPI :   Valente Bourne reports he had chest pain in his Left chest. He states that he was at rest when the pain lasted 3-4 hours. He took tylenol and did not get relief. After the first hour he took 1 SL NTG and the pain went away after 2-3 more hours. He states that he did not try tums or GI relief medications. He states that he did not press on the area, the pain did not change with walking around the house. Pain was 6-7/10 and was sharp in nature. He notes occasional chest pain - brief in nature- comes and goes- no associated symptoms- relief on own. He reports headaches. Review of Systems   Constitutional: Negative for diaphoresis and malaise/fatigue. HENT:          Headache   Cardiovascular:  Positive for chest pain. Negative for claudication, dyspnea on exertion, irregular heartbeat, leg swelling, near-syncope, orthopnea, palpitations and paroxysmal nocturnal dyspnea. Respiratory:  Negative for shortness of breath. Musculoskeletal:  Positive for back pain. Neurological:  Negative for dizziness and light-headedness. Vitals:    01/10/23 1319   BP: 120/76   Site: Left Upper Arm   Position: Sitting   Cuff Size: Medium Adult   Pulse: 80   SpO2: 95%   Weight: 165 lb 9.6 oz (75.1 kg)   Height: 5' 10\" (1.778 m)     No flowsheet data found. Wt Readings from Last 3 Encounters:   01/10/23 165 lb 9.6 oz (75.1 kg)   01/09/23 169 lb 1.6 oz (76.7 kg)   12/29/22 169 lb 1.6 oz (76.7 kg)     Body mass index is 23.76 kg/m².     Physical Exam  Vitals reviewed. HENT:      Head: Normocephalic and atraumatic. Eyes:      Comments: Right eye strabismus    Neck:      Vascular: No carotid bruit. Cardiovascular:      Rate and Rhythm: Normal rate. Pulses: Normal pulses. Heart sounds: Normal heart sounds. Abdominal:      General: There is no distension. Tenderness: There is no abdominal tenderness. Musculoskeletal:      Cervical back: No tenderness. Right lower leg: No edema. Left lower leg: No edema. Skin:     General: Skin is warm and dry. Capillary Refill: Capillary refill takes less than 2 seconds. Neurological:      Mental Status: He is alert and oriented to person, place, and time. Psychiatric:         Mood and Affect: Mood normal.              Current Outpatient Medications   Medication Sig Dispense Refill    venlafaxine (EFFEXOR XR) 37.5 MG extended release capsule Take 1 capsule by mouth daily 30 capsule 2    dilTIAZem (DILACOR XR) 240 MG extended release capsule Take 1 capsule by mouth daily 90 capsule 3    gabapentin (NEURONTIN) 300 MG capsule TAKE 1 CAPSULE BY MOUTH 3  TIMES DAILY 270 capsule 1    ondansetron (ZOFRAN) 4 MG tablet Take 1 tablet by mouth daily as needed for Nausea or Vomiting 30 tablet 0    pantoprazole (PROTONIX) 40 MG tablet TAKE 1 TABLET BY MOUTH  DAILY 90 tablet 1    tiZANidine (ZANAFLEX) 4 MG tablet Take 1 tablet by mouth nightly Take 1/2 tab nightly for 8 nights then increase to full tab. 30 tablet 3    levothyroxine (SYNTHROID) 50 MCG tablet TAKE 1 TABLET BY MOUTH IN  THE MORNING 90 tablet 3    nitroGLYCERIN (NITROSTAT) 0.4 MG SL tablet up to max of 3 total doses.  If no relief after 1 dose, call 911. 25 tablet 3    vitamin B-12 500 MCG tablet Take 1 tablet by mouth daily 30 tablet 3    clopidogrel (PLAVIX) 75 MG tablet Take 1 tablet by mouth daily 90 tablet 3    aspirin 81 MG tablet Take 81 mg by mouth daily      tiZANidine (ZANAFLEX) 4 MG tablet Take 1 tablet by mouth at bedtime 30 tablet 5 SUMAtriptan (IMITREX) 50 MG tablet Take 1 tablet by mouth once as needed for Migraine (Patient not taking: Reported on 1/10/2023) 9 tablet 0     No current facility-administered medications for this visit. All pertinent data reviewed and discussed with patient       ASSESSMENT/PLAN:    Chest pain  Non cardiac pain -stress test normal  He continues to have non cardiac pain. Encouraged to try tums  NTG SL did not help. No Known hx of CAD. Hypertension  Controlled  Continue to monitor    Arhythmia  Denies symptoms: stable   Continue with Cardizem     Hyperlipidemia  Lipids noted from 10/2022  LDL 50  We reviewed the rational for Statin treatment and cholesterol lowering. He is willing to restart lipitor. Vertebral artery stenosis  He did not have intervention. Continue ASA plavix   He is not taking cholesterol medications because he has been having hot flashes. Tests ordered:  none      Follow-up  6 months      Signed:  NELLIE Solano CNP, 1/10/2023, 1:42 PM    An electronic signature was used to authenticate this note. Please note this report has been partially produced using speech recognition software and may contain errors related to that system including errors in grammar, punctuation, and spelling, as well as words and phrases that may be inappropriate. If there are any questions or concerns please feel free to contact the dictating provider for clarification.

## 2023-01-12 ENCOUNTER — CARE COORDINATION (OUTPATIENT)
Dept: CARE COORDINATION | Age: 83
End: 2023-01-12

## 2023-01-12 NOTE — CARE COORDINATION
Ambulatory Care Coordination Note      ACC: Deidre Gottron, RN  Call to pt for acm f/u. Sees 2/1 urology. 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 Getting treatments every month to 6 weeks. Has transpo. NO issues breathing, just hot flashes and headaches. Denies further needs at this time. Plan[de-identified] will graduate at next encounter if no new needs/barriers. Offered patient enrollment in the Remote Patient Monitoring (RPM) program for in-home monitoring: Yes, but did not enroll at this time. Lab Results       None            Care Coordination Interventions    Referral from Primary Care Provider: No  Suggested Interventions and Community Resources  Specialty Services Referral: Completed  Zone Management Tools: Completed          Goals Addressed                   This Visit's Progress     Conditions and Symptoms   On track     I will notify my provider of any barriers to my plan of care. I will notify my provider of any symptoms that indicate a worsening of my condition. Barriers: lack of support and overwhelmed by complexity of regimen  Plan for overcoming my barriers: acm support  Confidence: 8/10  Anticipated Goal Completion Date: 1/23/23                Prior to Admission medications    Medication Sig Start Date End Date Taking?  Authorizing Provider   tiZANidine (ZANAFLEX) 4 MG tablet Take 1 tablet by mouth at bedtime 1/9/23   NELLIE Sauer - CNP   venlafaxine (EFFEXOR XR) 37.5 MG extended release capsule Take 1 capsule by mouth daily 12/29/22 3/29/23  Blake Segovia, DO   dilTIAZem (DILACOR XR) 240 MG extended release capsule Take 1 capsule by mouth daily 12/15/22   Mandy Wilson MD   gabapentin (NEURONTIN) 300 MG capsule TAKE 1 CAPSULE BY MOUTH 3  TIMES DAILY 12/1/22 2/1/23  Blake Segovia DO   ondansetron (ZOFRAN) 4 MG tablet Take 1 tablet by mouth daily as needed for Nausea or Vomiting 12/1/22   Blake Segovia DO   SUMAtriptan (IMITREX) 50 MG tablet Take 1 tablet by mouth once as needed for Migraine  Patient not taking: Reported on 1/10/2023 12/1/22 1/9/23  Blake Segovia, DO   pantoprazole (PROTONIX) 40 MG tablet TAKE 1 TABLET BY MOUTH  DAILY 11/7/22 2/5/23  Blake Segovia, DO   tiZANidine (ZANAFLEX) 4 MG tablet Take 1 tablet by mouth nightly Take 1/2 tab nightly for 8 nights then increase to full tab. 10/7/22   NELLIE Townsend CNP   levothyroxine (SYNTHROID) 50 MCG tablet TAKE 1 TABLET BY MOUTH IN  THE MORNING 8/30/22   Blake Segovia, DO   nitroGLYCERIN (NITROSTAT) 0.4 MG SL tablet up to max of 3 total doses.  If no relief after 1 dose, call 911. 8/25/22   NELLIE Smyth CNP   vitamin B-12 500 MCG tablet Take 1 tablet by mouth daily 8/25/22   NELLIE Smyth CNP   clopidogrel (PLAVIX) 75 MG tablet Take 1 tablet by mouth daily 2/14/22   Charleen Kocher, MD   aspirin 81 MG tablet Take 81 mg by mouth daily    Historical Provider, MD       Future Appointments   Date Time Provider Devan Haque   1/23/2023 10:30 AM Braulio Huddleston [address] [place] [facility]   2/2/2023 12:00 PM [address] [place] [facility] AWV LPN [address] [place] [facility]   5/52/1016 11:15 AM Blake Segovia DO [address] [place] [facility]   4/3/2023 11:30 AM NELLIE Townsend CNP [address] [place] [address] Neurology -   7/21/2023 11:00 AM Kapil Medina MD [facility]   ,   COPD Assessment    Does the patient understand envrionmental exposure?: Yes  Is the patient able to verbalize Rescue vs. Long Acting medications?: No  Does the patient have a nebulizer?: No  Does the patient use a space with inhaled medications?: No     No patient-reported symptoms         Symptoms:              General Assessment    Do you have any symptoms that are causing concern?: No

## 2023-01-19 ASSESSMENT — SOCIAL DETERMINANTS OF HEALTH (SDOH): HOW HARD IS IT FOR YOU TO PAY FOR THE VERY BASICS LIKE FOOD, HOUSING, MEDICAL CARE, AND HEATING?: NOT HARD AT ALL

## 2023-01-23 ENCOUNTER — OFFICE VISIT (OUTPATIENT)
Dept: FAMILY MEDICINE CLINIC | Age: 83
End: 2023-01-23
Payer: MEDICARE

## 2023-01-23 VITALS
SYSTOLIC BLOOD PRESSURE: 118 MMHG | BODY MASS INDEX: 23.48 KG/M2 | HEIGHT: 70 IN | HEART RATE: 56 BPM | RESPIRATION RATE: 16 BRPM | OXYGEN SATURATION: 100 % | DIASTOLIC BLOOD PRESSURE: 84 MMHG | WEIGHT: 164 LBS

## 2023-01-23 DIAGNOSIS — R23.2 HOT FLASH IN MALE: ICD-10-CM

## 2023-01-23 DIAGNOSIS — C61 PROSTATE CA (HCC): ICD-10-CM

## 2023-01-23 DIAGNOSIS — C61 PROSTATE CA (HCC): Primary | ICD-10-CM

## 2023-01-23 DIAGNOSIS — E03.9 ACQUIRED HYPOTHYROIDISM: ICD-10-CM

## 2023-01-23 DIAGNOSIS — J44.9 CHRONIC OBSTRUCTIVE PULMONARY DISEASE, UNSPECIFIED COPD TYPE (HCC): ICD-10-CM

## 2023-01-23 DIAGNOSIS — R56.9 SEIZURE-LIKE ACTIVITY (HCC): ICD-10-CM

## 2023-01-23 LAB
A/G RATIO: 2 (ref 1.1–2.2)
ALBUMIN SERPL-MCNC: 4.5 G/DL (ref 3.4–5)
ALP BLD-CCNC: 42 U/L (ref 40–129)
ALT SERPL-CCNC: 17 U/L (ref 10–40)
ANION GAP SERPL CALCULATED.3IONS-SCNC: 12 MMOL/L (ref 3–16)
AST SERPL-CCNC: 19 U/L (ref 15–37)
BASOPHILS ABSOLUTE: 0 K/UL (ref 0–0.2)
BASOPHILS RELATIVE PERCENT: 0.3 %
BILIRUB SERPL-MCNC: 0.5 MG/DL (ref 0–1)
BUN BLDV-MCNC: 20 MG/DL (ref 7–20)
CALCIUM SERPL-MCNC: 9.9 MG/DL (ref 8.3–10.6)
CHLORIDE BLD-SCNC: 99 MMOL/L (ref 99–110)
CO2: 28 MMOL/L (ref 21–32)
CREAT SERPL-MCNC: 1.1 MG/DL (ref 0.8–1.3)
EOSINOPHILS ABSOLUTE: 0 K/UL (ref 0–0.6)
EOSINOPHILS RELATIVE PERCENT: 0.9 %
GFR SERPL CREATININE-BSD FRML MDRD: >60 ML/MIN/{1.73_M2}
GLUCOSE BLD-MCNC: 74 MG/DL (ref 70–99)
HCT VFR BLD CALC: 45.2 % (ref 40.5–52.5)
HEMOGLOBIN: 14.9 G/DL (ref 13.5–17.5)
LYMPHOCYTES ABSOLUTE: 1 K/UL (ref 1–5.1)
LYMPHOCYTES RELATIVE PERCENT: 20.1 %
MCH RBC QN AUTO: 32.6 PG (ref 26–34)
MCHC RBC AUTO-ENTMCNC: 32.9 G/DL (ref 31–36)
MCV RBC AUTO: 99.1 FL (ref 80–100)
MONOCYTES ABSOLUTE: 0.5 K/UL (ref 0–1.3)
MONOCYTES RELATIVE PERCENT: 9.8 %
NEUTROPHILS ABSOLUTE: 3.4 K/UL (ref 1.7–7.7)
NEUTROPHILS RELATIVE PERCENT: 68.9 %
PDW BLD-RTO: 13.6 % (ref 12.4–15.4)
PLATELET # BLD: 223 K/UL (ref 135–450)
PMV BLD AUTO: 7.8 FL (ref 5–10.5)
POTASSIUM SERPL-SCNC: 4.8 MMOL/L (ref 3.5–5.1)
RBC # BLD: 4.56 M/UL (ref 4.2–5.9)
SODIUM BLD-SCNC: 139 MMOL/L (ref 136–145)
TOTAL PROTEIN: 6.8 G/DL (ref 6.4–8.2)
TSH REFLEX FT4: 2.35 UIU/ML (ref 0.27–4.2)
WBC # BLD: 4.9 K/UL (ref 4–11)

## 2023-01-23 PROCEDURE — 3078F DIAST BP <80 MM HG: CPT | Performed by: STUDENT IN AN ORGANIZED HEALTH CARE EDUCATION/TRAINING PROGRAM

## 2023-01-23 PROCEDURE — G8420 CALC BMI NORM PARAMETERS: HCPCS | Performed by: STUDENT IN AN ORGANIZED HEALTH CARE EDUCATION/TRAINING PROGRAM

## 2023-01-23 PROCEDURE — 1123F ACP DISCUSS/DSCN MKR DOCD: CPT | Performed by: STUDENT IN AN ORGANIZED HEALTH CARE EDUCATION/TRAINING PROGRAM

## 2023-01-23 PROCEDURE — 3023F SPIROM DOC REV: CPT | Performed by: STUDENT IN AN ORGANIZED HEALTH CARE EDUCATION/TRAINING PROGRAM

## 2023-01-23 PROCEDURE — 99213 OFFICE O/P EST LOW 20 MIN: CPT | Performed by: STUDENT IN AN ORGANIZED HEALTH CARE EDUCATION/TRAINING PROGRAM

## 2023-01-23 PROCEDURE — 3074F SYST BP LT 130 MM HG: CPT | Performed by: STUDENT IN AN ORGANIZED HEALTH CARE EDUCATION/TRAINING PROGRAM

## 2023-01-23 PROCEDURE — G8427 DOCREV CUR MEDS BY ELIG CLIN: HCPCS | Performed by: STUDENT IN AN ORGANIZED HEALTH CARE EDUCATION/TRAINING PROGRAM

## 2023-01-23 PROCEDURE — G8484 FLU IMMUNIZE NO ADMIN: HCPCS | Performed by: STUDENT IN AN ORGANIZED HEALTH CARE EDUCATION/TRAINING PROGRAM

## 2023-01-23 PROCEDURE — 1036F TOBACCO NON-USER: CPT | Performed by: STUDENT IN AN ORGANIZED HEALTH CARE EDUCATION/TRAINING PROGRAM

## 2023-01-23 RX ORDER — VENLAFAXINE HYDROCHLORIDE 37.5 MG/1
75 CAPSULE, EXTENDED RELEASE ORAL DAILY
Qty: 30 CAPSULE | Refills: 2
Start: 2023-01-23 | End: 2023-04-23

## 2023-01-23 ASSESSMENT — PATIENT HEALTH QUESTIONNAIRE - PHQ9
SUM OF ALL RESPONSES TO PHQ QUESTIONS 1-9: 0
1. LITTLE INTEREST OR PLEASURE IN DOING THINGS: 0
SUM OF ALL RESPONSES TO PHQ QUESTIONS 1-9: 0
SUM OF ALL RESPONSES TO PHQ9 QUESTIONS 1 & 2: 0
2. FEELING DOWN, DEPRESSED OR HOPELESS: 0

## 2023-01-23 NOTE — PROGRESS NOTES
1/23/2023    Jassi Deluna    Chief Complaint   Patient presents with    3 Month Follow-Up     4 month ck -having hot flashes since August. Mostly at night but can happen whenever. He will have to get up and take a cold shower when this happens. HPI  History was obtained from patient. Amna Stacy is a 79 y.o. male with a PMHx as listed below who presents today for 3 month follow up. Patient continues to have hot flashes. Symptoms include, hot flashes. This is mainly occurring at night effecting sleep. No significnat weight loss, no fever    Recent psa levels normal, hx. Prostate ca following regularly with urology Dr. Salma Lozada recent MRI lumbar spine no acute findings    COPD stable      1. Prostate CA ([address]    2. Chronic obstructive pulmonary disease, unspecified COPD type ([address]    3. Seizure-like activity ([address]    4. Hot flash in male    5. Acquired hypothyroidism             REVIEW OF SYMPTOMS    Review of Systems   Constitutional:  Negative for chills and fatigue. HENT:  Negative for congestion and sore throat. Respiratory:  Negative for shortness of breath and wheezing. Cardiovascular:  Negative for chest pain and palpitations. Gastrointestinal:  Negative for abdominal pain and nausea. Genitourinary:  Negative for frequency and urgency. Neurological:  Negative for light-headedness. PAST MEDICAL HISTORY  Past Medical History:   Diagnosis Date    Anesthesia complication     Patient reports in 1973 following a Back Surgery he was kept in the hospital due to high temperatures in the evenings. He stated they kept him for observation and doesn't remember receiving any treatment for the high temperatures. Patient reports the anesthesia name started with flur? ?.    BPH with obstruction/lower urinary tract symptoms     Chest pain 7/1/2018    Chronic low back pain     GERD (gastroesophageal reflux disease)     H/O 24 hour EKG monitoring 3/12  12/11    3/12/12  essentially a normal event monitor without significant arrhythmias noted    H/O cardiac catheterization 2/2/12 4/02 10/98    2/22/12  LM, LAD and RCA all without significant disease    H/O cardiac catheterization 2/2/12 4/02 10/98    Done R/T false pos on stress test.    H/O cardiac catheterization 08/06/2018    normal study    H/O cardiovascular stress test 07/02/2018    normal study    H/O echocardiogram 12/10      3/14 EF55-60% normal LV function 12/10/10  complete 2 dim transthoracic echj. LVSF normal  EF >55%, transmitral spectral Doppler flow pattern is suggestive of  impaired LV relaxation    History of cardiovascular stress test 2/12 11/09 7/08 9/06 2/02 4/00 8/99 2/8/2012  evidence of mild ischemia  in the RCA. abnormal study , restingEF is 70%, global LVSF is  normal    History of Holter monitoring 9/17/14    14 day EVENT - no RVR    History of nuclear stress test 08/03/2017    cardiolite-normal,EF60%    Hx of chest x-ray 2/20/12    chest is considereed non acute. COPD    Hx of echocardiogram 08/03/2017    WB02-16%,NTFJL 2 diastolic dysfunction    Hyperlipidemia     Idiopathic peripheral neuropathy     Kidney stone     Left facial numbness 8/30/2017    Lumbar radiculopathy     Malignant melanoma of skin of face (HCC)     MVP (mitral valve prolapse)     Myotonia     chronic increased CPK's    Peripelvic (lymphatic) cyst     per CT    Precordial pain 8/3/2018    Vitamin B12 deficiency        FAMILY HISTORY  Family History   Problem Relation Age of Onset    Heart Disease Mother         ASCAD    Heart Disease Father     Heart Disease Brother     Diabetes Other     Cancer Other         unknown type    Prostate Cancer Other     Coronary Art Dis Other     Coronary Art Dis Other        SOCIAL HISTORY  Social History     Socioeconomic History    Marital status:     Tobacco Use    Smoking status: Never    Smokeless tobacco: Never   Vaping Use    Vaping Use: Never used   Substance and Sexual Activity    Alcohol use: Not Currently Comment: Occassional. caffeine: 2-3 cups coffee daily. Drug use: No    Sexual activity: Not Currently     Partners: Female     Comment:      Social Determinants of Health     Financial Resource Strain: Low Risk     Difficulty of Paying Living Expenses: Not hard at all   Food Insecurity: No Food Insecurity    Worried About [address] in the Last Year: Never true    [address] [facility] [address] in the Last Year: Never true   Transportation Needs: No Transportation Needs    Lack of Transportation (Medical): No    Lack of Transportation (Non-Medical): No   Physical Activity: Sufficiently Active    Days of Exercise per Week: 7 days    Minutes of Exercise per Session: 40 min   Stress: No Stress Concern Present    Feeling of Stress : Not at all   Social Connections: Socially Isolated    Frequency of Communication with Friends and Family: Twice a week    Frequency of Social Gatherings with Friends and Family: Once a week    Attends L[employer] Services: Never    Active Member of Clubs or Organizations: No    Attends Club or Organization Meetings: Never    Marital Status:    Housing Stability: Low Risk     Unable to Pay for Housing in the Last Year: No    Number of Places Lived in the Last Year: 1    Unstable Housing in the Last Year: No        SURGICAL HISTORY  Past Surgical History:   Procedure Laterality Date    CARDIAC CATHETERIZATION  2/2/12    EF 55% No sugnificant disease.     COLONOSCOPY  5/5/14    diverticulosis, 1 polyp, hemorrhoids    COLONOSCOPY  07/10/2017    single 5 mm polyp found in sigmoid colon, moderate diverticulosis found in sigmoid colon    LUMBAR DISC SURGERY  11/2002    L4-5 hemilaminectomy and disk    LUMBAR DISCECTOMY  [address]  09/26/2011    s/p robotic radical prostatectomy    ROTATOR CUFF REPAIR Right 10/2016    TIBIA FRACTURE SURGERY Right     childhood    URETEROLITHOTOMY  1992                 CURRENT MEDICATIONS  Current Outpatient Medications   Medication Sig Dispense Refill    venlafaxine (EFFEXOR XR) 37.5 MG extended release capsule Take 2 capsules by mouth daily 30 capsule 2    tiZANidine (ZANAFLEX) 4 MG tablet Take 1 tablet by mouth at bedtime 30 tablet 5    gabapentin (NEURONTIN) 300 MG capsule TAKE 1 CAPSULE BY MOUTH 3  TIMES DAILY 270 capsule 1    pantoprazole (PROTONIX) 40 MG tablet TAKE 1 TABLET BY MOUTH  DAILY 90 tablet 1    levothyroxine (SYNTHROID) 50 MCG tablet TAKE 1 TABLET BY MOUTH IN  THE MORNING 90 tablet 3    nitroGLYCERIN (NITROSTAT) 0.4 MG SL tablet up to max of 3 total doses. If no relief after 1 dose, call 911. 25 tablet 3    vitamin B-12 500 MCG tablet Take 1 tablet by mouth daily 30 tablet 3    clopidogrel (PLAVIX) 75 MG tablet Take 1 tablet by mouth daily 90 tablet 3    aspirin 81 MG tablet Take 81 mg by mouth daily      dilTIAZem (DILACOR XR) 240 MG extended release capsule Take 1 capsule by mouth daily 90 capsule 3     No current facility-administered medications for this visit. ALLERGIES  Allergies   Allergen Reactions    Fenofibrate        PHYSICAL EXAM    /84 (Site: Right Upper Arm, Position: Sitting, Cuff Size: Medium Adult)   Pulse 56   Resp 16   Ht 5' 10\" (1.778 m)   Wt 164 lb (74.4 kg)   SpO2 100%   BMI 23.53 kg/m²     Physical Exam  Constitutional:       Appearance: Normal appearance. HENT:      Head: Normocephalic and atraumatic. Eyes:      Extraocular Movements: Extraocular movements intact. Pupils: Pupils are equal, round, and reactive to light. Cardiovascular:      Rate and Rhythm: Normal rate and regular rhythm. Pulses: Normal pulses. Heart sounds: No murmur heard. No friction rub. No gallop. Skin:     General: Skin is warm and dry. Neurological:      General: No focal deficit present. Mental Status: He is alert. Psychiatric:         Mood and Affect: Mood normal.         Behavior: Behavior normal.       ASSESSMENT & PLAN    1.  Prostate CA ([address]    - CBC with Auto Differential; Future  - Comprehensive Metabolic Panel; Future    2. Chronic obstructive pulmonary disease, unspecified COPD type ([facility]    - CBC with Auto Differential; Future  - Comprehensive Metabolic Panel; Future    3. Seizure-like activity ([facility]      4. Hot flash in male    - External Referral To Endocrinology  - venlafaxine (EFFEXOR XR) 37.5 MG extended release capsule; Take 2 capsules by mouth daily  Dispense: 30 capsule; Refill: 2    5. Acquired hypothyroidism  - TSH with Reflex to FT4; Future  - External Referral To Endocrinology    Worsening hot flashes we will refer to endocrinology for eval  Repeat CBC  Recent PSA WNL follows with Urology, testosterone levels normal  Recent CXR    Return for as scheduled.          Electronically signed by Usha Qureshi DO on 1/23/2023

## 2023-01-24 DIAGNOSIS — I65.21 STENOSIS OF RIGHT CAROTID ARTERY: ICD-10-CM

## 2023-01-25 RX ORDER — CLOPIDOGREL BISULFATE 75 MG/1
75 TABLET ORAL DAILY
Qty: 90 TABLET | Refills: 3 | Status: SHIPPED | OUTPATIENT
Start: 2023-01-25

## 2023-01-27 PROBLEM — C44.612 BASAL CELL CARCINOMA (BCC) OF SKIN OF RIGHT UPPER EXTREMITY INCLUDING SHOULDER: Status: ACTIVE | Noted: 2023-01-27

## 2023-01-27 ASSESSMENT — ENCOUNTER SYMPTOMS
WHEEZING: 0
SORE THROAT: 0
NAUSEA: 0
ABDOMINAL PAIN: 0
SHORTNESS OF BREATH: 0

## 2023-01-31 ENCOUNTER — PATIENT MESSAGE (OUTPATIENT)
Dept: FAMILY MEDICINE CLINIC | Age: 83
End: 2023-01-31

## 2023-01-31 DIAGNOSIS — E03.9 ACQUIRED HYPOTHYROIDISM: Primary | ICD-10-CM

## 2023-01-31 DIAGNOSIS — R45.851 SUICIDAL IDEATIONS: ICD-10-CM

## 2023-01-31 NOTE — TELEPHONE ENCOUNTER
Spoke to Marie Sousa and informed her referrals were placed and they will call to schedule appointments.

## 2023-01-31 NOTE — TELEPHONE ENCOUNTER
From: Maxwell Cheadle  To: Dr. Tito Torrez: 1/31/2023 7:06 AM EST  Subject: Request for consults    Update on Dad,  He presented [facility] last Thursday because he his hot flashes have become more frequent and intense. He had voiced to me the week prior and at the ER that he couldnt go on living like this and wanted it all to end. With suicidal thoughts and he expressed to the staff that he had a gun; he was admitted to an inpatient behavioral health hospital under suicide precautions for a minimum 3 night stay. Laura was transferred to [facility] in [place]   Admitted there Friday afternoon. On arrival at [facility] he was sent to local ER twice:  -once for oxygenation levels of 70-75%-Seen and returned to [facility] late Friday night  -once for chest/abdominal pain after breakfast Sunday morning- admitted and worked up for abdominal pain and dehydration. During his current hospital stay, he was denied the opportunity to return inpatient behavioral health. The plan now I for him to return home sometime today. Laura was seen at [facility] by their Psych Nurse Practitioner who started treatments for anxiety and depression. He continued taking the Effexor and added Buspar. Dont know the dosage or who will be following up on these new medications. Our requests  1)Consult for Psychologist- Please fax this consult to  [facility] network  Attention Homa Fernandez  Fax number [phone]  She stated she would get him into the system. 2) Consult to see an Endocrinologist-ASAP  To cover our bases to make sure this isnt an undiagnosed medical condition.   Thank you for your consideration  [place]  [phone]

## 2023-02-08 ENCOUNTER — OFFICE VISIT (OUTPATIENT)
Dept: FAMILY MEDICINE CLINIC | Age: 83
End: 2023-02-08
Payer: MEDICARE

## 2023-02-08 VITALS
DIASTOLIC BLOOD PRESSURE: 64 MMHG | HEART RATE: 68 BPM | HEIGHT: 70 IN | BODY MASS INDEX: 23.48 KG/M2 | SYSTOLIC BLOOD PRESSURE: 94 MMHG | WEIGHT: 164 LBS

## 2023-02-08 DIAGNOSIS — G47.00 INSOMNIA, UNSPECIFIED TYPE: ICD-10-CM

## 2023-02-08 DIAGNOSIS — R23.2 HOT FLASHES: ICD-10-CM

## 2023-02-08 DIAGNOSIS — E03.9 ACQUIRED HYPOTHYROIDISM: Chronic | ICD-10-CM

## 2023-02-08 DIAGNOSIS — M54.16 LUMBAR RADICULOPATHY: Chronic | ICD-10-CM

## 2023-02-08 DIAGNOSIS — T46.6X5A MYALGIA DUE TO STATIN: ICD-10-CM

## 2023-02-08 DIAGNOSIS — R10.9 ABDOMINAL PAIN, UNSPECIFIED ABDOMINAL LOCATION: ICD-10-CM

## 2023-02-08 DIAGNOSIS — Z09 HOSPITAL DISCHARGE FOLLOW-UP: Primary | ICD-10-CM

## 2023-02-08 DIAGNOSIS — R45.851 PASSIVE SUICIDAL IDEATIONS: ICD-10-CM

## 2023-02-08 DIAGNOSIS — E04.9 ENLARGED THYROID GLAND: ICD-10-CM

## 2023-02-08 DIAGNOSIS — G60.9 IDIOPATHIC PERIPHERAL NEUROPATHY: Chronic | ICD-10-CM

## 2023-02-08 DIAGNOSIS — Z87.442 HISTORY OF KIDNEY STONES: ICD-10-CM

## 2023-02-08 DIAGNOSIS — M79.10 MYALGIA DUE TO STATIN: ICD-10-CM

## 2023-02-08 PROCEDURE — 99214 OFFICE O/P EST MOD 30 MIN: CPT

## 2023-02-08 PROCEDURE — 1036F TOBACCO NON-USER: CPT

## 2023-02-08 PROCEDURE — 1123F ACP DISCUSS/DSCN MKR DOCD: CPT

## 2023-02-08 PROCEDURE — 1111F DSCHRG MED/CURRENT MED MERGE: CPT

## 2023-02-08 PROCEDURE — G8427 DOCREV CUR MEDS BY ELIG CLIN: HCPCS

## 2023-02-08 PROCEDURE — 3074F SYST BP LT 130 MM HG: CPT

## 2023-02-08 PROCEDURE — G8420 CALC BMI NORM PARAMETERS: HCPCS

## 2023-02-08 PROCEDURE — G8484 FLU IMMUNIZE NO ADMIN: HCPCS

## 2023-02-08 PROCEDURE — 3078F DIAST BP <80 MM HG: CPT

## 2023-02-08 RX ORDER — LEVOTHYROXINE SODIUM 0.05 MG/1
TABLET ORAL
Qty: 90 TABLET | Refills: 1 | Status: SHIPPED | OUTPATIENT
Start: 2023-02-08

## 2023-02-08 RX ORDER — TIZANIDINE 4 MG/1
4 TABLET ORAL NIGHTLY PRN
Qty: 30 TABLET | Refills: 5
Start: 2023-02-08

## 2023-02-08 RX ORDER — ONDANSETRON 4 MG/1
TABLET, ORALLY DISINTEGRATING ORAL
COMMUNITY
Start: 2022-12-03

## 2023-02-08 RX ORDER — TRAZODONE HYDROCHLORIDE 50 MG/1
50 TABLET ORAL NIGHTLY
Qty: 90 TABLET | Refills: 1 | Status: SHIPPED | OUTPATIENT
Start: 2023-02-08

## 2023-02-08 RX ORDER — TRAMADOL HYDROCHLORIDE 50 MG/1
50 TABLET ORAL EVERY 6 HOURS PRN
Qty: 28 TABLET | Refills: 0 | Status: SHIPPED | OUTPATIENT
Start: 2023-02-08 | End: 2023-02-15

## 2023-02-08 RX ORDER — TRAZODONE HYDROCHLORIDE 50 MG/1
TABLET ORAL
COMMUNITY
Start: 2023-02-06 | End: 2023-02-08 | Stop reason: SDUPTHER

## 2023-02-08 RX ORDER — SUMATRIPTAN 50 MG/1
50 TABLET, FILM COATED ORAL
COMMUNITY

## 2023-02-08 RX ORDER — LORAZEPAM 0.5 MG/1
TABLET ORAL
COMMUNITY
Start: 2023-02-06

## 2023-02-08 NOTE — PROGRESS NOTES
Post-Discharge Transitional Care  Follow Up      Leslee Harrington   YOB: 1940    Date of Office Visit:  2/8/2023  Date of Hospital Admission: 10/17/22  Date of Hospital Discharge: 10/17/22  Risk of hospital readmission (high >=14%. Medium >=10%) :No data recorded    Care management risk score Rising risk (score 2-5) and Complex Care (Scores >=6): No Risk Score On File     Non face to face  following discharge, date last encounter closed (first attempt may have been earlier): *No documented post hospital discharge outreach found in the last 14 days    Call initiated 2 business days of discharge: *No response recorded in the last 14 days    ASSESSMENT/PLAN:   Hospital discharge follow-up  Medication list reviewed with patient and his daughter [employer]- with all questions and refill needs addressed at this time. [place] paperwork for daughter completed. -     LA DISCHARGE MEDS RECONCILED W/ CURRENT OUTPATIENT MED LIST  Abdominal pain, unspecified abdominal location  CT scan reviewed, most likely 2/2 constipation which patient reports is chronic. Encouraged increased water intake, can take Miralax daily and titrate dose </= 1 capful daily. Passive suicidal ideations  Patient made the statement that he \"didn't want to live like this\" with active abdominal pain, hot flashes etc. Patient denies suicidal ideation, states that he was \"just miserable\". Denies thoughts of self harm. Good family support system in place. Psychiatry referral previously placed- awaiting call. Hot flashes  Started on sertraline while admitted, tolerating well. Will refill medication. -     sertraline (ZOLOFT) 50 MG tablet; Take 1 tablet by mouth daily, Disp-90 tablet, R-1Normal  Insomnia, unspecified type  Started on Trazodone while admitted, tolerating well. Will refill medication. Advised strong caution against concomitant use with Tramadol and/or Tizanidine.   -     traZODone (DESYREL) 50 MG tablet;  Take 1 tablet by mouth nightly, Disp-90 tablet, R-1Normal  Enlarged thyroid gland  Enlarged on physical exam. TSH WNL. Given sweating episodes and intermittent dysphagia with normal swallowing evaluation on recent hospitalization will obtain US while awaiting appointment with endocrinology. -     US THYROID; Future  Acquired hypothyroidism  Will refill medication.   -     levothyroxine (SYNTHROID) 50 MCG tablet; TAKE 1 TABLET BY MOUTH IN  THE MORNING, Disp-90 tablet, R-1Requesting 1 year supplyNormal  History of kidney stones  Calcium consistently on upper end or normal, given enlarged thyroid will also check PTH.   -     Calcium; Future  -     PTH, Intact; Future  Lumbar radiculopathy  Sparing use, OARRS reviewed, no evidence of misuse with last fill 12/2021.  -     traMADol (ULTRAM) 50 MG tablet; Take 1 tablet by mouth every 6 hours as needed for Pain for up to 7 days. Intended supply: 7 days. Take lowest dose possible to manage pain Max Daily Amount: 200 mg, Disp-28 tablet, R-0Normal  Idiopathic peripheral neuropathy  As above. -     traMADol (ULTRAM) 50 MG tablet; Take 1 tablet by mouth every 6 hours as needed for Pain for up to 7 days. Intended supply: 7 days. Take lowest dose possible to manage pain Max Daily Amount: 200 mg, Disp-28 tablet, R-0Normal  Myalgia due to statin  Had to stop Lipitor. Unable to tolerate statin. Last lipid panel well controlled. Will monitor. Medical Decision Making: moderate complexity  Return Change February appointment to 4 weeks. .    On this date 2/8/2023 I have spent 58 minutes reviewing previous notes, test results and face to face with the patient discussing the diagnosis and importance of compliance with the treatment plan as well as documenting on the day of the visit. Subjective:   HPI:  Follow up of Hospital problems/diagnosis(es): Abdominal Pain, hot flashes and Suicidal Ideation    Inpatient course: Discharge summary reviewed- see chart.     Interval history/Current status: Patient was admitted to a psychiatric facility behavioral unit at [facility] on 1/29/2023   · Patient had MRI of the pelvis with no suspicious osseous lesions noted. · MRI of the lumbar spine December 2022 showing multilevel degenerative disc disease with moderate stenosis. · MRI of the cervical spine with DDD. · TSH  2.3  · Vertebral artery stenosis on MRI s/p cerebral angiogram Sep 2022    CXR No acute cardia pulmonary process seen on single view chest. Linear changes seen at both lung bases most consistent with atelectasis. CT Abdomen/pelvis No acute intra-abdominal process identified. Colonic diverticulosis. No CT evidence of diverticulitis. No acute colitis. Appendix normal.     Abdominal pain thought to be constipation related. Vertebral artery stenosis currently on aspirin Plavix again patient had a cerebral angiogram in September 2022 by interventional radiologist please see the note with mild stenosis did not recommend any intervention     Has had hot flashes since August. He is unable to lie flat because he gets so hot. He does follow with Dr. Juli Street. He reports that he was having chest pains and did not feel well. He stated that he just \"could not live like this anymore\" and was sent to [facility]. They sent him to ER for low pulse ox readings. He was returned to [facility] and then wound up in [address] [place] [address] in [place]. 20/30 MMSE- MCI noted. They are trying to rule ut if these episodes are panic attacks. They started Zoloft daily, Ativan PRN and Trazodone for sleep. Mihaela Lamas is going to go to [facility] with his daughter Juany Banerjee to stay for 10 days to have some further assessment. He has been tolerating 50mg Trazodone well. Tolerating Zoloft 50mg. 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 He notes difficulty swallowing some of his pills, and sometimes foods as well. Provider at the hospital discussed enlarged thyroid gland.  He had a swallowing evaluation on a previous visit that was normal.     They do have an appointment with endocrinology through [facility] for follow up. Stopped lipitor due to myalgias. Patient Active Problem List   Diagnosis    Moderate mixed hyperlipidemia not requiring statin therapy    MVP (mitral valve prolapse)    Frequent PVCs    Headache    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% 8/29/17     Angina pectoris (HCC)    Kidney stone    Myotonia    Peripelvic (lymphatic) cyst    Chronic low back pain    Idiopathic peripheral neuropathy    GERD (gastroesophageal reflux disease)    Lumbar radiculopathy    Malignant melanoma of skin of face (HCC)    H/O prostate cancer    COPD (chronic obstructive pulmonary disease) ([facility]    Acquired hypothyroidism    History of Helicobacter pylori infection    Diverticulosis    Chronic migraine without aura without status migrainosus, not intractable    Prostate CA ([facility]    TIA (transient ischemic attack)    Primary hypertension    Post laminectomy syndrome    Stroke-like symptoms    Intracranial vascular stenosis    Arrhythmia    Myotonia congenita    Basal cell carcinoma (BCC) of skin of right upper extremity including shoulder    Myalgia due to statin       Medications listed as ordered at the time of discharge from hospital     Medication List            Accurate as of February 8, 2023  7:55 PM. If you have any questions, ask your nurse or doctor. START taking these medications      traMADol 50 MG tablet  Commonly known as: Ultram  Take 1 tablet by mouth every 6 hours as needed for Pain for up to 7 days. Intended supply: 7 days. Take lowest dose possible to manage pain Max Daily Amount: 200 mg  Started by: NELLIE Warren CNP            CHANGE how you take these medications      sertraline 50 MG tablet  Commonly known as: ZOLOFT  Take 1 tablet by mouth daily  What changed: See the new instructions.   Changed by: NELLIE Warren CNP     tiZANidine 4 MG tablet  Commonly known as: ZANAFLEX  Take 1 tablet by mouth nightly as needed (muscle pain)  What changed:   when to take this  reasons to take this  Changed by: NELLIE Villanueva CNP     traZODone 50 MG tablet  Commonly known as: DESYREL  Take 1 tablet by mouth nightly  What changed: See the new instructions. Changed by: NELLIE Villanueva CNP            CONTINUE taking these medications      aspirin 81 MG tablet     clopidogrel 75 MG tablet  Commonly known as: PLAVIX  TAKE 1 TABLET BY MOUTH  DAILY     cyanocobalamin 500 MCG tablet  Take 1 tablet by mouth daily     dilTIAZem 240 MG extended release capsule  Commonly known as: DILACOR XR  Take 1 capsule by mouth daily     gabapentin 300 MG capsule  Commonly known as: NEURONTIN  TAKE 1 CAPSULE BY MOUTH 3  TIMES DAILY     levothyroxine 50 MCG tablet  Commonly known as: SYNTHROID  TAKE 1 TABLET BY MOUTH IN  THE MORNING     LORazepam 0.5 MG tablet  Commonly known as: ATIVAN     nitroGLYCERIN 0.4 MG SL tablet  Commonly known as: NITROSTAT  up to max of 3 total doses. If no relief after 1 dose, call 911.      ondansetron 4 MG disintegrating tablet  Commonly known as: ZOFRAN-ODT     pantoprazole 40 MG tablet  Commonly known as: PROTONIX  TAKE 1 TABLET BY MOUTH  DAILY     SUMAtriptan 50 MG tablet  Commonly known as: IMITREX               Where to Get Your Medications        These medications were sent to [facility] ([address] [place] [address], . [address] [facility] [phone] - F [phone]  [address] [place] [id number]      Phone: [phone]   levothyroxine 50 MCG tablet  sertraline 50 MG tablet  traZODone 50 MG tablet       These medications were sent to [address] [place] [address], [address] [place] [address] Road [place] [phone]  [address] [place] [address]      Phone: [phone]   traMADol 50 MG tablet       Information about where to get these medications is not yet available    Ask your nurse or doctor about these medications  tiZANidine 4 MG tablet           Medications marked \"taking\" at this time  Outpatient Medications Marked as Taking for the 2/8/23 encounter (Office Visit) with NELLIE Fairchild CNP   Medication Sig Dispense Refill    LORazepam (ATIVAN) 0.5 MG tablet TAKE 1 TABLET BY MOUTH EVERY 6 HOURS AS NEEDED      SUMAtriptan (IMITREX) 50 MG tablet Take 50 mg by mouth once as needed for Migraine      ondansetron (ZOFRAN-ODT) 4 MG disintegrating tablet DISSOLVE 1 TABLET IN MOUTH EVERY 8 HOURS AS NEEDED FOR NAUSEA FOR UP TO 5 DAYS      traZODone (DESYREL) 50 MG tablet Take 1 tablet by mouth nightly 90 tablet 1    sertraline (ZOLOFT) 50 MG tablet Take 1 tablet by mouth daily 90 tablet 1    traMADol (ULTRAM) 50 MG tablet Take 1 tablet by mouth every 6 hours as needed for Pain for up to 7 days. Intended supply: 7 days. Take lowest dose possible to manage pain Max Daily Amount: 200 mg 28 tablet 0    tiZANidine (ZANAFLEX) 4 MG tablet Take 1 tablet by mouth nightly as needed (muscle pain) 30 tablet 5    levothyroxine (SYNTHROID) 50 MCG tablet TAKE 1 TABLET BY MOUTH IN  THE MORNING 90 tablet 1    clopidogrel (PLAVIX) 75 MG tablet TAKE 1 TABLET BY MOUTH  DAILY 90 tablet 3    dilTIAZem (DILACOR XR) 240 MG extended release capsule Take 1 capsule by mouth daily 90 capsule 3    gabapentin (NEURONTIN) 300 MG capsule TAKE 1 CAPSULE BY MOUTH 3  TIMES DAILY 270 capsule 1    pantoprazole (PROTONIX) 40 MG tablet TAKE 1 TABLET BY MOUTH  DAILY 90 tablet 1    nitroGLYCERIN (NITROSTAT) 0.4 MG SL tablet up to max of 3 total doses. If no relief after 1 dose, call 911. 25 tablet 3    vitamin B-12 500 MCG tablet Take 1 tablet by mouth daily 30 tablet 3    aspirin 81 MG tablet Take 81 mg by mouth daily          Medications patient taking as of now reconciled against medications ordered at time of hospital discharge: Yes    A comprehensive review of systems was negative except for what was noted in the HPI.     Objective: BP 94/64 (Site: Right Upper Arm, Position: Sitting, Cuff Size: Small Adult)   Pulse 68   Ht 5' 10\" (1.778 m)   Wt 164 lb (74.4 kg)   BMI 23.53 kg/m²   General Appearance: alert and oriented to person, place and time, well developed and well- nourished, in no acute distress, forgetful at times  Skin: warm and dry, no rash or erythema  Head: normocephalic and atraumatic  Neck: supple and non-tender without mass, no cervical lymphadenopathy, enlarged thyroid   Pulmonary/Chest: clear to auscultation bilaterally- no wheezes, rales or rhonchi, normal air movement, no respiratory distress  Cardiovascular: normal rate, regular rhythm, normal S1 and S2, no murmurs, rubs, clicks, or gallops  Extremities: no cyanosis, clubbing or edema  Musculoskeletal: normal range of motion, no joint swelling, deformity or tenderness  Neurologic: no cranial nerve deficit, gait, coordination and speech normal      An electronic signature was used to authenticate this note.   --NELLIE Soto - CNP

## 2023-02-10 ENCOUNTER — CARE COORDINATION (OUTPATIENT)
Dept: CARE COORDINATION | Age: 83
End: 2023-02-10

## 2023-02-10 NOTE — CARE COORDINATION
Call to pt for acm fu. Reports he is at \[facility]\"" in [place] for a while. Advised to contact [facility] when back in [place] as not licensed in [place]. Voices understanding.

## 2023-02-24 ENCOUNTER — HOSPITAL ENCOUNTER (OUTPATIENT)
Dept: ULTRASOUND IMAGING | Age: 83
Discharge: HOME | End: 2023-02-24
Payer: MEDICARE

## 2023-02-24 DIAGNOSIS — E04.9 ENLARGED THYROID GLAND: ICD-10-CM

## 2023-02-24 PROCEDURE — 76536 US EXAM OF HEAD AND NECK: CPT

## 2023-03-07 ENCOUNTER — CARE COORDINATION (OUTPATIENT)
Dept: CARE COORDINATION | Age: 83
End: 2023-03-07

## 2023-03-07 NOTE — CARE COORDINATION
Ambulatory Care Coordination Note      Patient Current Location:  Home: [address] [place] [address]     ACM contacted the patient by telephone. Verified name and  with patient as identifiers. Provided introduction to self, and explanation of the ACM role. Challenges to be reviewed by the provider   Additional needs identified to be addressed with provider: No  none               Method of communication with provider: none. ACM: Claudia Cabrera RN        Offered patient enrollment in the Remote Patient Monitoring (RPM) program for in-home monitoring: Yes, but did not enroll at this time. Lab Results       None            Care Coordination Interventions    Referral from Primary Care Provider: No  Suggested Interventions and Community Resources  Specialty Services Referral: Completed  Zone Management Tools: Completed          Goals Addressed    None         Future Appointments   Date Time Provider Devan Haque   4/3/2023 11:30 AM NELLIE Huynh - [address] -   2023 11:00 AM Pradip Hedrick MD [facility]     COPD Assessment    Does the patient understand envrionmental exposure?: Yes  Is the patient able to verbalize Rescue vs. Long Acting medications?: No  Does the patient have a nebulizer?: No  Does the patient use a space with inhaled medications?: No     No patient-reported symptoms         Symptoms:                Call to pt for acm f/u. Still with hot flashes, no copd s/s. Takes cool shower before bed. Taking all meds ok. Has f/u with pcp office tm. Will grad at this time. Pt has acm contact info and encouraged to call in future if needs arise.

## 2023-03-08 ENCOUNTER — TELEPHONE (OUTPATIENT)
Dept: FAMILY MEDICINE CLINIC | Age: 83
End: 2023-03-08

## 2023-03-08 ENCOUNTER — OFFICE VISIT (OUTPATIENT)
Dept: FAMILY MEDICINE CLINIC | Age: 83
End: 2023-03-08

## 2023-03-08 VITALS
DIASTOLIC BLOOD PRESSURE: 74 MMHG | SYSTOLIC BLOOD PRESSURE: 130 MMHG | WEIGHT: 167 LBS | OXYGEN SATURATION: 98 % | HEIGHT: 70 IN | HEART RATE: 80 BPM | BODY MASS INDEX: 23.91 KG/M2

## 2023-03-08 DIAGNOSIS — R23.2 HOT FLASHES: Primary | ICD-10-CM

## 2023-03-08 PROCEDURE — 99999 PR OFFICE/OUTPT VISIT,PROCEDURE ONLY: CPT

## 2023-03-08 RX ORDER — ATORVASTATIN CALCIUM 40 MG/1
40 TABLET, FILM COATED ORAL DAILY
COMMUNITY

## 2023-03-08 NOTE — TELEPHONE ENCOUNTER
Patient presents to the clinic for mood follow-up.     Medication Reconciliation: complete   Frances Richardson LPN............. June 26, 2019 9:42 AM             Spoke with pt's dtr Sarita Almodovar informed our office is not going to bill pt for todays visit d/t him establishing with [facility].  Dtr voiced understanding

## 2023-03-08 NOTE — PROGRESS NOTES
Patient noted that he was establishing care with PCP at [facility]. In looking at his chart it was noted that he had been seen by Dr. Paolo Alvarez, DO yesterday in office. Reviewed note and medication changes and orders for imaging studies with patient. Patient stated no new concerns for today's visit- advised that if he is establishing with another provider that we will not bill for the visit today. Patient voiced understanding, patient's daughter Landry called and updated on today's visit as well.

## 2023-04-23 DIAGNOSIS — K21.9 GASTROESOPHAGEAL REFLUX DISEASE WITHOUT ESOPHAGITIS: ICD-10-CM

## 2023-04-24 RX ORDER — PANTOPRAZOLE SODIUM 40 MG/1
40 TABLET, DELAYED RELEASE ORAL DAILY
Qty: 90 TABLET | Refills: 1 | Status: SHIPPED | OUTPATIENT
Start: 2023-04-24 | End: 2023-07-23

## 2023-05-17 DIAGNOSIS — G47.00 INSOMNIA, UNSPECIFIED TYPE: ICD-10-CM

## 2023-05-17 DIAGNOSIS — R23.2 HOT FLASHES: ICD-10-CM

## 2023-05-18 RX ORDER — TRAZODONE HYDROCHLORIDE 50 MG/1
TABLET ORAL
Qty: 90 TABLET | Refills: 1 | Status: SHIPPED | OUTPATIENT
Start: 2023-05-18

## 2023-06-08 ENCOUNTER — TELEPHONE (OUTPATIENT)
Dept: FAMILY MEDICINE CLINIC | Age: 83
End: 2023-06-08

## 2023-06-08 NOTE — TELEPHONE ENCOUNTER
Called patient to schedule an AWV appt. Patient is no longer a patient in this office. Patients new provider is  Dr Yvrose Álvarez thru [facility].

## 2023-07-20 ASSESSMENT — ENCOUNTER SYMPTOMS
ORTHOPNEA: 0
SHORTNESS OF BREATH: 0
BACK PAIN: 1

## 2023-07-21 ENCOUNTER — OFFICE VISIT (OUTPATIENT)
Dept: CARDIOLOGY CLINIC | Age: 83
End: 2023-07-21
Payer: MEDICARE

## 2023-07-21 VITALS
OXYGEN SATURATION: 96 % | DIASTOLIC BLOOD PRESSURE: 58 MMHG | SYSTOLIC BLOOD PRESSURE: 88 MMHG | WEIGHT: 169.6 LBS | HEART RATE: 80 BPM | BODY MASS INDEX: 24.28 KG/M2 | HEIGHT: 70 IN

## 2023-07-21 DIAGNOSIS — G45.8 SUBCLAVIAN STEAL SYNDROME: ICD-10-CM

## 2023-07-21 DIAGNOSIS — I95.9 HYPOTENSION, UNSPECIFIED HYPOTENSION TYPE: ICD-10-CM

## 2023-07-21 PROCEDURE — 99214 OFFICE O/P EST MOD 30 MIN: CPT | Performed by: NURSE PRACTITIONER

## 2023-07-21 PROCEDURE — 3074F SYST BP LT 130 MM HG: CPT | Performed by: NURSE PRACTITIONER

## 2023-07-21 PROCEDURE — G8427 DOCREV CUR MEDS BY ELIG CLIN: HCPCS | Performed by: NURSE PRACTITIONER

## 2023-07-21 PROCEDURE — 1036F TOBACCO NON-USER: CPT | Performed by: NURSE PRACTITIONER

## 2023-07-21 PROCEDURE — 3078F DIAST BP <80 MM HG: CPT | Performed by: NURSE PRACTITIONER

## 2023-07-21 PROCEDURE — 1123F ACP DISCUSS/DSCN MKR DOCD: CPT | Performed by: NURSE PRACTITIONER

## 2023-07-21 PROCEDURE — G8420 CALC BMI NORM PARAMETERS: HCPCS | Performed by: NURSE PRACTITIONER

## 2023-07-21 RX ORDER — AMITRIPTYLINE HYDROCHLORIDE 10 MG/1
1 TABLET, FILM COATED ORAL NIGHTLY
COMMUNITY
Start: 2023-04-25

## 2023-07-21 RX ORDER — DILTIAZEM HYDROCHLORIDE 180 MG/1
CAPSULE, COATED, EXTENDED RELEASE ORAL DAILY
COMMUNITY
Start: 2023-07-17 | End: 2023-07-21 | Stop reason: ALTCHOICE

## 2023-07-21 RX ORDER — TIZANIDINE 4 MG/1
1 TABLET ORAL NIGHTLY
COMMUNITY

## 2023-07-21 RX ORDER — TRAMADOL HYDROCHLORIDE 50 MG/1
50 TABLET ORAL EVERY 6 HOURS PRN
COMMUNITY

## 2023-07-21 RX ORDER — CLONAZEPAM 0.5 MG/1
0.5 TABLET ORAL NIGHTLY PRN
COMMUNITY
Start: 2023-06-06

## 2023-07-21 NOTE — PATIENT INSTRUCTIONS
Please be informed that if you contact our office outside of normal business hours the physician on call cannot help with any scheduling or rescheduling issues, procedure instruction questions or any type of medication issue. We advise you for any urgent/emergency that you go to the nearest emergency room! PLEASE CALL OUR OFFICE DURING NORMAL BUSINESS HOURS    Monday - Friday   8 am to 5 pm    Josh: [address] [place]: [phone]    [place]:  [phone]    **It is YOUR responsibilty to bring medication bottles and/or updated medication list to [address] [place] [address]. This will allow us to better serve you and all your healthcare needs**    [facility] Locations - No appointment necessary. Sites open Monday to Friday. Call your preferred location for test preparation, business   hours and other information you need. [employer] accepts [employer]. [address] [place] [address]. [address] [place], [address] [facility]  Phone: [phone]     Thank you for allowing us to care for you today! We want to ensure we can follow your treatment plan and we strive to give you the best outcomes and experience possible. If you ever have a life threatening emergency and call 911 - for an ambulance (EMS)   Our providers can only care for you at:   [facility] or [facility]. Even if you have someone take you or you drive yourself we can only care for you in a [address] [place] [address] facility. Our providers are not setup at the other healthcare locations! We are committed to providing you the best care possible. If you receive a survey after visiting one of our offices, please take time to share your experience concerning your physician office visit. These surveys are confidential and no health information about you is shared. We are eager to improve for you and we are counting on your feedback to help make that happen.

## 2023-07-25 ENCOUNTER — NURSE ONLY (OUTPATIENT)
Dept: CARDIOLOGY CLINIC | Age: 83
End: 2023-07-25

## 2023-07-25 VITALS
BODY MASS INDEX: 24.2 KG/M2 | DIASTOLIC BLOOD PRESSURE: 70 MMHG | OXYGEN SATURATION: 96 % | HEART RATE: 81 BPM | HEIGHT: 70 IN | SYSTOLIC BLOOD PRESSURE: 110 MMHG | WEIGHT: 169 LBS

## 2023-07-25 DIAGNOSIS — I95.9 HYPOTENSION, UNSPECIFIED HYPOTENSION TYPE: Primary | ICD-10-CM

## 2023-07-25 NOTE — PROGRESS NOTES
Robin Ennis is here for a 2 week BP check  Last visit was placed on hold due to hypotension. He is monitoring his blood pressure at home and blood pressure has been labile. He has had as low as 100/-150. BP Readings from Last 3 Encounters:   07/25/23 110/70   07/21/23 (!) 88/58   03/08/23 130/74     Pulse Readings from Last 3 Encounters:   07/25/23 81   07/21/23 80   03/08/23 80     Wt Readings from Last 3 Encounters:   07/25/23 169 lb (76.7 kg)   07/21/23 169 lb 9.6 oz (76.9 kg)   03/08/23 167 lb (75.8 kg)           Assessment and Plan:   Robin Ennis is to start metoprolol 25 mg twice daily.   Hold if SBP less than 110 or heart rate less than 60

## 2023-07-25 NOTE — PATIENT INSTRUCTIONS
**It is YOUR responsibilty to bring medication bottles and/or updated medication list to [address] [place] [address]. This will allow us to better serve you and all your healthcare needs**  [facility] Laboratory Locations - No appointment necessary. Sites open Monday to Friday. Call your preferred location for test preparation, business   hours and other information you need. [employer] accepts [employer]. [address] [place] [address]. [address] [place], [address] [facility]  Phone: [phone]     Please be informed that if you contact our office outside of normal business hours the physician on call cannot help with any scheduling or rescheduling issues, procedure instruction questions or any type of medication issue. We advise you for any urgent/emergency that you go to the nearest emergency room! PLEASE CALL OUR OFFICE DURING NORMAL BUSINESS HOURS    Monday - Friday   8 am to 5 pm    Josh: [address] [place]: [phone]    [place]:  [phone]  Thank you for allowing us to care for you today! We want to ensure we can follow your treatment plan and we strive to give you the best outcomes and experience possible. If you ever have a life threatening emergency and call 911 - for an ambulance (EMS)   Our providers can only care for you at:   [facility] or [facility]. Even if you have someone take you or you drive yourself we can only care for you in a [facility]. Our providers are not setup at the other healthcare locations! We are committed to providing you the best care possible. If you receive a survey after visiting one of our offices, please take time to share your experience concerning your physician office visit. These surveys are confidential and no health information about you is shared. We are eager to improve for you and we are counting on your feedback to help make that happen.

## 2023-08-29 ENCOUNTER — PROCEDURE VISIT (OUTPATIENT)
Dept: CARDIOLOGY CLINIC | Age: 83
End: 2023-08-29
Payer: MEDICARE

## 2023-08-29 ENCOUNTER — TELEPHONE (OUTPATIENT)
Dept: CARDIOLOGY CLINIC | Age: 83
End: 2023-08-29

## 2023-08-29 ENCOUNTER — NURSE ONLY (OUTPATIENT)
Dept: CARDIOLOGY CLINIC | Age: 83
End: 2023-08-29

## 2023-08-29 VITALS
HEIGHT: 70 IN | DIASTOLIC BLOOD PRESSURE: 88 MMHG | BODY MASS INDEX: 24.2 KG/M2 | WEIGHT: 169 LBS | HEART RATE: 60 BPM | SYSTOLIC BLOOD PRESSURE: 156 MMHG

## 2023-08-29 DIAGNOSIS — I20.9 ANGINA PECTORIS (HCC): Primary | ICD-10-CM

## 2023-08-29 DIAGNOSIS — I95.9 HYPOTENSION, UNSPECIFIED HYPOTENSION TYPE: Primary | ICD-10-CM

## 2023-08-29 DIAGNOSIS — G45.8 SUBCLAVIAN STEAL SYNDROME: ICD-10-CM

## 2023-08-29 PROCEDURE — 93922 UPR/L XTREMITY ART 2 LEVELS: CPT | Performed by: INTERNAL MEDICINE

## 2023-08-29 PROCEDURE — 93930 UPPER EXTREMITY STUDY: CPT | Performed by: INTERNAL MEDICINE

## 2023-08-29 NOTE — PROGRESS NOTES
Michelle Munoz is here for a 1 week BP check      At home he is having episodes of hypotension with doing things such as mowing the grass. Had bp low of 80/ at home with his cuff. We did compare his NBP to our manual which are totally the same. BP Readings from Last 3 Encounters:   08/29/23 (!) 156/88   07/25/23 110/70   07/21/23 (!) 88/58     Pulse Readings from Last 3 Encounters:   08/29/23 60   07/25/23 81   07/21/23 80     Wt Readings from Last 3 Encounters:   08/29/23 169 lb (76.7 kg)   07/25/23 169 lb (76.7 kg)   07/21/23 169 lb 9.6 oz (76.9 kg)           Assessment and Plan:   Michelle Munoz is to due to hypotension with exertion concern for some underlying coronary artery disease hence we will check a stress Cardiolite.

## 2023-08-29 NOTE — TELEPHONE ENCOUNTER
Shea Ennis, APRN - CNP  Melecio Padron MA  Please inform that test is normal - recommend a NM stress test - concern that BP drops with activity     LM for pt to return my call. Patient needs a NM stress test scheduled.

## 2023-09-05 ENCOUNTER — TELEPHONE (OUTPATIENT)
Dept: CARDIOLOGY CLINIC | Age: 83
End: 2023-09-05

## 2023-09-05 NOTE — TELEPHONE ENCOUNTER
Called pt to see if he would like to come in today for his scheduled NucMed. stress test instead of tomorrow, d/t that there are openings available. Pt stated he is not able to do so today, d/t he is busy. Confirmed w/pt that his NucMed. appt is scheduled for 9/6/2023 @ 11am. Pt voiced understanding.

## 2023-09-06 ENCOUNTER — PROCEDURE VISIT (OUTPATIENT)
Dept: CARDIOLOGY CLINIC | Age: 83
End: 2023-09-06

## 2023-09-06 DIAGNOSIS — I20.9 ANGINA PECTORIS (HCC): ICD-10-CM

## 2023-09-07 ENCOUNTER — TELEPHONE (OUTPATIENT)
Dept: CARDIOLOGY CLINIC | Age: 83
End: 2023-09-07

## 2023-09-07 NOTE — TELEPHONE ENCOUNTER
----- Message from NELLIE Escobedo - CNP sent at 9/7/2023  9:26 AM EDT -----  Please move apt up to discuss stress test with DR Saima Pederson - testis abn   Spoke to pt rescheduled Oct appt to discuss results

## 2023-09-26 ENCOUNTER — OFFICE VISIT (OUTPATIENT)
Dept: CARDIOLOGY CLINIC | Age: 83
End: 2023-09-26
Payer: MEDICARE

## 2023-09-26 VITALS
HEIGHT: 70 IN | BODY MASS INDEX: 24.2 KG/M2 | HEART RATE: 67 BPM | WEIGHT: 169 LBS | OXYGEN SATURATION: 94 % | DIASTOLIC BLOOD PRESSURE: 60 MMHG | SYSTOLIC BLOOD PRESSURE: 102 MMHG

## 2023-09-26 DIAGNOSIS — I95.0 IDIOPATHIC HYPOTENSION: Primary | ICD-10-CM

## 2023-09-26 DIAGNOSIS — Z01.810 PRE-OPERATIVE CARDIOVASCULAR EXAMINATION: Primary | ICD-10-CM

## 2023-09-26 PROCEDURE — G8420 CALC BMI NORM PARAMETERS: HCPCS | Performed by: INTERNAL MEDICINE

## 2023-09-26 PROCEDURE — 99214 OFFICE O/P EST MOD 30 MIN: CPT | Performed by: INTERNAL MEDICINE

## 2023-09-26 PROCEDURE — 1036F TOBACCO NON-USER: CPT | Performed by: INTERNAL MEDICINE

## 2023-09-26 PROCEDURE — 3078F DIAST BP <80 MM HG: CPT | Performed by: INTERNAL MEDICINE

## 2023-09-26 PROCEDURE — 1123F ACP DISCUSS/DSCN MKR DOCD: CPT | Performed by: INTERNAL MEDICINE

## 2023-09-26 PROCEDURE — 3074F SYST BP LT 130 MM HG: CPT | Performed by: INTERNAL MEDICINE

## 2023-09-26 PROCEDURE — G8427 DOCREV CUR MEDS BY ELIG CLIN: HCPCS | Performed by: INTERNAL MEDICINE

## 2023-09-26 RX ORDER — AMITRIPTYLINE HYDROCHLORIDE 25 MG/1
25 TABLET, FILM COATED ORAL
COMMUNITY
Start: 2023-09-16

## 2023-09-26 NOTE — PATIENT INSTRUCTIONS
Please be informed that if you contact our office outside of normal business hours the physician on call cannot help with any scheduling or rescheduling issues, procedure instruction questions or any type of medication issue. We advise you for any urgent/emergency that you go to the nearest emergency room! PLEASE CALL OUR OFFICE DURING NORMAL BUSINESS HOURS    Monday - Friday   8 am to 5 pm    Josh: [address] [place]: [phone]    [place]:  [phone]    **It is YOUR responsibilty to bring medication bottles and/or updated medication list to [address] [place] [address]. This will allow us to better serve you and all your healthcare needs**    Thank you for allowing us to care for you today! We want to ensure we can follow your treatment plan and we strive to give you the best outcomes and experience possible. If you ever have a life threatening emergency and call 911 - for an ambulance (EMS)   Our providers can only care for you at:   [facility] or [facility]. Even if you have someone take you or you drive yourself we can only care for you in a [address] [place] [address] facility. Our providers are not setup at the other healthcare locations! We are committed to providing you the best care possible. If you receive a survey after visiting one of our offices, please take time to share your experience concerning your physician office visit. These surveys are confidential and no health information about you is shared. We are eager to improve for you and we are counting on your feedback to help make that happen.

## 2023-09-26 NOTE — PROGRESS NOTES
therapy E78.2    MVP (mitral valve prolapse) I34.1    Frequent PVCs I49.3    Headache R51.9    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% 8/29/17 US I65.21    Angina pectoris (HCC) I20.9    Kidney stone N20.0    Myotonia M62.89    Peripelvic (lymphatic) cyst N28.1    Chronic low back pain M54.50, G89.29    Idiopathic peripheral neuropathy G60.9    GERD (gastroesophageal reflux disease) K21.9    Lumbar radiculopathy M54.16    Malignant melanoma of skin of face ([facility]) C43.30    H/O prostate cancer Z85.46    COPD (chronic obstructive pulmonary disease) ([facility]) J44.9    Acquired hypothyroidism E03.9    History of Helicobacter pylori infection Z86.19    Diverticulosis K57.90    Chronic migraine without aura without status migrainosus, not intractable G43.709    Prostate CA ([address]) C61    TIA (transient ischemic attack) G45.9    Primary hypertension I10    Post laminectomy syndrome M96.1    Stroke-like symptoms R29.90    Intracranial vascular stenosis I67.9    Arrhythmia I49.9    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 NCEP- ATP III guidelines reviewed with patient. -   Changes  in medicines made: No     On diet                      - carotid artery stenosis stable     - has exertional hypotension:  get [facility]    Summary   Supervising physician Dr. Gonzalez Conception . Small sized defect of mild severity which is mixed involving inferior wall   of myocardium. Abnormal Stress nuclear scintigraphic study suggestive of   abnormal myocardial perfusion. Gated images demonstrate normal left   ventricular systolic function with EF of 60 %. A fixed perfusion defect of   inferior wall of left ventricle with normal wall motion noted suggestive of   diaphragmatic artifact.  vs ischemia

## 2023-09-27 ENCOUNTER — TELEPHONE (OUTPATIENT)
Dept: CARDIOLOGY CLINIC | Age: 83
End: 2023-09-27

## 2023-09-27 NOTE — TELEPHONE ENCOUNTER
[address] [facility] WITH POSSIBLE PERCUTANEOUS CORONARY INTERVENTION      Patient Name: Lucila Rosado   : 1940  MRN# [id number]    Date of Procedure: 10/10/23 Time: 8:30am Arrival Time: 6:30am    The catheterization and angiogram are usually outpatient procedures, however if stenting is needed you may need to stay overnight. You will need to arrive at the hospital two hours before the procedure. You will go to registration in the main lobby. You will need to arrange for someone to drive you home. HOSPITAL:  [place])      X   If you have received orders for blood work and or a chest x-ray, please have         them done on assigned date at [facility],           [facility], or [facility].     X Please do not have anything by mouth after midnight prior to or 8 hours before   the procedure. X You may take your medications with a sip of water in the morning of your               procedure or take them with you to the hospital           X If you take Viagra (Sildenafil) or Cialis (Tadalafil) you will need to hold it for 3 days before your procedure.

## 2023-09-27 NOTE — TELEPHONE ENCOUNTER
Patient notified and confirmed [address] scheduled 10/10/23 @ 8:30am arrival 6:30am. Instruction call scheduled for 10/5/23.

## 2023-10-04 ENCOUNTER — HOSPITAL ENCOUNTER (OUTPATIENT)
Age: 83
Discharge: HOME | End: 2023-10-04
Payer: MEDICARE

## 2023-10-04 ENCOUNTER — HOSPITAL ENCOUNTER (OUTPATIENT)
Dept: GENERAL RADIOLOGY | Age: 83
Discharge: HOME | End: 2023-10-04
Payer: MEDICARE

## 2023-10-04 DIAGNOSIS — Z01.810 PRE-OPERATIVE CARDIOVASCULAR EXAMINATION: ICD-10-CM

## 2023-10-04 LAB
ABO/RH: NORMAL
ANION GAP SERPL CALCULATED.3IONS-SCNC: 11 MMOL/L (ref 4–16)
ANTIBODY SCREEN: NEGATIVE
BUN SERPL-MCNC: 18 MG/DL (ref 6–23)
CALCIUM SERPL-MCNC: 9.9 MG/DL (ref 8.3–10.6)
CHLORIDE BLD-SCNC: 102 MMOL/L (ref 99–110)
CO2: 27 MMOL/L (ref 21–32)
COMMENT: NORMAL
CREAT SERPL-MCNC: 1.1 MG/DL (ref 0.9–1.3)
GFR SERPL CREATININE-BSD FRML MDRD: >60 ML/MIN/1.73M2
GLUCOSE SERPL-MCNC: 109 MG/DL (ref 70–99)
HCT VFR BLD CALC: 47 % (ref 42–52)
HEMOGLOBIN: 15.2 GM/DL (ref 13.5–18)
MCH RBC QN AUTO: 31.9 PG (ref 27–31)
MCHC RBC AUTO-ENTMCNC: 32.3 % (ref 32–36)
MCV RBC AUTO: 98.7 FL (ref 78–100)
PDW BLD-RTO: 12.9 % (ref 11.7–14.9)
PLATELET # BLD: 184 K/CU MM (ref 140–440)
PMV BLD AUTO: 9.4 FL (ref 7.5–11.1)
POTASSIUM SERPL-SCNC: 4.2 MMOL/L (ref 3.5–5.1)
RBC # BLD: 4.76 M/CU MM (ref 4.6–6.2)
SODIUM BLD-SCNC: 140 MMOL/L (ref 135–145)
WBC # BLD: 3.6 K/CU MM (ref 4–10.5)

## 2023-10-04 PROCEDURE — 85027 COMPLETE CBC AUTOMATED: CPT

## 2023-10-04 PROCEDURE — 86900 BLOOD TYPING SEROLOGIC ABO: CPT

## 2023-10-04 PROCEDURE — 36415 COLL VENOUS BLD VENIPUNCTURE: CPT

## 2023-10-04 PROCEDURE — 80048 BASIC METABOLIC PNL TOTAL CA: CPT

## 2023-10-04 PROCEDURE — 86850 RBC ANTIBODY SCREEN: CPT

## 2023-10-04 PROCEDURE — 86901 BLOOD TYPING SEROLOGIC RH(D): CPT

## 2023-10-04 PROCEDURE — 71046 X-RAY EXAM CHEST 2 VIEWS: CPT

## 2023-10-10 ENCOUNTER — HOSPITAL ENCOUNTER (OUTPATIENT)
Dept: CARDIAC CATH/INVASIVE PROCEDURES | Age: 83
Discharge: HOME | End: 2023-10-10
Attending: INTERNAL MEDICINE | Admitting: INTERNAL MEDICINE
Payer: MEDICARE

## 2023-10-10 VITALS
SYSTOLIC BLOOD PRESSURE: 146 MMHG | RESPIRATION RATE: 18 BRPM | BODY MASS INDEX: 24.34 KG/M2 | DIASTOLIC BLOOD PRESSURE: 60 MMHG | OXYGEN SATURATION: 100 % | TEMPERATURE: 96.8 F | WEIGHT: 170 LBS | HEART RATE: 67 BPM | HEIGHT: 70 IN

## 2023-10-10 PROBLEM — I25.10 ASCVD (ARTERIOSCLEROTIC CARDIOVASCULAR DISEASE): Status: ACTIVE | Noted: 2023-10-10

## 2023-10-10 PROCEDURE — 2709999900 HC NON-CHARGEABLE SUPPLY

## 2023-10-10 PROCEDURE — C1769 GUIDE WIRE: HCPCS

## 2023-10-10 PROCEDURE — C1894 INTRO/SHEATH, NON-LASER: HCPCS

## 2023-10-10 PROCEDURE — 2500000003 HC RX 250 WO HCPCS

## 2023-10-10 PROCEDURE — 6370000000 HC RX 637 (ALT 250 FOR IP): Performed by: INTERNAL MEDICINE

## 2023-10-10 PROCEDURE — 93458 L HRT ARTERY/VENTRICLE ANGIO: CPT

## 2023-10-10 PROCEDURE — 2580000003 HC RX 258: Performed by: INTERNAL MEDICINE

## 2023-10-10 PROCEDURE — 6360000002 HC RX W HCPCS

## 2023-10-10 PROCEDURE — 6360000004 HC RX CONTRAST MEDICATION

## 2023-10-10 RX ORDER — SODIUM CHLORIDE 9 MG/ML
INJECTION, SOLUTION INTRAVENOUS CONTINUOUS
Status: DISCONTINUED | OUTPATIENT
Start: 2023-10-10 | End: 2023-10-10 | Stop reason: HOSPADM

## 2023-10-10 RX ORDER — DIPHENHYDRAMINE HCL 25 MG
25 TABLET ORAL ONCE
Status: COMPLETED | OUTPATIENT
Start: 2023-10-10 | End: 2023-10-10

## 2023-10-10 RX ORDER — DIAZEPAM 5 MG/1
5 TABLET ORAL ONCE
Status: COMPLETED | OUTPATIENT
Start: 2023-10-10 | End: 2023-10-10

## 2023-10-10 RX ADMIN — DIPHENHYDRAMINE HYDROCHLORIDE 25 MG: 25 TABLET ORAL at 06:43

## 2023-10-10 RX ADMIN — SODIUM CHLORIDE: 9 INJECTION, SOLUTION INTRAVENOUS at 06:43

## 2023-10-10 RX ADMIN — DIAZEPAM 5 MG: 5 TABLET ORAL at 06:43

## 2023-10-10 NOTE — H&P
Medication Sig Dispense Refill    amitriptyline (ELAVIL) 25 MG tablet Take 1 tablet by mouth at bedtime. metoprolol tartrate (LOPRESSOR) 25 MG tablet Take 1 tablet by mouth 2 times daily 60 tablet 3    clonazePAM (KLONOPIN) 0.5 MG tablet Take 1 tablet by mouth nightly as needed. tiZANidine (ZANAFLEX) 4 MG tablet Take 1 tablet by mouth nightly        traMADol (ULTRAM) 50 MG tablet Take 1 tablet by mouth every 6 hours as needed for Pain. traZODone (DESYREL) 50 MG tablet TAKE 1 TABLET BY MOUTH AT NIGHT 90 tablet 1    pantoprazole (PROTONIX) 40 MG tablet TAKE 1 TABLET BY MOUTH DAILY 90 tablet 1    SUMAtriptan (IMITREX) 50 MG tablet Take 1 tablet by mouth once as needed for Migraine        levothyroxine (SYNTHROID) 50 MCG tablet TAKE 1 TABLET BY MOUTH IN  THE MORNING 90 tablet 1    clopidogrel (PLAVIX) 75 MG tablet TAKE 1 TABLET BY MOUTH  DAILY 90 tablet 3    gabapentin (NEURONTIN) 300 MG capsule TAKE 1 CAPSULE BY MOUTH 3  TIMES DAILY 270 capsule 1    nitroGLYCERIN (NITROSTAT) 0.4 MG SL tablet up to max of 3 total doses. If no relief after 1 dose, call 911. 25 tablet 3    vitamin B-12 500 MCG tablet Take 1 tablet by mouth daily 30 tablet 3    aspirin 81 MG tablet Take 1 tablet by mouth daily          No current facility-administered medications for this visit. Allergies: Fenofibrate and Statins  Past Medical History        Past Medical History:   Diagnosis Date    Anesthesia complication       Patient reports in 1973 following a Back Surgery he was kept in the hospital due to high temperatures in the evenings. He stated they kept him for observation and doesn't remember receiving any treatment for the high temperatures. Patient reports the anesthesia name started with flur? ?.    BPH with obstruction/lower urinary tract symptoms      Chest pain 7/1/2018    Chronic low back pain      GERD (gastroesophageal reflux disease)      H/O 24 hour EKG monitoring 3/12  12/11     3/12/12  essentially a

## 2023-10-10 NOTE — PROGRESS NOTES
10/10/23 7201   Encounter Summary   Encounter Overview/Reason  Pre-Procedural   Service Provided For: Patient and family together   Referral/Consult From: [id number]   Last Encounter  10/10/23  (Patient called back while I was visiting; Good conversation with Maximino Newsome (significant other). Both she and Olimpia Downs are people of apolinar and shared in prayer for procedure and recovery.)   Complexity of Encounter Low   Begin Time 0635   End Time  0640   Total Time Calculated 5 min   Spiritual/Emotional needs   Type Spiritual Support   Assessment/Intervention/Outcome   Assessment Calm;Coping; Hopeful;Peaceful   Intervention Active listening;Empowerment;Nurtured Hope;Prayer (assurance of)/[place];Sustaining Presence/Ministry of presence   Outcome Comfort;Coping;Encouraged;Engaged in conversation;Expressed feelings, needs, and concerns;Expressed Gratitude   Plan and Referrals   Plan/Referrals Guidance concerning next steps/process to be expected;Continue Support (comment)  (S.O. informed how to contact .)

## 2023-10-31 ENCOUNTER — OFFICE VISIT (OUTPATIENT)
Dept: CARDIOLOGY CLINIC | Age: 83
End: 2023-10-31
Payer: MEDICARE

## 2023-10-31 VITALS
HEIGHT: 70 IN | SYSTOLIC BLOOD PRESSURE: 112 MMHG | DIASTOLIC BLOOD PRESSURE: 74 MMHG | WEIGHT: 165 LBS | HEART RATE: 66 BPM | BODY MASS INDEX: 23.62 KG/M2

## 2023-10-31 DIAGNOSIS — I20.9 ANGINA PECTORIS (HCC): Primary | ICD-10-CM

## 2023-10-31 PROCEDURE — 3078F DIAST BP <80 MM HG: CPT | Performed by: INTERNAL MEDICINE

## 2023-10-31 PROCEDURE — G8427 DOCREV CUR MEDS BY ELIG CLIN: HCPCS | Performed by: INTERNAL MEDICINE

## 2023-10-31 PROCEDURE — G8420 CALC BMI NORM PARAMETERS: HCPCS | Performed by: INTERNAL MEDICINE

## 2023-10-31 PROCEDURE — 99214 OFFICE O/P EST MOD 30 MIN: CPT | Performed by: INTERNAL MEDICINE

## 2023-10-31 PROCEDURE — G8484 FLU IMMUNIZE NO ADMIN: HCPCS | Performed by: INTERNAL MEDICINE

## 2023-10-31 PROCEDURE — 1036F TOBACCO NON-USER: CPT | Performed by: INTERNAL MEDICINE

## 2023-10-31 PROCEDURE — 93000 ELECTROCARDIOGRAM COMPLETE: CPT | Performed by: INTERNAL MEDICINE

## 2023-10-31 PROCEDURE — 1124F ACP DISCUSS-NO DSCNMKR DOCD: CPT | Performed by: INTERNAL MEDICINE

## 2023-10-31 PROCEDURE — 3074F SYST BP LT 130 MM HG: CPT | Performed by: INTERNAL MEDICINE

## 2023-10-31 NOTE — PROGRESS NOTES
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  Pt  is  compliant with medicines                  The patient does  have cardiac complaints of feeling fatigued has exertional hypotension  Patient had a stress test which was abnormal has been having exertional hypotension also has carotid artery stenosis as well  C was OK    Francisco Hua has the following history recorded in care path:  Patient Active Problem List    Diagnosis Date Noted    Myalgia due to statin 02/08/2023    Basal cell carcinoma (BCC) of skin of right upper extremity including shoulder 01/27/2023    Myotonia congenita 12/01/2022    Arrhythmia 09/13/2022    Intracranial vascular stenosis 08/25/2022    Stroke-like symptoms 08/22/2022    Post laminectomy syndrome 06/22/2022    Headache 08/30/2017    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% 8/29/17 US 08/30/2017    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    Moderate mixed hyperlipidemia not requiring statin therapy     MVP (mitral valve prolapse)     ASCVD (arteriosclerotic cardiovascular disease) 10/10/2023    Hypotension 07/21/2023    Primary hypertension 02/23/2022    TIA (transient ischemic attack) 04/09/2021    Chronic migraine without aura without status migrainosus, not intractable 04/09/2020    H/O prostate cancer 07/22/2019    COPD (chronic obstructive pulmonary disease) ([address]) 07/22/2019    Acquired hypothyroidism 07/22/2019    History of Helicobacter pylori infection 07/22/2019    Diverticulosis 07/22/2019    Kidney stone     Myotonia     Peripelvic (lymphatic) cyst     Chronic low back pain     Idiopathic peripheral neuropathy     GERD (gastroesophageal reflux disease)     Lumbar

## 2023-10-31 NOTE — PATIENT INSTRUCTIONS
**It is YOUR responsibilty to bring medication bottles and/or updated medication list to [address] [place] [address]. This will allow us to better serve you and all your healthcare needs**   [facility] Laboratory Locations - No appointment necessary. Sites open Monday to Friday. Call your preferred location for test preparation, business   hours and other information you need. [employer] accepts 's. [address] [place] [address]. [address] [place], [address] [facility]  Phone: [phone]    Thank you for allowing us to care for you today! We want to ensure we can follow your treatment plan and we strive to give you the best outcomes and experience possible. If you ever have a life threatening emergency and call 911 - for an ambulance (EMS)   Our providers can only care for you at:   [facility] or [facility]. Even if you have someone take you or you drive yourself we can only care for you in a [facility]. Our providers are not setup at the other healthcare locations! Please be informed that if you contact our office outside of normal business hours the physician on call cannot help with any scheduling or rescheduling issues, procedure instruction questions or any type of medication issue. We advise you for any urgent/emergency that you go to the nearest emergency room! PLEASE CALL OUR OFFICE DURING NORMAL BUSINESS HOURS    Monday - Friday   8 am to 5 pm    Josh: [address] [place]: [phone]    [place]:  [phone]  We are committed to providing you the best care possible. If you receive a survey after visiting one of our offices, please take time to share your experience concerning your physician office visit. These surveys are confidential and no health information about you is shared. We are eager to improve for you and we are counting on your feedback to help make that happen.

## 2023-11-06 NOTE — PROGRESS NOTES
Occupational Therapy    OT attempted to see pt for initial evaluation this AM. Pt politely declined evaluation, stating he was completely independent and had no needs.      Jenn Keane, OT Physical Therapy Visit    Visit Type: Daily Treatment Note  Visit: 6  Referring Provider: Radha Hernandez MD  Medical Diagnosis (from order): R26.89 - Balance [id number] - Physical deconditioning   Patient alert and oriented X3.    SUBJECTIVE                                                                                                               Patient verbally consented to allow observer present during session. ()    Things are going slow today.  Forgot to take a water pill yesterday and legs feel a little heavier.  Took Tylenol today due to pain in the legs.  reports she sits a lot of the day.  Functional Change: Nothing new to report    Pain / Symptoms  - Pain rating (out of 10): Current: 4       OBJECTIVE                                                                                                                                       Treatment     Therapeutic Exercise  NuStep workload 3, seat 8 LE only x 6 minutes -range, strength and activity tolerance while completing subjective findings.     Standing:  Side stepping along ballet bar x 4 laps  Alternating marching x 15 with UE assist -cues for posture  6\" step up with Bilateral UE assist x 10 Bilaterally   6\" anterior step down with Bilateral UE assist x 5 Bilateral   Double heel raises x 10    Neuromuscular Re-Education  Feet together x 60 seconds   airex mat feet apart and together x 60 seconds each    Gait Training  Ambulate 150 feet in gym with 2WW with supervision.   Ambulate from department on to the elevator and into the lobby.  Patient reported fatigue and weakness requesting to use wheelchair the rest of the way out of the building.      Skilled input: as detailed above and verbal instruction/cues    Writer verbally educated and received verbal consent for hand placement, positioning of patient, and techniques to be performed today from patient for therapist position for techniques and hand placement and palpation for  techniques as described above and how they are pertinent to the patient's plan of care.  Home Exercise Program  Access Code: [email or web address]  URL: [email or web address]  Date: 10/04/2023  Prepared by: Verna Velez    Program Notes  use recumbent bike 5 minutes daily and increase 1-2 minutes every few days as able.      Exercises  - Sit to Stand with Arms Crossed  - 1-2 x daily - 5-10 reps  - Seated March  - 1-2 x daily - 7 x weekly - 1 sets - 10-15 reps  - Seated Long Arc Quad  - 1-2 x daily - 7 x weekly - 1 sets - 10-15 reps  - Standing Hip Abduction with Counter Support  - 1-2 x daily - 7 x weekly - 1 sets - 5-10 reps  - Standing March with Counter Support  - 1-2 x daily - 7 x weekly - 1 sets - 10-15 reps      ASSESSMENT                                                                                                            Encouraged patient to use the walker at all times for safety and decrease risk of falls.  Recommended patient to do more walking in the home and community to increase activity tolerance.  Had patient walk out of department with 2WW to assess patient's abilities; ambulated about 75 feet then requested to sit.  Reports legs feel better at end of session.    Pain/symptoms after session (out of 10): 2  Education:   - Results of above outlined education: Verbalizes understanding and Needs reinforcement    PLAN                                                                                                                           Suggestions for next session as indicated: Progress per plan of care standing LE strength, balance. Complete 6 minute walk test to assess activity tolerance       Therapy procedure time and total treatment time can be found documented on the Time Entry flowsheet

## 2023-11-13 ENCOUNTER — APPOINTMENT (OUTPATIENT)
Dept: CT IMAGING | Age: 83
End: 2023-11-13
Payer: MEDICARE

## 2023-11-13 ENCOUNTER — TELEPHONE (OUTPATIENT)
Dept: CARDIOLOGY CLINIC | Age: 83
End: 2023-11-13

## 2023-11-13 ENCOUNTER — HOSPITAL ENCOUNTER (INPATIENT)
Age: 83
LOS: 3 days | Discharge: HOME | End: 2023-11-16
Attending: STUDENT IN AN ORGANIZED HEALTH CARE EDUCATION/TRAINING PROGRAM
Payer: MEDICARE

## 2023-11-13 ENCOUNTER — APPOINTMENT (OUTPATIENT)
Dept: GENERAL RADIOLOGY | Age: 83
End: 2023-11-13
Payer: MEDICARE

## 2023-11-13 DIAGNOSIS — I16.0 HYPERTENSIVE URGENCY: Primary | ICD-10-CM

## 2023-11-13 DIAGNOSIS — I65.21 STENOSIS OF RIGHT CAROTID ARTERY: ICD-10-CM

## 2023-11-13 DIAGNOSIS — I60.9 SAH (SUBARACHNOID HEMORRHAGE) (HCC): ICD-10-CM

## 2023-11-13 LAB
ANION GAP SERPL CALCULATED.3IONS-SCNC: 9 MMOL/L (ref 4–16)
BASOPHILS ABSOLUTE: 0 K/CU MM
BASOPHILS RELATIVE PERCENT: 1 % (ref 0–1)
BUN SERPL-MCNC: 14 MG/DL (ref 6–23)
CALCIUM SERPL-MCNC: 9.8 MG/DL (ref 8.3–10.6)
CHLORIDE BLD-SCNC: 100 MMOL/L (ref 99–110)
CO2: 28 MMOL/L (ref 21–32)
CREAT SERPL-MCNC: 0.9 MG/DL (ref 0.9–1.3)
DIFFERENTIAL TYPE: ABNORMAL
EKG ATRIAL RATE: 61 BPM
EKG DIAGNOSIS: NORMAL
EKG P AXIS: 58 DEGREES
EKG P-R INTERVAL: 194 MS
EKG Q-T INTERVAL: 402 MS
EKG QRS DURATION: 94 MS
EKG QTC CALCULATION (BAZETT): 404 MS
EKG R AXIS: 4 DEGREES
EKG T AXIS: 55 DEGREES
EKG VENTRICULAR RATE: 61 BPM
EOSINOPHILS ABSOLUTE: 0 K/CU MM
EOSINOPHILS RELATIVE PERCENT: 1 % (ref 0–3)
GFR SERPL CREATININE-BSD FRML MDRD: >60 ML/MIN/1.73M2
GLUCOSE SERPL-MCNC: 96 MG/DL (ref 70–99)
HCT VFR BLD CALC: 48.2 % (ref 42–52)
HEMOGLOBIN: 15.6 GM/DL (ref 13.5–18)
LYMPHOCYTES ABSOLUTE: 1 K/CU MM
LYMPHOCYTES RELATIVE PERCENT: 21 % (ref 24–44)
MCH RBC QN AUTO: 31.6 PG (ref 27–31)
MCHC RBC AUTO-ENTMCNC: 32.4 % (ref 32–36)
MCV RBC AUTO: 97.6 FL (ref 78–100)
MONOCYTES ABSOLUTE: 0.4 K/CU MM
MONOCYTES RELATIVE PERCENT: 9 % (ref 0–4)
NUCLEATED RBC %: 0 %
PDW BLD-RTO: 12.6 % (ref 11.7–14.9)
PLATELET # BLD: 173 K/CU MM (ref 140–440)
PMV BLD AUTO: 9.6 FL (ref 7.5–11.1)
POTASSIUM SERPL-SCNC: 4.7 MMOL/L (ref 3.5–5.1)
RBC # BLD: 4.94 M/CU MM (ref 4.6–6.2)
SEGMENTED NEUTROPHILS ABSOLUTE COUNT: 3.4 K/CU MM
SEGMENTED NEUTROPHILS RELATIVE PERCENT: 68 % (ref 36–66)
SODIUM BLD-SCNC: 137 MMOL/L (ref 135–145)
TOTAL NUCLEATED RBC: 0 K/CU MM
WBC # BLD: 4.8 K/CU MM (ref 4–10.5)

## 2023-11-13 PROCEDURE — 2500000003 HC RX 250 WO HCPCS: Performed by: STUDENT IN AN ORGANIZED HEALTH CARE EDUCATION/TRAINING PROGRAM

## 2023-11-13 PROCEDURE — 6360000002 HC RX W HCPCS: Performed by: STUDENT IN AN ORGANIZED HEALTH CARE EDUCATION/TRAINING PROGRAM

## 2023-11-13 PROCEDURE — 93005 ELECTROCARDIOGRAM TRACING: CPT | Performed by: STUDENT IN AN ORGANIZED HEALTH CARE EDUCATION/TRAINING PROGRAM

## 2023-11-13 PROCEDURE — 99285 EMERGENCY DEPT VISIT HI MDM: CPT

## 2023-11-13 PROCEDURE — 93010 ELECTROCARDIOGRAM REPORT: CPT | Performed by: INTERNAL MEDICINE

## 2023-11-13 PROCEDURE — 96374 THER/PROPH/DIAG INJ IV PUSH: CPT

## 2023-11-13 PROCEDURE — 80048 BASIC METABOLIC PNL TOTAL CA: CPT

## 2023-11-13 PROCEDURE — 70450 CT HEAD/BRAIN W/O DYE: CPT

## 2023-11-13 PROCEDURE — 71046 X-RAY EXAM CHEST 2 VIEWS: CPT

## 2023-11-13 PROCEDURE — 6370000000 HC RX 637 (ALT 250 FOR IP)

## 2023-11-13 PROCEDURE — 2580000003 HC RX 258: Performed by: STUDENT IN AN ORGANIZED HEALTH CARE EDUCATION/TRAINING PROGRAM

## 2023-11-13 PROCEDURE — 2580000003 HC RX 258

## 2023-11-13 PROCEDURE — 2500000003 HC RX 250 WO HCPCS

## 2023-11-13 PROCEDURE — 96375 TX/PRO/DX INJ NEW DRUG ADDON: CPT

## 2023-11-13 PROCEDURE — 2000000000 HC ICU R&B

## 2023-11-13 PROCEDURE — 85025 COMPLETE CBC W/AUTO DIFF WBC: CPT

## 2023-11-13 RX ORDER — POTASSIUM CHLORIDE 29.8 MG/ML
20 INJECTION INTRAVENOUS PRN
Status: DISCONTINUED | OUTPATIENT
Start: 2023-11-13 | End: 2023-11-16 | Stop reason: HOSPADM

## 2023-11-13 RX ORDER — ACETAMINOPHEN 325 MG/1
650 TABLET ORAL EVERY 6 HOURS PRN
Status: DISCONTINUED | OUTPATIENT
Start: 2023-11-13 | End: 2023-11-16 | Stop reason: HOSPADM

## 2023-11-13 RX ORDER — POLYETHYLENE GLYCOL 3350 17 G/17G
17 POWDER, FOR SOLUTION ORAL DAILY PRN
Status: DISCONTINUED | OUTPATIENT
Start: 2023-11-13 | End: 2023-11-16 | Stop reason: HOSPADM

## 2023-11-13 RX ORDER — ONDANSETRON 4 MG/1
4 TABLET, ORALLY DISINTEGRATING ORAL EVERY 8 HOURS PRN
Status: DISCONTINUED | OUTPATIENT
Start: 2023-11-13 | End: 2023-11-16 | Stop reason: HOSPADM

## 2023-11-13 RX ORDER — FAMOTIDINE 20 MG/1
20 TABLET, FILM COATED ORAL 2 TIMES DAILY
Status: DISCONTINUED | OUTPATIENT
Start: 2023-11-13 | End: 2023-11-14

## 2023-11-13 RX ORDER — ACETAMINOPHEN 650 MG/1
650 SUPPOSITORY RECTAL EVERY 6 HOURS PRN
Status: DISCONTINUED | OUTPATIENT
Start: 2023-11-13 | End: 2023-11-16 | Stop reason: HOSPADM

## 2023-11-13 RX ORDER — SODIUM CHLORIDE 0.9 % (FLUSH) 0.9 %
5-40 SYRINGE (ML) INJECTION PRN
Status: DISCONTINUED | OUTPATIENT
Start: 2023-11-13 | End: 2023-11-16 | Stop reason: HOSPADM

## 2023-11-13 RX ORDER — 0.9 % SODIUM CHLORIDE 0.9 %
1000 INTRAVENOUS SOLUTION INTRAVENOUS ONCE
Status: COMPLETED | OUTPATIENT
Start: 2023-11-13 | End: 2023-11-13

## 2023-11-13 RX ORDER — SODIUM CHLORIDE 0.9 % (FLUSH) 0.9 %
5-40 SYRINGE (ML) INJECTION EVERY 12 HOURS SCHEDULED
Status: DISCONTINUED | OUTPATIENT
Start: 2023-11-13 | End: 2023-11-16 | Stop reason: HOSPADM

## 2023-11-13 RX ORDER — PROCHLORPERAZINE EDISYLATE 5 MG/ML
10 INJECTION INTRAMUSCULAR; INTRAVENOUS ONCE
Status: COMPLETED | OUTPATIENT
Start: 2023-11-13 | End: 2023-11-13

## 2023-11-13 RX ORDER — ONDANSETRON 2 MG/ML
4 INJECTION INTRAMUSCULAR; INTRAVENOUS EVERY 6 HOURS PRN
Status: DISCONTINUED | OUTPATIENT
Start: 2023-11-13 | End: 2023-11-16 | Stop reason: HOSPADM

## 2023-11-13 RX ORDER — LABETALOL HYDROCHLORIDE 5 MG/ML
10 INJECTION, SOLUTION INTRAVENOUS ONCE
Status: COMPLETED | OUTPATIENT
Start: 2023-11-13 | End: 2023-11-13

## 2023-11-13 RX ORDER — MAGNESIUM SULFATE IN WATER 40 MG/ML
2000 INJECTION, SOLUTION INTRAVENOUS PRN
Status: DISCONTINUED | OUTPATIENT
Start: 2023-11-13 | End: 2023-11-16 | Stop reason: HOSPADM

## 2023-11-13 RX ORDER — SODIUM CHLORIDE 9 MG/ML
INJECTION, SOLUTION INTRAVENOUS PRN
Status: DISCONTINUED | OUTPATIENT
Start: 2023-11-13 | End: 2023-11-16 | Stop reason: HOSPADM

## 2023-11-13 RX ORDER — DIPHENHYDRAMINE HYDROCHLORIDE 50 MG/ML
25 INJECTION INTRAMUSCULAR; INTRAVENOUS ONCE
Status: COMPLETED | OUTPATIENT
Start: 2023-11-13 | End: 2023-11-13

## 2023-11-13 RX ORDER — POTASSIUM CHLORIDE 7.45 MG/ML
10 INJECTION INTRAVENOUS PRN
Status: DISCONTINUED | OUTPATIENT
Start: 2023-11-13 | End: 2023-11-16 | Stop reason: HOSPADM

## 2023-11-13 RX ADMIN — PROCHLORPERAZINE EDISYLATE 10 MG: 5 INJECTION INTRAMUSCULAR; INTRAVENOUS at 14:47

## 2023-11-13 RX ADMIN — FAMOTIDINE 20 MG: 20 TABLET ORAL at 23:00

## 2023-11-13 RX ADMIN — SODIUM CHLORIDE 1 MG/HR: 9 INJECTION, SOLUTION INTRAVENOUS at 22:00

## 2023-11-13 RX ADMIN — SODIUM CHLORIDE 1000 ML: 9 INJECTION, SOLUTION INTRAVENOUS at 14:46

## 2023-11-13 RX ADMIN — SODIUM CHLORIDE 5 MG/HR: 9 INJECTION, SOLUTION INTRAVENOUS at 17:02

## 2023-11-13 RX ADMIN — SODIUM CHLORIDE, PRESERVATIVE FREE 10 ML: 5 INJECTION INTRAVENOUS at 22:07

## 2023-11-13 RX ADMIN — DIPHENHYDRAMINE HYDROCHLORIDE 25 MG: 50 INJECTION, SOLUTION INTRAMUSCULAR; INTRAVENOUS at 14:49

## 2023-11-13 RX ADMIN — LABETALOL HYDROCHLORIDE 10 MG: 5 INJECTION, SOLUTION INTRAVENOUS at 16:30

## 2023-11-13 ASSESSMENT — ENCOUNTER SYMPTOMS
NAUSEA: 0
VOMITING: 0
COUGH: 0
SHORTNESS OF BREATH: 0
ABDOMINAL PAIN: 0

## 2023-11-13 NOTE — ED PROVIDER NOTES
Pulmonary effort is normal.      Breath sounds: Normal breath sounds. Abdominal:      General: There is no distension. Palpations: Abdomen is soft. Tenderness: There is no abdominal tenderness. Musculoskeletal:         General: Normal range of motion. Cervical back: Normal range of motion and neck supple. Skin:     General: Skin is warm and dry. Neurological:      General: No focal deficit present. Mental Status: He is alert and oriented to person, place, and time. Sensory: No sensory deficit. Motor: No weakness. Comments: NIHSS 0       ORDERS     PLAN (LABS / IMAGING / EKG):  Orders Placed This Encounter   Procedures    CT HEAD WO CONTRAST    XR CHEST (2 VW)    CT HEAD WO CONTRAST    BMP    CBC with Auto Differential    Diet NPO    Vital signs per unit routine    Daily weights    Intake and output    Initiate Hypoglycemia Treatment    Place intermittent pneumatic compression device    Up as tolerated    Notify physician    Neuro checks    Weigh patient    Full Code    Inpatient consult to Neurosurgery    Initiate Oxygen Therapy Protocol    Respiratory care evaluation only    EKG 12 Lead    ADMIT TO INPATIENT       MEDICATIONS ORDERED:  Orders Placed This Encounter   Medications    sodium chloride 0.9 % bolus 1,000 mL    labetalol (NORMODYNE;TRANDATE) injection 10 mg    prochlorperazine (COMPAZINE) injection 10 mg    diphenhydrAMINE (BENADRYL) injection 25 mg    DISCONTD: niCARdipine (CARDENE) 25 mg in sodium chloride 0.9 % 250 mL infusion ([id number])     Order Specific Question:   Titrate Infusion? Answer:   Yes     Order Specific Question:   Initial Infusion Rate: Answer:   5 mg/hr     Order Specific Question:   Goal of Therapy is:      Answer:   SBP less than 140 mmHg     Order Specific Question:   Contact Provider if:     Answer:   Patient is receiving the maximum dose and is not achieving the goal of therapy    sodium chloride flush 0.9 % injection 5-40 mL ordered. ECG/medicine tests: ordered. Risk  Prescription drug management. Decision regarding hospitalization. ED Course as of 11/13/23 2109 Mon Nov 13, 2023   1413 Reviewed outpatient cardiology note from 10/31/2023 he was there for management of hyperlipidemia. His last heart cath was \"okay \"showed stable CAD. Had abnormal stress test that was either due to ischemia or diaphragmatic abnormality. [CS]   8106 BP(!): 153/88  Improved with migraine cocktail. Labetalol not given. [CS]   0752 Discussed w/ radiologist about CT -unfortunately does not have previous to compare to. Cannot determine whether the bleed is subacute or acute. [CS]   7304 BP(!): 193/73  Blood pressure trended back up. Labetalol given. We will also place on Cardene now given CT scan findings. [CS]   9139 11/9/23 CT scan at [facility] reviewed  IMPRESSION:   1. Acute intraparenchymal hemorrhage within the right occipital lobe measuring 7 x 13 x 5 mm. 2. Small acute hemorrhage within the cortex of the right frontal lobe and left frontal lobe as described. 3. Stable cerebral atrophy and small vessel ischemic disease changes. 4. Chronic left maxillary sinusitis. [CS]   26 Clare NP at [facility]. With NSG [CS]   1980 Discussed with neurosurgery at [facility], they do not plan to do any interventions. From a neurosurgical standpoint he is stable. Recommended hospitalist admission here in [place] [employer]. Reached out to our hospitalist here and since neurosurgery declined admission here at [place] [employer] also declined admission. We will plan to reach out to hospitalist at [facility]. [CS]   SANCTUARY AT THE [facility] declined based off capacity. [CS]   [id number] Discussed with ICU here happy to accept but would like to reach out with neurosurgery 1 more time here. [CS]   2039 Discussed with Dr. Ephraim Trivedi okay to stay here.   No need for repeat CT head [CS]   2102 Discussed case with medical ICU here agreeable to admission. [CS]

## 2023-11-13 NOTE — ED NOTES
[facility] CALLED BACK @1805 STATES NOT ABLE TO ACCEPT PT DUE TO NO ROOMS BEING AVAIL Keshawn Bills.       Meryle Gary  11/13/23 9247

## 2023-11-13 NOTE — ED TRIAGE NOTES
Pt arrived to the ED with complaints of high blood pressure since this morning. Pt states his readings have been in the 200's at home.

## 2023-11-13 NOTE — TELEPHONE ENCOUNTER
Pt has headache, fatigued, speech is slured   8 am 182/110   Next one 199/99   12:30ish 201/101 doesn't recall heart rate  12:52 198/102 Hr 68  Advised him to go to ER, pt going to call son to take him

## 2023-11-14 ENCOUNTER — APPOINTMENT (OUTPATIENT)
Dept: CT IMAGING | Age: 83
End: 2023-11-14
Payer: MEDICARE

## 2023-11-14 LAB
ANION GAP SERPL CALCULATED.3IONS-SCNC: 11 MMOL/L (ref 4–16)
BASOPHILS ABSOLUTE: 0 K/CU MM
BASOPHILS RELATIVE PERCENT: 0.2 % (ref 0–1)
BUN SERPL-MCNC: 16 MG/DL (ref 6–23)
CALCIUM SERPL-MCNC: 9.5 MG/DL (ref 8.3–10.6)
CHLORIDE BLD-SCNC: 101 MMOL/L (ref 99–110)
CO2: 27 MMOL/L (ref 21–32)
CREAT SERPL-MCNC: 0.9 MG/DL (ref 0.9–1.3)
DIFFERENTIAL TYPE: ABNORMAL
EOSINOPHILS ABSOLUTE: 0.1 K/CU MM
EOSINOPHILS RELATIVE PERCENT: 1.4 % (ref 0–3)
GFR SERPL CREATININE-BSD FRML MDRD: >60 ML/MIN/1.73M2
GLUCOSE SERPL-MCNC: 99 MG/DL (ref 70–99)
HCT VFR BLD CALC: 46 % (ref 42–52)
HEMOGLOBIN: 15 GM/DL (ref 13.5–18)
IMMATURE NEUTROPHIL %: 0.2 % (ref 0–0.43)
LYMPHOCYTES ABSOLUTE: 1.1 K/CU MM
LYMPHOCYTES RELATIVE PERCENT: 23.8 % (ref 24–44)
MAGNESIUM: 1.8 MG/DL (ref 1.8–2.4)
MCH RBC QN AUTO: 31.5 PG (ref 27–31)
MCHC RBC AUTO-ENTMCNC: 32.6 % (ref 32–36)
MCV RBC AUTO: 96.6 FL (ref 78–100)
MONOCYTES ABSOLUTE: 0.5 K/CU MM
MONOCYTES RELATIVE PERCENT: 10.2 % (ref 0–4)
NUCLEATED RBC %: 0 %
PDW BLD-RTO: 12.7 % (ref 11.7–14.9)
PHOSPHORUS: 3.9 MG/DL (ref 2.5–4.9)
PLATELET # BLD: 206 K/CU MM (ref 140–440)
PMV BLD AUTO: 8.8 FL (ref 7.5–11.1)
POTASSIUM SERPL-SCNC: 4 MMOL/L (ref 3.5–5.1)
RBC # BLD: 4.76 M/CU MM (ref 4.6–6.2)
SEGMENTED NEUTROPHILS ABSOLUTE COUNT: 2.8 K/CU MM
SEGMENTED NEUTROPHILS RELATIVE PERCENT: 64.2 % (ref 36–66)
SODIUM BLD-SCNC: 139 MMOL/L (ref 135–145)
TOTAL IMMATURE NEUTOROPHIL: 0.01 K/CU MM
TOTAL NUCLEATED RBC: 0 K/CU MM
WBC # BLD: 4.4 K/CU MM (ref 4–10.5)

## 2023-11-14 PROCEDURE — 85025 COMPLETE CBC W/AUTO DIFF WBC: CPT

## 2023-11-14 PROCEDURE — 6370000000 HC RX 637 (ALT 250 FOR IP): Performed by: STUDENT IN AN ORGANIZED HEALTH CARE EDUCATION/TRAINING PROGRAM

## 2023-11-14 PROCEDURE — 97530 THERAPEUTIC ACTIVITIES: CPT

## 2023-11-14 PROCEDURE — 83735 ASSAY OF MAGNESIUM: CPT

## 2023-11-14 PROCEDURE — 6370000000 HC RX 637 (ALT 250 FOR IP)

## 2023-11-14 PROCEDURE — 2000000000 HC ICU R&B

## 2023-11-14 PROCEDURE — 97162 PT EVAL MOD COMPLEX 30 MIN: CPT

## 2023-11-14 PROCEDURE — 36592 COLLECT BLOOD FROM PICC: CPT

## 2023-11-14 PROCEDURE — 70450 CT HEAD/BRAIN W/O DYE: CPT

## 2023-11-14 PROCEDURE — 84100 ASSAY OF PHOSPHORUS: CPT

## 2023-11-14 PROCEDURE — 6370000000 HC RX 637 (ALT 250 FOR IP): Performed by: PHYSICIAN ASSISTANT

## 2023-11-14 PROCEDURE — 2580000003 HC RX 258

## 2023-11-14 PROCEDURE — 6360000002 HC RX W HCPCS: Performed by: PHYSICIAN ASSISTANT

## 2023-11-14 PROCEDURE — 94761 N-INVAS EAR/PLS OXIMETRY MLT: CPT

## 2023-11-14 PROCEDURE — 80048 BASIC METABOLIC PNL TOTAL CA: CPT

## 2023-11-14 RX ORDER — TIZANIDINE 4 MG/1
4 TABLET ORAL NIGHTLY
Status: DISCONTINUED | OUTPATIENT
Start: 2023-11-14 | End: 2023-11-16 | Stop reason: HOSPADM

## 2023-11-14 RX ORDER — TRAMADOL HYDROCHLORIDE 50 MG/1
50 TABLET ORAL EVERY 6 HOURS PRN
Status: DISCONTINUED | OUTPATIENT
Start: 2023-11-14 | End: 2023-11-16 | Stop reason: HOSPADM

## 2023-11-14 RX ORDER — GABAPENTIN 100 MG/1
200 CAPSULE ORAL NIGHTLY
Status: DISCONTINUED | OUTPATIENT
Start: 2023-11-14 | End: 2023-11-16 | Stop reason: HOSPADM

## 2023-11-14 RX ORDER — HYDRALAZINE HYDROCHLORIDE 20 MG/ML
10 INJECTION INTRAMUSCULAR; INTRAVENOUS EVERY 6 HOURS PRN
Status: DISCONTINUED | OUTPATIENT
Start: 2023-11-14 | End: 2023-11-16 | Stop reason: HOSPADM

## 2023-11-14 RX ORDER — CLONAZEPAM 0.5 MG/1
0.5 TABLET ORAL NIGHTLY PRN
Status: DISCONTINUED | OUTPATIENT
Start: 2023-11-14 | End: 2023-11-16 | Stop reason: HOSPADM

## 2023-11-14 RX ORDER — AMITRIPTYLINE HYDROCHLORIDE 25 MG/1
25 TABLET, FILM COATED ORAL NIGHTLY
Status: DISCONTINUED | OUTPATIENT
Start: 2023-11-14 | End: 2023-11-16 | Stop reason: HOSPADM

## 2023-11-14 RX ORDER — FAMOTIDINE 20 MG/1
20 TABLET, FILM COATED ORAL DAILY
Status: DISCONTINUED | OUTPATIENT
Start: 2023-11-15 | End: 2023-11-16 | Stop reason: HOSPADM

## 2023-11-14 RX ORDER — LEVOTHYROXINE SODIUM 0.05 MG/1
50 TABLET ORAL DAILY
Status: DISCONTINUED | OUTPATIENT
Start: 2023-11-14 | End: 2023-11-16 | Stop reason: HOSPADM

## 2023-11-14 RX ORDER — LISINOPRIL 20 MG/1
20 TABLET ORAL DAILY
Status: DISCONTINUED | OUTPATIENT
Start: 2023-11-14 | End: 2023-11-16 | Stop reason: HOSPADM

## 2023-11-14 RX ORDER — GABAPENTIN 100 MG/1
100 CAPSULE ORAL DAILY
Status: DISCONTINUED | OUTPATIENT
Start: 2023-11-15 | End: 2023-11-16 | Stop reason: HOSPADM

## 2023-11-14 RX ORDER — LEVETIRACETAM 500 MG/5ML
500 INJECTION, SOLUTION, CONCENTRATE INTRAVENOUS EVERY 12 HOURS
Status: DISCONTINUED | OUTPATIENT
Start: 2023-11-14 | End: 2023-11-16 | Stop reason: HOSPADM

## 2023-11-14 RX ADMIN — TRAMADOL HYDROCHLORIDE 50 MG: 50 TABLET, COATED ORAL at 22:46

## 2023-11-14 RX ADMIN — SODIUM CHLORIDE, PRESERVATIVE FREE 10 ML: 5 INJECTION INTRAVENOUS at 22:49

## 2023-11-14 RX ADMIN — LEVOTHYROXINE SODIUM 50 MCG: 0.05 TABLET ORAL at 07:27

## 2023-11-14 RX ADMIN — GABAPENTIN 200 MG: 100 CAPSULE ORAL at 22:46

## 2023-11-14 RX ADMIN — FAMOTIDINE 20 MG: 20 TABLET ORAL at 10:08

## 2023-11-14 RX ADMIN — TIZANIDINE 4 MG: 4 TABLET ORAL at 22:46

## 2023-11-14 RX ADMIN — LEVETIRACETAM 500 MG: 100 INJECTION, SOLUTION INTRAVENOUS at 22:44

## 2023-11-14 RX ADMIN — LEVETIRACETAM 500 MG: 100 INJECTION, SOLUTION INTRAVENOUS at 10:29

## 2023-11-14 RX ADMIN — AMITRIPTYLINE HYDROCHLORIDE 25 MG: 25 TABLET, FILM COATED ORAL at 22:46

## 2023-11-14 RX ADMIN — SODIUM CHLORIDE, PRESERVATIVE FREE 10 ML: 5 INJECTION INTRAVENOUS at 10:14

## 2023-11-14 RX ADMIN — LISINOPRIL 20 MG: 20 TABLET ORAL at 10:27

## 2023-11-14 ASSESSMENT — ENCOUNTER SYMPTOMS
SHORTNESS OF BREATH: 0
NAUSEA: 0
ABDOMINAL DISTENTION: 0
BACK PAIN: 0
VOMITING: 0
ABDOMINAL PAIN: 0
SORE THROAT: 0

## 2023-11-14 NOTE — CARE COORDINATION
CM met with pt at bedside to assess discharge needs. Pt lives alone. Pt appears to be independent PTA stating he was doing yard work. Pt was in [facility] within the last week due to a fall and head injury. Per Care everywhere pt declined home care upon discharge. Pt has 3 children, one lives in [address] [place] [address] and the other two are local. Pt has PCP/Ins. CM notes PT/OT has been ordered on 11/14. CM placed white board to therapy regarding need for evals. Pt has had multiple falls in the past few weeks. Pt states he drives. Pt has a walker and a cane at home and reports he doesn't use them. Cm will follow for PT/OT recs. 11/14/23 1745   Service Assessment   Patient Orientation Alert and Oriented;Person;Place;Situation   Cognition Alert   History Provided By Patient;Medical Record   Primary Caregiver Self   Accompanied By/Relationship not applicable   Support Systems Children   Patient's Healthcare Decision Maker is: Legal Next of Kin   PCP Verified by CM Yes   Last Visit to PCP   (unknown)   Prior Functional Level Independent in ADLs/IADLs   Current Functional Level Assistance with the following:;Mobility   Can patient return to prior living arrangement Unknown at present   Ability to make needs known: Good   Family able to assist with home care needs: Yes  (Pt has 3 children. 2 are local.)   Would you like for me to discuss the discharge plan with any other family members/significant others, and if so, who? Yes  (emergency contact as needed)   Financial Resources Medicare   CM/SW Referral Other (see comment)  (discharge planning assessment)   Social/Functional History   Lives With Alone   Type of [address] [place] [address]  One level; Laundry in basement   [employer] in [address] Cane;Walker, [place] [employer] From [employer]  Yes

## 2023-11-14 NOTE — H&P
NITRU NEGATIVE 08/19/2022 05:26 AM    COLORU YELLOW 08/19/2022 05:26 AM    WBCUA NONE SEEN 08/19/2022 05:26 AM    RBCUA NONE SEEN 08/19/2022 05:26 AM    MUCUS RARE 01/22/2022 09:30 AM    TRICHOMONAS NONE SEEN 08/19/2022 05:26 AM    BACTERIA NEGATIVE 08/19/2022 05:26 AM    CLARITYU CLEAR 08/19/2022 05:26 AM    SPECGRAV <1.005 08/19/2022 05:26 AM    LEUKOCYTESUR NEGATIVE 08/19/2022 05:26 AM    UROBILINOGEN 0.2 08/19/2022 05:26 AM    BILIRUBINUR NEGATIVE 08/19/2022 05:26 AM    BLOODU NEGATIVE 08/19/2022 05:26 AM    GLUCOSEU negative 11/21/2013 07:41 AM    KETUA NEGATIVE 08/19/2022 05:26 AM     Urine Cultures: No results found for: \"LABURIN\"  Blood Cultures: No results found for: \"BC\"  No results found for: \"BLOODCULT2\"  Organism: No results found for: \"ORG\"    Imaging/Diagnostics Last 24 Hours   CT HEAD WO CONTRAST    Result Date: 11/13/2023  EXAMINATION: CT OF THE HEAD WITHOUT CONTRAST  11/13/2023 2:54 pm TECHNIQUE: CT of the head was performed without the administration of intravenous contrast. Automated exposure control, iterative reconstruction, and/or weight based adjustment of the mA/kV was utilized to reduce the radiation dose to as low as reasonably achievable. COMPARISON: 10/17/2022 HISTORY: ORDERING SYSTEM PROVIDED HISTORY: Headache.  high BP.  fall last week with ICH at [facility] TECHNOLOGIST PROVIDED HISTORY: Reason for exam:->Headache.  high BP.  fall last week with ICH at [facility] Has a \"code stroke\" or \"stroke alert\" been called? ->No Decision Support Exception - unselect if not a suspected or confirmed emergency medical condition->Emergency Medical Condition (MA) Reason for Exam: Headache. high BP. fall last week with ICH at [facility] FINDINGS: BRAIN/VENTRICLES: Multiple foci of small amount of subarachnoid hemorrhage in bilateral frontal regions. In addition, a focal area of right occipital hemorrhagic contusion measuring approximately 1.1 x 0.6 cm with mild surrounding edema. Suggestion of mild intraventricular hemorrhage in the posterior horn of the right lateral ventricle. No evidence of significant midline shift. No evidence of hydrocephalus. No evidence of posterior fossa bleeding. ORBITS: The visualized portion of the orbits demonstrate no acute abnormality. SINUSES: The visualized paranasal sinuses and mastoid air cells demonstrate no acute abnormality. SOFT TISSUES/SKULL:  No acute abnormality of the visualized skull or soft tissues. 1. Small foci of bilateral frontal subarachnoid blood without significant mass effect or midline shift. 2. Intra-axial hemorrhagic contusion in the right occipital area measures 1.1 x 0.6 cm. This is surrounded by mild edema. Posterior horns of the lateral ventricles are not well visualized and it is likely that the right posterior horn contains some blood. 3. No evidence of significant midline shift or mass effect. No evidence of acute skull fracture deformity. Findings discussed with Dr. Jazzy Horta on 11/13/2023 at 4:36 pm.     XR CHEST (2 VW)    Result Date: 11/13/2023  EXAMINATION: TWO XRAY VIEWS OF THE CHEST 11/13/2023 3:09 pm COMPARISON: 10/04/2023 at 1008 hours HISTORY: ORDERING SYSTEM PROVIDED HISTORY: Elevated BP TECHNOLOGIST PROVIDED HISTORY: Reason for exam:->Elevated BP Reason for Exam: Elevated BP Additional signs and symptoms: unknown Relevant Medical/Surgical History: MVP, chest paim, GERD FINDINGS: No acute pulmonary consolidation, airspace infiltrate, detectable pleural effusion, pneumothorax, pulmonary edema, cardiomegaly or mediastinal widening. Radiographically nonacute chest.       Personally reviewed Lab Studies, Imaging, and discussed case with Dr. Khushbu Shah. Electronically signed by NELLIE Mccray CNP on 11/13/2023 at 9:14 PM    Critical care time, excluding time doing procedures, was 40 minutes.

## 2023-11-14 NOTE — CONSULTS
V2.0  History and Physical      Name:  Gilmer Quintana /Age/Sex: 1940  (79 y.o. male)   MRN & CSN:  [id number] & [id number] Encounter Date/Time: 2023 3:13 PM EST   Location:  -A PCP: Karen Triana DO       Hospital Day: 2    Assessment and Plan:   Gilmer Quintana is a 79 y.o. 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  GERD -PPI  Insomnia -continue home meds as able  Myotonia    Disposition:   Current Living situation: Home  Expected Disposition: TBD  Estimated D/C: 2 days    Diet ADULT DIET; Regular   DVT Prophylaxis [] Lovenox, []  Heparin, [] SCDs, [] Ambulation,  [] Eliquis, [] Xarelto, [] Coumadin   Code Status Full Code   Surrogate Decision Maker/ POA      Personally reviewed Lab Studies and Imaging     Imaging that was interpreted personally includes CT scan and results as below    Drugs that require monitoring for toxicity include lisinopril and the method of monitoring was BP        History from:     patient, electronic medical record    History of Present Illness:     Chief Complaint:   Gilmer Quintana is a 79 y.o. male who checked his BP at home and noted it to be SBP 200s for which he presented. Pt's cardiologist recommended coming to the ED. BP on arrival was 153/88. Of note, pt had a fall at home on  and noted to have [facility] for which he was admitted and discharged on , told not to take his plavix or BP meds.  [address] on arrival this visit with small foci of b/l

## 2023-11-14 NOTE — CARE COORDINATION
MCG criteria for headache reviewed at this time, criteria supports Inpatient Admission.  BRENDEN,RN/CM

## 2023-11-14 NOTE — CONSULTS
[address] D Gautam, 1940, 2101/2101-A, 11/14/2023    Assessment:  Patient admitted for fall 11/9/23 with SAH and head contusion with evolving medical features and stable/uncomplicated rehabilitative features. Fall happened during power washing task after sitting on low stool and failing to rise d/t neuro condition . While in hospital, skilled physical therapy services are appropriate. There are no significant educational impairments or barriers to learning which prevent participation with service. Clinical decision making:  medium complexity:  hx 1-2 personal factors/comorbidities, exam tests and measures for 2-3 elements of body/structures/functions/activity limitation/participation restriction, evolving presentation. Discharge recommendations:  Support home independently. Very active at baseline, performed well with eval.  Should own a RW for periodic use. Plan/Goals:  Eval and hold. Doing well. No significant needs for acute care PT service. Encourage RN mobility. Mobility homework for patient and nurse:    change position frequently, sit at edge of bed 4-6 times daily, out of bed for meals, stand at bedside 4-6 times daily , accompany to bathroom for voiding, and ambulate 4-6 times daily, with least restrictive device. Patient requires supervision vs precautionary CGA for mobility with nursing. History  [place]:  The primary encounter diagnosis was Hypertensive urgency. A diagnosis of SAH (subarachnoid hemorrhage) ([address]) was also pertinent to this visit.   Patient  has a past medical history of Anesthesia complication, BPH with obstruction/lower urinary tract symptoms, Chest pain, Chronic low back pain, GERD (gastroesophageal reflux disease), H/O 24 hour EKG monitoring, H/O cardiac catheterization, H/O cardiac catheterization, H/O cardiac catheterization, H/O cardiovascular stress test, H/O echocardiogram, History of ambulation, self-recognized HA and elected to rest.  Neuro screen:  grossly WFL functional performance, coord, recruitment, and control. Slight delay initiating d/t underlying, lifelong neuro condition, overcome neuro condition appropriately with inc time and focus. Musculoskeletal screen:  grossly WFL AROM and power. Mobility skills and general balance:    Ambulation:  yes    Ambulation distance, pattern, device:  75+ feet, normal pattern, and no device   Ambulation assistance:  CGA via HHA  Bed Mobility, supine-sit, and sitting balance: independent. Sit-stand and immediate static standing balance:  independent. Stand-pivot:  supervision. Alysia's Egress Test (of mobility from lying in bed through walking to bathroom):   Pass -- A -- mobility with ASSIST of one person from supine to sit, then to stand one minute, then walk into bathroom. [facility] 6 Clicks Inpatient Mobility:   (Ambulation in room is defined as 10-30 feet.)  [facility] Basic Mobility - Inpatient   How much help is needed turning from your back to your side while in a flat bed without using bedrails?: None  How much help is needed moving from lying on your back to sitting on the side of a flat bed without using bedrails?: None  How much help is needed moving to and from a bed to a chair?: None  How much help is needed standing up from a chair using your arms?: None  How much help is needed walking in hospital room?: A Little  How much help is needed climbing 3-5 steps with a railing?: A Little  [facility] Inpatient Mobility Raw Score : 22  AM-PAC Inpatient T-Scale Score : 53.28  Mobility Inpatient CMS 0-100% Score: 20.91  Mobility Inpatient CMS G-Code Modifier : CJ  [facility] 6 Clicks Goal:  n/a    Treatment today:    Therapeutic Activity Training:   Therapeutic activity training was instructed today. Cues were given for safety, sequence, UE/LE placement, awareness, and balance.     Activities performed today included bed mobility training, sup-sit,

## 2023-11-14 NOTE — CONSULTS
S/p fall. Previously seen at [facility]. CT shows tiny ICH. No mass effect or midline shift. No further neurosurgical intervention indicated. Hold antiplatelets/anticoagulants for 2 weeks, then resume. F/u with PCP after discharge. Call with questions. Jericho Carrington MD, [address] [place] [address]  Board Certified Neurosurgeon  Minimally Invasive Spine Surgeon  President of [address] [place] [address].      Phone: Rachel Albarran ([phone])  Fax: [phone]  Website: [email or web address]

## 2023-11-14 NOTE — PROGRESS NOTES
Inpatient Progress Note 11/14/2023        Jelani Salazar  1940  [id number]      Assessment/Plan:  Jelani Salazar is a 79 y.o. 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 Posterior horn of the lateral ventricle not well visualized and it is likely that the right posterior horn contains some blood per discussion with the neurosurgical team at [facility], CT scan results are unchanged, no surgical intervention. Discussion with general surgery at [address] [place] [address], patient to stay for every hour neurochecks and repeat CT scan at the 6-hour and 24-hour aylin. Keppra 500 twice daily, every hour neurochecks, repeat CT scan in 24 hours. Known to have a genetic history of myotonia, thought that beta-blockers may be contributing, hold for now. Cardio: Known hypertensive presented with blood pressure in the 200s,(was told to stop taking his anticoagulation and hypertensive medications by outside hospital) at home on beta-blockers which may have contributed to his falls and contributed to his myotonia.   Switch antihypertensives to lisinopril, monitor vitals and adjust as needed for blood pressure

## 2023-11-14 NOTE — PROGRESS NOTES
Occupational Therapy ATTEMPT Treatment Note  Name: Candice Carreno MRN: [id number] :   1940   Date:  2023   Admission Date: 2023 Room:  -A     Primary Problem:  The primary encounter diagnosis was Hypertensive urgency. A diagnosis of SAH (subarachnoid hemorrhage) ([address]) was also pertinent to this visit. Past Medical History:   Diagnosis Date    Anesthesia complication     Patient reports in  following a Back Surgery he was kept in the hospital due to high temperatures in the evenings. He stated they kept him for observation and doesn't remember receiving any treatment for the high temperatures. Patient reports the anesthesia name started with flur? ?.    BPH with obstruction/lower urinary tract symptoms     Chest pain 2018    Chronic low back pain     GERD (gastroesophageal reflux disease)     H/O 24 hour EKG monitoring 3/12  12/11    3/12/12  essentially a normal event monitor without significant arrhythmias noted    H/O cardiac catheterization 2/2/12 4/02 10/98    2/22/12  LM, LAD and RCA all without significant disease    H/O cardiac catheterization 2/2/12 4/02 10/98    Done R/T false pos on stress test.    H/O cardiac catheterization 2018    normal study    H/O cardiovascular stress test 2018    normal study    H/O echocardiogram 12/10      3/14 EF55-60% normal LV function 12/10/10  complete 2 dim transthoracic echj. LVSF normal  EF >55%, transmitral spectral Doppler flow pattern is suggestive of  impaired LV relaxation    History of cardiovascular stress test 2012  evidence of mild ischemia  in the RCA. abnormal study , restingEF is 70%, global LVSF is  normal    History of Holter monitoring 14    14 day EVENT - no RVR    History of nuclear stress test 2017    cardiolite-normal,EF60%    Hx of chest x-ray 12    chest is considereed non acute.   COPD    Hx of echocardiogram 2017    EF55-60%,grade 2 diastolic dysfunction    Hyperlipidemia     Idiopathic peripheral neuropathy     Kidney stone     Left facial numbness 8/30/2017    Lumbar radiculopathy     Malignant melanoma of skin of face (HCC)     MVP (mitral valve prolapse)     Myotonia     chronic increased CPK's    Peripelvic (lymphatic) cyst     per CT    Precordial pain 8/3/2018    Vitamin B12 deficiency              Received OT consult and CM request to see pt. Will await neurosurgery consult/plan prior to mobilization. Will cont to follow.            Electronically signed by:    SONYA Carreon/L  License: [id number]  09/25/0109, 8:47 AM

## 2023-11-14 NOTE — PROGRESS NOTES
4 Eyes Skin Assessment     NAME:  Peggy Florez  YOB: 1940  MEDICAL RECORD NUMBER:  [id number]    The patient is being assessed for  Admission    I agree that at least one RN has performed a thorough Head to Toe Skin Assessment on the patient. ALL assessment sites listed below have been assessed. Areas assessed by both nurses:    Head, Face, Ears, Shoulders, Back, Chest, Arms, Elbows, Hands, Sacrum. Buttock, Coccyx, Ischium, Legs. Feet and Heels, and Under Medical Devices         Does the Patient have a Wound? Yes wound(s) were present on assessment.  LDA wound assessment was Initiated and completed by RN     HEAD  Nazario Prevention initiated by RN: no  Wound Care Orders initiated by RN: No    Pressure Injury (Stage 3,4, Unstageable, DTI, NWPT, and Complex wounds) if present, place Wound referral order by RN under : No    New Ostomies, if present place, Ostomy referral order under : No     Nurse 1 eSignature: Electronically signed by Liam Lawler RN on 11/14/23 at 8:36 AM EST    **SHARE this note so that the co-signing nurse can place an eSignature**    Nurse 2 eSignature: Electronically signed by Pro Baker RN on 11/14/23 at 8:38 AM EST

## 2023-11-14 NOTE — CONSULTS
Comprehensive Nutrition Assessment    Type and Reason for Visit:  Initial, Consult (TF order/management)    Nutrition Recommendations/Plan:   Liberalize diet to regular  Please encourage and document po intakes at all meals  Monitor weights, po intakes, labs, POC     Malnutrition Assessment:  Malnutrition Status: At risk for malnutrition (Comment) (advanced age, acute illness) (11/14/23 2200)    Context:  Acute Illness     Findings of the 6 clinical characteristics of malnutrition:  Energy Intake:  Mild decrease in energy intake (Comment)  Weight Loss:  No significant weight loss     Body Fat Loss:  No significant body fat loss     Muscle Mass Loss:  Mild muscle mass loss Temples (temporalis)  Fluid Accumulation:  No significant fluid accumulation     Strength:  Not Performed    Nutrition Assessment:    Admitted w/ subarachnoid bleed, pmh of HTN, insomnia, hypothyroidism, HLD, 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 C/s for TF order/management, pt does not have any indication for starting TF, no access place. He endorses eating well PTA, appetite is somewhat decreased here due to not feeling well. His weight has been stable for at least 1 year per pt and chart review. NFPE performed, no sig wasting noted. He declined oral supplements, dislikes. Encouraged adequate intakes; will liberalize cardiac restriction to promote po intakes. Follow at moderate risk. Nutrition Related Findings:    Meds, labs reviewed Wound Type:  (abraision to head)       Current Nutrition Intake & Therapies:    Average Meal Intake: Unable to assess  Average Supplements Intake: None Ordered  ADULT DIET;  Regular; Low Fat/Low Chol/High Fiber/TURNER    Anthropometric Measures:  Height: 177.8 cm (5' 10\")  Ideal Body Weight (IBW): 166 lbs (75 kg)    Admission Body Weight: 78.5 kg (173 lb 1 oz) (bedscale)  Current Body Weight: 78.5 kg (173 lb 1 oz), IBW. Weight Source: Bed Scale  Current BMI (kg/m2): 24.8  Usual Body Weight: 76.7 kg (169 lb 1.5 oz) (8/29/23)  % Weight Change (Calculated): 2.3  Weight Adjustment For: No Adjustment                 BMI Categories: Normal Weight (BMI 22.0 to 24.9) age over 72    Estimated Daily Nutrient Needs:  Energy Requirements Based On: Kcal/kg  Weight Used for Energy Requirements: Current  Energy (kcal/day): 4187-0607  (25-30kcal/kg)  Weight Used for Protein Requirements: Ideal  Protein (g/day):  (1.2-1.4g/kg IBW)  Method Used for Fluid Requirements: 1 ml/kcal  Fluid (ml/day): 2000    Nutrition Diagnosis:   Predicted inadequate energy intake related to increase demand for energy/nutrients, acute injury/trauma as evidenced by intake 26-50%, intake 51-75%    Nutrition Interventions:   Food and/or Nutrient Delivery: Continue Current Diet  Nutrition Education/Counseling: No recommendation at this time  Coordination of Nutrition Care: Continue to monitor while inpatient, Coordination of Care  Plan of Care discussed with: pt, RN    Goals:     Goals: PO intake 75% or greater, by next RD assessment       Nutrition Monitoring and Evaluation:   Behavioral-Environmental Outcomes: None Identified  Food/Nutrient Intake Outcomes: Food and Nutrient Intake, Supplement Intake  Physical Signs/Symptoms Outcomes: Weight, Nutrition Focused Physical Findings, Biochemical Data, Meal Time Behavior    Discharge Planning:    Continue current diet     Boston Stevens, [address]: [id number]

## 2023-11-14 NOTE — PROCEDURES
4 Eyes Skin Assessment     NAME:  Carmen Florez  YOB: 1940  MEDICAL RECORD NUMBER:  [id number]    The patient is being assessed for  {Reason for Assessment:02446}    I agree that at least one RN has performed a thorough Head to Toe Skin Assessment on the patient. ALL assessment sites listed below have been assessed. Areas assessed by both nurses:    {Pressure Areas Assessed:77405}        Does the Patient have a Wound?  {Action ZZAIT:04733}       Nazario Prevention initiated by RN: Yes  Wound Care Orders initiated by RN: No    Pressure Injury (Stage 3,4, Unstageable, DTI, NWPT, and Complex wounds) if present, place Wound referral order by RN under : No    New Ostomies, if present place, Ostomy referral order under : No     Nurse 1 eSignature: Electronically signed by Venus Washington RN on 11/13/23 at 9:55 PM EST    **SHARE this note so that the co-signing nurse can place an eSignature**    Nurse 2 eSignature: {Esignature:[id number]}

## 2023-11-15 LAB
ANION GAP SERPL CALCULATED.3IONS-SCNC: 11 MMOL/L (ref 4–16)
BASOPHILS ABSOLUTE: 0 K/CU MM
BASOPHILS RELATIVE PERCENT: 0.4 % (ref 0–1)
BUN SERPL-MCNC: 22 MG/DL (ref 6–23)
CALCIUM SERPL-MCNC: 9.5 MG/DL (ref 8.3–10.6)
CHLORIDE BLD-SCNC: 102 MMOL/L (ref 99–110)
CO2: 27 MMOL/L (ref 21–32)
CREAT SERPL-MCNC: 1 MG/DL (ref 0.9–1.3)
DIFFERENTIAL TYPE: ABNORMAL
EOSINOPHILS ABSOLUTE: 0.1 K/CU MM
EOSINOPHILS RELATIVE PERCENT: 1.6 % (ref 0–3)
GFR SERPL CREATININE-BSD FRML MDRD: >60 ML/MIN/1.73M2
GLUCOSE SERPL-MCNC: 88 MG/DL (ref 70–99)
HCT VFR BLD CALC: 48.1 % (ref 42–52)
HEMOGLOBIN: 15.7 GM/DL (ref 13.5–18)
IMMATURE NEUTROPHIL %: 0 % (ref 0–0.43)
LYMPHOCYTES ABSOLUTE: 1.3 K/CU MM
LYMPHOCYTES RELATIVE PERCENT: 25.8 % (ref 24–44)
MAGNESIUM: 2 MG/DL (ref 1.8–2.4)
MCH RBC QN AUTO: 32.2 PG (ref 27–31)
MCHC RBC AUTO-ENTMCNC: 32.6 % (ref 32–36)
MCV RBC AUTO: 98.6 FL (ref 78–100)
MONOCYTES ABSOLUTE: 0.5 K/CU MM
MONOCYTES RELATIVE PERCENT: 10.4 % (ref 0–4)
NUCLEATED RBC %: 0 %
PDW BLD-RTO: 13 % (ref 11.7–14.9)
PHOSPHORUS: 4.2 MG/DL (ref 2.5–4.9)
PLATELET # BLD: 207 K/CU MM (ref 140–440)
PMV BLD AUTO: 9 FL (ref 7.5–11.1)
POTASSIUM SERPL-SCNC: 4 MMOL/L (ref 3.5–5.1)
RBC # BLD: 4.88 M/CU MM (ref 4.6–6.2)
SEGMENTED NEUTROPHILS ABSOLUTE COUNT: 3.2 K/CU MM
SEGMENTED NEUTROPHILS RELATIVE PERCENT: 61.8 % (ref 36–66)
SODIUM BLD-SCNC: 140 MMOL/L (ref 135–145)
TOTAL IMMATURE NEUTOROPHIL: 0 K/CU MM
TOTAL NUCLEATED RBC: 0 K/CU MM
WBC # BLD: 5.1 K/CU MM (ref 4–10.5)

## 2023-11-15 PROCEDURE — 83735 ASSAY OF MAGNESIUM: CPT

## 2023-11-15 PROCEDURE — 97530 THERAPEUTIC ACTIVITIES: CPT

## 2023-11-15 PROCEDURE — 80048 BASIC METABOLIC PNL TOTAL CA: CPT

## 2023-11-15 PROCEDURE — 6360000002 HC RX W HCPCS: Performed by: PHYSICIAN ASSISTANT

## 2023-11-15 PROCEDURE — 97166 OT EVAL MOD COMPLEX 45 MIN: CPT

## 2023-11-15 PROCEDURE — 6370000000 HC RX 637 (ALT 250 FOR IP)

## 2023-11-15 PROCEDURE — 2580000003 HC RX 258: Performed by: INTERNAL MEDICINE

## 2023-11-15 PROCEDURE — 2580000003 HC RX 258

## 2023-11-15 PROCEDURE — 94761 N-INVAS EAR/PLS OXIMETRY MLT: CPT

## 2023-11-15 PROCEDURE — 6370000000 HC RX 637 (ALT 250 FOR IP): Performed by: PHYSICIAN ASSISTANT

## 2023-11-15 PROCEDURE — 6370000000 HC RX 637 (ALT 250 FOR IP): Performed by: STUDENT IN AN ORGANIZED HEALTH CARE EDUCATION/TRAINING PROGRAM

## 2023-11-15 PROCEDURE — 84100 ASSAY OF PHOSPHORUS: CPT

## 2023-11-15 PROCEDURE — 1200000000 HC SEMI PRIVATE

## 2023-11-15 PROCEDURE — 85025 COMPLETE CBC W/AUTO DIFF WBC: CPT

## 2023-11-15 RX ORDER — DILTIAZEM HYDROCHLORIDE 120 MG/1
120 CAPSULE, COATED, EXTENDED RELEASE ORAL DAILY
Status: DISCONTINUED | OUTPATIENT
Start: 2023-11-15 | End: 2023-11-16 | Stop reason: HOSPADM

## 2023-11-15 RX ORDER — SODIUM CHLORIDE, SODIUM LACTATE, POTASSIUM CHLORIDE, CALCIUM CHLORIDE 600; 310; 30; 20 MG/100ML; MG/100ML; MG/100ML; MG/100ML
INJECTION, SOLUTION INTRAVENOUS CONTINUOUS
Status: DISPENSED | OUTPATIENT
Start: 2023-11-15 | End: 2023-11-16

## 2023-11-15 RX ORDER — SODIUM CHLORIDE, SODIUM LACTATE, POTASSIUM CHLORIDE, AND CALCIUM CHLORIDE .6; .31; .03; .02 G/100ML; G/100ML; G/100ML; G/100ML
500 INJECTION, SOLUTION INTRAVENOUS ONCE
Status: COMPLETED | OUTPATIENT
Start: 2023-11-15 | End: 2023-11-15

## 2023-11-15 RX ADMIN — SODIUM CHLORIDE, PRESERVATIVE FREE 10 ML: 5 INJECTION INTRAVENOUS at 09:17

## 2023-11-15 RX ADMIN — FAMOTIDINE 20 MG: 20 TABLET ORAL at 09:17

## 2023-11-15 RX ADMIN — GABAPENTIN 100 MG: 100 CAPSULE ORAL at 09:17

## 2023-11-15 RX ADMIN — SODIUM CHLORIDE, POTASSIUM CHLORIDE, SODIUM LACTATE AND CALCIUM CHLORIDE 500 ML: 600; 310; 30; 20 INJECTION, SOLUTION INTRAVENOUS at 13:54

## 2023-11-15 RX ADMIN — AMITRIPTYLINE HYDROCHLORIDE 25 MG: 25 TABLET, FILM COATED ORAL at 21:26

## 2023-11-15 RX ADMIN — GABAPENTIN 200 MG: 100 CAPSULE ORAL at 21:26

## 2023-11-15 RX ADMIN — LEVETIRACETAM 500 MG: 100 INJECTION, SOLUTION INTRAVENOUS at 21:26

## 2023-11-15 RX ADMIN — LEVOTHYROXINE SODIUM 50 MCG: 0.05 TABLET ORAL at 05:12

## 2023-11-15 RX ADMIN — LEVETIRACETAM 500 MG: 100 INJECTION, SOLUTION INTRAVENOUS at 14:01

## 2023-11-15 RX ADMIN — SODIUM CHLORIDE, PRESERVATIVE FREE 10 ML: 5 INJECTION INTRAVENOUS at 21:26

## 2023-11-15 RX ADMIN — SODIUM CHLORIDE, POTASSIUM CHLORIDE, SODIUM LACTATE AND CALCIUM CHLORIDE: 600; 310; 30; 20 INJECTION, SOLUTION INTRAVENOUS at 18:47

## 2023-11-15 RX ADMIN — TIZANIDINE 4 MG: 4 TABLET ORAL at 21:26

## 2023-11-15 RX ADMIN — LISINOPRIL 20 MG: 20 TABLET ORAL at 09:16

## 2023-11-15 ASSESSMENT — ENCOUNTER SYMPTOMS
ABDOMINAL PAIN: 0
EYE PAIN: 0
COUGH: 0
DIARRHEA: 0
SORE THROAT: 0
CONSTIPATION: 0
NAUSEA: 0
SINUS PRESSURE: 0
VOMITING: 0
SHORTNESS OF BREATH: 0
WHEEZING: 0
BACK PAIN: 0
SINUS PAIN: 0
EYE ITCHING: 0
CHEST TIGHTNESS: 0
EYE REDNESS: 0

## 2023-11-15 ASSESSMENT — PAIN DESCRIPTION - LOCATION: LOCATION: HEAD

## 2023-11-15 ASSESSMENT — PAIN DESCRIPTION - PAIN TYPE: TYPE: ACUTE PAIN

## 2023-11-15 ASSESSMENT — PAIN SCALES - GENERAL
PAINLEVEL_OUTOF10: 0
PAINLEVEL_OUTOF10: 3
PAINLEVEL_OUTOF10: 0

## 2023-11-15 ASSESSMENT — PAIN - FUNCTIONAL ASSESSMENT: PAIN_FUNCTIONAL_ASSESSMENT: ACTIVITIES ARE NOT PREVENTED

## 2023-11-15 ASSESSMENT — PAIN DESCRIPTION - ORIENTATION: ORIENTATION: UPPER

## 2023-11-15 ASSESSMENT — PAIN DESCRIPTION - DESCRIPTORS: DESCRIPTORS: ACHING

## 2023-11-15 NOTE — PROGRESS NOTES
Patient off the floor to 3E at this time. All belongings, including cell phone, , glasses, clothing, shoes and wallet transported with patient to #[id number]. Collette Chard, RN notified.

## 2023-11-15 NOTE — PLAN OF CARE
Problem: Discharge Planning  Goal: Discharge to home or other facility with appropriate resources  Outcome: Progressing     Problem: Safety - Adult  Goal: Free from fall injury  Outcome: Progressing     Problem: Nutrition Deficit:  Goal: Optimize nutritional status  Outcome: Progressing     Problem: Neurosensory - Adult  Goal: Achieves stable or improved neurological status  Outcome: Progressing  Goal: Absence of seizures  Outcome: Progressing  Goal: Remains free of injury related to seizures activity  Outcome: Progressing  Goal: Achieves maximal functionality and self care  Outcome: Progressing     Problem: Skin/Tissue Integrity - Adult  Goal: Skin integrity remains intact  Outcome: Progressing  Goal: Incisions, wounds, or drain sites healing without S/S of infection  Outcome: Progressing     Problem: Musculoskeletal - Adult  Goal: Return mobility to safest level of function  Outcome: Progressing  Goal: Maintain proper alignment of affected body part  Outcome: Progressing  Goal: Return ADL status to a safe level of function  Outcome: Progressing     Problem: Metabolic/Fluid and Electrolytes - Adult  Goal: Electrolytes maintained within normal limits  Outcome: Progressing  Goal: Hemodynamic stability and optimal renal function maintained  Outcome: Progressing     Problem: Hematologic - Adult  Goal: Maintains hematologic stability  Outcome: Progressing     Problem: Skin/Tissue Integrity  Goal: Absence of new skin breakdown  Description: 1. Monitor for areas of redness and/or skin breakdown  2. Assess vascular access sites hourly  3. Every 4-6 hours minimum:  Change oxygen saturation probe site  4. Every 4-6 hours:  If on nasal continuous positive airway pressure, respiratory therapy assess nares and determine need for appliance change or resting period.   Outcome: Progressing

## 2023-11-15 NOTE — PROGRESS NOTES
Occupational [facility] ACUTE CARE OCCUPATIONAL THERAPY EVALUATION  Demetria Phan, 1940, 2101/2101-A, 11/15/2023    Discharge Recommendation:  Home Health OT services    History  [place]:  The primary encounter diagnosis was Hypertensive urgency. A diagnosis of SAH (subarachnoid hemorrhage) ([address]) was also pertinent to this visit. Patient  has a past medical history of Anesthesia complication, BPH with obstruction/lower urinary tract symptoms, Chest pain, Chronic low back pain, GERD (gastroesophageal reflux disease), H/O 24 hour EKG monitoring, H/O cardiac catheterization, H/O cardiac catheterization, H/O cardiac catheterization, H/O cardiovascular stress test, H/O echocardiogram, History of cardiovascular stress test, History of Holter monitoring, History of nuclear stress test, Hx of chest x-ray, Hx of echocardiogram, Hyperlipidemia, Idiopathic peripheral neuropathy, Kidney stone, Left facial numbness, Lumbar radiculopathy, Malignant melanoma of skin of face ([address]), MVP (mitral valve prolapse), Myotonia, Myotonia congenita, Peripelvic (lymphatic) cyst, Precordial pain, and Vitamin B12 deficiency. Patient  has a past surgical history that includes Cardiac catheterization (2/2/12); Prostate surgery (09/26/2011); Rotator cuff repair (Right, 10/2016); Colonoscopy (5/5/14); Colonoscopy (07/10/2017); lumbar discectomy (7739); ureterolithotomy (1992); Tibia fracture surgery (Right); and Lumbar disc surgery (11/2002). Subjective:  Patient states: \"How do you go from walking 10,000 steps a day to this? \". Pain:  Denied. Communication with other providers: RN okayed session, CM .   Restrictions: General Precautions, Fall Risk     Home Setup/Prior level of function  Social/Functional History  Lives With: Alone  Type of Home: House  Home Layout: One level, Laundry in basement  Home Access: Stairs to enter without rails  Entrance Stairs - Number of Steps: 1  Bathroom Shower/Tub: Tub/Shower

## 2023-11-15 NOTE — CARE COORDINATION
CM met with pt. Pt verbalizes that he has a walker and a cane at home and does not have any home needs.

## 2023-11-15 NOTE — PROGRESS NOTES
In-Patient Progress Note    Patient:  Shelagh Aase 79 y.o. male MRN: [id number]     Date of Service: 11/15/2023    Hospital Day: 3      Chief complaint: had concerns including Hypertension (Reading in the 200's at home). Subjective   Patient seen and examined in the morning. Patient states he feels ok today. Noted to have a HR of 110. See ROS    I have reviewed all pertinent PMHx, PSHx, FamHx, SocialHx, medications, and allergies and updated history as appropriate. I have reviewed images as appropriate. Assessment and Plan   Shelagh Aase, a 79 y.o. male, with a history of Carotid stenosis, HTN, HLD, Hypothyroidism, histoplasmosis Rt. Eye, GERD, Insomnia, Myotonia was admitted on 11/13/2023 with complaints of had concerns including Hypertension (Reading in the 200's at home). Assessment and Plan:  SAH, right occipital contusion s/p fall  Essential HTN  -CTH stable, no new deficits  -Maintain SBP<160  -NSGY ok with discharge   -Repeat CT scan at 6 and 24 hr aylin are stable   -Keppra BID X 7 days in total     Tachycardia 2/2 likely Reflex 2/2 Held Betablocker        - -115 today        - BB on hold        - Start on diltiazem 120mg daily      Carotid stenosis  -Hold ASA, plavix X 7 days     HTN, HLD -allergic to statins. 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 Currently on famotidine. Insomnia -continue home meds as able  Myotonia      # Peptic ulcer prophylaxis: Famotidine   # DVT Prophylaxis: SCD      Current living situation: Home   Expected Disposition: Home   Estimated discharge date: 11/16/2023    Personally reviewed Lab Studies and Imaging     Discussed management of the case with ICU team who recommended downgrade to med-surg. Discussed during IDR - patient tachycardic.  Plan to start on CCB and control

## 2023-11-15 NOTE — PROGRESS NOTES
Reason for Exam: evaluation of subarachnoid hemorrhage FINDINGS: BRAIN/VENTRICLES: Slightly decreased size of a right occipital hemorrhagic contusion now measuring 0.9 x 0.6 cm, previously 1.1 x 0.6 cm. There is mild adjacent edema. Stable bifrontal subarachnoid blood products. No visualized intraventricular blood products. ORBITS: The visualized portion of the orbits demonstrate no acute abnormality. SINUSES: There is moderate mucosal thickening of the left maxillary sinus. No mastoid effusion. SOFT TISSUES/SKULL:  No acute abnormality of the visualized skull or soft tissues. 1.  Slightly decreased size of a 0.9 cm right occipital hemorrhagic contusion. 2.  Stable bifrontal subarachnoid blood products. CT HEAD WO CONTRAST    Result Date: 11/14/2023  EXAMINATION: CT OF THE HEAD WITHOUT CONTRAST  11/13/2023 2:54 pm TECHNIQUE: CT of the head was performed without the administration of intravenous contrast. Automated exposure control, iterative reconstruction, and/or weight based adjustment of the mA/kV was utilized to reduce the radiation dose to as low as reasonably achievable. COMPARISON: 10/17/2022 HISTORY: ORDERING SYSTEM PROVIDED HISTORY: Headache.  high BP.  fall last week with ICH at [facility] TECHNOLOGIST PROVIDED HISTORY: Reason for exam:->Headache.  high BP.  fall last week with ICH at [facility] Has a \"code stroke\" or \"stroke alert\" been called? ->No Decision Support Exception - unselect if not a suspected or confirmed emergency medical condition->Emergency Medical Condition (MA) Reason for Exam: Headache. high BP. fall last week with ICH at [facility] FINDINGS: BRAIN/VENTRICLES: Multiple foci of small amount of subarachnoid hemorrhage in bilateral frontal regions. In addition, a focal area of right occipital hemorrhagic contusion measuring approximately 1.1 x 0.6 cm with mild surrounding edema.   Suggestion of mild intraventricular hemorrhage in the posterior horn of the right lateral ventricle. No evidence of significant midline shift. No evidence of hydrocephalus. No evidence of posterior fossa bleeding. ORBITS: The visualized portion of the orbits demonstrate no acute abnormality. SINUSES: The visualized paranasal sinuses and mastoid air cells demonstrate no acute abnormality. SOFT TISSUES/SKULL:  No acute abnormality of the visualized skull or soft tissues. 1. Small foci of bilateral frontal subarachnoid blood without significant mass effect or midline shift. 2. Intra-axial hemorrhagic contusion in the right occipital area measures 1.1 x 0.6 cm. This is surrounded by mild edema. Posterior horns of the lateral ventricles are not well visualized and it is likely that the right posterior horn contains some blood. 3. No evidence of significant midline shift or mass effect. No evidence of acute skull fracture deformity. Findings discussed with Dr. Pastor Medellin on 11/13/2023 at 4:36 pm.     XR CHEST (2 VW)    Result Date: 11/13/2023  EXAMINATION: TWO XRAY VIEWS OF THE CHEST 11/13/2023 3:09 pm COMPARISON: 10/04/2023 at 1008 hours HISTORY: ORDERING SYSTEM PROVIDED HISTORY: Elevated BP TECHNOLOGIST PROVIDED HISTORY: Reason for exam:->Elevated BP Reason for Exam: Elevated BP Additional signs and symptoms: unknown Relevant Medical/Surgical History: MVP, chest paim, GERD FINDINGS: No acute pulmonary consolidation, airspace infiltrate, detectable pleural effusion, pneumothorax, pulmonary edema, cardiomegaly or mediastinal widening.      Radiographically nonacute chest.       Recent Results (from the past 24 hour(s))   CBC with Auto Differential    Collection Time: 11/15/23  5:10 AM   Result Value Ref Range    WBC 5.1 4.0 - 10.5 K/CU MM    RBC 4.88 4.6 - 6.2 M/CU MM    Hemoglobin 15.7 13.5 - 18.0 GM/DL    Hematocrit 48.1 42 - 52 %    MCV 98.6 78 - 100 FL    MCH 32.2 (H) 27 - 31 PG    MCHC 32.6 32.0 - 36.0 %    RDW 13.0 11.7 - 14.9 %    Platelets 716 957 - 296 K/CU MM    MPV 9.0 7.5 -

## 2023-11-15 NOTE — PROGRESS NOTES
This RN discussed with CC NP Ayaka about clarifying the CT orders. Also discussed about patient requesting a couple home medications restarted. CC NP aware and responded that she would look into it. No new orders placed as of this time.

## 2023-11-16 VITALS
HEIGHT: 70 IN | RESPIRATION RATE: 19 BRPM | WEIGHT: 160.6 LBS | HEART RATE: 104 BPM | OXYGEN SATURATION: 96 % | TEMPERATURE: 97.6 F | SYSTOLIC BLOOD PRESSURE: 128 MMHG | DIASTOLIC BLOOD PRESSURE: 76 MMHG | BODY MASS INDEX: 22.99 KG/M2

## 2023-11-16 LAB
ANION GAP SERPL CALCULATED.3IONS-SCNC: 8 MMOL/L (ref 4–16)
BASOPHILS ABSOLUTE: 0 K/CU MM
BASOPHILS RELATIVE PERCENT: 0.4 % (ref 0–1)
BUN SERPL-MCNC: 24 MG/DL (ref 6–23)
CALCIUM SERPL-MCNC: 8.8 MG/DL (ref 8.3–10.6)
CHLORIDE BLD-SCNC: 104 MMOL/L (ref 99–110)
CO2: 25 MMOL/L (ref 21–32)
CREAT SERPL-MCNC: 1.1 MG/DL (ref 0.9–1.3)
DIFFERENTIAL TYPE: ABNORMAL
EOSINOPHILS ABSOLUTE: 0.1 K/CU MM
EOSINOPHILS RELATIVE PERCENT: 1 % (ref 0–3)
GFR SERPL CREATININE-BSD FRML MDRD: >60 ML/MIN/1.73M2
GLUCOSE SERPL-MCNC: 139 MG/DL (ref 70–99)
HCT VFR BLD CALC: 44.2 % (ref 42–52)
HEMOGLOBIN: 14.1 GM/DL (ref 13.5–18)
IMMATURE NEUTROPHIL %: 0.2 % (ref 0–0.43)
LYMPHOCYTES ABSOLUTE: 0.9 K/CU MM
LYMPHOCYTES RELATIVE PERCENT: 18.5 % (ref 24–44)
MAGNESIUM: 1.9 MG/DL (ref 1.8–2.4)
MCH RBC QN AUTO: 32 PG (ref 27–31)
MCHC RBC AUTO-ENTMCNC: 31.9 % (ref 32–36)
MCV RBC AUTO: 100.5 FL (ref 78–100)
MONOCYTES ABSOLUTE: 0.4 K/CU MM
MONOCYTES RELATIVE PERCENT: 7.2 % (ref 0–4)
NUCLEATED RBC %: 0 %
PDW BLD-RTO: 12.9 % (ref 11.7–14.9)
PHOSPHORUS: 3.4 MG/DL (ref 2.5–4.9)
PLATELET # BLD: 193 K/CU MM (ref 140–440)
PMV BLD AUTO: 9 FL (ref 7.5–11.1)
POTASSIUM SERPL-SCNC: 5 MMOL/L (ref 3.5–5.1)
RBC # BLD: 4.4 M/CU MM (ref 4.6–6.2)
SEGMENTED NEUTROPHILS ABSOLUTE COUNT: 3.5 K/CU MM
SEGMENTED NEUTROPHILS RELATIVE PERCENT: 72.7 % (ref 36–66)
SODIUM BLD-SCNC: 137 MMOL/L (ref 135–145)
TOTAL IMMATURE NEUTOROPHIL: 0.01 K/CU MM
TOTAL NUCLEATED RBC: 0 K/CU MM
WBC # BLD: 4.9 K/CU MM (ref 4–10.5)

## 2023-11-16 PROCEDURE — 36415 COLL VENOUS BLD VENIPUNCTURE: CPT

## 2023-11-16 PROCEDURE — 6370000000 HC RX 637 (ALT 250 FOR IP)

## 2023-11-16 PROCEDURE — 6360000002 HC RX W HCPCS: Performed by: PHYSICIAN ASSISTANT

## 2023-11-16 PROCEDURE — 6370000000 HC RX 637 (ALT 250 FOR IP): Performed by: STUDENT IN AN ORGANIZED HEALTH CARE EDUCATION/TRAINING PROGRAM

## 2023-11-16 PROCEDURE — 2580000003 HC RX 258

## 2023-11-16 PROCEDURE — 6370000000 HC RX 637 (ALT 250 FOR IP): Performed by: INTERNAL MEDICINE

## 2023-11-16 PROCEDURE — 94761 N-INVAS EAR/PLS OXIMETRY MLT: CPT

## 2023-11-16 PROCEDURE — 85025 COMPLETE CBC W/AUTO DIFF WBC: CPT

## 2023-11-16 PROCEDURE — 83735 ASSAY OF MAGNESIUM: CPT

## 2023-11-16 PROCEDURE — 80048 BASIC METABOLIC PNL TOTAL CA: CPT

## 2023-11-16 PROCEDURE — 84100 ASSAY OF PHOSPHORUS: CPT

## 2023-11-16 RX ORDER — CLOPIDOGREL BISULFATE 75 MG/1
75 TABLET ORAL DAILY
Qty: 30 TABLET | Refills: 0 | Status: SHIPPED | OUTPATIENT
Start: 2023-11-29

## 2023-11-16 RX ORDER — DILTIAZEM HYDROCHLORIDE 120 MG/1
120 CAPSULE, COATED, EXTENDED RELEASE ORAL DAILY
Qty: 30 CAPSULE | Refills: 0 | Status: SHIPPED | OUTPATIENT
Start: 2023-11-16

## 2023-11-16 RX ORDER — LEVETIRACETAM 500 MG/1
500 TABLET ORAL 2 TIMES DAILY
Qty: 8 TABLET | Refills: 0 | Status: SHIPPED | OUTPATIENT
Start: 2023-11-16 | End: 2023-11-20

## 2023-11-16 RX ADMIN — GABAPENTIN 100 MG: 100 CAPSULE ORAL at 08:36

## 2023-11-16 RX ADMIN — ACETAMINOPHEN 650 MG: 325 TABLET ORAL at 08:40

## 2023-11-16 RX ADMIN — LEVOTHYROXINE SODIUM 50 MCG: 0.05 TABLET ORAL at 06:10

## 2023-11-16 RX ADMIN — SODIUM CHLORIDE, PRESERVATIVE FREE 10 ML: 5 INJECTION INTRAVENOUS at 08:36

## 2023-11-16 RX ADMIN — LEVETIRACETAM 500 MG: 100 INJECTION, SOLUTION INTRAVENOUS at 10:57

## 2023-11-16 RX ADMIN — FAMOTIDINE 20 MG: 20 TABLET ORAL at 08:36

## 2023-11-16 RX ADMIN — DILTIAZEM HYDROCHLORIDE 120 MG: 120 CAPSULE, COATED, EXTENDED RELEASE ORAL at 08:36

## 2023-11-16 ASSESSMENT — PAIN DESCRIPTION - LOCATION: LOCATION: HEAD

## 2023-11-16 ASSESSMENT — PAIN SCALES - GENERAL
PAINLEVEL_OUTOF10: 3
PAINLEVEL_OUTOF10: 3

## 2023-11-16 ASSESSMENT — PAIN SCALES - WONG BAKER: WONGBAKER_NUMERICALRESPONSE: 0

## 2023-11-16 ASSESSMENT — PAIN - FUNCTIONAL ASSESSMENT: PAIN_FUNCTIONAL_ASSESSMENT: ACTIVITIES ARE NOT PREVENTED

## 2023-11-16 ASSESSMENT — PAIN DESCRIPTION - ORIENTATION: ORIENTATION: MID

## 2023-11-16 ASSESSMENT — PAIN DESCRIPTION - DESCRIPTORS: DESCRIPTORS: PRESSURE

## 2023-11-16 NOTE — DISCHARGE INSTRUCTIONS
Internal Medicine Discharge Instruction    Discharge to:  Home  Diet: ADULT DIET; Regular  Activity: As tolerated       Be compliant with medications  Please take medications as prescribed   Please do not take your aspirin and plavix till 11/29/2023  Please take keppra 500mg twice a day - last dose 11/20/2023 in the evening.          Electronically signed by Emma Joshi MD on 11/16/2023 at 7:45 AM

## 2023-11-16 NOTE — DISCHARGE SUMMARY
TECHNIQUE: CT of the head was performed without the administration of intravenous contrast. Automated exposure control, iterative reconstruction, and/or weight based adjustment of the mA/kV was utilized to reduce the radiation dose to as low as reasonably achievable. COMPARISON: 11/13/2023 CT head, 10/17/2022 CT head HISTORY: ORDERING SYSTEM PROVIDED HISTORY: evaluation of subarachnoid hemorrhage TECHNOLOGIST PROVIDED HISTORY: Reason for exam:->evaluation of subarachnoid hemorrhage Has a \"code stroke\" or \"stroke alert\" been called? ->No Reason for Exam: evaluation of subarachnoid hemorrhage FINDINGS: BRAIN/VENTRICLES: Slightly decreased size of a right occipital hemorrhagic contusion now measuring 0.9 x 0.6 cm, previously 1.1 x 0.6 cm. There is mild adjacent edema. Stable bifrontal subarachnoid blood products. No visualized intraventricular blood products. ORBITS: The visualized portion of the orbits demonstrate no acute abnormality. SINUSES: There is moderate mucosal thickening of the left maxillary sinus. No mastoid effusion. SOFT TISSUES/SKULL:  No acute abnormality of the visualized skull or soft tissues. 1.  Slightly decreased size of a 0.9 cm right occipital hemorrhagic contusion. 2.  Stable bifrontal subarachnoid blood products. CT HEAD WO CONTRAST    Result Date: 11/14/2023  EXAMINATION: CT OF THE HEAD WITHOUT CONTRAST  11/13/2023 2:54 pm TECHNIQUE: CT of the head was performed without the administration of intravenous contrast. Automated exposure control, iterative reconstruction, and/or weight based adjustment of the mA/kV was utilized to reduce the radiation dose to as low as reasonably achievable. COMPARISON: 10/17/2022 HISTORY: ORDERING SYSTEM PROVIDED HISTORY: Headache.  high BP.  fall last week with ICH at [facility] TECHNOLOGIST PROVIDED HISTORY: Reason for exam:->Headache.  high BP.  fall last week with ICH at [facility] Has a \"code stroke\" or \"stroke alert\" been called? ->No pleural effusion, pneumothorax, pulmonary edema, cardiomegaly or mediastinal widening. Radiographically nonacute chest.       Additional Information: Patient seen and examined day of discharge.  For more information regarding patient's care please contact [address] [place] [address] [facility] [phone]    Discharge Time of 41 minutes    Electronically signed by Miguel Angel Ji MD on 11/16/2023 at 7:46 AM

## 2023-11-16 NOTE — PROGRESS NOTES
Outpatient Pharmacy Progress Note for Meds-to-Beds    Total number of Prescriptions Filled: 3  The following medications were dispensed to the patient during the discharge process:  aspirin  dilTIAZem  levETIRAcetam    Additional Documentation:  Patient picked-up the medication(s) in the OP Pharmacy  The following prescription(s) were refilled to soon per insurance (patient has some at home): Clopidogrel (can refill on 11/27/2023)       Thank you for letting us serve your patients.   [address]    [address] [place] [address], [address] [place] [address]    Phone: [phone]    Fax: [phone]

## 2023-11-17 ENCOUNTER — CLINICAL DOCUMENTATION ONLY (OUTPATIENT)
Facility: CLINIC | Age: 83
End: 2023-11-17

## 2023-11-27 DIAGNOSIS — G47.00 INSOMNIA, UNSPECIFIED TYPE: ICD-10-CM

## 2023-11-27 RX ORDER — TRAZODONE HYDROCHLORIDE 50 MG/1
TABLET ORAL
Qty: 90 TABLET | Refills: 3 | OUTPATIENT
Start: 2023-11-27

## 2023-12-13 ENCOUNTER — HOSPITAL ENCOUNTER (OUTPATIENT)
Dept: CT IMAGING | Age: 83
Discharge: HOME | End: 2023-12-13
Payer: MEDICARE

## 2023-12-13 DIAGNOSIS — Z86.79 PERSONAL HISTORY OF CARDIOVASCULAR DISORDER: ICD-10-CM

## 2023-12-13 PROCEDURE — 70450 CT HEAD/BRAIN W/O DYE: CPT

## 2024-01-25 ENCOUNTER — OFFICE VISIT (OUTPATIENT)
Dept: CARDIOLOGY CLINIC | Age: 84
End: 2024-01-25
Payer: MEDICARE

## 2024-01-25 VITALS
WEIGHT: 169 LBS | HEART RATE: 92 BPM | SYSTOLIC BLOOD PRESSURE: 94 MMHG | DIASTOLIC BLOOD PRESSURE: 62 MMHG | BODY MASS INDEX: 24.2 KG/M2 | HEIGHT: 70 IN

## 2024-01-25 DIAGNOSIS — I10 PRIMARY HYPERTENSION: Primary | ICD-10-CM

## 2024-01-25 PROCEDURE — 93000 ELECTROCARDIOGRAM COMPLETE: CPT | Performed by: NURSE PRACTITIONER

## 2024-01-25 PROCEDURE — 3074F SYST BP LT 130 MM HG: CPT | Performed by: NURSE PRACTITIONER

## 2024-01-25 PROCEDURE — G8427 DOCREV CUR MEDS BY ELIG CLIN: HCPCS | Performed by: NURSE PRACTITIONER

## 2024-01-25 PROCEDURE — 1036F TOBACCO NON-USER: CPT | Performed by: NURSE PRACTITIONER

## 2024-01-25 PROCEDURE — G8420 CALC BMI NORM PARAMETERS: HCPCS | Performed by: NURSE PRACTITIONER

## 2024-01-25 PROCEDURE — 1123F ACP DISCUSS/DSCN MKR DOCD: CPT | Performed by: NURSE PRACTITIONER

## 2024-01-25 PROCEDURE — G8484 FLU IMMUNIZE NO ADMIN: HCPCS | Performed by: NURSE PRACTITIONER

## 2024-01-25 PROCEDURE — 99213 OFFICE O/P EST LOW 20 MIN: CPT | Performed by: NURSE PRACTITIONER

## 2024-01-25 PROCEDURE — 3078F DIAST BP <80 MM HG: CPT | Performed by: NURSE PRACTITIONER

## 2024-01-25 ASSESSMENT — ENCOUNTER SYMPTOMS
SHORTNESS OF BREATH: 0
ORTHOPNEA: 0

## 2024-01-25 NOTE — PATIENT INSTRUCTIONS
We are committed to providing you the best care possible.    If you receive a survey after visiting one of our offices, please take time to share your experience concerning your physician office visit.  These surveys are confidential and no health information about you is shared.    We are eager to improve for you and we are counting on your feedback to help make that happen.      **It is YOUR responsibilty to bring medication bottles and/or updated medication list to EACH APPOINTMENT. This will allow us to better serve you and all your healthcare needs**  Thank you for allowing us to care for you today!   We want to ensure we can follow your treatment plan and we strive to give you the best outcomes and experience possible.   If you ever have a life threatening emergency and call 911 - for an ambulance (EMS)   Our providers can only care for you at:   [facility] or [facility].   Even if you have someone take you or you drive yourself we can only care for you in a [facility] facility. Our providers are not setup at the other healthcare locations!   Please be informed that if you contact our office outside of normal business hours the physician on call cannot help with any scheduling or rescheduling issues, procedure instruction questions or any type of medication issue.    We advise you for any urgent/emergency that you go to the nearest emergency room!    PLEASE CALL OUR OFFICE DURING NORMAL BUSINESS HOURS    Monday - Friday   8 am to 5 pm    [place]: [phone]    [place]: [phone]    [place]:  [phone]

## 2024-01-25 NOTE — PROGRESS NOTES
1/25/2024  Primary cardiologist: Dr. Randhawa    CC:   Glynn  is an established 83 y.o.  male here for a 6 month follow up on HTN      SUBJECTIVE/OBJECTIVE:  Glynn is a 83 y.o. male with a history of hypertension, carotid artery stensosis     Had C in October 2023 calcified arteries        HPI :   Glynn reports he has had a head ache off and on since Monday . BP at  home the past two nights has been up : 166/93. This am bp is soft     Review of Systems   Constitutional: Negative for diaphoresis and malaise/fatigue.   Cardiovascular:  Negative for chest pain, claudication, dyspnea on exertion, irregular heartbeat, leg swelling, near-syncope, orthopnea, palpitations and paroxysmal nocturnal dyspnea.   Respiratory:  Negative for shortness of breath.    Neurological:  Positive for dizziness. Negative for light-headedness.       Vitals:    01/25/24 0936   BP: 94/62   Site: Left Upper Arm   Position: Sitting   Cuff Size: Medium Adult   Pulse: 92   Weight: 76.7 kg (169 lb)   Height: 1.778 m (5' 10\")     Wt Readings from Last 3 Encounters:   01/25/24 76.7 kg (169 lb)   11/16/23 72.8 kg (160 lb 9.6 oz)   10/31/23 74.8 kg (165 lb)      Body mass index is 24.25 kg/m².     Physical Exam  Vitals reviewed.   Eyes:      Pupils: Pupils are equal, round, and reactive to light.   Neck:      Vascular: No carotid bruit.   Cardiovascular:      Rate and Rhythm: Normal rate and regular rhythm.      Pulses: Normal pulses.   Pulmonary:      Effort: Pulmonary effort is normal.      Breath sounds: Normal breath sounds.   Musculoskeletal:      Cervical back: No tenderness.      Right lower leg: No edema.      Left lower leg: No edema.   Skin:     General: Skin is warm and dry.      Capillary Refill: Capillary refill takes less than 2 seconds.   Neurological:      Mental Status: He is alert and oriented to person, place, and time.                Current Outpatient Medications   Medication Sig Dispense Refill    aspirin 81 MG tablet Take 1

## 2024-01-31 ENCOUNTER — HOSPITAL ENCOUNTER (OUTPATIENT)
Age: 84
Discharge: HOME | End: 2024-01-31
Payer: MEDICARE

## 2024-01-31 LAB
ERYTHROCYTE SEDIMENTATION RATE: 11 MM/HR (ref 0–20)
ESTIMATED AVERAGE GLUCOSE: 105 MG/DL
HBA1C MFR BLD: 5.3 % (ref 4.2–6.3)

## 2024-01-31 PROCEDURE — 86320 SERUM IMMUNOELECTROPHORESIS: CPT

## 2024-01-31 PROCEDURE — 83520 IMMUNOASSAY QUANT NOS NONAB: CPT

## 2024-01-31 PROCEDURE — 84207 ASSAY OF VITAMIN B-6: CPT

## 2024-01-31 PROCEDURE — 82607 VITAMIN B-12: CPT

## 2024-01-31 PROCEDURE — 84439 ASSAY OF FREE THYROXINE: CPT

## 2024-01-31 PROCEDURE — 84443 ASSAY THYROID STIM HORMONE: CPT

## 2024-01-31 PROCEDURE — 86235 NUCLEAR ANTIGEN ANTIBODY: CPT

## 2024-01-31 PROCEDURE — 82746 ASSAY OF FOLIC ACID SERUM: CPT

## 2024-01-31 PROCEDURE — 83036 HEMOGLOBIN GLYCOSYLATED A1C: CPT

## 2024-01-31 PROCEDURE — 84156 ASSAY OF PROTEIN URINE: CPT

## 2024-01-31 PROCEDURE — 84425 ASSAY OF VITAMIN B-1: CPT

## 2024-01-31 PROCEDURE — 85652 RBC SED RATE AUTOMATED: CPT

## 2024-01-31 PROCEDURE — 82784 ASSAY IGA/IGD/IGG/IGM EACH: CPT

## 2024-01-31 PROCEDURE — 86038 ANTINUCLEAR ANTIBODIES: CPT

## 2024-01-31 PROCEDURE — 86325 OTHER IMMUNOELECTROPHORESIS: CPT

## 2024-01-31 PROCEDURE — 36415 COLL VENOUS BLD VENIPUNCTURE: CPT

## 2024-02-01 LAB
FOLATE SERPL-MCNC: 18.7 NG/ML (ref 3.1–17.5)
IGA: 219 MG/DL (ref 69–382)
IGG,SERUM: 915 MG/DL (ref 723–1685)
IGM,SERUM: 121 MG/DL (ref 62–277)
T4 FREE SERPL-MCNC: 1.18 NG/DL (ref 0.9–1.8)
TSH SERPL DL<=0.005 MIU/L-ACNC: 5.39 UIU/ML (ref 0.27–4.2)
VITAMIN B-12: 1826 PG/ML (ref 211–911)

## 2024-02-02 LAB
SPEP INTERPRETATION: NORMAL
SPEP INTERPRETATION: NORMAL
URINE TOTAL PROTEIN: 4.7 MG/DL

## 2024-02-03 LAB
ENA SS-A 60KD AB SER-ACNC: 9 AU/ML (ref 0–40)
ENA SS-A IGG SER QL: 10 AU/ML (ref 0–40)
ENA SS-B IGG SER IA-ACNC: 2 AU/ML (ref 0–40)
NUCLEAR IGG SER QL IA: NORMAL

## 2024-02-04 LAB — VIT B6 SERPL-MCNC: 85.5 NMOL/L (ref 20–125)

## 2024-02-05 LAB — VIT B1 PYROPHOSHATE BLD-SCNC: 124 NMOL/L (ref 70–180)

## 2024-04-12 ENCOUNTER — OFFICE VISIT (OUTPATIENT)
Dept: CARDIOLOGY CLINIC | Age: 84
End: 2024-04-12
Payer: MEDICARE

## 2024-04-12 VITALS
HEART RATE: 78 BPM | DIASTOLIC BLOOD PRESSURE: 68 MMHG | SYSTOLIC BLOOD PRESSURE: 132 MMHG | OXYGEN SATURATION: 97 % | BODY MASS INDEX: 24.48 KG/M2 | HEIGHT: 70 IN | RESPIRATION RATE: 16 BRPM | WEIGHT: 171 LBS

## 2024-04-12 DIAGNOSIS — I10 PRIMARY HYPERTENSION: Primary | ICD-10-CM

## 2024-04-12 PROCEDURE — 3078F DIAST BP <80 MM HG: CPT | Performed by: NURSE PRACTITIONER

## 2024-04-12 PROCEDURE — G8420 CALC BMI NORM PARAMETERS: HCPCS | Performed by: NURSE PRACTITIONER

## 2024-04-12 PROCEDURE — 99213 OFFICE O/P EST LOW 20 MIN: CPT | Performed by: NURSE PRACTITIONER

## 2024-04-12 PROCEDURE — 1036F TOBACCO NON-USER: CPT | Performed by: NURSE PRACTITIONER

## 2024-04-12 PROCEDURE — 1123F ACP DISCUSS/DSCN MKR DOCD: CPT | Performed by: NURSE PRACTITIONER

## 2024-04-12 PROCEDURE — G8427 DOCREV CUR MEDS BY ELIG CLIN: HCPCS | Performed by: NURSE PRACTITIONER

## 2024-04-12 PROCEDURE — 3075F SYST BP GE 130 - 139MM HG: CPT | Performed by: NURSE PRACTITIONER

## 2024-04-12 ASSESSMENT — ENCOUNTER SYMPTOMS
SHORTNESS OF BREATH: 0
ORTHOPNEA: 0

## 2024-04-12 NOTE — PROGRESS NOTES
4/12/2024  Primary cardiologist: Dr. Randhawa    CC:   Glynn  is an established 84 y.o.  male here for a follow up on uncontrolled blood pressure      SUBJECTIVE/OBJECTIVE:  HPI  Glynn is a 84 y.o. male with a history of  hypertension, hypothyroid and  intracranial hemorrhage.  Carmen underwent a heart catheterization in October 2023 that showed calcified artery with no significant CAD.    Glynn reports been monitoring his blood pressure at home has recently noticed that his evening blood pressures have been as high as 170s.  With blood pressure being elevated he does become symptomatic with red-faced, headache and flushing.  Was recently started on hydralazine per PCP however has not been taking on a regular basis.     Review of Systems   Constitutional: Negative for diaphoresis and malaise/fatigue.   Cardiovascular:  Negative for chest pain, claudication, dyspnea on exertion, irregular heartbeat, leg swelling, near-syncope, orthopnea, palpitations and paroxysmal nocturnal dyspnea.   Respiratory:  Negative for shortness of breath.    Neurological:  Positive for headaches. Negative for dizziness and light-headedness.       Vitals:    04/12/24 1319   BP: 132/68   Site: Left Upper Arm   Position: Sitting   Cuff Size: Medium Adult   Pulse: 78   Resp: 16   SpO2: 97%   Weight: 77.6 kg (171 lb)   Height: 1.778 m (5' 10\")     Wt Readings from Last 3 Encounters:   04/12/24 77.6 kg (171 lb)   01/25/24 76.7 kg (169 lb)   11/16/23 72.8 kg (160 lb 9.6 oz)      Body mass index is 24.54 kg/m².     Physical Exam  Vitals reviewed.   Eyes:      Pupils: Pupils are equal, round, and reactive to light.   Neck:      Vascular: No carotid bruit.   Cardiovascular:      Rate and Rhythm: Normal rate and regular rhythm.      Pulses: Normal pulses.   Pulmonary:      Effort: Pulmonary effort is normal.      Breath sounds: Normal breath sounds.   Musculoskeletal:      Cervical back: No tenderness.      Right lower leg: No edema.      Left lower

## 2024-06-24 ENCOUNTER — HOSPITAL ENCOUNTER (OUTPATIENT)
Age: 84
Discharge: HOME | End: 2024-06-24
Payer: MEDICARE

## 2024-06-24 LAB
ALBUMIN SERPL-MCNC: 4.1 GM/DL (ref 3.4–5)
ALP BLD-CCNC: 46 IU/L (ref 40–129)
ALT SERPL-CCNC: 16 U/L (ref 10–40)
AST SERPL-CCNC: 21 IU/L (ref 15–37)
BASOPHILS ABSOLUTE: 0 K/CU MM
BASOPHILS RELATIVE PERCENT: 0.3 % (ref 0–1)
BILIRUB SERPL-MCNC: 0.4 MG/DL (ref 0–1)
BILIRUBIN DIRECT: 0.2 MG/DL (ref 0–0.3)
BILIRUBIN, INDIRECT: 0.2 MG/DL (ref 0–0.7)
DIFFERENTIAL TYPE: ABNORMAL
EOSINOPHILS ABSOLUTE: 0.1 K/CU MM
EOSINOPHILS RELATIVE PERCENT: 1.6 % (ref 0–3)
HCT VFR BLD CALC: 42.4 % (ref 42–52)
HEMOGLOBIN: 13.9 GM/DL (ref 13.5–18)
IMMATURE NEUTROPHIL %: 0.3 % (ref 0–0.43)
LYMPHOCYTES ABSOLUTE: 0.9 K/CU MM
LYMPHOCYTES RELATIVE PERCENT: 23.4 % (ref 24–44)
MCH RBC QN AUTO: 32.3 PG (ref 27–31)
MCHC RBC AUTO-ENTMCNC: 32.8 % (ref 32–36)
MCV RBC AUTO: 98.6 FL (ref 78–100)
MONOCYTES ABSOLUTE: 0.3 K/CU MM
MONOCYTES RELATIVE PERCENT: 8.7 % (ref 0–4)
NEUTROPHILS ABSOLUTE: 2.4 K/CU MM
NEUTROPHILS RELATIVE PERCENT: 65.7 % (ref 36–66)
NUCLEATED RBC %: 0 %
PDW BLD-RTO: 12.9 % (ref 11.7–14.9)
PLATELET # BLD: 188 K/CU MM (ref 140–440)
PMV BLD AUTO: 9.7 FL (ref 7.5–11.1)
RBC # BLD: 4.3 M/CU MM (ref 4.6–6.2)
TOTAL IMMATURE NEUTOROPHIL: 0.01 K/CU MM
TOTAL NUCLEATED RBC: 0 K/CU MM
TOTAL PROTEIN: 6.5 GM/DL (ref 6.4–8.2)
WBC # BLD: 3.7 K/CU MM (ref 4–10.5)

## 2024-06-24 PROCEDURE — 85025 COMPLETE CBC W/AUTO DIFF WBC: CPT

## 2024-06-24 PROCEDURE — 80076 HEPATIC FUNCTION PANEL: CPT

## 2024-06-24 PROCEDURE — 36415 COLL VENOUS BLD VENIPUNCTURE: CPT

## 2024-07-30 ENCOUNTER — OFFICE VISIT (OUTPATIENT)
Dept: CARDIOLOGY CLINIC | Age: 84
End: 2024-07-30
Payer: MEDICARE

## 2024-07-30 VITALS
HEIGHT: 70 IN | OXYGEN SATURATION: 98 % | SYSTOLIC BLOOD PRESSURE: 120 MMHG | DIASTOLIC BLOOD PRESSURE: 66 MMHG | WEIGHT: 169.2 LBS | BODY MASS INDEX: 24.22 KG/M2 | HEART RATE: 79 BPM

## 2024-07-30 DIAGNOSIS — R07.9 CHEST PAIN, UNSPECIFIED TYPE: Primary | ICD-10-CM

## 2024-07-30 PROCEDURE — G8427 DOCREV CUR MEDS BY ELIG CLIN: HCPCS | Performed by: INTERNAL MEDICINE

## 2024-07-30 PROCEDURE — 3078F DIAST BP <80 MM HG: CPT | Performed by: INTERNAL MEDICINE

## 2024-07-30 PROCEDURE — 99213 OFFICE O/P EST LOW 20 MIN: CPT | Performed by: INTERNAL MEDICINE

## 2024-07-30 PROCEDURE — 3074F SYST BP LT 130 MM HG: CPT | Performed by: INTERNAL MEDICINE

## 2024-07-30 PROCEDURE — G8420 CALC BMI NORM PARAMETERS: HCPCS | Performed by: INTERNAL MEDICINE

## 2024-07-30 PROCEDURE — 1036F TOBACCO NON-USER: CPT | Performed by: INTERNAL MEDICINE

## 2024-07-30 PROCEDURE — 1123F ACP DISCUSS/DSCN MKR DOCD: CPT | Performed by: INTERNAL MEDICINE

## 2024-07-30 NOTE — PROGRESS NOTES
CARDIOLOGY NOTE      7/30/2024    RE: Glynn Florez  (1940)                               TO:  Mic Velasco DO            CHIEF COMPLAINT   Glynn is a 84 y.o. male who was seen today for management of  hyperlipidimea                          Here for fu       HPI:   Patient is here for    - Hyperlipidimea, lipids are in acceptable range. 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    Arrhythmia 09/13/2022    Intracranial vascular stenosis 08/25/2022    Stroke-like symptoms 08/22/2022    Post laminectomy syndrome 06/22/2022    Headache 08/30/2017    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% 8/29/17 US 08/30/2017    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    Moderate mixed hyperlipidemia not requiring statin therapy     MVP (mitral valve prolapse)     Subarachnoid bleed (HCC) 11/13/2023    ASCVD (arteriosclerotic cardiovascular disease) 10/10/2023    Hypotension 07/21/2023    Primary hypertension 02/23/2022    TIA (transient ischemic attack) 04/09/2021    Chronic migraine without aura without status migrainosus, not intractable 04/09/2020    H/O prostate cancer 07/22/2019    COPD (chronic obstructive pulmonary disease) (HCC) 07/22/2019    Acquired hypothyroidism 07/22/2019    History of Helicobacter pylori infection 07/22/2019    Diverticulosis 07/22/2019    Kidney stone     Myotonia     Peripelvic (lymphatic) cyst     Chronic low back pain     Idiopathic peripheral neuropathy     GERD

## 2024-07-31 ENCOUNTER — HOSPITAL ENCOUNTER (OUTPATIENT)
Age: 84
Discharge: HOME | End: 2024-07-31
Payer: MEDICARE

## 2024-07-31 LAB
ALBUMIN SERPL-MCNC: 4.1 GM/DL (ref 3.4–5)
ALP BLD-CCNC: 49 IU/L (ref 40–129)
ALT SERPL-CCNC: 18 U/L (ref 10–40)
AST SERPL-CCNC: 26 IU/L (ref 15–37)
BASOPHILS ABSOLUTE: 0 K/CU MM
BASOPHILS RELATIVE PERCENT: 0.3 % (ref 0–1)
BILIRUB SERPL-MCNC: 0.5 MG/DL (ref 0–1)
BILIRUBIN DIRECT: 0.2 MG/DL (ref 0–0.3)
BILIRUBIN, INDIRECT: 0.3 MG/DL (ref 0–0.7)
DIFFERENTIAL TYPE: ABNORMAL
EOSINOPHILS ABSOLUTE: 0.1 K/CU MM
EOSINOPHILS RELATIVE PERCENT: 1.9 % (ref 0–3)
HCT VFR BLD CALC: 45.9 % (ref 42–52)
HEMOGLOBIN: 14.6 GM/DL (ref 13.5–18)
IMMATURE NEUTROPHIL %: 0.5 % (ref 0–0.43)
LYMPHOCYTES ABSOLUTE: 0.9 K/CU MM
LYMPHOCYTES RELATIVE PERCENT: 23.1 % (ref 24–44)
MCH RBC QN AUTO: 32.2 PG (ref 27–31)
MCHC RBC AUTO-ENTMCNC: 31.8 % (ref 32–36)
MCV RBC AUTO: 101.1 FL (ref 78–100)
MONOCYTES ABSOLUTE: 0.4 K/CU MM
MONOCYTES RELATIVE PERCENT: 10.6 % (ref 0–4)
NEUTROPHILS ABSOLUTE: 2.3 K/CU MM
NEUTROPHILS RELATIVE PERCENT: 63.6 % (ref 36–66)
NUCLEATED RBC %: 0 %
PDW BLD-RTO: 13 % (ref 11.7–14.9)
PLATELET # BLD: 175 K/CU MM (ref 140–440)
PMV BLD AUTO: 9.5 FL (ref 7.5–11.1)
RBC # BLD: 4.54 M/CU MM (ref 4.6–6.2)
TOTAL IMMATURE NEUTOROPHIL: 0.02 K/CU MM
TOTAL NUCLEATED RBC: 0 K/CU MM
TOTAL PROTEIN: 6.4 GM/DL (ref 6.4–8.2)
WBC # BLD: 3.7 K/CU MM (ref 4–10.5)

## 2024-07-31 PROCEDURE — 80076 HEPATIC FUNCTION PANEL: CPT

## 2024-07-31 PROCEDURE — 85025 COMPLETE CBC W/AUTO DIFF WBC: CPT

## 2024-07-31 PROCEDURE — 80186 ASSAY OF PHENYTOIN FREE: CPT

## 2024-07-31 PROCEDURE — 36415 COLL VENOUS BLD VENIPUNCTURE: CPT

## 2024-08-02 LAB
PHENYTOIN % FREE: ABNORMAL % (ref 8–14)
PHENYTOIN DOSE AMOUNT: ABNORMAL
PHENYTOIN DOSE FREQUENCY: ABNORMAL
PHENYTOIN DOSE ROUTE: ABNORMAL
PHENYTOIN FREE: <0.5 UG/ML (ref 1–2.5)
PHENYTOIN TYPE OF DRAW: ABNORMAL
TOTAL PHENYTOIN: <0.5 UG/ML (ref 10–20)

## 2024-10-09 NOTE — TELEPHONE ENCOUNTER
Physical Therapy Visit    Visit Type: Daily Treatment Note  Visit: 13  Referring Provider: YOVANA Palomo*  Medical Diagnosis (from order): Z74.09 - Reduced mobility   Patient alert and oriented X3.    SUBJECTIVE                                                                                                               10/9/24: Patient has been practicing his ambulation and is hoping to go on vacation to [place] in around 6 weeks.     PROGRESS NOTE 9/16/24: Patient has new orders from wound care provider and is now forr flat WB status, NO TOE OFF.     PROGRESS NOTE 8/15/24: Patient reports that he has been consistent with HEP. He was told that wound healing is going well but no set date for WB change as of yet.     PROGRESS NOTE 7/25/24: Patient had had multiple skin grafts for LEFT foot and therefore could not participate in PT, per physician as fear of excessive shearing forces. Patient was given permission to participate in modified PT exercises today.      INITIAL EVALUATION 6/4/24: Ari reports that he contracted Hepatitis A when in [place]. 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 Patient is on dialysis treatments currently and has a Left foot wound, WB only on heel. Patient just finished course of therapy at [place]      OBJECTIVE                                                                                                                     Range of Motion (ROM)   (degrees unless noted; active unless noted; norms in ( ); negative=lacking to 0, positive=beyond 0)  Ankle:   - Dorsiflexion (20):       Left:  -5 (post-stretch)        Right:  20     - Plantar Flexion (45-50):       Left:  30 (post-stretch)        Right:  50    - Inversion (30-35):      Left: 25 (post-stretch)      Right: 35           Treatment     Therapeutic Exercise  *= added to HEP  TB = Theraband  DB = Dumbbell    (All following exercises with UE support available)  Step Stand foot flat WB 4\" step  Stagger sit to stand from  Pt stated that he has tried Melatonin and it gave him a headache. Pt was advised to call his PCP. chair  Hip movements with BOSU as guide  Sidestep foot flat WB      Manual Therapy   Myofascial Release = MFR  Soft tissue massage = STM    L ankle DF stretch  Calcaneal Rocking, medial to/from lateral          Gait Training  CGA with RW 25' x 3 bouts (foot flat WB)    Skilled input: verbal instruction/cues    Writer verbally educated and received verbal consent for hand placement, positioning of patient, and techniques to be performed today from patient for clothing adjustments for techniques, therapist position for techniques and hand placement and palpation for techniques as described above and how they are pertinent to the patient's plan of care.  Home Exercise Program  Access Code: [email or web address]  URL: [email or web address]  Date: 07/25/2024  Prepared by: Efren Li    Exercises  - Seated Hip Abduction with Resistance  - 1 x daily - 7 x weekly - 3 sets - 10 reps  - Seated March with Resistance  - 1 x daily - 7 x weekly - 3 sets - 10 reps  - Seated Leg Extension with Resistance  - 1 x daily - 7 x weekly - 3 sets - 10 reps  - Seated Knee Extension with Resistance  - 1 x daily - 7 x weekly - 3 sets - 10 reps  ADDED: Chest Press with dowel VS therapist pressure  Chest Press with Green TB  Medicine ball circles and figure 8's  Reaching as if putting away glasses, plates, etc.      ASSESSMENT                                                                                                            10/9/24: Patient is progressing within bounds of current WB restrictions and healing status. Patient is ambulating with no c/o increased pain. Patient had improved ankle movement after manual therapy. Will continue to progress within current status. Patient's wound care physician is hopefully that he will have healed enough for WBAT status in around 3-4 weeks. Continue POC as established.     PROGRESS NOTE 9/16/24: Patient has been able to start new ambulation with physician's increase of WB status.  Patient has thus been able to test more activities and has increased in both ambulation and overall LE strength. Will continue to work within current WB restrictions to prepare for WBAT/FWB with and without AD.     PROGRESS NOTE 8/15/24: Patient has progressed with strength and AROM within physician restrictions on WB/gait. Patient is making progress towards all goals and is improving his transfer ability. Will continue to work on strengthening in preparation for WB change and then gait work.    PROGRESS NOTE 7/25/24: Patient has had a gap in therapy because os multiple skin grafts on L foot. Patient returns today for re-check and some modified exercises. Will continue to work on BLE strengthening as patient can tolerate and within physician restrictions.     INITIAL EVALUATION 6/4/24: Ari reports that he contracted Hepatitis A when in [place]. 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 Patient is on dialysis treatments currently and has a Left foot wound, WB only on heel. Patient has profound weakness in BLE and deconditioning per his multiple health issues. The patient will benefit from skilled PT intervention with focus on patient education, therapeutic exercise, and manual therapy techniques. Failure to intervene may lead to permanent function loss and could increase chronicity of this disorder, impeding upon pt's overall function and quality of life.  Education:   - Results of above outlined education: Verbalizes understanding and Demonstrates understanding    PLAN                                                                                                                           Suggestions for next session as indicated: Progress per plan of care    Goals  Long Term Goals: to be met by end of plan of care  1. Patient to steady self on affected leg for reaching activities prn daily without balance disruption. Status: progressing/ongoing  2. Patient to ambulate with least restrictive AD prn daily for  community distances. Status: progressing/ongoing  3. Patient to reach object from floor, lift, and carry household distances prn daily without balance disruption. Status: progressing/ongoing  4. Patient to perform turn or quick adjustment while in motion without balance disruption for safe ambulation.  Status: progressing/ongoing      Therapy procedure time and total treatment time can be found documented on the Time Entry flowsheet

## 2024-10-17 ENCOUNTER — TELEPHONE (OUTPATIENT)
Dept: CARDIOLOGY CLINIC | Age: 84
End: 2024-10-17

## 2024-10-17 NOTE — TELEPHONE ENCOUNTER
Rosi with Neurology called and states they would like to put our patient on a certain medication but it comes with high cardio risk. Rosi left her cell phone number for someone to call her back and advise.     [phone]

## 2024-11-05 ENCOUNTER — NURSE ONLY (OUTPATIENT)
Dept: CARDIOLOGY CLINIC | Age: 84
End: 2024-11-05
Payer: MEDICARE

## 2024-11-05 ENCOUNTER — HOSPITAL ENCOUNTER (OUTPATIENT)
Age: 84
Setting detail: SPECIMEN
Discharge: HOME | End: 2024-11-05
Payer: MEDICARE

## 2024-11-05 VITALS
DIASTOLIC BLOOD PRESSURE: 60 MMHG | WEIGHT: 168.2 LBS | HEART RATE: 75 BPM | HEIGHT: 70 IN | BODY MASS INDEX: 24.08 KG/M2 | SYSTOLIC BLOOD PRESSURE: 100 MMHG

## 2024-11-05 DIAGNOSIS — R07.9 CHEST PAIN, UNSPECIFIED TYPE: Primary | ICD-10-CM

## 2024-11-05 PROCEDURE — 93000 ELECTROCARDIOGRAM COMPLETE: CPT | Performed by: NURSE PRACTITIONER

## 2024-11-05 PROCEDURE — 36415 COLL VENOUS BLD VENIPUNCTURE: CPT

## 2024-11-05 RX ORDER — MEXILETINE HYDROCHLORIDE 150 MG/1
CAPSULE ORAL
COMMUNITY
Start: 2024-10-17

## 2024-11-05 RX ORDER — ONDANSETRON 4 MG/1
4 TABLET, FILM COATED ORAL EVERY 8 HOURS PRN
COMMUNITY

## 2024-11-05 NOTE — PATIENT INSTRUCTIONS
Please be informed that if you contact our office outside of normal business hours the physician on call cannot help with any scheduling or rescheduling issues, procedure instruction questions or any type of medication issue.    We advise you for any urgent/emergency that you go to the nearest emergency room!    PLEASE CALL OUR OFFICE DURING NORMAL BUSINESS HOURS    Monday - Friday   8 am to 5 pm    [place]: [phone]    [place]: [phone]    [place]:  [phone]    **It is YOUR responsibilty to bring medication bottles and/or updated medication list to EACH APPOINTMENT. This will allow us to better serve you and all your healthcare needs**    Thank you for allowing us to care for you today!   We want to ensure we can follow your treatment plan and we strive to give you the best outcomes and experience possible.   If you ever have a life threatening emergency and call 911 - for an ambulance (EMS)   Our providers can only care for you at:   [facility] or [facility].   Even if you have someone take you or you drive yourself we can only care for you in a [facility] facility. Our providers are not setup at the other healthcare locations!

## 2024-11-15 ENCOUNTER — APPOINTMENT (OUTPATIENT)
Dept: GENERAL RADIOLOGY | Age: 84
DRG: 419 | End: 2024-11-15
Payer: MEDICARE

## 2024-11-15 ENCOUNTER — APPOINTMENT (OUTPATIENT)
Dept: CT IMAGING | Age: 84
DRG: 419 | End: 2024-11-15
Payer: MEDICARE

## 2024-11-15 ENCOUNTER — ANESTHESIA EVENT (OUTPATIENT)
Dept: OPERATING ROOM | Age: 84
End: 2024-11-15
Payer: MEDICARE

## 2024-11-15 ENCOUNTER — HOSPITAL ENCOUNTER (INPATIENT)
Age: 84
LOS: 2 days | Discharge: HOME | DRG: 419 | End: 2024-11-17
Attending: EMERGENCY MEDICINE | Admitting: INTERNAL MEDICINE
Payer: MEDICARE

## 2024-11-15 ENCOUNTER — ANESTHESIA (OUTPATIENT)
Dept: OPERATING ROOM | Age: 84
End: 2024-11-15
Payer: MEDICARE

## 2024-11-15 DIAGNOSIS — R10.13 EPIGASTRIC PAIN: Primary | ICD-10-CM

## 2024-11-15 DIAGNOSIS — R10.12 LEFT UPPER QUADRANT ABDOMINAL PAIN: ICD-10-CM

## 2024-11-15 DIAGNOSIS — K81.9 CHOLECYSTITIS: ICD-10-CM

## 2024-11-15 DIAGNOSIS — R74.01 TRANSAMINITIS: ICD-10-CM

## 2024-11-15 PROBLEM — K81.0 ACUTE CHOLECYSTITIS: Status: ACTIVE | Noted: 2024-11-15

## 2024-11-15 LAB
ALBUMIN SERPL-MCNC: 3.8 G/DL (ref 3.4–5)
ALBUMIN/GLOB SERPL: 1.8 {RATIO} (ref 1.1–2.2)
ALP SERPL-CCNC: 77 U/L (ref 40–129)
ALT SERPL-CCNC: 114 U/L (ref 10–40)
ANION GAP SERPL CALCULATED.3IONS-SCNC: 12 MMOL/L (ref 9–17)
AST SERPL-CCNC: 219 U/L (ref 15–37)
BASOPHILS # BLD: 0.01 K/UL
BASOPHILS NFR BLD: 0 % (ref 0–1)
BILIRUB DIRECT SERPL-MCNC: 0.4 MG/DL (ref 0–0.3)
BILIRUB INDIRECT SERPL-MCNC: 0.4 MG/DL (ref 0–0.7)
BILIRUB SERPL-MCNC: 0.8 MG/DL (ref 0–1)
BUN SERPL-MCNC: 16 MG/DL (ref 7–20)
CALCIUM SERPL-MCNC: 9.9 MG/DL (ref 8.3–10.6)
CHLORIDE SERPL-SCNC: 100 MMOL/L (ref 99–110)
CO2 SERPL-SCNC: 26 MMOL/L (ref 21–32)
CREAT SERPL-MCNC: 1.2 MG/DL (ref 0.8–1.3)
EKG ATRIAL RATE: 89 BPM
EKG DIAGNOSIS: NORMAL
EKG P AXIS: 58 DEGREES
EKG P-R INTERVAL: 202 MS
EKG Q-T INTERVAL: 374 MS
EKG QRS DURATION: 90 MS
EKG QTC CALCULATION (BAZETT): 455 MS
EKG R AXIS: -20 DEGREES
EKG T AXIS: 70 DEGREES
EKG VENTRICULAR RATE: 89 BPM
EOSINOPHIL # BLD: 0.05 K/UL
EOSINOPHILS RELATIVE PERCENT: 1 % (ref 0–3)
ERYTHROCYTE [DISTWIDTH] IN BLOOD BY AUTOMATED COUNT: 13.3 % (ref 11.7–14.9)
GFR, ESTIMATED: 60 ML/MIN/1.73M2
GLUCOSE SERPL-MCNC: 142 MG/DL (ref 74–99)
HCT VFR BLD AUTO: 42.6 % (ref 42–52)
HGB BLD-MCNC: 14.4 G/DL (ref 13.5–18)
IMM GRANULOCYTES # BLD AUTO: 0.01 K/UL
IMM GRANULOCYTES NFR BLD: 0 %
LIPASE SERPL-CCNC: 231 U/L (ref 13–60)
LYMPHOCYTES NFR BLD: 0.36 K/UL
LYMPHOCYTES RELATIVE PERCENT: 6 % (ref 24–44)
MCH RBC QN AUTO: 32.5 PG (ref 27–31)
MCHC RBC AUTO-ENTMCNC: 33.8 G/DL (ref 32–36)
MCV RBC AUTO: 96.2 FL (ref 78–100)
MONOCYTES NFR BLD: 0.05 K/UL
MONOCYTES NFR BLD: 1 % (ref 0–4)
NEUTROPHILS NFR BLD: 93 % (ref 36–66)
NEUTS SEG NFR BLD: 6.07 K/UL
PLATELET # BLD AUTO: 155 K/UL (ref 140–440)
PMV BLD AUTO: 9.7 FL (ref 7.5–11.1)
POTASSIUM SERPL-SCNC: 3.9 MMOL/L (ref 3.5–5.1)
PROT SERPL-MCNC: 5.9 G/DL (ref 6.4–8.2)
RBC # BLD AUTO: 4.43 M/UL (ref 4.6–6.2)
SODIUM SERPL-SCNC: 138 MMOL/L (ref 136–145)
TROPONIN I SERPL HS-MCNC: 27 NG/L (ref 0–22)
TROPONIN I SERPL HS-MCNC: 29 NG/L (ref 0–22)
WBC OTHER # BLD: 6.6 K/UL (ref 4–10.5)

## 2024-11-15 PROCEDURE — 2500000003 HC RX 250 WO HCPCS: Performed by: SURGERY

## 2024-11-15 PROCEDURE — 2580000003 HC RX 258: Performed by: EMERGENCY MEDICINE

## 2024-11-15 PROCEDURE — 99285 EMERGENCY DEPT VISIT HI MDM: CPT

## 2024-11-15 PROCEDURE — 6360000002 HC RX W HCPCS: Performed by: EMERGENCY MEDICINE

## 2024-11-15 PROCEDURE — 83690 ASSAY OF LIPASE: CPT

## 2024-11-15 PROCEDURE — 96374 THER/PROPH/DIAG INJ IV PUSH: CPT

## 2024-11-15 PROCEDURE — 8E0W4CZ ROBOTIC ASSISTED PROCEDURE OF TRUNK REGION, PERCUTANEOUS ENDOSCOPIC APPROACH: ICD-10-PCS | Performed by: NURSE PRACTITIONER

## 2024-11-15 PROCEDURE — 74177 CT ABD & PELVIS W/CONTRAST: CPT

## 2024-11-15 PROCEDURE — 99222 1ST HOSP IP/OBS MODERATE 55: CPT | Performed by: SURGERY

## 2024-11-15 PROCEDURE — 2580000003 HC RX 258: Performed by: SURGERY

## 2024-11-15 PROCEDURE — 2580000003 HC RX 258: Performed by: NURSE ANESTHETIST, CERTIFIED REGISTERED

## 2024-11-15 PROCEDURE — 84484 ASSAY OF TROPONIN QUANT: CPT

## 2024-11-15 PROCEDURE — 71045 X-RAY EXAM CHEST 1 VIEW: CPT

## 2024-11-15 PROCEDURE — 7100000000 HC PACU RECOVERY - FIRST 15 MIN: Performed by: SURGERY

## 2024-11-15 PROCEDURE — 6360000002 HC RX W HCPCS: Performed by: NURSE ANESTHETIST, CERTIFIED REGISTERED

## 2024-11-15 PROCEDURE — 93010 ELECTROCARDIOGRAM REPORT: CPT | Performed by: INTERNAL MEDICINE

## 2024-11-15 PROCEDURE — 3700000000 HC ANESTHESIA ATTENDED CARE: Performed by: SURGERY

## 2024-11-15 PROCEDURE — 88304 TISSUE EXAM BY PATHOLOGIST: CPT

## 2024-11-15 PROCEDURE — 7100000001 HC PACU RECOVERY - ADDTL 15 MIN: Performed by: SURGERY

## 2024-11-15 PROCEDURE — 93005 ELECTROCARDIOGRAM TRACING: CPT | Performed by: EMERGENCY MEDICINE

## 2024-11-15 PROCEDURE — 0FT44ZZ RESECTION OF GALLBLADDER, PERCUTANEOUS ENDOSCOPIC APPROACH: ICD-10-PCS | Performed by: NURSE PRACTITIONER

## 2024-11-15 PROCEDURE — 85025 COMPLETE CBC W/AUTO DIFF WBC: CPT

## 2024-11-15 PROCEDURE — 80053 COMPREHEN METABOLIC PANEL: CPT

## 2024-11-15 PROCEDURE — 3600000019 HC SURGERY ROBOT ADDTL 15MIN: Performed by: SURGERY

## 2024-11-15 PROCEDURE — 6360000002 HC RX W HCPCS: Performed by: INTERNAL MEDICINE

## 2024-11-15 PROCEDURE — 2709999900 HC NON-CHARGEABLE SUPPLY: Performed by: SURGERY

## 2024-11-15 PROCEDURE — APPNB180 APP NON BILLABLE TIME > 60 MINS: Performed by: PHYSICIAN ASSISTANT

## 2024-11-15 PROCEDURE — 6360000002 HC RX W HCPCS: Performed by: SURGERY

## 2024-11-15 PROCEDURE — S2900 ROBOTIC SURGICAL SYSTEM: HCPCS | Performed by: SURGERY

## 2024-11-15 PROCEDURE — 3600000009 HC SURGERY ROBOT BASE: Performed by: SURGERY

## 2024-11-15 PROCEDURE — C1889 IMPLANT/INSERT DEVICE, NOC: HCPCS | Performed by: SURGERY

## 2024-11-15 PROCEDURE — 6360000004 HC RX CONTRAST MEDICATION: Performed by: EMERGENCY MEDICINE

## 2024-11-15 PROCEDURE — 47562 LAPAROSCOPIC CHOLECYSTECTOMY: CPT | Performed by: PHYSICIAN ASSISTANT

## 2024-11-15 PROCEDURE — 94761 N-INVAS EAR/PLS OXIMETRY MLT: CPT

## 2024-11-15 PROCEDURE — 3700000001 HC ADD 15 MINUTES (ANESTHESIA): Performed by: SURGERY

## 2024-11-15 PROCEDURE — 1200000000 HC SEMI PRIVATE

## 2024-11-15 PROCEDURE — 82248 BILIRUBIN DIRECT: CPT

## 2024-11-15 PROCEDURE — 2500000003 HC RX 250 WO HCPCS: Performed by: NURSE ANESTHETIST, CERTIFIED REGISTERED

## 2024-11-15 PROCEDURE — 47562 LAPAROSCOPIC CHOLECYSTECTOMY: CPT | Performed by: SURGERY

## 2024-11-15 PROCEDURE — 2580000003 HC RX 258: Performed by: INTERNAL MEDICINE

## 2024-11-15 DEVICE — CLIP INT L POLYMER LOK LIG HEM O LOK (6EA/PK): Type: IMPLANTABLE DEVICE | Site: ABDOMEN | Status: FUNCTIONAL

## 2024-11-15 RX ORDER — ONDANSETRON 2 MG/ML
4 INJECTION INTRAMUSCULAR; INTRAVENOUS
Status: DISCONTINUED | OUTPATIENT
Start: 2024-11-15 | End: 2024-11-15 | Stop reason: HOSPADM

## 2024-11-15 RX ORDER — ONDANSETRON 2 MG/ML
4 INJECTION INTRAMUSCULAR; INTRAVENOUS EVERY 6 HOURS PRN
Status: DISCONTINUED | OUTPATIENT
Start: 2024-11-15 | End: 2024-11-17 | Stop reason: HOSPADM

## 2024-11-15 RX ORDER — POTASSIUM CHLORIDE 7.45 MG/ML
10 INJECTION INTRAVENOUS PRN
Status: DISCONTINUED | OUTPATIENT
Start: 2024-11-15 | End: 2024-11-17 | Stop reason: HOSPADM

## 2024-11-15 RX ORDER — SODIUM CHLORIDE 9 MG/ML
INJECTION, SOLUTION INTRAVENOUS PRN
Status: DISCONTINUED | OUTPATIENT
Start: 2024-11-15 | End: 2024-11-17 | Stop reason: HOSPADM

## 2024-11-15 RX ORDER — PROCHLORPERAZINE EDISYLATE 5 MG/ML
5 INJECTION INTRAMUSCULAR; INTRAVENOUS
Status: DISCONTINUED | OUTPATIENT
Start: 2024-11-15 | End: 2024-11-15 | Stop reason: HOSPADM

## 2024-11-15 RX ORDER — SODIUM CHLORIDE 0.9 % (FLUSH) 0.9 %
5-40 SYRINGE (ML) INJECTION EVERY 12 HOURS SCHEDULED
Status: DISCONTINUED | OUTPATIENT
Start: 2024-11-15 | End: 2024-11-17 | Stop reason: HOSPADM

## 2024-11-15 RX ORDER — SODIUM CHLORIDE 0.9 % (FLUSH) 0.9 %
5-40 SYRINGE (ML) INJECTION EVERY 12 HOURS SCHEDULED
Status: DISCONTINUED | OUTPATIENT
Start: 2024-11-15 | End: 2024-11-15 | Stop reason: HOSPADM

## 2024-11-15 RX ORDER — POTASSIUM CHLORIDE 1500 MG/1
40 TABLET, EXTENDED RELEASE ORAL PRN
Status: DISCONTINUED | OUTPATIENT
Start: 2024-11-15 | End: 2024-11-17 | Stop reason: HOSPADM

## 2024-11-15 RX ORDER — NALOXONE HYDROCHLORIDE 0.4 MG/ML
INJECTION, SOLUTION INTRAMUSCULAR; INTRAVENOUS; SUBCUTANEOUS PRN
Status: DISCONTINUED | OUTPATIENT
Start: 2024-11-15 | End: 2024-11-15 | Stop reason: HOSPADM

## 2024-11-15 RX ORDER — DEXAMETHASONE SODIUM PHOSPHATE 4 MG/ML
INJECTION, SOLUTION INTRA-ARTICULAR; INTRALESIONAL; INTRAMUSCULAR; INTRAVENOUS; SOFT TISSUE
Status: DISCONTINUED | OUTPATIENT
Start: 2024-11-15 | End: 2024-11-15 | Stop reason: SDUPTHER

## 2024-11-15 RX ORDER — ACETAMINOPHEN 650 MG/1
650 SUPPOSITORY RECTAL EVERY 6 HOURS PRN
Status: DISCONTINUED | OUTPATIENT
Start: 2024-11-15 | End: 2024-11-17 | Stop reason: HOSPADM

## 2024-11-15 RX ORDER — ONDANSETRON 4 MG/1
4 TABLET, ORALLY DISINTEGRATING ORAL EVERY 8 HOURS PRN
Status: DISCONTINUED | OUTPATIENT
Start: 2024-11-15 | End: 2024-11-17 | Stop reason: HOSPADM

## 2024-11-15 RX ORDER — HYDROCODONE BITARTRATE AND ACETAMINOPHEN 5; 325 MG/1; MG/1
1 TABLET ORAL EVERY 6 HOURS PRN
Status: DISCONTINUED | OUTPATIENT
Start: 2024-11-15 | End: 2024-11-17 | Stop reason: HOSPADM

## 2024-11-15 RX ORDER — FENTANYL CITRATE 50 UG/ML
25 INJECTION, SOLUTION INTRAMUSCULAR; INTRAVENOUS EVERY 5 MIN PRN
Status: DISCONTINUED | OUTPATIENT
Start: 2024-11-15 | End: 2024-11-15 | Stop reason: HOSPADM

## 2024-11-15 RX ORDER — LIDOCAINE HYDROCHLORIDE 20 MG/ML
INJECTION, SOLUTION EPIDURAL; INFILTRATION; INTRACAUDAL; PERINEURAL
Status: DISCONTINUED | OUTPATIENT
Start: 2024-11-15 | End: 2024-11-15 | Stop reason: SDUPTHER

## 2024-11-15 RX ORDER — ROCURONIUM BROMIDE 10 MG/ML
INJECTION, SOLUTION INTRAVENOUS
Status: DISCONTINUED | OUTPATIENT
Start: 2024-11-15 | End: 2024-11-15 | Stop reason: SDUPTHER

## 2024-11-15 RX ORDER — INDOCYANINE GREEN AND WATER 25 MG
1.25 KIT INJECTION ONCE
Status: COMPLETED | OUTPATIENT
Start: 2024-11-15 | End: 2024-11-15

## 2024-11-15 RX ORDER — MAGNESIUM SULFATE IN WATER 40 MG/ML
2000 INJECTION, SOLUTION INTRAVENOUS PRN
Status: DISCONTINUED | OUTPATIENT
Start: 2024-11-15 | End: 2024-11-17 | Stop reason: HOSPADM

## 2024-11-15 RX ORDER — ESMOLOL HYDROCHLORIDE 10 MG/ML
INJECTION INTRAVENOUS
Status: DISCONTINUED | OUTPATIENT
Start: 2024-11-15 | End: 2024-11-15 | Stop reason: SDUPTHER

## 2024-11-15 RX ORDER — PROPOFOL 10 MG/ML
INJECTION, EMULSION INTRAVENOUS
Status: DISCONTINUED | OUTPATIENT
Start: 2024-11-15 | End: 2024-11-15 | Stop reason: SDUPTHER

## 2024-11-15 RX ORDER — SODIUM CHLORIDE 9 MG/ML
INJECTION, SOLUTION INTRAVENOUS PRN
Status: DISCONTINUED | OUTPATIENT
Start: 2024-11-15 | End: 2024-11-15 | Stop reason: HOSPADM

## 2024-11-15 RX ORDER — MORPHINE SULFATE 2 MG/ML
2 INJECTION, SOLUTION INTRAMUSCULAR; INTRAVENOUS
Status: DISCONTINUED | OUTPATIENT
Start: 2024-11-15 | End: 2024-11-17 | Stop reason: HOSPADM

## 2024-11-15 RX ORDER — POLYETHYLENE GLYCOL 3350 17 G/17G
17 POWDER, FOR SOLUTION ORAL DAILY PRN
Status: DISCONTINUED | OUTPATIENT
Start: 2024-11-15 | End: 2024-11-17 | Stop reason: HOSPADM

## 2024-11-15 RX ORDER — FENTANYL CITRATE 50 UG/ML
INJECTION, SOLUTION INTRAMUSCULAR; INTRAVENOUS
Status: DISCONTINUED | OUTPATIENT
Start: 2024-11-15 | End: 2024-11-15 | Stop reason: SDUPTHER

## 2024-11-15 RX ORDER — SODIUM CHLORIDE, SODIUM LACTATE, POTASSIUM CHLORIDE, CALCIUM CHLORIDE 600; 310; 30; 20 MG/100ML; MG/100ML; MG/100ML; MG/100ML
INJECTION, SOLUTION INTRAVENOUS
Status: DISCONTINUED | OUTPATIENT
Start: 2024-11-15 | End: 2024-11-15 | Stop reason: SDUPTHER

## 2024-11-15 RX ORDER — HYDRALAZINE HYDROCHLORIDE 20 MG/ML
10 INJECTION INTRAMUSCULAR; INTRAVENOUS
Status: DISCONTINUED | OUTPATIENT
Start: 2024-11-15 | End: 2024-11-15 | Stop reason: HOSPADM

## 2024-11-15 RX ORDER — SODIUM CHLORIDE 0.9 % (FLUSH) 0.9 %
5-40 SYRINGE (ML) INJECTION PRN
Status: DISCONTINUED | OUTPATIENT
Start: 2024-11-15 | End: 2024-11-17 | Stop reason: HOSPADM

## 2024-11-15 RX ORDER — MORPHINE SULFATE 4 MG/ML
4 INJECTION, SOLUTION INTRAMUSCULAR; INTRAVENOUS
Status: DISCONTINUED | OUTPATIENT
Start: 2024-11-15 | End: 2024-11-17 | Stop reason: HOSPADM

## 2024-11-15 RX ORDER — BUPIVACAINE HYDROCHLORIDE 5 MG/ML
INJECTION, SOLUTION EPIDURAL; INTRACAUDAL
Status: COMPLETED | OUTPATIENT
Start: 2024-11-15 | End: 2024-11-15

## 2024-11-15 RX ORDER — ONDANSETRON 2 MG/ML
INJECTION INTRAMUSCULAR; INTRAVENOUS
Status: DISCONTINUED | OUTPATIENT
Start: 2024-11-15 | End: 2024-11-15 | Stop reason: SDUPTHER

## 2024-11-15 RX ORDER — ACETAMINOPHEN 325 MG/1
650 TABLET ORAL
Status: DISCONTINUED | OUTPATIENT
Start: 2024-11-15 | End: 2024-11-15 | Stop reason: HOSPADM

## 2024-11-15 RX ORDER — 0.9 % SODIUM CHLORIDE 0.9 %
1000 INTRAVENOUS SOLUTION INTRAVENOUS ONCE
Status: COMPLETED | OUTPATIENT
Start: 2024-11-15 | End: 2024-11-15

## 2024-11-15 RX ORDER — IOPAMIDOL 755 MG/ML
75 INJECTION, SOLUTION INTRAVASCULAR
Status: COMPLETED | OUTPATIENT
Start: 2024-11-15 | End: 2024-11-15

## 2024-11-15 RX ORDER — SODIUM CHLORIDE 0.9 % (FLUSH) 0.9 %
5-40 SYRINGE (ML) INJECTION PRN
Status: DISCONTINUED | OUTPATIENT
Start: 2024-11-15 | End: 2024-11-15 | Stop reason: HOSPADM

## 2024-11-15 RX ORDER — ACETAMINOPHEN 325 MG/1
650 TABLET ORAL EVERY 6 HOURS PRN
Status: DISCONTINUED | OUTPATIENT
Start: 2024-11-15 | End: 2024-11-17 | Stop reason: HOSPADM

## 2024-11-15 RX ADMIN — PROPOFOL 100 MCG/KG/MIN: 10 INJECTION, EMULSION INTRAVENOUS at 10:45

## 2024-11-15 RX ADMIN — SUGAMMADEX 200 MG: 100 INJECTION, SOLUTION INTRAVENOUS at 11:17

## 2024-11-15 RX ADMIN — LIDOCAINE HYDROCHLORIDE 40 MG: 20 INJECTION, SOLUTION EPIDURAL; INFILTRATION; INTRACAUDAL; PERINEURAL at 10:43

## 2024-11-15 RX ADMIN — PHENYLEPHRINE HYDROCHLORIDE 100 MCG: 10 INJECTION INTRAVENOUS at 10:45

## 2024-11-15 RX ADMIN — DEXAMETHASONE SODIUM PHOSPHATE 4 MG: 4 INJECTION, SOLUTION INTRAMUSCULAR; INTRAVENOUS at 10:43

## 2024-11-15 RX ADMIN — FENTANYL CITRATE 50 MCG: 50 INJECTION, SOLUTION INTRAMUSCULAR; INTRAVENOUS at 10:41

## 2024-11-15 RX ADMIN — PROPOFOL 150 MG: 10 INJECTION, EMULSION INTRAVENOUS at 10:43

## 2024-11-15 RX ADMIN — CEFAZOLIN 2000 MG: 2 INJECTION, POWDER, FOR SOLUTION INTRAMUSCULAR; INTRAVENOUS at 10:50

## 2024-11-15 RX ADMIN — PIPERACILLIN AND TAZOBACTAM 3375 MG: 3; .375 INJECTION, POWDER, LYOPHILIZED, FOR SOLUTION INTRAVENOUS at 22:21

## 2024-11-15 RX ADMIN — ESMOLOL HYDROCHLORIDE 20 MG: 100 INJECTION, SOLUTION INTRAVENOUS at 11:10

## 2024-11-15 RX ADMIN — INDOCYANINE GREEN AND WATER 1.25 MG: KIT at 08:09

## 2024-11-15 RX ADMIN — SODIUM CHLORIDE, PRESERVATIVE FREE 10 ML: 5 INJECTION INTRAVENOUS at 20:39

## 2024-11-15 RX ADMIN — IOPAMIDOL 75 ML: 755 INJECTION, SOLUTION INTRAVENOUS at 05:34

## 2024-11-15 RX ADMIN — SODIUM CHLORIDE 1000 ML: 9 INJECTION, SOLUTION INTRAVENOUS at 04:17

## 2024-11-15 RX ADMIN — PIPERACILLIN AND TAZOBACTAM 3375 MG: 3; .375 INJECTION, POWDER, LYOPHILIZED, FOR SOLUTION INTRAVENOUS at 15:14

## 2024-11-15 RX ADMIN — FENTANYL CITRATE 50 MCG: 50 INJECTION, SOLUTION INTRAMUSCULAR; INTRAVENOUS at 10:50

## 2024-11-15 RX ADMIN — ONDANSETRON 4 MG: 2 INJECTION INTRAMUSCULAR; INTRAVENOUS at 10:43

## 2024-11-15 RX ADMIN — PIPERACILLIN AND TAZOBACTAM 3375 MG: 3; .375 INJECTION, POWDER, LYOPHILIZED, FOR SOLUTION INTRAVENOUS at 06:08

## 2024-11-15 RX ADMIN — SODIUM CHLORIDE, POTASSIUM CHLORIDE, SODIUM LACTATE AND CALCIUM CHLORIDE: 600; 310; 30; 20 INJECTION, SOLUTION INTRAVENOUS at 10:41

## 2024-11-15 RX ADMIN — ROCURONIUM BROMIDE 50 MG: 10 INJECTION, SOLUTION INTRAVENOUS at 10:43

## 2024-11-15 ASSESSMENT — PAIN - FUNCTIONAL ASSESSMENT
PAIN_FUNCTIONAL_ASSESSMENT: 0-10
PAIN_FUNCTIONAL_ASSESSMENT: ACTIVITIES ARE NOT PREVENTED
PAIN_FUNCTIONAL_ASSESSMENT: 0-10

## 2024-11-15 ASSESSMENT — PAIN SCALES - GENERAL
PAINLEVEL_OUTOF10: 0
PAINLEVEL_OUTOF10: 1
PAINLEVEL_OUTOF10: 10

## 2024-11-15 ASSESSMENT — COPD QUESTIONNAIRES: CAT_SEVERITY: MODERATE

## 2024-11-15 ASSESSMENT — PAIN DESCRIPTION - ORIENTATION: ORIENTATION: UPPER

## 2024-11-15 ASSESSMENT — PAIN DESCRIPTION - LOCATION: LOCATION: ABDOMEN

## 2024-11-15 ASSESSMENT — PAIN DESCRIPTION - DESCRIPTORS
DESCRIPTORS: DISCOMFORT
DESCRIPTORS: DISCOMFORT

## 2024-11-15 NOTE — ED NOTES
ED TO INPATIENT SBAR HANDOFF    Patient Name: Glynn Florez   :  1940  84 y.o.   Preferred Name  Aung  Family/Caregiver Present no   Restraints no   C-SSRS: Risk of Suicide: No Risk  Sitter no   Sepsis Risk Score        Situation  Chief Complaint   Patient presents with    Chest Pain     Brief Description of Patient's Condition: Per patient chest pain started yesterday increasing tonight. Patient points to his upper mid abdomen as pain location.  Mental Status: oriented, alert, coherent, logical, and thought processes intact  Arrived from: home    Imaging:   XR CHEST PORTABLE   Final Result      Mild rightward rotation. Some fullness of the right side of the   mediastinum/right hilum is similar to 2022.      Mild scarring or subsegmental atelectasis at the right lung base. Curvilinear   scarring or subsegmental atelectasis right suprahilar region is not   significantly changed. There is no focal airspace consolidation or sizable   pleural effusion. Suggest a follow-up PA and lateral chest radiograph in 1 week.      Electronically signed by Moustapha Servin      CT ABDOMEN PELVIS W IV CONTRAST Additional Contrast? None    (Results Pending)     Abnormal labs:   Abnormal Labs Reviewed   CBC WITH AUTO DIFFERENTIAL - Abnormal; Notable for the following components:       Result Value    RBC 4.43 (*)     MCH 32.5 (*)     Neutrophils % 93 (*)     Lymphocytes % 6 (*)     All other components within normal limits   BASIC METABOLIC PANEL - Abnormal; Notable for the following components:    Glucose 142 (*)     Est, Glom Filt Rate 60 (*)     All other components within normal limits   TROPONIN - Abnormal; Notable for the following components:    Troponin, High Sensitivity 29 (*)     All other components within normal limits   HEPATIC FUNCTION PANEL - Abnormal; Notable for the following components:     (*)      (*)     Bilirubin, Direct 0.4 (*)     Total Protein 5.9 (*)     All other components within

## 2024-11-15 NOTE — PROGRESS NOTES
4 Eyes Skin Assessment     NAME:  Glynn Florez  YOB: 1940  MEDICAL RECORD NUMBER:  [id number]    The patient is being assessed for  Admission    I agree that at least one RN has performed a thorough Head to Toe Skin Assessment on the patient. ALL assessment sites listed below have been assessed.      Areas assessed by both nurses:    Head, Face, Ears, Shoulders, Back, Chest, Arms, Elbows, Hands, Sacrum. Buttock, Coccyx, Ischium, Legs. Feet and Heels, and Under Medical Devices         Does the Patient have a Wound? No noted wound(s)       Nazario Prevention initiated by RN: No  Wound Care Orders initiated by RN: No    Pressure Injury (Stage 3,4, Unstageable, DTI, NWPT, and Complex wounds) if present, place Wound referral order by RN under : No    New Ostomies, if present place, Ostomy referral order under : No     Nurse 1 eSignature: Electronically signed by REBA TONG RN on 11/15/24 at 4:51 PM EST    **SHARE this note so that the co-signing nurse can place an eSignature**    Nurse 2 eSignature: Electronically signed by Kathy Lisa RN on 11/15/24 at 5:17 PM EST

## 2024-11-15 NOTE — ED NOTES
Medication History  [facility]    Patient Name: Glynn Florez 1940     Medication history has been completed by: Mana Magallon [facility]    Source(s) of information: patient and insurance claims     Primary Care Physician: Mic Ruiz DO     Pharmacy: [facility]    Allergies as of 11/15/2024 - Fully Reviewed 11/15/2024   Allergen Reaction Noted    Fenofibrate  02/23/2022    Statins Myalgia 02/08/2023        Prior to Admission medications    Medication Sig Start Date End Date Taking? Authorizing Provider   Multiple Vitamins-Minerals (EYE VITAMINS) CAPS Take 1 capsule by mouth 2 times daily   Yes Dami Waters MD   mexiletine (MEXITIL) 150 MG capsule Take 1 capsule by mouth 3 times daily 10/17/24  Yes Dami Waters MD   aspirin 81 MG tablet Take 1 tablet by mouth daily 11/29/23  Yes Bruno Matt MD   dilTIAZem (CARDIZEM CD) 120 MG extended release capsule Take 1 capsule by mouth daily 11/16/23  Yes Bruno Matt MD   clopidogrel (PLAVIX) 75 MG tablet Take 1 tablet by mouth daily 11/29/23  Yes Bruno Matt MD   amitriptyline (ELAVIL) 10 MG tablet Take 3 tablets by mouth nightly at bedtime. 9/16/23  Yes Dami Waters MD   clonazePAM (KLONOPIN) 0.5 MG tablet Take 0.5 tablets by mouth 2 times daily. Pt taking 1/2 in the am & 1/2 in the pm 6/6/23  Yes Dami Waters MD   traZODone (DESYREL) 50 MG tablet TAKE 1 TABLET BY MOUTH AT NIGHT 5/18/23  Yes Dahlia Patel APRN - CNP   pantoprazole (PROTONIX) 40 MG tablet TAKE 1 TABLET BY MOUTH DAILY 4/24/23  Yes Dahlia Patel APRN - CNP   levothyroxine (SYNTHROID) 50 MCG tablet TAKE 1 TABLET BY MOUTH IN  THE MORNING 2/8/23  Yes Dahlia Patel APRN - CNP   gabapentin (NEURONTIN) 300 MG capsule TAKE 1 CAPSULE BY MOUTH 3  TIMES DAILY 12/1/22  Yes Blake Segovia DO   vitamin B-12 500 MCG tablet Take 1 tablet by mouth daily 8/25/22  Yes Moustapha Laws, NELLIE - CNP   ondansetron (ZOFRAN)

## 2024-11-15 NOTE — ED NOTES
Pt placed in gown. Surgery called, pt scheduled for surgery 0830am.  PT notified. Confirmed with surgery pt has been NPO since before midnight.

## 2024-11-15 NOTE — PROGRESS NOTES
1135 Pt arrived from OR to PACU. Monitors applied and alarms in place. Report rec'd from Eloise Andrade and Fuad CRNA. Oral airway in place. Pt drowsy. opens eyes to speech  1145 Oral airway removed. Pt able to follow commands  1155 Pt denies pain or nausea  1200 Pt tolerating ice chips appropriately  1210 Pt resting in bed with eyes closed. Eyes open to speech. Resp even and unlabored. Pt denies needs at this time  1220 Phase 1 pacu care complete. Awaiting inpatient bed  1225 Hospitalist at bedside  1300 Pt resting in bed. Eyes open to speech. Resp even and unlabored. Pt denies needs  1330 Report called to 1N  1337 Transport at bedside

## 2024-11-15 NOTE — ANESTHESIA PRE PROCEDURE
Department of Anesthesiology  Preprocedure Note       Name:  Glynn Florez   Age:  84 y.o.  :  1940                                          MRN:  [id number]         Date:  11/15/2024      Surgeon: Surgeon(s):  Ayaz Hargrove II, MD    Procedure: Procedure(s):  CHOLECYSTECTOMY LAPAROSCOPIC ROBOTIC    Medications prior to admission:   Prior to Admission medications    Medication Sig Start Date End Date Taking? Authorizing Provider   Multiple Vitamins-Minerals (EYE VITAMINS) CAPS Take 1 capsule by mouth 2 times daily   Yes Dami Waters MD   mexiletine (MEXITIL) 150 MG capsule Take 1 capsule by mouth 3 times daily 10/17/24  Yes Dami Waters MD   aspirin 81 MG tablet Take 1 tablet by mouth daily 23  Yes Bruno Matt MD   dilTIAZem (CARDIZEM CD) 120 MG extended release capsule Take 1 capsule by mouth daily 23  Yes Bruno Matt MD   clopidogrel (PLAVIX) 75 MG tablet Take 1 tablet by mouth daily 23  Yes Bruno Matt MD   amitriptyline (ELAVIL) 10 MG tablet Take 3 tablets by mouth nightly at bedtime. 23  Yes Dami Waters MD   clonazePAM (KLONOPIN) 0.5 MG tablet Take 0.5 tablets by mouth 2 times daily. Pt taking 1/2 in the am & 1/2 in the pm 23  Yes Dami Waters MD   traZODone (DESYREL) 50 MG tablet TAKE 1 TABLET BY MOUTH AT NIGHT 23  Yes Dahlia Patel APRN - CNP   pantoprazole (PROTONIX) 40 MG tablet TAKE 1 TABLET BY MOUTH DAILY 4/24/23 11/15/24 Yes Dahlia Patel APRN - CNP   levothyroxine (SYNTHROID) 50 MCG tablet TAKE 1 TABLET BY MOUTH IN  THE MORNING 23  Yes Dahlia Patel APRN - CNP   gabapentin (NEURONTIN) 300 MG capsule TAKE 1 CAPSULE BY MOUTH 3  TIMES DAILY 12/1/22 11/15/24 Yes Blake Segovia DO   vitamin B-12 500 MCG tablet Take 1 tablet by mouth daily 22  Yes Moustapha Laws APRN - CNP   ondansetron (ZOFRAN) 4 MG tablet Take 1 tablet by mouth every 8 hours as needed for Nausea or Vomiting

## 2024-11-15 NOTE — CONSULTS
Department of General Surgery   Surgical Service Dr. Hargrove   Consult Note    Date of Consult: 11/15/24    Critical care consult time: 0 min    Reason for Consult:  Acalculous cholecystitis     Requesting Physician:  ED    CHIEF COMPLAINT:  Abdominal pain    History Obtained From:  patient, electronic medical record    HISTORY OF PRESENT ILLNESS:      The patient is a 84 y.o. male who presented to the ED with complaints described as:      Location: Epigastric  Quality: sharp, radiates  Severity: 10 /10 at worst  Duration: ~ 2 days  Timing: as above  Context: previously without complaints  Modifying factors: denies  Associated signs and symptoms: nausea    Pt presented to ED and was worked up with exam, labs, imaging.    C/s placed to Gen Surg.       Past Medical History:    Past Medical History:   Diagnosis Date    Anesthesia complication     Patient reports in 1973 following a Back Surgery he was kept in the hospital due to high temperatures in the evenings. He stated they kept him for observation and doesn't remember receiving any treatment for the high temperatures. Patient reports the anesthesia name started with flur??.    BPH with obstruction/lower urinary tract symptoms     Chest pain 07/01/2018    Chronic low back pain     GERD (gastroesophageal reflux disease)     H/O 24 hour EKG monitoring 3/12  12/11    3/12/12  essentially a normal event monitor without significant arrhythmias noted    H/O cardiac catheterization 2/2/12 4/02 10/98    2/22/12  LM, LAD and RCA all without significant disease    H/O cardiac catheterization 2/2/12 4/02 10/98    Done R/T false pos on stress test.    H/O cardiac catheterization 08/06/2018    normal study    H/O cardiovascular stress test 07/02/2018    normal study    H/O echocardiogram 12/2010    3/14 EF55-60% normal LV function 12/10/10  complete 2 dim transthoracic echj.  LVSF normal  EF >55%, transmitral spectral Doppler flow pattern is suggestive of  impaired LV relaxation

## 2024-11-15 NOTE — OP NOTE
Operative Report    Patient Information:    Glynn Florez  [id number]  84 y.o.  1940    Indications: The above patient presented with symptomatic gallbladder disease and was worked up appropriately--including imaging, laboratory data, and history and physical exam. After discussion with the patient, the decision was made to undergo robotic-assisted laparoscopic, possible open, cholecystectomy with the possibility of intraoperative cholangiogram and/or Firefly fluorescence imaging using indocyanine green (ICG) dye.    Pre-Operative Diagnosis: Acute cholecystitis    Post-Operative Diagnosis: Same as above    Procedure: Robotic-assisted laparoscopic cholecystectomy with ICG fluorescence imaging    Surgeon: Ayaz Hargrove MD, FACS, FASS    Assistant: LIBERTAD Arthur. The skilled assistance of the PA was necessary for the successful completion of this case. She was essential for the proper positioning, manipulation of instruments, proper exposure, manipulation of tissue, and wound closure.    Anesthesia: General endotracheal    ASA: Per anesthesia    Findings: Consistent with post-operative diagnosis    IVF: Per anesthesia mL    Estimated Blood Loss: Less than 15 mL    Specimen(s): Gallbladder and contents    Complications:  None apparent    Condition: Stable    Disposition: PACU    Operative Details: The patient was met in the pre-operative holding area. An informed consent was obtained after discussing the risks, benefits, complications, treatment options, and expected outcomes.    The risks discussed included: adverse reaction to medication(s), pulmonary aspiration, perforation of viscus, bleeding, recurrent infection, finding a normal gallbladder, the need for additional procedures, failure to diagnose a condition, the possibility of converting to an open procedure, damage to nearby structures (specifically biliary structures), and creating a complication requiring transfusion or a separate

## 2024-11-15 NOTE — PROGRESS NOTES
I personally saw the patient and have reviewed all lab and imaging. I discussed the patient with the SVETA and was available for questions and consultation as needed.     History:   Patient states day before yesterday he started having upper abdominal pain. No nausea or vomiting. No diarrhea. No fevers. No shortness of breath. Pain was gradually getting worse so he came to the hospital.     Exam:   No acute distress  S1s2 rrr  Cta b/l bs+, on nasal canula, with occasional wet cough  Abd soft nd, mild tenderness, has surgical wounds    MDM:   -Right upper quadrant abdominal pain: Due to acute cholecystitis s/p lap cristi 11/15.  On antibiotics.  Management as per general surgery      Maikel Garcia MD  11/15/24

## 2024-11-15 NOTE — ANESTHESIA POSTPROCEDURE EVALUATION
Department of Anesthesiology  Postprocedure Note    Patient: Glynn Florez  MRN: [id number]  YOB: 1940  Date of evaluation: 11/15/2024    Procedure Summary       Date: 11/15/24 Room / Location: [address]    Anesthesia Start: 1035 Anesthesia Stop: 1137    Procedure: CHOLECYSTECTOMY LAPAROSCOPIC ROBOTIC Diagnosis:       Cholecystitis      (Cholecystitis [K81.9])    Surgeons: Ayaz Hargrove II, MD Responsible Provider: Stephen Singletary MD    Anesthesia Type: General ASA Status: 4            Anesthesia Type: General    Perry Phase I: Perry Score: 10    Perry Phase II:      Anesthesia Post Evaluation    Patient location during evaluation: bedside  Patient participation: complete - patient participated  Level of consciousness: awake  Airway patency: patent  Nausea & Vomiting: no nausea  Cardiovascular status: blood pressure returned to baseline  Respiratory status: acceptable  Hydration status: euvolemic  Pain management: adequate    No notable events documented.

## 2024-11-15 NOTE — ED PROVIDER NOTES
history, social history, surgical history, current medications, and allergies.    PHYSICAL EXAM  VITAL SIGNS: Triage VS:    ED Triage Vitals   Encounter Vitals Group      BP 11/15/24 0200 (!) 137/91      Systolic BP Percentile --       Diastolic BP Percentile --       Pulse 11/15/24 0158 88      Respirations 11/15/24 0200 20      Temp 11/15/24 0201 97.6 °F (36.4 °C)      Temp Source 11/15/24 0201 Oral      SpO2 11/15/24 0200 97 %      Weight - Scale 11/15/24 0200 76.2 kg (168 lb)      Height 11/15/24 0200 1.778 m (5' 10\")      Head Circumference --       Peak Flow --       Pain Score --       Pain Loc --       Pain Education --       Exclude from Growth Chart --      Constitutional: Well developed, Well nourished, nontoxic-appearing  HENT: Normocephalic, Atraumatic, Bilateral external ears normal,  Nose normal.   Eyes: Conjunctiva normal, No discharge. No scleral icterus.  Neck: Normal range of motion, No tenderness, Supple.  Lymphatic: No lymphadenopathy noted.   Cardiovascular: Normal heart rate, Normal rhythm, No murmurs, gallops or rubs.   Thorax & Lungs: Normal breath sounds, No respiratory distress, No wheezing.  Abdomen: Soft, tenderness to palpation to epigastrium and left upper quadrant without rebound or guarding, negative Wills sign, no masses, No pulsatile masses, No distention, Normal bowel sounds  Skin: Warm, Dry, Pink, No mottling, No erythema, No rash.   Back: No tenderness, No CVA tenderness.   Extremities: No cyanosis, Normal perfusion, No clubbing.   Musculoskeletal: Good range of motion in all major joints as observed. No major deformities noted.   Neurologic: Alert & oriented x 3,  No focal deficits noted.   Psychiatric: Affect normal, Judgment normal, Mood normal.   EKG  Per my interpretation demonstrates normal sinus rhythm at a rate of 89 bpm.  Normal axis.  Mildly prolonged SD interval.  No acute ST segment changes.  RADIOLOGY  Labs Reviewed   CBC WITH AUTO DIFFERENTIAL - Abnormal; Notable

## 2024-11-15 NOTE — ED TRIAGE NOTES
Per patient chest pain started yesterday increasing tonight. Patient points to his upper mid abdomen as pain location.

## 2024-11-15 NOTE — H&P
V2.0  History and Physical      Name:  Glynn Florez /Age/Sex: 1940  (84 y.o. male)   MRN & CSN:  [id number] & [id number] Encounter Date/Time: 11/15/2024 3:32 PM EST   [facility]: Mic Ruiz DO       Hospital Day: 1    Assessment and Plan:   Glynn Florez is a 84 y.o. male with a pmh of HTN, COPD, Prostate cancer, vitamin D deficiency, seizure, anxiety, and neuropathy who presents with Acute cholecystitis    Hospital Problems             Last Modified POA    * (Principal) Acute cholecystitis 11/15/2024 Yes    Acalculous cholecystitis 11/15/2024 Yes       Plan:    Abdominal pain   Acalculous cholecystitis   CT abd showed c CXR non acute. ALT: 114 and AST: 219. Lipase 231  -IVF  -NPO  -Pain control with Morphine IV and [place] PO  -Zosyn IV empirically  -General surgery consult. Plan for surgery this morning at 8: 30 am     HTN  -Continue cardizem    COPD, not in exacerbation  -Continue home regimen    Acquired hypothyroidism  -Continue Levothyroxine    Prostate cancer, in remission    Seizure  On Keppra    Neuropathy  -Continue Gabapentin    Anxiety  -OARRS reviewed and on Klonopin    Disposition:   Current Living situation: Home  Expected Disposition: Home  Estimated D/C: 1-2 days    Diet ADULT DIET; Clear Liquid   DVT Prophylaxis [x] Lovenox, []  Heparin, [] SCDs, [] Ambulation,  [] Eliquis, [] Xarelto, [] Coumadin   Code Status Full Code   Surrogate Decision Maker/ POA Self     Personally reviewed Lab Studies and Imaging     Discussed management of the case with Dr. Garcia who recommended admission    EKG interpreted personally and results SR    Imaging that was interpreted personally includes CT abd. And CXR and results  Acalculous cholecystitis  and nonacute cxr    Drugs that require monitoring for toxicity include morphine and Norco and the method of monitoring was VSS        History from:     patient    History of Present Illness:     Chief Complaint: \" abdominal pain.\"

## 2024-11-16 LAB
ALBUMIN SERPL-MCNC: 3.2 G/DL (ref 3.4–5)
ALBUMIN/GLOB SERPL: 1.4 {RATIO} (ref 1.1–2.2)
ALP SERPL-CCNC: 85 U/L (ref 40–129)
ALT SERPL-CCNC: 526 U/L (ref 10–40)
ANION GAP SERPL CALCULATED.3IONS-SCNC: 10 MMOL/L (ref 9–17)
AST SERPL-CCNC: 420 U/L (ref 15–37)
BASOPHILS # BLD: 0.01 K/UL
BASOPHILS NFR BLD: 0 % (ref 0–1)
BILIRUB SERPL-MCNC: 2.3 MG/DL (ref 0–1)
BUN SERPL-MCNC: 17 MG/DL (ref 7–20)
CALCIUM SERPL-MCNC: 9 MG/DL (ref 8.3–10.6)
CHLORIDE SERPL-SCNC: 103 MMOL/L (ref 99–110)
CO2 SERPL-SCNC: 25 MMOL/L (ref 21–32)
CREAT SERPL-MCNC: 1.1 MG/DL (ref 0.8–1.3)
EOSINOPHIL # BLD: 0 K/UL
EOSINOPHILS RELATIVE PERCENT: 0 % (ref 0–3)
ERYTHROCYTE [DISTWIDTH] IN BLOOD BY AUTOMATED COUNT: 14 % (ref 11.7–14.9)
GFR, ESTIMATED: 65 ML/MIN/1.73M2
GLUCOSE SERPL-MCNC: 103 MG/DL (ref 74–99)
HCT VFR BLD AUTO: 40.2 % (ref 42–52)
HGB BLD-MCNC: 13.3 G/DL (ref 13.5–18)
IMM GRANULOCYTES # BLD AUTO: 0.06 K/UL
IMM GRANULOCYTES NFR BLD: 1 %
LYMPHOCYTES NFR BLD: 0.63 K/UL
LYMPHOCYTES RELATIVE PERCENT: 5 % (ref 24–44)
MCH RBC QN AUTO: 32.2 PG (ref 27–31)
MCHC RBC AUTO-ENTMCNC: 33.1 G/DL (ref 32–36)
MCV RBC AUTO: 97.3 FL (ref 78–100)
MONOCYTES NFR BLD: 0.67 K/UL
MONOCYTES NFR BLD: 6 % (ref 0–4)
NEUTROPHILS NFR BLD: 89 % (ref 36–66)
NEUTS SEG NFR BLD: 10.67 K/UL
PLATELET # BLD AUTO: 134 K/UL (ref 140–440)
PMV BLD AUTO: 9.4 FL (ref 7.5–11.1)
POTASSIUM SERPL-SCNC: 4.1 MMOL/L (ref 3.5–5.1)
PROT SERPL-MCNC: 5.5 G/DL (ref 6.4–8.2)
RBC # BLD AUTO: 4.13 M/UL (ref 4.6–6.2)
SODIUM SERPL-SCNC: 138 MMOL/L (ref 136–145)
WBC OTHER # BLD: 12 K/UL (ref 4–10.5)

## 2024-11-16 PROCEDURE — 99024 POSTOP FOLLOW-UP VISIT: CPT | Performed by: SURGERY

## 2024-11-16 PROCEDURE — 1200000000 HC SEMI PRIVATE

## 2024-11-16 PROCEDURE — 94761 N-INVAS EAR/PLS OXIMETRY MLT: CPT

## 2024-11-16 PROCEDURE — 6370000000 HC RX 637 (ALT 250 FOR IP): Performed by: PHYSICIAN ASSISTANT

## 2024-11-16 PROCEDURE — 6360000002 HC RX W HCPCS: Performed by: INTERNAL MEDICINE

## 2024-11-16 PROCEDURE — 2580000003 HC RX 258: Performed by: INTERNAL MEDICINE

## 2024-11-16 PROCEDURE — 80053 COMPREHEN METABOLIC PANEL: CPT

## 2024-11-16 PROCEDURE — 85025 COMPLETE CBC W/AUTO DIFF WBC: CPT

## 2024-11-16 PROCEDURE — 36415 COLL VENOUS BLD VENIPUNCTURE: CPT

## 2024-11-16 RX ORDER — LEVOTHYROXINE SODIUM 50 UG/1
50 TABLET ORAL DAILY
Status: DISCONTINUED | OUTPATIENT
Start: 2024-11-16 | End: 2024-11-17 | Stop reason: HOSPADM

## 2024-11-16 RX ORDER — ASPIRIN 81 MG/1
81 TABLET, CHEWABLE ORAL DAILY
Status: DISCONTINUED | OUTPATIENT
Start: 2024-11-16 | End: 2024-11-17 | Stop reason: HOSPADM

## 2024-11-16 RX ORDER — CLONAZEPAM 0.5 MG/1
0.25 TABLET ORAL 2 TIMES DAILY
Status: DISCONTINUED | OUTPATIENT
Start: 2024-11-16 | End: 2024-11-17 | Stop reason: HOSPADM

## 2024-11-16 RX ORDER — CLOPIDOGREL BISULFATE 75 MG/1
75 TABLET ORAL DAILY
Status: DISCONTINUED | OUTPATIENT
Start: 2024-11-16 | End: 2024-11-17 | Stop reason: HOSPADM

## 2024-11-16 RX ORDER — TRAZODONE HYDROCHLORIDE 50 MG/1
50 TABLET, FILM COATED ORAL NIGHTLY
Status: DISCONTINUED | OUTPATIENT
Start: 2024-11-16 | End: 2024-11-17 | Stop reason: HOSPADM

## 2024-11-16 RX ORDER — AMITRIPTYLINE HYDROCHLORIDE 10 MG/1
30 TABLET ORAL NIGHTLY
Status: DISCONTINUED | OUTPATIENT
Start: 2024-11-16 | End: 2024-11-17 | Stop reason: HOSPADM

## 2024-11-16 RX ORDER — MEXILETINE HYDROCHLORIDE 150 MG/1
150 CAPSULE ORAL 3 TIMES DAILY
Status: DISCONTINUED | OUTPATIENT
Start: 2024-11-16 | End: 2024-11-17 | Stop reason: HOSPADM

## 2024-11-16 RX ORDER — DILTIAZEM HYDROCHLORIDE 120 MG/1
120 CAPSULE, COATED, EXTENDED RELEASE ORAL DAILY
Status: DISCONTINUED | OUTPATIENT
Start: 2024-11-16 | End: 2024-11-17 | Stop reason: HOSPADM

## 2024-11-16 RX ORDER — PANTOPRAZOLE SODIUM 40 MG/1
40 TABLET, DELAYED RELEASE ORAL DAILY
Status: DISCONTINUED | OUTPATIENT
Start: 2024-11-16 | End: 2024-11-17 | Stop reason: HOSPADM

## 2024-11-16 RX ORDER — GABAPENTIN 300 MG/1
300 CAPSULE ORAL 3 TIMES DAILY
Status: DISCONTINUED | OUTPATIENT
Start: 2024-11-16 | End: 2024-11-17 | Stop reason: HOSPADM

## 2024-11-16 RX ADMIN — GABAPENTIN 300 MG: 300 CAPSULE ORAL at 15:02

## 2024-11-16 RX ADMIN — CLONAZEPAM 0.25 MG: 0.5 TABLET ORAL at 21:33

## 2024-11-16 RX ADMIN — MEXILETINE HYDROCHLORIDE 150 MG: 150 CAPSULE ORAL at 08:59

## 2024-11-16 RX ADMIN — PANTOPRAZOLE SODIUM 40 MG: 40 TABLET, DELAYED RELEASE ORAL at 08:52

## 2024-11-16 RX ADMIN — GABAPENTIN 300 MG: 300 CAPSULE ORAL at 21:33

## 2024-11-16 RX ADMIN — AMITRIPTYLINE HYDROCHLORIDE 30 MG: 10 TABLET, FILM COATED ORAL at 21:34

## 2024-11-16 RX ADMIN — PIPERACILLIN AND TAZOBACTAM 3375 MG: 3; .375 INJECTION, POWDER, LYOPHILIZED, FOR SOLUTION INTRAVENOUS at 22:31

## 2024-11-16 RX ADMIN — DILTIAZEM HYDROCHLORIDE 120 MG: 120 CAPSULE, COATED, EXTENDED RELEASE ORAL at 08:52

## 2024-11-16 RX ADMIN — GABAPENTIN 300 MG: 300 CAPSULE ORAL at 08:52

## 2024-11-16 RX ADMIN — PIPERACILLIN AND TAZOBACTAM 3375 MG: 3; .375 INJECTION, POWDER, LYOPHILIZED, FOR SOLUTION INTRAVENOUS at 15:05

## 2024-11-16 RX ADMIN — MEXILETINE HYDROCHLORIDE 150 MG: 150 CAPSULE ORAL at 15:02

## 2024-11-16 RX ADMIN — LEVOTHYROXINE SODIUM 50 MCG: 0.05 TABLET ORAL at 08:52

## 2024-11-16 RX ADMIN — TRAZODONE HYDROCHLORIDE 50 MG: 50 TABLET ORAL at 21:34

## 2024-11-16 RX ADMIN — PIPERACILLIN AND TAZOBACTAM 3375 MG: 3; .375 INJECTION, POWDER, LYOPHILIZED, FOR SOLUTION INTRAVENOUS at 06:37

## 2024-11-16 RX ADMIN — SODIUM CHLORIDE, PRESERVATIVE FREE 10 ML: 5 INJECTION INTRAVENOUS at 21:33

## 2024-11-16 RX ADMIN — CLONAZEPAM 0.25 MG: 0.5 TABLET ORAL at 08:52

## 2024-11-16 RX ADMIN — MEXILETINE HYDROCHLORIDE 150 MG: 150 CAPSULE ORAL at 21:34

## 2024-11-16 RX ADMIN — SODIUM CHLORIDE, PRESERVATIVE FREE 10 ML: 5 INJECTION INTRAVENOUS at 13:00

## 2024-11-16 ASSESSMENT — PAIN SCALES - GENERAL: PAINLEVEL_OUTOF10: 0

## 2024-11-16 NOTE — PROGRESS NOTES
GENERAL SURGERY PROGRESS NOTE    Glynn Florez is a 84 y.o. male who presented with acalculous cholecystitis now POD #1 from robotic assisted cholecystectomy.                Subjective:  No acute events overnight.  Patient reports that he was unable to get much sleep secondary to interruptions and some abdominal discomfort, however he is feeling better this morning.  He denies any nausea or vomiting.  He has not had anything to eat besides some coffee.  He has not gotten out of bed yet.    Objective:    Vitals:   Vitals:    11/15/24 2030 11/15/24 2230 11/16/24 0204 11/16/24 0830   BP: (!) 151/92 (!) 160/91 (!) 153/79 (!) 160/81   Pulse: 95 89  88   Resp: 12 14  12   Temp: 98.3 °F (36.8 °C)  98.4 °F (36.9 °C) 98.8 °F (37.1 °C)   TempSrc: Oral  Oral Oral   SpO2: 94%  95% 95%   Weight:       Height:             I/O: 11/15 0701 - 11/16 0700  In: 240 [P.O.:240]  Out: 780 [Urine:780]    Labs/Imaging Results:   Lab Results   Component Value Date    WBC 12.0 (H) 11/16/2024    HGB 13.3 (L) 11/16/2024    HCT 40.2 (L) 11/16/2024    MCV 97.3 11/16/2024     (L) 11/16/2024     Lab Results   Component Value Date     11/16/2024    K 4.1 11/16/2024     11/16/2024    CO2 25 11/16/2024    BUN 17 11/16/2024    CREATININE 1.1 11/16/2024    GLUCOSE 103 (H) 11/16/2024    CALCIUM 9.0 11/16/2024    BILITOT 2.3 (H) 11/16/2024    ALKPHOS 85 11/16/2024     (H) 11/16/2024     (H) 11/16/2024    LABGLOM 65 11/16/2024    GFRAA >60 10/17/2022    AGRATIO 2.0 01/23/2023    GLOB 2.0 11/08/2019     CT ABDOMEN PELVIS W IV CONTRAST Additional Contrast? None   Final Result      Suggestion of mild edema adjacent to the gallbladder. No calcified gallstones or   significant dilation of the gallbladder. The appearance could be due to   acalculus cholecystitis. Recommend correlation with clinical exam findings.   Abdominal ultrasound or a nuclear medicine hepatobiliary scan may be considered.         There appears to be

## 2024-11-16 NOTE — PLAN OF CARE
Problem: Discharge Planning  Goal: Discharge to home or other facility with appropriate resources  Outcome: Progressing     Problem: Pain  Goal: Verbalizes/displays adequate comfort level or baseline comfort level  Outcome: Progressing     Problem: Safety - Adult  Goal: Free from fall injury  Outcome: Progressing     Problem: ABCDS Injury Assessment  Goal: Absence of physical injury  Outcome: Progressing     Problem: Skin/Tissue Integrity - Adult  Goal: Incisions, wounds, or drain sites healing without S/S of infection  Outcome: Progressing     Problem: Gastrointestinal - Adult  Goal: Minimal or absence of nausea and vomiting  Outcome: Progressing  Goal: Maintains or returns to baseline bowel function  Outcome: Progressing  Goal: Maintains adequate nutritional intake  Outcome: Progressing

## 2024-11-16 NOTE — PROGRESS NOTES
V2.0  [facility] Hospitalist Progress Note      Name:  Glynn Florez /Age/Sex: 1940  (84 y.o. male)   MRN & CSN:  [id number] & [id number] Encounter Date/Time: 2024 7:21 AM EST    [facility]  [facility] PCP: Mic Ruiz DO       Hospital Day: 2    Assessment and Plan:   Glynn Florez is a 84 y.o. male who presents with Acute cholecystitis    Abdominal pain   Acalculous cholecystitis   -CT abd showed mild edema of the gallbladder, no stones or significant dilation    - s/p lap CCY by Dr. Hargrove 11/15/24  -Zosyn IV empirically  - continue pain control   - pain free this AM    Transaminitis   - , , T bili 2.3 - trending up, Could be transient from surgery, but need to be downtrending prior to discharge.   -Discussed with Dr. Gutierres, will repeat CMP in a.m. and if persistent will need MRCP    HTN  -Continue cardizem     COPD, not in exacerbation  -Continue home regimen     Acquired hypothyroidism  -Continue Levothyroxine     Prostate cancer, in remission     Seizure  -On Keppra     Neuropathy  -Continue Gabapentin     Anxiety  -OARRS reviewed and on Klonopin    This patient was discussed with Dr. Garcia. He was agreeable with the assessment and plan as dictated above.     Current Living situation: home  Expected Disposition: same  Estimated D/C: 24 hours    Diet ADULT DIET; Clear Liquid   DVT Prophylaxis [x] Lovenox, []  Heparin, [] SCDs, [] Ambulation,  [] Eliquis, [] Xarelto []Coumadin   Code Status Full Code   Disposition Patient requires continued admission due to elevated LFTs   Surrogate Decision Maker/ POA Basil Florez      Personally reviewed Lab Studies and Imaging     Discussed management of the case with Dr. Gutierres who recommended repeating LFTs      Subjective:     Chief Complaint: Chest Pain     Patient seen and examined at bedside.  Having no abdominal pain this morning.  Very eager to go home.  Discussed need for general surgery clearance due to elevated LFTs and

## 2024-11-17 VITALS
HEART RATE: 85 BPM | OXYGEN SATURATION: 94 % | DIASTOLIC BLOOD PRESSURE: 78 MMHG | RESPIRATION RATE: 13 BRPM | TEMPERATURE: 98.6 F | WEIGHT: 165 LBS | BODY MASS INDEX: 23.62 KG/M2 | SYSTOLIC BLOOD PRESSURE: 126 MMHG | HEIGHT: 70 IN

## 2024-11-17 LAB
ALBUMIN SERPL-MCNC: 3.1 G/DL (ref 3.4–5)
ALBUMIN/GLOB SERPL: 1.4 {RATIO} (ref 1.1–2.2)
ALP SERPL-CCNC: 83 U/L (ref 40–129)
ALT SERPL-CCNC: 330 U/L (ref 10–40)
ANION GAP SERPL CALCULATED.3IONS-SCNC: 10 MMOL/L (ref 9–17)
AST SERPL-CCNC: 170 U/L (ref 15–37)
BILIRUB SERPL-MCNC: 0.8 MG/DL (ref 0–1)
BUN SERPL-MCNC: 20 MG/DL (ref 7–20)
CALCIUM SERPL-MCNC: 8.7 MG/DL (ref 8.3–10.6)
CHLORIDE SERPL-SCNC: 102 MMOL/L (ref 99–110)
CO2 SERPL-SCNC: 24 MMOL/L (ref 21–32)
CREAT SERPL-MCNC: 1 MG/DL (ref 0.8–1.3)
ERYTHROCYTE [DISTWIDTH] IN BLOOD BY AUTOMATED COUNT: 14.2 % (ref 11.7–14.9)
GFR, ESTIMATED: 70 ML/MIN/1.73M2
GLUCOSE SERPL-MCNC: 110 MG/DL (ref 74–99)
HCT VFR BLD AUTO: 38.3 % (ref 42–52)
HGB BLD-MCNC: 12.7 G/DL (ref 13.5–18)
LIPASE SERPL-CCNC: 198 U/L (ref 13–60)
MCH RBC QN AUTO: 32.2 PG (ref 27–31)
MCHC RBC AUTO-ENTMCNC: 33.2 G/DL (ref 32–36)
MCV RBC AUTO: 97 FL (ref 78–100)
PLATELET, FLUORESCENCE: 115 K/UL (ref 140–440)
PMV BLD AUTO: 9.7 FL (ref 7.5–11.1)
POTASSIUM SERPL-SCNC: 3.7 MMOL/L (ref 3.5–5.1)
PROT SERPL-MCNC: 5.4 G/DL (ref 6.4–8.2)
RBC # BLD AUTO: 3.95 M/UL (ref 4.6–6.2)
SODIUM SERPL-SCNC: 136 MMOL/L (ref 136–145)
WBC OTHER # BLD: 6.4 K/UL (ref 4–10.5)

## 2024-11-17 PROCEDURE — 6370000000 HC RX 637 (ALT 250 FOR IP): Performed by: PHYSICIAN ASSISTANT

## 2024-11-17 PROCEDURE — 36415 COLL VENOUS BLD VENIPUNCTURE: CPT

## 2024-11-17 PROCEDURE — 83690 ASSAY OF LIPASE: CPT

## 2024-11-17 PROCEDURE — 85027 COMPLETE CBC AUTOMATED: CPT

## 2024-11-17 PROCEDURE — 99024 POSTOP FOLLOW-UP VISIT: CPT | Performed by: PHYSICIAN ASSISTANT

## 2024-11-17 PROCEDURE — 80053 COMPREHEN METABOLIC PANEL: CPT

## 2024-11-17 PROCEDURE — 6360000002 HC RX W HCPCS: Performed by: INTERNAL MEDICINE

## 2024-11-17 PROCEDURE — 94761 N-INVAS EAR/PLS OXIMETRY MLT: CPT

## 2024-11-17 PROCEDURE — 94150 VITAL CAPACITY TEST: CPT

## 2024-11-17 PROCEDURE — APPNB30 APP NON BILLABLE TIME 0-30 MINS: Performed by: PHYSICIAN ASSISTANT

## 2024-11-17 PROCEDURE — 2580000003 HC RX 258: Performed by: INTERNAL MEDICINE

## 2024-11-17 RX ORDER — POLYETHYLENE GLYCOL 3350 17 G/17G
17 POWDER, FOR SOLUTION ORAL DAILY PRN
COMMUNITY
Start: 2024-11-17 | End: 2024-12-17

## 2024-11-17 RX ADMIN — GABAPENTIN 300 MG: 300 CAPSULE ORAL at 09:46

## 2024-11-17 RX ADMIN — LEVOTHYROXINE SODIUM 50 MCG: 0.05 TABLET ORAL at 06:37

## 2024-11-17 RX ADMIN — DILTIAZEM HYDROCHLORIDE 120 MG: 120 CAPSULE, COATED, EXTENDED RELEASE ORAL at 09:45

## 2024-11-17 RX ADMIN — MEXILETINE HYDROCHLORIDE 150 MG: 150 CAPSULE ORAL at 09:45

## 2024-11-17 RX ADMIN — PANTOPRAZOLE SODIUM 40 MG: 40 TABLET, DELAYED RELEASE ORAL at 09:46

## 2024-11-17 RX ADMIN — CLONAZEPAM 0.25 MG: 0.5 TABLET ORAL at 09:45

## 2024-11-17 RX ADMIN — PIPERACILLIN AND TAZOBACTAM 3375 MG: 3; .375 INJECTION, POWDER, LYOPHILIZED, FOR SOLUTION INTRAVENOUS at 06:36

## 2024-11-17 NOTE — PROGRESS NOTES
GENERAL SURGERY PROGRESS NOTE    Glynn Florez is a 84 y.o. male with 2 Days Post-Op robotic cholecystectomy .                 Subjective:    Pain: \"A little Sore\"  BM: -ve, is passing flatus   Diet: ADULT DIET; Regular  Activity: Tolerating well    Pt feels well today. Denies N/V. Is passing flatus, but no BM. Is urinating well.     Review of Systems   Constitutional:  Negative for chills and fever.   HENT:  Negative for ear pain, mouth sores, sore throat and tinnitus.    Eyes:  Negative for photophobia, redness and itching.   Respiratory:  Negative for apnea, choking and stridor.    Cardiovascular:  Negative for chest pain and palpitations.   Gastrointestinal:  Positive for abdominal pain. Negative for anal bleeding, constipation, nausea, rectal pain and vomiting.   Endocrine: Negative for polydipsia.   Genitourinary:  Negative for enuresis, flank pain and hematuria.   Musculoskeletal:  Negative for back pain, joint swelling and myalgias.   Skin:  Negative for color change and pallor.   Allergic/Immunologic: Negative for environmental allergies.   Neurological:  Negative for syncope and speech difficulty.   Psychiatric/Behavioral:  Negative for confusion and hallucinations.        Objective:    Vitals: VITALS:  /78   Pulse 85   Temp 98.6 °F (37 °C) (Oral)   Resp 13   Ht 1.778 m (5' 10\")   Wt 74.8 kg (165 lb)   SpO2 94%   BMI 23.68 kg/m²   TEMPERATURE:  Current - Temp: 98.6 °F (37 °C); Max - Temp  Av.5 °F (36.9 °C)  Min: 98.3 °F (36.8 °C)  Max: 98.7 °F (37.1 °C)    I/O:  0701 -  07  In: 480 [P.O.:480]  Out: -     Labs/Imaging Results:   Lab Results   Component Value Date    WBC 6.4 2024    HGB 12.7 (L) 2024    HCT 38.3 (L) 2024    MCV 97.0 2024     (L) 2024     Lab Results   Component Value Date     2024    K 3.7 2024     2024    CO2 24 2024    BUN 20 2024    CREATININE 1.0 2024    GLUCOSE 110 (H)

## 2024-11-17 NOTE — PLAN OF CARE
Problem: Discharge Planning  Goal: Discharge to home or other facility with appropriate resources  11/17/2024 1033 by Yulisa Rodriguez RN  Outcome: Progressing  11/16/2024 2221 by Sneha Sutton LPN  Outcome: Progressing     Problem: Pain  Goal: Verbalizes/displays adequate comfort level or baseline comfort level  11/17/2024 1033 by Yulisa Rodriguez RN  Outcome: Progressing  11/16/2024 2221 by Sneha Sutton LPN  Outcome: Progressing     Problem: Safety - Adult  Goal: Free from fall injury  11/17/2024 1033 by Yulisa Rodriguez RN  Outcome: Progressing  11/16/2024 2221 by Sneha Sutton LPN  Outcome: Progressing     Problem: ABCDS Injury Assessment  Goal: Absence of physical injury  11/17/2024 1033 by Yulisa Rodriguez RN  Outcome: Progressing  11/16/2024 2221 by Sneha Sutton LPN  Outcome: Progressing     Problem: Skin/Tissue Integrity - Adult  Goal: Incisions, wounds, or drain sites healing without S/S of infection  11/17/2024 1033 by Yulisa Rodriguez RN  Outcome: Progressing  11/16/2024 2221 by Sneha Sutton LPN  Outcome: Progressing     Problem: Gastrointestinal - Adult  Goal: Minimal or absence of nausea and vomiting  11/17/2024 1033 by Yulisa Rodriguez RN  Outcome: Progressing  11/16/2024 2221 by Sneha Sutton LPN  Outcome: Progressing  Goal: Maintains or returns to baseline bowel function  11/17/2024 1033 by Yulisa Rodriguez RN  Outcome: Progressing  11/16/2024 2221 by Sneha Sutton LPN  Outcome: Progressing  Goal: Maintains adequate nutritional intake  11/17/2024 1033 by Yulisa Rodriguez RN  Outcome: Progressing  11/16/2024 2221 by Sneha Sutton LPN  Outcome: Progressing

## 2024-11-17 NOTE — DISCHARGE INSTRUCTIONS
You can go back to taking your aspirin and Plavix on discharge. Follow-up with your primary care doctor to make sure your liver enzymes are still improving.

## 2024-11-17 NOTE — DISCHARGE SUMMARY
V2.0  Discharge Summary    Name:  Glynn Florez /Age/Sex: 1940 (84 y.o. male)   Admit Date: 11/15/2024  Discharge Date: 24    MRN & CSN:  [id number] & [id number] Encounter Date and Time 24 9:57 AM EST    Attending:  Maikel Garcia MD Discharging Provider: Bisi An PA-C       Hospital Course:     Brief HPI: Glynn Florez is a 84 y.o. male who presented with acute cholecystitis.     Problems addressed during this hospitalization:     Abdominal pain   Acalculous cholecystitis   -CT abd showed mild edema of the gallbladder, no stones or significant dilation    - s/p lap CCY by Dr. Hargrove 11/15/24  - received Zosyn IV empirically  - encouraged bowel regimen at home  - pain free this AM, tolerating regular diet. General surgery cleared for discharge. Follow-up in 2 weeks as OP.      Transaminitis   - , , T bili 0.8 (downtrending)  - no abdominal pain   - general surgery cleared for discharge. Recommend follow-up with PCP for repeat CMP in 1 week.     HTN  -Continue cardizem     COPD, not in exacerbation  -Continue home regimen     Acquired hypothyroidism  -Continue Levothyroxine     Prostate cancer, in remission    Thrombocytopenia   - Plts 115 on discharge. Recheck CBC in 5-7 days with PCP.      Seizure  -On Keppra     Neuropathy  -Continue Gabapentin     Anxiety  -OARRS reviewed and on Klonopin    This patient was discussed in conjunction with Dr. Garcia. He was agreeable with patient's plan at discharge as dictated above.     The patient expressed appropriate understanding of, and agreement with the discharge recommendations, medications, and plan.     Consults this admission:  None    Discharge Diagnosis:   Acute cholecystitis      Discharge Instruction:   Follow up appointments: PCP, general surgery  Primary care physician: Mic Ruiz DO within 2 weeks  Diet: regular diet   Activity: activity as tolerated  Disposition: Discharged to:   [x]Home, []HHC, []SNF,

## 2024-11-18 LAB — SURGICAL PATHOLOGY REPORT: NORMAL

## 2024-12-10 ENCOUNTER — OFFICE VISIT (OUTPATIENT)
Dept: BARIATRICS/WEIGHT MGMT | Age: 84
End: 2024-12-10
Payer: MEDICARE

## 2024-12-10 VITALS
SYSTOLIC BLOOD PRESSURE: 110 MMHG | BODY MASS INDEX: 24.17 KG/M2 | HEART RATE: 76 BPM | WEIGHT: 168.8 LBS | DIASTOLIC BLOOD PRESSURE: 72 MMHG | HEIGHT: 70 IN

## 2024-12-10 DIAGNOSIS — K59.09 OTHER CONSTIPATION: ICD-10-CM

## 2024-12-10 DIAGNOSIS — Z90.49 S/P LAPAROSCOPIC CHOLECYSTECTOMY: Primary | ICD-10-CM

## 2024-12-10 PROCEDURE — 99213 OFFICE O/P EST LOW 20 MIN: CPT | Performed by: SURGERY

## 2024-12-10 PROCEDURE — 3078F DIAST BP <80 MM HG: CPT | Performed by: SURGERY

## 2024-12-10 PROCEDURE — 3074F SYST BP LT 130 MM HG: CPT | Performed by: SURGERY

## 2024-12-10 PROCEDURE — G8420 CALC BMI NORM PARAMETERS: HCPCS | Performed by: SURGERY

## 2024-12-10 PROCEDURE — G8427 DOCREV CUR MEDS BY ELIG CLIN: HCPCS | Performed by: SURGERY

## 2024-12-10 PROCEDURE — G8484 FLU IMMUNIZE NO ADMIN: HCPCS | Performed by: SURGERY

## 2024-12-10 PROCEDURE — 1111F DSCHRG MED/CURRENT MED MERGE: CPT | Performed by: SURGERY

## 2024-12-10 PROCEDURE — 1036F TOBACCO NON-USER: CPT | Performed by: SURGERY

## 2024-12-10 PROCEDURE — 1123F ACP DISCUSS/DSCN MKR DOCD: CPT | Performed by: SURGERY

## 2024-12-10 PROCEDURE — 1159F MED LIST DOCD IN RCRD: CPT | Performed by: SURGERY

## 2024-12-10 NOTE — PROGRESS NOTES
Post-Operative Clinic Note    Chief Complaint   Patient presents with    Post-Op Check     1st P/O Lap Cristi @ [facility] 11/15/24         SUBJECTIVE:  Patient is here today for a post-operative visit.     Patient is s/p lap cristi on 11/15/24.     Denies pain.    Tolerating PO.    Reports new issue of constipation.     Taking miralax and senokot.    Past Surgical History:   Procedure Laterality Date    CARDIAC CATHETERIZATION  2/2/12    EF 55% No sugnificant disease.    CHOLECYSTECTOMY, LAPAROSCOPIC N/A 11/15/2024    CHOLECYSTECTOMY LAPAROSCOPIC ROBOTIC performed by Ayaz Hargrove II, MD at [facility] OR    COLONOSCOPY  5/5/14    diverticulosis, 1 polyp, hemorrhoids    COLONOSCOPY  07/10/2017    single 5 mm polyp found in sigmoid colon, moderate diverticulosis found in sigmoid colon    LUMBAR DISC SURGERY  11/2002    L4-5 hemilaminectomy and disk    LUMBAR DISCECTOMY  1973    PROSTATE SURGERY  09/26/2011    s/p robotic radical prostatectomy    ROTATOR CUFF REPAIR Right 10/2016    TIBIA FRACTURE SURGERY Right     childhood    URETEROLITHOTOMY  1992     Past Medical History:   Diagnosis Date    Anesthesia complication     Patient reports in 1973 following a Back Surgery he was kept in the hospital due to high temperatures in the evenings. He stated they kept him for observation and doesn't remember receiving any treatment for the high temperatures. Patient reports the anesthesia name started with flur??.    BPH with obstruction/lower urinary tract symptoms     Chest pain 07/01/2018    Chronic low back pain     GERD (gastroesophageal reflux disease)     H/O 24 hour EKG monitoring 3/12  12/11    3/12/12  essentially a normal event monitor without significant arrhythmias noted    H/O cardiac catheterization 2/2/12 4/02 10/98    2/22/12  LM, LAD and RCA all without significant disease    H/O cardiac catheterization 2/2/12 4/02 10/98    Done R/T false pos on stress test.    H/O cardiac catheterization 08/06/2018    normal study

## 2024-12-13 ENCOUNTER — HOSPITAL ENCOUNTER (OUTPATIENT)
Age: 84
Setting detail: SPECIMEN
Discharge: HOME | End: 2024-12-13
Payer: MEDICARE

## 2024-12-13 LAB
AMPHET UR QL SCN: POSITIVE
BARBITURATES UR QL SCN: NEGATIVE
BENZODIAZ UR QL: POSITIVE
CANNABINOIDS UR QL SCN: NEGATIVE
COCAINE UR QL SCN: NEGATIVE
FENTANYL UR QL: NEGATIVE
OPIATES UR QL SCN: NEGATIVE
OXYCODONE UR QL SCN: NEGATIVE
TEST INFORMATION: ABNORMAL

## 2024-12-13 PROCEDURE — 80307 DRUG TEST PRSMV CHEM ANLYZR: CPT

## 2025-02-05 ENCOUNTER — OFFICE VISIT (OUTPATIENT)
Dept: CARDIOLOGY CLINIC | Age: 85
End: 2025-02-05
Payer: MEDICARE

## 2025-02-05 VITALS
WEIGHT: 176 LBS | HEART RATE: 80 BPM | DIASTOLIC BLOOD PRESSURE: 86 MMHG | BODY MASS INDEX: 25.2 KG/M2 | HEIGHT: 70 IN | SYSTOLIC BLOOD PRESSURE: 134 MMHG

## 2025-02-05 DIAGNOSIS — E78.2 MODERATE MIXED HYPERLIPIDEMIA NOT REQUIRING STATIN THERAPY: ICD-10-CM

## 2025-02-05 DIAGNOSIS — M79.10 MYALGIA DUE TO STATIN: ICD-10-CM

## 2025-02-05 DIAGNOSIS — I10 PRIMARY HYPERTENSION: Primary | ICD-10-CM

## 2025-02-05 DIAGNOSIS — T46.6X5A MYALGIA DUE TO STATIN: ICD-10-CM

## 2025-02-05 PROCEDURE — 1123F ACP DISCUSS/DSCN MKR DOCD: CPT | Performed by: NURSE PRACTITIONER

## 2025-02-05 PROCEDURE — G8427 DOCREV CUR MEDS BY ELIG CLIN: HCPCS | Performed by: NURSE PRACTITIONER

## 2025-02-05 PROCEDURE — 99214 OFFICE O/P EST MOD 30 MIN: CPT | Performed by: NURSE PRACTITIONER

## 2025-02-05 PROCEDURE — 1159F MED LIST DOCD IN RCRD: CPT | Performed by: NURSE PRACTITIONER

## 2025-02-05 PROCEDURE — G8419 CALC BMI OUT NRM PARAM NOF/U: HCPCS | Performed by: NURSE PRACTITIONER

## 2025-02-05 PROCEDURE — 1036F TOBACCO NON-USER: CPT | Performed by: NURSE PRACTITIONER

## 2025-02-05 PROCEDURE — 1160F RVW MEDS BY RX/DR IN RCRD: CPT | Performed by: NURSE PRACTITIONER

## 2025-02-05 RX ORDER — LISINOPRIL 20 MG/1
20 TABLET ORAL DAILY
Qty: 30 TABLET | Refills: 2 | Status: SHIPPED | OUTPATIENT
Start: 2025-02-05

## 2025-02-05 ASSESSMENT — ENCOUNTER SYMPTOMS
ORTHOPNEA: 0
SHORTNESS OF BREATH: 0

## 2025-02-05 NOTE — PROGRESS NOTES
2/5/2025  Primary cardiologist: Dr. Randhawa    CC:   Glynn  is an established 85 y.o.  male here for a follow up on HTN      SUBJECTIVE/OBJECTIVE:  Glynn is a 85 y.o. male with a history of hypertension, hypothyroid and  intracranial hemorrhage.  Carmen underwent a heart catheterization in October 2023 that showed calcified artery with no significant CAD.    HPI:  Glynn states his blood pressure has been fluctuating at home.  Having episodes of blood pressure in the evening up to 180/100.    Review of Systems   Constitutional: Negative for diaphoresis and malaise/fatigue.   Cardiovascular:  Negative for chest pain, claudication, dyspnea on exertion, irregular heartbeat, leg swelling, near-syncope, orthopnea, palpitations and paroxysmal nocturnal dyspnea.   Respiratory:  Negative for shortness of breath.    Neurological:  Negative for dizziness and light-headedness.       Vitals:    02/05/25 1137   BP: 134/86   Site: Left Upper Arm   Position: Sitting   Cuff Size: Medium Adult   Pulse: 80   Weight: 79.8 kg (176 lb)   Height: 1.778 m (5' 10\")     Wt Readings from Last 3 Encounters:   02/05/25 79.8 kg (176 lb)   12/10/24 76.6 kg (168 lb 12.8 oz)   11/15/24 74.8 kg (165 lb)      Body mass index is 25.25 kg/m².     Physical Exam  Vitals reviewed.   Eyes:      Pupils: Pupils are equal, round, and reactive to light.   Neck:      Vascular: No carotid bruit.   Cardiovascular:      Rate and Rhythm: Normal rate and regular rhythm.      Pulses: Normal pulses.   Pulmonary:      Effort: Pulmonary effort is normal.      Breath sounds: Normal breath sounds.   Musculoskeletal:      Right lower leg: No edema.      Left lower leg: No edema.   Skin:     General: Skin is warm and dry.      Capillary Refill: Capillary refill takes less than 2 seconds.   Neurological:      Mental Status: He is alert and oriented to person, place, and time.                Current Outpatient Medications   Medication Sig Dispense Refill    Multiple

## 2025-02-20 ENCOUNTER — NURSE ONLY (OUTPATIENT)
Dept: CARDIOLOGY CLINIC | Age: 85
End: 2025-02-20

## 2025-02-20 VITALS
DIASTOLIC BLOOD PRESSURE: 84 MMHG | WEIGHT: 173.2 LBS | HEART RATE: 84 BPM | SYSTOLIC BLOOD PRESSURE: 140 MMHG | BODY MASS INDEX: 24.79 KG/M2 | HEIGHT: 70 IN | OXYGEN SATURATION: 94 %

## 2025-02-20 NOTE — PROGRESS NOTES
Glynn Florez is here for a 2 week BP check  Last visit he was noted to have high blood pressure readings at home.  He was started on lisinopril.  He did bring in record of his last couple days of blood pressure readings.  He continues to have blood pressure readings of 170/80      BP Readings from Last 3 Encounters:   02/20/25 (!) 140/84   02/05/25 134/86   12/10/24 110/72     Pulse Readings from Last 3 Encounters:   02/20/25 84   02/05/25 80   12/10/24 76     Wt Readings from Last 3 Encounters:   02/20/25 78.6 kg (173 lb 3.2 oz)   02/05/25 79.8 kg (176 lb)   12/10/24 76.6 kg (168 lb 12.8 oz)           Assessment and Plan:   Glynn Florez is to bring in his home blood pressure machine for comparison in the office.  Question accuracy of his machine.  For now continue with lisinopril /Cardizem

## 2025-02-20 NOTE — PROGRESS NOTES
CLINICAL STAFF DOCUMENTATION    Eloise Freeman, TERESITA     Glynn Florez  1940  [id number]    Have you had any Chest Pain recently? - No  If Yes DO EKG   What type of pain is it? -    Tender to palpate (touch)?   When did the pain begin? -    How long does the pain last? -      How Severe is the pain? -   Is there anything that aggravates or triggers the pain?   Did you take any medication?    And did it relieve the pain -   Is there anything that relieves it?   Do you have any other symptoms at the same time?     DO EKG IF: Patient has a Heart Rate above 100 or below 40 (Ex:A-fib, Aflutter, PAF, SVT, VT, Bradycardia)      CAD (Coronary Artery Disease) patient should have one on file every 6 months     New Consult or New Patient     If patient is following up from a current Stent/PCI     If patient is following up from a current Cardioversion        Have you had any Shortness of Breath - No  When did it begin? -    If Yes - When   How many flights of stairs can you go up without having SOB? -   Are you on Oxygen during the day or at night? -   How many liters of Oxygen are you on? -   How many pillows do you sleep with? -     Have you had any dizziness - No  If Yes DO ORTHOSTATIC BP including pulse  When do you feel dizzy?   Does the room spin?   How long does it last .       Have patient lie down for 5 minutes do supine (lying down), wait 1 minute then do sitting, wait 1 minute then do standing - log each in \"vitals\" area in Epic  Be sure to ask what symptoms they are having if they get dizzy while completing ortho stats such as: room spinning, nausea, etc.    Have you had any palpitations recently? - No  If Yes DO EKG - Do you feel your heart     When did they begin? -    How long do they last - .     Is there anything that aggravates or triggers them?   Is there anything that relieves them?   Any thyroid issues? -     Is the patient on any of the following medications -   If Yes DO EKG - Needs done every 3

## 2025-03-06 ENCOUNTER — NURSE ONLY (OUTPATIENT)
Dept: CARDIOLOGY CLINIC | Age: 85
End: 2025-03-06

## 2025-03-06 VITALS — HEART RATE: 80 BPM | SYSTOLIC BLOOD PRESSURE: 150 MMHG | DIASTOLIC BLOOD PRESSURE: 84 MMHG

## 2025-03-06 RX ORDER — LISINOPRIL 40 MG/1
40 TABLET ORAL DAILY
Qty: 30 TABLET | Refills: 1 | Status: SHIPPED | OUTPATIENT
Start: 2025-03-06

## 2025-03-06 NOTE — PROGRESS NOTES
Glynn Florez is here for a 2 week BP check  Last visit he was asked to bring in his machine    His machine readings are close to the manual bp   He continues to have elevated bp readings at home - up to 170/    BP Readings from Last 3 Encounters:   03/06/25 (!) 150/84   02/20/25 (!) 140/84   02/05/25 134/86     Pulse Readings from Last 3 Encounters:   03/06/25 80   02/20/25 84   02/05/25 80     Wt Readings from Last 3 Encounters:   02/20/25 78.6 kg (173 lb 3.2 oz)   02/05/25 79.8 kg (176 lb)   12/10/24 76.6 kg (168 lb 12.8 oz)           Assessment and Plan:   Glynn KETTY Florez is to increase lisinopril to 40 mg daily  Bp check in 2 weeks

## 2025-03-06 NOTE — PATIENT INSTRUCTIONS
Thank you for allowing us to care for you today!   We want to ensure we can follow your treatment plan and we strive to give you the best outcomes and experience possible.   If you ever have a life threatening emergency and call 911 - for an ambulance (EMS)  REMEMBER  Our providers can only care for you at:   [facility] or [facility]   Even if you have someone take you or you drive yourself we can only care for you in a [facility] facility. Our providers are not setup at the other healthcare locations!    PLEASE CALL OUR OFFICE DURING NORMAL BUSINESS HOURS  Monday through Friday 8 am to 5 pm  AFTER HOURS the physician on-call cannot help with scheduling, rescheduling, procedure instruction questions or any type of medication need or issue.  [facility] P:[phone] - [facility] P:[phone] - [facility] P:[phone]      If you receive a survey:  We would appreciate you taking the time to share your experience concerning your provider visit in the office.    These surveys are confidential!  We are eager to improve and are counting on you to share your feedback so we can ensure you get the best care possible.

## 2025-03-21 ENCOUNTER — CLINICAL SUPPORT (OUTPATIENT)
Dept: CARDIOLOGY CLINIC | Age: 85
End: 2025-03-21

## 2025-03-21 VITALS
BODY MASS INDEX: 24.71 KG/M2 | HEART RATE: 84 BPM | DIASTOLIC BLOOD PRESSURE: 90 MMHG | WEIGHT: 172.6 LBS | HEIGHT: 70 IN | SYSTOLIC BLOOD PRESSURE: 140 MMHG

## 2025-03-21 NOTE — PROGRESS NOTES
Glynnseng Florez is here for a 2 week BP check  Last visit his lisinopril was increased to 40 mg a day     BP Readings from Last 3 Encounters:   03/21/25 (!) 140/90   03/06/25 (!) 150/84   02/20/25 (!) 140/84     Pulse Readings from Last 3 Encounters:   03/21/25 84   03/06/25 80   02/20/25 84     Wt Readings from Last 3 Encounters:   03/21/25 78.3 kg (172 lb 9.6 oz)   02/20/25 78.6 kg (173 lb 3.2 oz)   02/05/25 79.8 kg (176 lb)           Assessment and Plan:   Glynn KETTY Florez is to increase Cardizem to 240 mg a day

## 2025-04-08 ENCOUNTER — CLINICAL SUPPORT (OUTPATIENT)
Dept: CARDIOLOGY CLINIC | Age: 85
End: 2025-04-08

## 2025-04-08 VITALS
WEIGHT: 172 LBS | HEART RATE: 60 BPM | BODY MASS INDEX: 24.62 KG/M2 | DIASTOLIC BLOOD PRESSURE: 70 MMHG | HEIGHT: 70 IN | SYSTOLIC BLOOD PRESSURE: 116 MMHG

## 2025-04-08 RX ORDER — LISINOPRIL 40 MG/1
40 TABLET ORAL DAILY
Qty: 90 TABLET | Refills: 2 | Status: SHIPPED | OUTPATIENT
Start: 2025-04-08 | End: 2025-04-10 | Stop reason: SDUPTHER

## 2025-04-08 NOTE — PROGRESS NOTES
CLINICAL STAFF DOCUMENTATION    Eloise Freeman CNP     Glynnseng Florez  1940  [id number]    Have you had any Chest Pain recently? - No      Have you had any Shortness of Breath - No      Have you had any dizziness - No      Have you had any palpitations recently? - No      Do you have any edema - swelling in No        When did you have your last labs drawn 12/24  What doctor ordered Katte  Do we have the labs in their chart Yes      Do you need any prescriptions refilled? - Yes    Do you have a surgery or procedure scheduled in the near future - No      Do use tobacco products? - No  Do you drink alcohol? - No  Do you use any illicit drugs? - No  Caffeine? - Yes  How much caffeine? .2  cups daily.

## 2025-04-08 NOTE — PATIENT INSTRUCTIONS
Thank you for allowing us to care for you today!   We want to ensure we can follow your treatment plan and we strive to give you the best outcomes and experience possible.   If you ever have a life threatening emergency and call 911 - for an ambulance (EMS)  REMEMBER  Our providers can only care for you at:   [facility] or [facility]   Even if you have someone take you or you drive yourself we can only care for you in a [facility] facility. Our providers are not setup at the other healthcare locations!    PLEASE CALL OUR OFFICE DURING NORMAL BUSINESS HOURS  Monday through Friday 8 am to 5 pm  AFTER HOURS the physician on-call cannot help with scheduling, rescheduling, procedure instruction questions or any type of medication need or issue.  [facility] P:[phone] - [facility] P:[phone] - [facility] P:[phone]      If you receive a survey:  We would appreciate you taking the time to share your experience concerning your provider visit in the office.    These surveys are confidential!  We are eager to improve and are counting on you to share your feedback so we can ensure you get the best care possible.

## 2025-04-08 NOTE — PROGRESS NOTES
Glynn Florez is here for a 2 week BP check  Last visit Cardizem was increased     BP Readings from Last 3 Encounters:   04/08/25 116/70   03/21/25 (!) 140/90   03/06/25 (!) 150/84     Pulse Readings from Last 3 Encounters:   04/08/25 60   03/21/25 84   03/06/25 80     Wt Readings from Last 3 Encounters:   04/08/25 78 kg (172 lb)   03/21/25 78.3 kg (172 lb 9.6 oz)   02/20/25 78.6 kg (173 lb 3.2 oz)           Assessment and Plan:   Glynn Florez is to CPM

## 2025-04-10 RX ORDER — LISINOPRIL 40 MG/1
40 TABLET ORAL DAILY
Qty: 90 TABLET | Refills: 1 | Status: SHIPPED | OUTPATIENT
Start: 2025-04-10

## 2025-05-08 NOTE — PROGRESS NOTES
MRN: [id number]  Name: Glynn Florez  : 1940    Insurance: Payor: MEDICARE /  /  /      Phone #: [phone]  Provider: Brie Randhawa MD     Date of Visit: 2025    Reason for visit: 3 MO Recent Hospitalization Date:    Reason for Hospitalization:    Last EK  Type of Device:       Vitals BP HR O2% WT HT ORTHO BP LYING ORTHO BP SITTING ORTHO BP SITTING   Today's Findings           Patients work up- Check List     Testing Last Date Completed Date Expected  ([facility]) Additional Notes    MA to document For provider to complete Either MA or Provider    Carotid Duplex  STAT 1 WK 6 MTH       THIS WK 2 WK 1 YEAR     Cardiac CTA  STAT 1 WK 6 MTH       THIS WK 2 WK 1 YEAR     Cardiac CT Calcium scoring  STAT 1 WK 6 MTH       THIS WK 2 WK 1 YEAR     CTA Chest, Abdomen & Pelvis  STAT 1 WK 6 MTH       THIS WK 2 WK 1 YEAR     CT Chest IV w/ Contrast  STAT 1 WK 6 MTH       THIS WK 2 WK 1 YEAR     CT Chest w/o Contrast  STAT 1 WK 6 MTH       THIS WK 2 WK 1 YEAR     CXR  STAT 1 WK 6 MTH       THIS WK 2 WK 1 YEAR     ECHO  Stress Complete Limited     MRI- Cardiac  STAT 1 WK 6 MTH       THIS WK 2 WK 1 YEAR     MUGA Scan  STAT 1 WK 6 MTH       THIS WK 2 WK 1 YEAR     Nuclear Stress  Lexiscan Cardiolite     PFT  STAT 1 WK 6 MTH       THIS WK 2 WK 1 YEAR     Treadmill Stress Test  STAT 1 WK 6 MTH       THIS WK 2 WK 1 YEAR     Vascular Duplex  Lower: Right Left Bilat       Upper: Right Left Bilat     Other Test Not Listed:    Monitors Last Date Completed Day's Request/Ordered     Holter  Short term 24 hours 48 hours      Long term 3 days 7 days 14 days   Event   (1-30 days)      Procedures Last Date Performed Procedure Details Date Expected   Additional Notes    ASD Closure        Carotid Angio        Cardioversion        Heart Cath 10/23 R L R&L      Peripheral Angio  R L      PFO Closure        PTCA/PCI        JHOAN        JHOAN/Cardioversion        Venogram        Tilt Table        Other Type of Procedure:

## 2025-05-16 ENCOUNTER — OFFICE VISIT (OUTPATIENT)
Dept: CARDIOLOGY CLINIC | Age: 85
End: 2025-05-16
Payer: MEDICARE

## 2025-05-16 VITALS
SYSTOLIC BLOOD PRESSURE: 90 MMHG | HEART RATE: 64 BPM | DIASTOLIC BLOOD PRESSURE: 60 MMHG | HEIGHT: 70 IN | WEIGHT: 169.2 LBS | BODY MASS INDEX: 24.22 KG/M2

## 2025-05-16 DIAGNOSIS — I25.10 ASCVD (ARTERIOSCLEROTIC CARDIOVASCULAR DISEASE): Primary | ICD-10-CM

## 2025-05-16 PROCEDURE — 1159F MED LIST DOCD IN RCRD: CPT | Performed by: INTERNAL MEDICINE

## 2025-05-16 PROCEDURE — G8427 DOCREV CUR MEDS BY ELIG CLIN: HCPCS | Performed by: INTERNAL MEDICINE

## 2025-05-16 PROCEDURE — 1123F ACP DISCUSS/DSCN MKR DOCD: CPT | Performed by: INTERNAL MEDICINE

## 2025-05-16 PROCEDURE — 1036F TOBACCO NON-USER: CPT | Performed by: INTERNAL MEDICINE

## 2025-05-16 PROCEDURE — 99214 OFFICE O/P EST MOD 30 MIN: CPT | Performed by: INTERNAL MEDICINE

## 2025-05-16 PROCEDURE — G8420 CALC BMI NORM PARAMETERS: HCPCS | Performed by: INTERNAL MEDICINE

## 2025-05-16 NOTE — PATIENT INSTRUCTIONS
Thank you for allowing us to care for you today!   We want to ensure we can follow your treatment plan and we strive to give you the best outcomes and experience possible.   If you ever have a life threatening emergency and call 911 - for an ambulance (EMS)  REMEMBER  Our providers can only care for you at:   [facility] or [facility]   Even if you have someone take you or you drive yourself we can only care for you in a [facility] facility. Our providers are not setup at the other healthcare locations!    PLEASE CALL OUR OFFICE DURING NORMAL BUSINESS HOURS  Monday through Friday 8 am to 5 pm  AFTER HOURS the physician on-call cannot help with scheduling, rescheduling, procedure instruction questions or any type of medication need or issue.  [facility] P:[phone] - [facility] P:[phone] - [facility] P:[phone]      If you receive a survey:  We would appreciate you taking the time to share your experience concerning your provider visit in the office.    These surveys are confidential!  We are eager to improve and are counting on you to share your feedback so we can ensure you get the best care possible.

## 2025-05-16 NOTE — PROGRESS NOTES
CLINICAL STAFF DOCUMENTATION    Dr. Brie Florez  1940  [id number]    Have you had any Chest Pain recently? - No  Have you had any Shortness of Breath - No  Have you had any dizziness - No  Have you had any palpitations recently? - No    Do you have any edema - swelling in  Right leg     How long have they been having edema - Weeks  If Yes - Have they worn compression stockings No  If they have worn compression stockings      Vein \"LEG PROBLEM Questionnaire\"  Do you have prominent leg veins?  No   Do you have any skin discoloration?  No  Do you have any healed or active sores? No  Do you have swelling of the legs?  Yes  Do you have a family history of varicose veins? No  Does your profession involve pro-longed        standing or heavy lifting?    No  7.   Have you been fighting overweight problems? No  8.   Do you have restless legs?   No  9.   Do you have any night time cramps?  No  10. Do you have any of the following in your legs:             Is the patient on any of the following medications - Mexitil (mexiletine)  If Yes DO EKG - Needs done every 3 months    When did you have your last labs drawn 11/24  What doctor ordered [place]  Do we have the labs in their chart Yes  If we do not have the labs, ask where they were drawn     Do you need any prescriptions refilled? - No    Do you have a surgery or procedure scheduled in the near future - No    Do use tobacco products? - No  Do you drink alcohol? - No  Do you use any illicit drugs? - No  Caffeine? - Yes  How much caffeine? 3 cups of coffee daily     Check medication list thoroughly!!! AND RECONCILE OUTSIDE MEDICATIONS  If dose has changed change the entire order not just the MG  BE SURE TO ASK PATIENT IF THEY NEED MEDICATION REFILLS  Verify Pharmacy and update if incorrect  
      Assessment & Plan:    -  LIPID MANAGEMENT:  Available lipid  lab data reviewed  and patient was given dietary advice. NCEP- ATP III guidelines reviewed with patient.    -   Changes  in medicines made: No     On diet                      - Hypothyroidism on Synthroid 50 mcg p.o. daily check the TSH    -  Hypertension: Patients blood pressure is normal. Patient is advised about low sodium diet. Present medical regimen will not be changed.    On lisinopril 40 mg p.o. daily will check the blood pressure and monitor      - carotid artery stenosis stable on DAPT will be continued     - has exertional hypotension:  [facility] was  OK 10/23    - on mexitil for myotonia per pt  will call        ONUR BAIG MA  FACC

## 2025-06-05 ENCOUNTER — HOSPITAL ENCOUNTER (OUTPATIENT)
Age: 85
Discharge: HOME | End: 2025-06-05
Payer: MEDICARE

## 2025-06-05 PROCEDURE — 80307 DRUG TEST PRSMV CHEM ANLYZR: CPT

## 2025-06-06 LAB
ALCOHOL URINE: NEGATIVE MG/DL
AMPHETAMINES UR QL SCN: NORMAL NG/ML
BARBITURATES UR QL SCN: NEGATIVE NG/ML
BENZODIAZ UR QL SCN: NEGATIVE NG/ML
CANNABINOIDS CTO UR CFM-MCNC: NEGATIVE NG/ML
COCAINE UR QL SCN: NEGATIVE NG/ML
CREAT UR-MCNC: 231.6 MG/DL (ref 20–400)
Lab: NORMAL
METHADONE UR QL SCN: NEGATIVE NG/ML
OPIATES CTO UR CFM-MCNC: NEGATIVE NG/ML
PCP UR QL SCN: NEGATIVE NG/ML
PROPOXYPH UR QL SCN: NEGATIVE NG/ML

## 2025-07-07 RX ORDER — DILTIAZEM HYDROCHLORIDE 120 MG/1
240 CAPSULE, COATED, EXTENDED RELEASE ORAL DAILY
Qty: 180 CAPSULE | Refills: 1 | Status: SHIPPED | OUTPATIENT
Start: 2025-07-07

## 2025-08-07 ENCOUNTER — TELEPHONE (OUTPATIENT)
Dept: CARDIOLOGY CLINIC | Age: 85
End: 2025-08-07

## 2025-08-07 ENCOUNTER — APPOINTMENT (OUTPATIENT)
Dept: GENERAL RADIOLOGY | Age: 85
End: 2025-08-07
Payer: MEDICARE

## 2025-08-07 ENCOUNTER — HOSPITAL ENCOUNTER (EMERGENCY)
Age: 85
Discharge: HOME | End: 2025-08-07
Attending: EMERGENCY MEDICINE
Payer: MEDICARE

## 2025-08-07 VITALS
OXYGEN SATURATION: 98 % | RESPIRATION RATE: 18 BRPM | BODY MASS INDEX: 24.34 KG/M2 | HEART RATE: 70 BPM | SYSTOLIC BLOOD PRESSURE: 150 MMHG | DIASTOLIC BLOOD PRESSURE: 90 MMHG | WEIGHT: 170 LBS | TEMPERATURE: 97.5 F | HEIGHT: 70 IN

## 2025-08-07 DIAGNOSIS — I95.9 HYPOTENSION, UNSPECIFIED HYPOTENSION TYPE: Primary | ICD-10-CM

## 2025-08-07 LAB
ALBUMIN SERPL-MCNC: 3.9 G/DL (ref 3.4–5)
ALBUMIN/GLOB SERPL: 2 {RATIO} (ref 1.1–2.2)
ALP SERPL-CCNC: 52 U/L (ref 40–129)
ALT SERPL-CCNC: 18 U/L (ref 10–40)
ANION GAP SERPL CALCULATED.3IONS-SCNC: 11 MMOL/L (ref 9–17)
AST SERPL-CCNC: 26 U/L (ref 15–37)
BASOPHILS # BLD: 0.02 K/UL
BASOPHILS NFR BLD: 0 % (ref 0–1)
BILIRUB SERPL-MCNC: 0.5 MG/DL (ref 0–1)
BILIRUB UR QL STRIP: ABNORMAL
BUN SERPL-MCNC: 19 MG/DL (ref 7–20)
CALCIUM SERPL-MCNC: 9.3 MG/DL (ref 8.3–10.6)
CASTS #/AREA URNS LPF: ABNORMAL /LPF
CHARACTER UR: ABNORMAL
CHLORIDE SERPL-SCNC: 103 MMOL/L (ref 99–110)
CLARITY UR: CLEAR
CO2 SERPL-SCNC: 23 MMOL/L (ref 21–32)
COLOR UR: YELLOW
CREAT SERPL-MCNC: 1.3 MG/DL (ref 0.8–1.3)
CRYSTALS URNS MICRO: ABNORMAL /HPF
EKG ATRIAL RATE: 68 BPM
EKG DIAGNOSIS: NORMAL
EKG P AXIS: 58 DEGREES
EKG P-R INTERVAL: 218 MS
EKG Q-T INTERVAL: 388 MS
EKG QRS DURATION: 92 MS
EKG QTC CALCULATION (BAZETT): 412 MS
EKG R AXIS: 0 DEGREES
EKG T AXIS: 18 DEGREES
EKG VENTRICULAR RATE: 68 BPM
EOSINOPHIL # BLD: 0.08 K/UL
EOSINOPHILS RELATIVE PERCENT: 2 % (ref 0–3)
EPI CELLS #/AREA URNS HPF: <1 /HPF
ERYTHROCYTE [DISTWIDTH] IN BLOOD BY AUTOMATED COUNT: 13.7 % (ref 11.7–14.9)
GFR, ESTIMATED: 53 ML/MIN/1.73M2
GLUCOSE SERPL-MCNC: 100 MG/DL (ref 74–99)
GLUCOSE UR STRIP-MCNC: NEGATIVE MG/DL
HCT VFR BLD AUTO: 41.5 % (ref 42–52)
HGB BLD-MCNC: 13.3 G/DL (ref 13.5–18)
HGB UR QL STRIP.AUTO: NEGATIVE
IMM GRANULOCYTES # BLD AUTO: 0.02 K/UL
IMM GRANULOCYTES NFR BLD: 0 %
KETONES UR STRIP-MCNC: 15 MG/DL
LEUKOCYTE ESTERASE UR QL STRIP: NEGATIVE
LYMPHOCYTES NFR BLD: 0.88 K/UL
LYMPHOCYTES RELATIVE PERCENT: 19 % (ref 24–44)
MCH RBC QN AUTO: 32.1 PG (ref 27–31)
MCHC RBC AUTO-ENTMCNC: 32 G/DL (ref 32–36)
MCV RBC AUTO: 100.2 FL (ref 78–100)
MONOCYTES NFR BLD: 0.47 K/UL
MONOCYTES NFR BLD: 10 % (ref 0–5)
MUCOUS THREADS URNS QL MICRO: ABNORMAL
NEUTROPHILS NFR BLD: 68 % (ref 36–66)
NEUTS SEG NFR BLD: 3.13 K/UL
NITRITE UR QL STRIP: NEGATIVE
PH UR STRIP: 6 [PH] (ref 5–8)
PLATELET # BLD AUTO: 229 K/UL (ref 140–440)
PMV BLD AUTO: 9.5 FL (ref 7.5–11.1)
POTASSIUM SERPL-SCNC: 4.6 MMOL/L (ref 3.5–5.1)
PROT SERPL-MCNC: 5.8 G/DL (ref 6.4–8.2)
PROT UR STRIP-MCNC: ABNORMAL MG/DL
RBC # BLD AUTO: 4.14 M/UL (ref 4.6–6.2)
RBC #/AREA URNS HPF: <1 /HPF (ref 0–2)
SODIUM SERPL-SCNC: 137 MMOL/L (ref 136–145)
SP GR UR STRIP: 1.02 (ref 1–1.03)
T4 FREE SERPL-MCNC: 1 NG/DL (ref 0.9–1.8)
TROPONIN I SERPL HS-MCNC: 34 NG/L (ref 0–22)
TROPONIN I SERPL HS-MCNC: 44 NG/L (ref 0–22)
TSH SERPL DL<=0.05 MIU/L-ACNC: 5.59 UIU/ML (ref 0.27–4.2)
UROBILINOGEN UR STRIP-ACNC: 2 EU/DL (ref 0–1)
WBC #/AREA URNS HPF: 2 /HPF (ref 0–5)
WBC OTHER # BLD: 4.6 K/UL (ref 4–10.5)

## 2025-08-07 PROCEDURE — 84439 ASSAY OF FREE THYROXINE: CPT

## 2025-08-07 PROCEDURE — 81001 URINALYSIS AUTO W/SCOPE: CPT

## 2025-08-07 PROCEDURE — 84443 ASSAY THYROID STIM HORMONE: CPT

## 2025-08-07 PROCEDURE — 85025 COMPLETE CBC W/AUTO DIFF WBC: CPT

## 2025-08-07 PROCEDURE — 96360 HYDRATION IV INFUSION INIT: CPT

## 2025-08-07 PROCEDURE — 71045 X-RAY EXAM CHEST 1 VIEW: CPT

## 2025-08-07 PROCEDURE — 80053 COMPREHEN METABOLIC PANEL: CPT

## 2025-08-07 PROCEDURE — 84484 ASSAY OF TROPONIN QUANT: CPT

## 2025-08-07 PROCEDURE — 93005 ELECTROCARDIOGRAM TRACING: CPT | Performed by: EMERGENCY MEDICINE

## 2025-08-07 PROCEDURE — 2580000003 HC RX 258: Performed by: EMERGENCY MEDICINE

## 2025-08-07 PROCEDURE — 96361 HYDRATE IV INFUSION ADD-ON: CPT

## 2025-08-07 PROCEDURE — 99285 EMERGENCY DEPT VISIT HI MDM: CPT

## 2025-08-07 RX ORDER — SODIUM CHLORIDE, SODIUM LACTATE, POTASSIUM CHLORIDE, AND CALCIUM CHLORIDE .6; .31; .03; .02 G/100ML; G/100ML; G/100ML; G/100ML
1000 INJECTION, SOLUTION INTRAVENOUS ONCE
Status: COMPLETED | OUTPATIENT
Start: 2025-08-07 | End: 2025-08-07

## 2025-08-07 RX ADMIN — SODIUM CHLORIDE, SODIUM LACTATE, POTASSIUM CHLORIDE, AND CALCIUM CHLORIDE 1000 ML: .6; .31; .03; .02 INJECTION, SOLUTION INTRAVENOUS at 12:34

## 2025-08-07 ASSESSMENT — LIFESTYLE VARIABLES
HOW OFTEN DO YOU HAVE A DRINK CONTAINING ALCOHOL: NEVER
HOW MANY STANDARD DRINKS CONTAINING ALCOHOL DO YOU HAVE ON A TYPICAL DAY: PATIENT DOES NOT DRINK

## 2025-08-07 ASSESSMENT — PAIN - FUNCTIONAL ASSESSMENT: PAIN_FUNCTIONAL_ASSESSMENT: NONE - DENIES PAIN

## 2025-08-08 LAB
EKG ATRIAL RATE: 72 BPM
EKG DIAGNOSIS: NORMAL
EKG P AXIS: 67 DEGREES
EKG P-R INTERVAL: 208 MS
EKG Q-T INTERVAL: 372 MS
EKG QRS DURATION: 90 MS
EKG QTC CALCULATION (BAZETT): 407 MS
EKG R AXIS: 32 DEGREES
EKG T AXIS: 70 DEGREES
EKG VENTRICULAR RATE: 72 BPM

## 2025-08-08 PROCEDURE — 93010 ELECTROCARDIOGRAM REPORT: CPT | Performed by: INTERNAL MEDICINE

## (undated) DEVICE — DUAL LUMEN STOMACH TUBE: Brand: SALEM SUMP

## (undated) DEVICE — Device

## (undated) DEVICE — COLUMN DRAPE

## (undated) DEVICE — TUBING INSUFFLATOR HEAT HI FLO SET PNEUMOCLEAR

## (undated) DEVICE — SEAL

## (undated) DEVICE — SUTURE VICRYL SZ 4-0 L18IN ABSRB UD L19MM PS-2 3/8 CIR PRIM J496H

## (undated) DEVICE — PROTECTOR EYE PT SELF ADH NS OPT GRD LF

## (undated) DEVICE — GLOVE SURG SZ 75 L12IN FNGR THK79MIL GRN LTX FREE

## (undated) DEVICE — 2, DISPOSABLE SUCTION/IRRIGATOR WITH DISPOSABLE TIP: Brand: STRYKEFLOW

## (undated) DEVICE — NEEDLE HYPO 21GA L1.5IN GRN POLYPR HUB S STL REG BVL STR

## (undated) DEVICE — APPLICATOR MEDICATED 26 CC SOLUTION HI LT ORNG CHLORAPREP

## (undated) DEVICE — ELECTRODE ES AD CRDLSS PT RET REM POLYHESIVE

## (undated) DEVICE — GLOVE SURG SZ 7 L12IN FNGR THK79MIL GRN LTX FREE

## (undated) DEVICE — LINER,SEMI-RIGID,3000CC,50EA/CS: Brand: MEDLINE

## (undated) DEVICE — ARM DRAPE

## (undated) DEVICE — TISSUE RETRIEVAL SYSTEM: Brand: INZII RETRIEVAL SYSTEM

## (undated) DEVICE — GLOVE ORANGE PI 7   MSG9070

## (undated) DEVICE — PROGRASP FORCEPS: Brand: ENDOWRIST

## (undated) DEVICE — TUBING FLTR PLUME AWAY EVAC W/ SUCT DEV DISP PUREVIEW

## (undated) DEVICE — SUTURE VICRYL + SZ 0 L27IN ABSRB VLT L26MM UR-6 5/8 CIR VCP603H

## (undated) DEVICE — TUBING, SUCTION, 9/32" X 10', STRAIGHT: Brand: MEDLINE

## (undated) DEVICE — POSITIONER HD AD W4.5XH8XL9IN HIGHLY RESILIENT FOAM CMFRT

## (undated) DEVICE — SOLUTION IRRIG 1000ML STRL H2O USP PLAS POUR BTL

## (undated) DEVICE — ADHESIVE SKIN CLOSURE WND 8.661X1.5 IN 22 CM LIQUIBAND SECUR

## (undated) DEVICE — LAPAROSCOPIC TROCAR SLEEVE/SINGLE USE: Brand: KII® OPTICAL ACCESS SYSTEM

## (undated) DEVICE — BLADELESS OBTURATOR: Brand: WECK VISTA